# Patient Record
Sex: FEMALE | Race: WHITE | NOT HISPANIC OR LATINO | Employment: STUDENT | URBAN - METROPOLITAN AREA
[De-identification: names, ages, dates, MRNs, and addresses within clinical notes are randomized per-mention and may not be internally consistent; named-entity substitution may affect disease eponyms.]

---

## 2017-01-07 ENCOUNTER — TELEPHONE (OUTPATIENT)
Dept: OTHER | Facility: CLINIC | Age: 22
End: 2017-01-07

## 2017-01-07 NOTE — TELEPHONE ENCOUNTER
Telephone Encounter  Date: 1/7/2017    Mom, Anny, called regarding Marleni who is a patient of Dr. Leger with CF. Mom reports that Marleni has had cough, productive of sputum as well as low grade fevers. She is still able to maintain oral intake.     I advised them to present to the Urgent Care for evaluation. Mom is very anxious about going to the  as they have never been to one outside of [facility], though they agreed that Marleni should be evaluated. We recommend checking respiratory viral panel, CXR, and sending sputum for CF cultures.     Discussed with Dr. Lorenzana.     Sadia Norwood MD PGY-5  Pulmonary & Critical Care Fellow

## 2017-02-09 ENCOUNTER — TELEPHONE (OUTPATIENT)
Dept: PULMONOLOGY | Facility: CLINIC | Age: 22
End: 2017-02-09

## 2017-02-09 NOTE — TELEPHONE ENCOUNTER
Prior Authorization Specialty Medication Request    Medication/Dose: Cayston 75 mg  Diagnosis and ICD: E84.0  New/Renewal/Insurance Change PA:  [place], [phone]   ID: [id number]    Important Lab Values:     Previously Tried and Failed Therapies:     Rationale:     Would you like to include any research articles?  If yes please include the hyperlink(s) below or fax @ [phone].    (Include Name and MRN)    If you received a fax notification from an outside Pharmacy;  Pharmacy Name:  [facility]  Pharmacy #:  [phone]  Pharmacy Fax:  [phone]

## 2017-02-14 DIAGNOSIS — E84.9 CF (CYSTIC FIBROSIS) (H): ICD-10-CM

## 2017-02-14 DIAGNOSIS — K86.89 PANCREATIC INSUFFICIENCY: ICD-10-CM

## 2017-02-14 RX ORDER — ERGOCALCIFEROL 1.25 MG/1
50000 CAPSULE, LIQUID FILLED ORAL
Qty: 15 CAPSULE | Refills: 0 | Status: SHIPPED | OUTPATIENT
Start: 2017-02-15 | End: 2017-03-21

## 2017-02-14 NOTE — TELEPHONE ENCOUNTER
[facility] Prior Authorization Team   Phone: [phone]  Fax: [phone]      PA Initiation    Medication: Cayston 75 mg-PENDING  Insurance Company: [employer] - Phone [phone] Fax [phone]  Pharmacy Filling the Rx: [place], [place] - [address] [place] [address]  Filling Pharmacy Phone: [phone]  Filling Pharmacy Fax: [phone]  Start Date: 2/14/2017

## 2017-02-16 NOTE — TELEPHONE ENCOUNTER
Prior Authorization Approval    Authorization Effective Date: 2/14/2017  Authorization Expiration Date: 2/14/2018  Medication: Cayston 75 mg - Approved  Approved Dose/Quantity: Take 1 mL (75 mg) by nebulization 3 times daily 28 days on, 28 days off.  Reference #: [id number]   Insurance Company: [employer] - Phone [phone] Fax [phone]  Expected CoPay:       CoPay Card Available:      Foundation Assistance Needed:    Which Pharmacy is filling the prescription (Not needed for infusion/clinic administered): [place], [place] - [address] [place] [address]  Pharmacy Notified: Yes  Patient Notified: Yes

## 2017-03-21 DIAGNOSIS — E84.9 CF (CYSTIC FIBROSIS) (H): ICD-10-CM

## 2017-03-21 DIAGNOSIS — K86.89 PANCREATIC INSUFFICIENCY: ICD-10-CM

## 2017-03-21 RX ORDER — ERGOCALCIFEROL 1.25 MG/1
50000 CAPSULE, LIQUID FILLED ORAL
Qty: 30 CAPSULE | Refills: 3 | Status: SHIPPED | OUTPATIENT
Start: 2017-03-22 | End: 2017-12-17

## 2017-04-12 DIAGNOSIS — E84.9 CF (CYSTIC FIBROSIS) (H): ICD-10-CM

## 2017-04-25 DIAGNOSIS — E84.0 CYSTIC FIBROSIS WITH PULMONARY MANIFESTATIONS (H): Primary | ICD-10-CM

## 2017-05-03 DIAGNOSIS — E84.9 CF (CYSTIC FIBROSIS) (H): ICD-10-CM

## 2017-05-03 RX ORDER — MV-MIN 51/FOLIC ACID/VIT K/UBI 100-350MCG
2 TABLET,CHEWABLE ORAL DAILY
Qty: 60 TABLET | Refills: 3 | Status: SHIPPED | OUTPATIENT
Start: 2017-05-03 | End: 2017-09-09

## 2017-07-10 DIAGNOSIS — E84.9 CF (CYSTIC FIBROSIS) (H): Primary | ICD-10-CM

## 2017-07-10 NOTE — TELEPHONE ENCOUNTER
Drug Name: [pulmozyme 1mg/ml  Last Fill Date: 6/7/17  Quantity: 75    Allyson Zuletakanika   [facility]  [phone]

## 2017-07-27 DIAGNOSIS — E84.9 CF (CYSTIC FIBROSIS) (H): Primary | ICD-10-CM

## 2017-07-27 RX ORDER — FLUTICASONE PROPIONATE AND SALMETEROL XINAFOATE 115; 21 UG/1; UG/1
AEROSOL, METERED RESPIRATORY (INHALATION)
Qty: 12 INHALER | Refills: 9 | Status: SHIPPED | OUTPATIENT
Start: 2017-07-27 | End: 2018-10-02

## 2017-08-31 DIAGNOSIS — E84.9 CF (CYSTIC FIBROSIS) (H): ICD-10-CM

## 2017-09-05 RX ORDER — ALBUTEROL SULFATE 0.83 MG/ML
SOLUTION RESPIRATORY (INHALATION)
Qty: 360 ML | Refills: 6 | Status: SHIPPED | OUTPATIENT
Start: 2017-09-05 | End: 2018-09-15

## 2017-09-09 DIAGNOSIS — E84.9 CF (CYSTIC FIBROSIS) (H): ICD-10-CM

## 2017-09-11 RX ORDER — MV-MIN 51/FOLIC ACID/VIT K/UBI 100-350MCG
TABLET,CHEWABLE ORAL
Qty: 60 TABLET | Refills: 3 | Status: SHIPPED | OUTPATIENT
Start: 2017-09-11 | End: 2018-01-03

## 2017-09-26 DIAGNOSIS — E84.0 CYSTIC FIBROSIS WITH PULMONARY MANIFESTATIONS (H): ICD-10-CM

## 2017-09-26 RX ORDER — AZITHROMYCIN 500 MG/1
TABLET, FILM COATED ORAL
Qty: 30 TABLET | Refills: 3 | Status: CANCELLED | OUTPATIENT
Start: 2017-09-26

## 2017-09-26 RX ORDER — AZITHROMYCIN 500 MG/1
500 TABLET, FILM COATED ORAL
Qty: 30 TABLET | Refills: 3 | Status: SHIPPED | OUTPATIENT
Start: 2017-09-27 | End: 2018-06-15

## 2017-11-16 DIAGNOSIS — E84.0 CYSTIC FIBROSIS WITH PULMONARY MANIFESTATIONS (H): ICD-10-CM

## 2017-11-20 RX ORDER — PANCRELIPASE 24000; 76000; 120000 [USP'U]/1; [USP'U]/1; [USP'U]/1
CAPSULE, DELAYED RELEASE PELLETS ORAL
Qty: 180 CAPSULE | Refills: 11 | Status: SHIPPED | OUTPATIENT
Start: 2017-11-20 | End: 2018-02-06

## 2017-12-13 ENCOUNTER — TELEPHONE (OUTPATIENT)
Dept: PULMONOLOGY | Facility: CLINIC | Age: 22
End: 2017-12-13

## 2017-12-15 NOTE — TELEPHONE ENCOUNTER
[facility] Prior Authorization Team   Phone: [phone]  Fax: [phone]    Prior Authorization Approval    Authorization Effective Date: 11/13/2017  Authorization Expiration Date: 12/13/2018  Medication: PULMOZYME 1 MG/ML neb solution  Approved Dose/Quantity: Inhale 2.5 mg into the lungs daily / #75  Reference #: CMM KEY#: [id number]   Insurance Company: [employer] - Phone [phone] Fax [phone]  Expected CoPay: $0.00     CoPay Card Available:      Foundation Assistance Needed:    Which Pharmacy is filling the prescription (Not needed for infusion/clinic administered): [facility] - [place], [place] - [facility] [address]  Pharmacy Notified: Yes  Patient Notified: Yes

## 2017-12-17 DIAGNOSIS — E84.9 CF (CYSTIC FIBROSIS) (H): ICD-10-CM

## 2017-12-17 DIAGNOSIS — K86.89 PANCREATIC INSUFFICIENCY: ICD-10-CM

## 2017-12-19 RX ORDER — ERGOCALCIFEROL 1.25 MG/1
CAPSULE, LIQUID FILLED ORAL
Qty: 30 CAPSULE | Refills: 2 | Status: SHIPPED | OUTPATIENT
Start: 2017-12-19 | End: 2018-07-15

## 2017-12-20 ENCOUNTER — TELEPHONE (OUTPATIENT)
Dept: CARE COORDINATION | Facility: CLINIC | Age: 22
End: 2017-12-20

## 2017-12-20 NOTE — TELEPHONE ENCOUNTER
Adult Cystic Fibrosis Program  Social Work Phone Consult    Data:   SW informed that JESSENIA's mother, Anny, cancelled an appointment at the [facility] due to not having enough gas money to get to clinic. SW reached out to Anny to provide resources for getting to clinic. SW left a voicemail with a call back number, encouraging Anny to call back at any time. Will remain available for assistance as able/needed.    Intervention:   -Outreach re: missed clinic appointment; offered resources    Assessment: SW will remain available to provide resources re: getting to clinic as able/needed.    Plan:   -Continue to assist with above related concern(s) as able/needed.  -Continue to follow through regular clinic consult.  -Continue to follow for any psychosocial needs that may arise.  -Complete full psychosocial assessment annually.       AILYN Nj, [facility]  Adult Cystic Fibrosis   (Pager) [phone]  (Phone) [phone]

## 2017-12-21 ENCOUNTER — TELEPHONE (OUTPATIENT)
Dept: PHARMACY | Facility: CLINIC | Age: 22
End: 2017-12-21

## 2017-12-21 DIAGNOSIS — E84.9 CF (CYSTIC FIBROSIS) (H): ICD-10-CM

## 2017-12-21 RX ORDER — TOBRAMYCIN INHALATION SOLUTION 300 MG/5ML
300 INHALANT RESPIRATORY (INHALATION) 2 TIMES DAILY
Qty: 280 ML | Refills: 6 | Status: SHIPPED | OUTPATIENT
Start: 2017-12-21 | End: 2019-01-07

## 2017-12-21 NOTE — TELEPHONE ENCOUNTER
PA Initiation    Medication: tobramycin 300mg/5ml  Insurance Company: [employer] - Phone [phone] Fax [phone]  Pharmacy Filling the Rx: [facility] - [place], [place] - [facility] [address]  Filling Pharmacy Phone: [phone]  Filling Pharmacy Fax: [phone]  Start Date: 12/21/2017    [facility] faxed back requesting additional information,provide documentation that the patient has been seen within the last year and that the medication works.

## 2017-12-22 NOTE — TELEPHONE ENCOUNTER
Prior Authorization Approval    Authorization Effective Date: 11/21/2017  Authorization Expiration Date: 12/22/2018  Medication: tobramycin 300mg/5ml (approved)  Approved Dose/Quantity: 280 per 28 days  Reference #:     Insurance Company: [employer] - Phone [phone] Fax [phone]  Expected CoPay:       CoPay Card Available:      Foundation Assistance Needed:    Which Pharmacy is filling the prescription (Not needed for infusion/clinic administered): [facility] - [place], [place] - [facility] [address]  Pharmacy Notified:  yes  Patient Notified:

## 2017-12-22 NOTE — TELEPHONE ENCOUNTER
Per call to [facility], they never received the additional info that was sent. refaxed marked urgent.

## 2018-01-03 DIAGNOSIS — E84.9 CF (CYSTIC FIBROSIS) (H): ICD-10-CM

## 2018-01-03 NOTE — TELEPHONE ENCOUNTER
APPT INFO    Date /Time: 1/10/18 at 8:30AM   Reason for Appt: Sinusitis   Ref Provider/Clinic: [facility]   Are there internal records? Yes/No?  IF YES, list clinic names: No   Are there outside records? Yes/No? Yes   Patient Contact (Y/N) & Call Details: No, referred   Action: Faxed records request.     OUTSIDE RECORDS CHECKLIST     CLINIC NAME COMMENTS REC (x) IMG (x)   [facility] Waiting for imaging X X

## 2018-01-04 RX ORDER — MV-MIN 51/FOLIC ACID/VIT K/UBI 100-350MCG
TABLET,CHEWABLE ORAL
Qty: 60 TABLET | Refills: 3 | Status: SHIPPED | OUTPATIENT
Start: 2018-01-04 | End: 2018-06-11

## 2018-01-05 NOTE — TELEPHONE ENCOUNTER
Phone Call:    Who did you talk to? (or) Who did you call? Anny, pt's mother   Call Detail/Action: Anny called back, stated pt was seen in 2015.  Emailed BO to [email or web address]

## 2018-01-05 NOTE — TELEPHONE ENCOUNTER
Note:   [facility] denial from [facility], they need signed BO     Phone Call:    Who did you talk to? (or) [facility]   Call Detail/Action: [facility] and spoke to [facility], stated there is no referral on file and was last seen in 10/2015.  Need signed BO     Phone Call:    Who did you talk to? (or) Who did you call? Patient   Call Detail/Action: Called pt and left VM, need to send BO for [facility].  Also need to find out where she had surgery.  Waiting to hear back.

## 2018-01-08 NOTE — TELEPHONE ENCOUNTER
Phone Call:    Who did you talk to? (or) Who did you call? Anny, pt's mother   Call Detail/Action: Pt's call back, stated she didn't get the form back.  Emailed her again and had her stay on the phone.  Verified it was received. She will return it to me before the end of the day.

## 2018-01-09 NOTE — TELEPHONE ENCOUNTER
Note:   Pt's mother returned BO but it is legible.  Emailed pt's mother to send another copy.

## 2018-01-10 ENCOUNTER — PRE VISIT (OUTPATIENT)
Dept: OTOLARYNGOLOGY | Facility: CLINIC | Age: 23
End: 2018-01-10

## 2018-01-10 NOTE — TELEPHONE ENCOUNTER
Note:   APPT RE-SCHEDULED TO 2/28/18     ACTION    What did you do? Received BO, faxed with request.

## 2018-01-12 ENCOUNTER — TELEPHONE (OUTPATIENT)
Dept: PULMONOLOGY | Facility: CLINIC | Age: 23
End: 2018-01-12

## 2018-01-12 DIAGNOSIS — E84.9 CF (CYSTIC FIBROSIS) (H): Primary | ICD-10-CM

## 2018-01-12 RX ORDER — DOXYCYCLINE HYCLATE 100 MG
100 TABLET ORAL 2 TIMES DAILY
Qty: 42 TABLET | Refills: 0 | Status: SHIPPED | OUTPATIENT
Start: 2018-01-12 | End: 2018-02-02

## 2018-01-12 NOTE — TELEPHONE ENCOUNTER
"[facility] [place] [facility]  January 12, 2018    Mickey Peterson    CF Provider: Edi Leger MD    Caller: MotherAnny    Clinical information:  Marleni Alamo has started coughing the past 2 days. \"Bad crunchy\" sounding cough. No fevers. Mother reports that Marleni seems lethargic. Blood sugars are WNL. Last clinic visit was12/20/2016. Mother reports that Marleni was to be brought to clinic in August by her father, who told mother he had taken her, but cancelled the appointment. The next scheduled appointment was cancelled because of no funds for gas.   Currently vesting twice a day.    Plan:   Discussed with Dr Leger:  Doxycycline 100 mg BID x3 weeks.  Increase vest to three times/day.  Needs clinic appointment soon. Appointment scheduled for Wednesday, January 17th.  Call back with any new or worsening symptoms/concerns.    Caller verbalized understanding of plan and agrees with advice given.     "

## 2018-01-24 NOTE — TELEPHONE ENCOUNTER
[facility]    DATE/EXAM/LOCATION  (specify location if different)   Office Notes: 2/24/00, 8/29/00, 8/30/00, 2/7/15, 10/6/15, 3/18/15, 3/7/17,    ED/Hospital Notes: 2/3/15   Radiology Reports: CT Maxillofacial  10/6/15   Operative Notes: Endoscopic Sinus Surgery 10/29/15   Path/Culture Reports: Wound Culture Sinus 10/25/15  Throat Culture 3/18/15, 7/31/15   Missing: Imaging?     ACTION    What did you do? [facility]

## 2018-01-29 NOTE — TELEPHONE ENCOUNTER
Received Imaging From: [facility]     Image Type (x): Disc:_x__  Pacs:___      Exam Date/Name: CT Maxillofacial 10/6/15 Comments: sent to hosp. Film room.

## 2018-02-06 DIAGNOSIS — E84.0 CYSTIC FIBROSIS WITH PULMONARY MANIFESTATIONS (H): ICD-10-CM

## 2018-02-22 ENCOUNTER — ALLIED HEALTH/NURSE VISIT (OUTPATIENT)
Dept: PHARMACY | Facility: CLINIC | Age: 23
End: 2018-02-22
Payer: COMMERCIAL

## 2018-02-22 DIAGNOSIS — E84.9 CF (CYSTIC FIBROSIS) (H): Primary | ICD-10-CM

## 2018-02-22 PROCEDURE — 99207 ZZC NO CHARGE LOS: CPT | Performed by: PHARMACIST

## 2018-02-22 NOTE — MR AVS SNAPSHOT
After Visit Summary   2/22/2018    Marleni Alamo    MRN: [id number]           Patient Information     Date Of Birth          1995        Visit Information        Provider Department      2/22/2018 1:30 PM Fay Jay RPH [facility] Cystic Fib[facility]        Today's Diagnoses     CF (cystic fibrosis) (H)    -  1       Follow-ups after your visit        Your next 10 appointments already scheduled     Feb 26, 2018  9:00 AM CST   CF LOOP with  PFL CF   [facility] Pulmonary Function Testing ([facility])    [address] [facility]  3rd Floor  [facility] [id number] [phone]            Feb 26, 2018  9:40 AM CST   (Arrive by 9:25 AM)   RETURN CYSTIC FIBROSIS VISIT with Yahaira Trinidad PA-C,  Cf Nutritionist   [facility] ([facility])    [address] [facility]  [address] [place] [id number] [phone]            Feb 28, 2018  3:00 PM CST   (Arrive by 2:45 PM)   NEW RHINOLOGY with Anny Carbajal MD   [facility] Ear Nose and Throat ([facility])    [address]  4th Floor  [facility] [id number] [phone]              Who to contact     If you have questions or need follow up information about today's clinic visit or your schedule please contact [facility]AT[facility]IC CLINIC directly at [phone].  Normal or non-critical lab and imaging results will be communicated to you by MyChart, letter or phone within 4 business days after the clinic has received the results. If you do not hear from us within 7 days, please contact the clinic through MyChart or phone. If you have a critical or abnormal lab result, we will notify you by phone as soon as possible.  Submit refill requests through [employer] or call your pharmacy and they will forward the refill request to us. Please allow 3 business days for  "your refill to be completed.          Additional Information About Your Visit        [employer] Information     [employer] lets you send messages to your doctor, view your test results, renew your prescriptions, schedule appointments and more. To sign up, go to [email or web address] . Click on \"Log in\" on the left side of the screen, which will take you to the Welcome page. Then click on \"Sign up Now\" on the right side of the page.     You will be asked to enter the access code listed below, as well as some personal information. Please follow the directions to create your username and password.     Your access code is: [email or web address]  Expires: 3/11/2018  6:30 AM     Your access code will  in 90 days. If you need help or a new code, please call your [facility] clinic or [phone].        Care EveryWhere ID     This is your Care EveryWhere ID. This could be used by other organizations to access your [facility] medical records  [id number]         Blood Pressure from Last 3 Encounters:   16 109/68   16 113/74   13 121/76    Weight from Last 3 Encounters:   16 139 lb 8.8 oz (63.3 kg)   13 120 lb (54.4 kg) (43 %)*     * Growth percentiles are based on [facility] 2-20 Years data.              Today, you had the following     No orders found for display       Primary Care Provider Office Phone # Fax #    Mickey Peterson [phone]       [facility] [address] [place] [address] [place] [id number]        Equal Access to Services     [facility] Hospital[facility] : Hadii dipak red hadashandrew Soliz, waaxda luqadaha, qaybta kaalmada ana, vinny lynn. So [facility] [phone].    ATENCIÓN: Si habla español, tiene a philip disposición servicios gratuitos de asistencia lingüística. Llame al [phone].    We comply with applicable federal civil rights laws and [place] laws. We do not discriminate on the basis of race, color, national origin, age, disability, " sex, sexual orientation, or gender identity.            Thank you!     Thank you for choosing [facility]IAT[facility]IC CLINIC  for your care. Our goal is always to provide you with excellent care. Hearing back from our patients is one way we can continue to improve our services. Please take a few minutes to complete the written survey that you may receive in the mail after your visit with us. Thank you!             Your Updated Medication List - Protect others around you: Learn how to safely use, store and throw away your medicines at www.disposemymeds.org.          This list is accurate as of 2/22/18  1:57 PM.  Always use your most recent med list.                   Brand Name Dispense Instructions for use Diagnosis    albuterol (2.5 MG/3ML) 0.083% neb solution     360 mL    TAKE 1 VIAL (2.5 MG) BY NEBULIZATION 4 TIMES DAILY    CF (cystic fibrosis) (H)       amylase-lipase-protease 55051-51330 UNITS Cpep per EC capsule    CREON    180 capsule    TAKE 4 CAPSULES WITH MEALS AND 3 CAPSULES WITH SNACKS, FOR 3 MEALS AND 2 SNACKS PER DAY.    Cystic fibrosis with pulmonary manifestations (H)       azithromycin 500 MG tablet    ZITHROMAX    30 tablet    Take 1 tablet (500 mg) by mouth Every Mon, Wed, Fri Morning    Cystic fibrosis with pulmonary manifestations (H)       aztreonam lysine 75 MG Solr    CAYSTON    84 mL    Take 1 mL (75 mg) by nebulization 3 times daily 28 days on, 28 days off.    CF (cystic fibrosis) (H)       dornase alpha 1 MG/ML neb solution    PULMOZYME    75 mL    Inhale 2.5 mg into the lungs daily    CF (cystic fibrosis) (H)       fluticasone-salmeterol 115-21 MCG/ACT Inhaler    ADVAIR-HFA    12 Inhaler    INHALE TWO PUFFS BY MOUTH TWICE DAILY    CF (cystic fibrosis) (H)       insulin aspart 100 UNIT/ML injection    NovoLOG PEN     2 units per 15g carbohydrate with meals and correction scale        insulin glargine 100 UNIT/ML injection    LANTUS     Inject 15 Units  Subcutaneous every morning        multivitamin CF formula chewable tablet     60 tablet    TAKE 2 TABLETS BY MOUTH DAILY    CF (cystic fibrosis) (H)       omeprazole 20 MG CR capsule    priLOSEC    60 capsule    Take 1 capsule (20 mg) by mouth 2 times daily    CF (cystic fibrosis) (H)       sodium chloride inhalant 7 % Nebu neb solution     4 mL    Take 4 mLs by nebulization daily UNCLEAR IF THIS IS ON THE CURRENT LIST        tobramycin (PF) 300 MG/5ML neb solution    CHER    280 mL    Take 5 mLs (300 mg) by nebulization 2 times daily    CF (cystic fibrosis) (H)       vitamin D 41182 UNIT capsule    ERGOCALCIFEROL    30 capsule    TAKE 1 CAPSULE (50,000 UNITS) BY MOUTH EVERY MON, WED, FRI MORNING    CF (cystic fibrosis) (H), Pancreatic insufficiency

## 2018-02-22 NOTE — PROGRESS NOTES
At the request of patient's provider, a pre-visit chart review was completed.    CFTR:   Patient is eligible for treatment with Symdeko (tezacaftor/ivacaftor) based on their CF genotype and age.  Patient s genotype is [id number]  Patient is currently not on a CFTR modulator.  Side effects of Kalydeco/Orkambi include n/a  LFTs have not been check for a few years.  Drug-drug interactions with Symdeko (tezacaftor/ivacaftor) no significant drug-drug interactions identified  Dose adjustment needed for Symdeko none  Dose adjustment needed for concomitant medications none    Recommendation: If able to get LFTs drawn quarterly for a year then annually thereafter would be a candidate for Symdeko.        Fay Jay PharmD  CF Clinic Pharmacist  Phone: [phone]  E-mail: [email or web address]

## 2018-02-26 ENCOUNTER — RADIANT APPOINTMENT (OUTPATIENT)
Dept: GENERAL RADIOLOGY | Facility: CLINIC | Age: 23
End: 2018-02-26
Attending: PHYSICIAN ASSISTANT
Payer: COMMERCIAL

## 2018-02-26 ENCOUNTER — OFFICE VISIT (OUTPATIENT)
Dept: PULMONOLOGY | Facility: CLINIC | Age: 23
End: 2018-02-26
Attending: PHYSICIAN ASSISTANT
Payer: COMMERCIAL

## 2018-02-26 ENCOUNTER — ALLIED HEALTH/NURSE VISIT (OUTPATIENT)
Dept: CARE COORDINATION | Facility: CLINIC | Age: 23
End: 2018-02-26

## 2018-02-26 VITALS
DIASTOLIC BLOOD PRESSURE: 73 MMHG | SYSTOLIC BLOOD PRESSURE: 110 MMHG | BODY MASS INDEX: 26.74 KG/M2 | HEART RATE: 77 BPM | RESPIRATION RATE: 16 BRPM | WEIGHT: 136.91 LBS | OXYGEN SATURATION: 97 %

## 2018-02-26 DIAGNOSIS — K21.9 GASTROESOPHAGEAL REFLUX DISEASE WITHOUT ESOPHAGITIS: ICD-10-CM

## 2018-02-26 DIAGNOSIS — E08.9 DIABETES MELLITUS RELATED TO CYSTIC FIBROSIS (H): ICD-10-CM

## 2018-02-26 DIAGNOSIS — E84.9 CF (CYSTIC FIBROSIS) (H): Primary | ICD-10-CM

## 2018-02-26 DIAGNOSIS — E84.9 CF (CYSTIC FIBROSIS) (H): ICD-10-CM

## 2018-02-26 DIAGNOSIS — E08.9 DIABETES MELLITUS DUE TO CYSTIC FIBROSIS (H): ICD-10-CM

## 2018-02-26 DIAGNOSIS — E84.9 DIABETES MELLITUS DUE TO CYSTIC FIBROSIS (H): ICD-10-CM

## 2018-02-26 DIAGNOSIS — Q87.0 PIERRE ROBIN SYNDROME: ICD-10-CM

## 2018-02-26 DIAGNOSIS — E84.8 DIABETES MELLITUS RELATED TO CYSTIC FIBROSIS (H): ICD-10-CM

## 2018-02-26 DIAGNOSIS — Z71.9 ENCOUNTER FOR COUNSELING: Primary | ICD-10-CM

## 2018-02-26 DIAGNOSIS — K86.89 PANCREATIC INSUFFICIENCY: ICD-10-CM

## 2018-02-26 LAB
ALBUMIN SERPL-MCNC: 4 G/DL (ref 3.4–5)
ALBUMIN UR-MCNC: 30 MG/DL
ALP SERPL-CCNC: 209 U/L (ref 40–150)
ALT SERPL W P-5'-P-CCNC: 66 U/L (ref 0–50)
AMORPH CRY #/AREA URNS HPF: ABNORMAL /HPF
ANION GAP SERPL CALCULATED.3IONS-SCNC: 8 MMOL/L (ref 3–14)
APPEARANCE UR: ABNORMAL
AST SERPL W P-5'-P-CCNC: 25 U/L (ref 0–45)
BACTERIA #/AREA URNS HPF: ABNORMAL /HPF
BASOPHILS # BLD AUTO: 0 10E9/L (ref 0–0.2)
BASOPHILS NFR BLD AUTO: 0.4 %
BILIRUB UR QL STRIP: NEGATIVE
BUN SERPL-MCNC: 13 MG/DL (ref 7–30)
CALCIUM SERPL-MCNC: 9.7 MG/DL (ref 8.5–10.1)
CHLORIDE SERPL-SCNC: 103 MMOL/L (ref 94–109)
CHOLEST SERPL-MCNC: 139 MG/DL
CO2 SERPL-SCNC: 26 MMOL/L (ref 20–32)
COLOR UR AUTO: ABNORMAL
CREAT SERPL-MCNC: 0.53 MG/DL (ref 0.52–1.04)
CREAT UR-MCNC: 218 MG/DL
DEPRECATED CALCIDIOL+CALCIFEROL SERPL-MC: 39 UG/L (ref 20–75)
DIFFERENTIAL METHOD BLD: ABNORMAL
EOSINOPHIL # BLD AUTO: 0.1 10E9/L (ref 0–0.7)
EOSINOPHIL NFR BLD AUTO: 1.5 %
ERYTHROCYTE [DISTWIDTH] IN BLOOD BY AUTOMATED COUNT: 15.9 % (ref 10–15)
ERYTHROCYTE [SEDIMENTATION RATE] IN BLOOD BY WESTERGREN METHOD: 25 MM/H (ref 0–20)
GFR SERPL CREATININE-BSD FRML MDRD: >90 ML/MIN/1.7M2
GGT SERPL-CCNC: 198 U/L (ref 0–40)
GLUCOSE SERPL-MCNC: 168 MG/DL (ref 70–99)
GLUCOSE UR STRIP-MCNC: 50 MG/DL
HBA1C MFR BLD: 8.1 % (ref 4.3–6)
HCT VFR BLD AUTO: 41.3 % (ref 35–47)
HDLC SERPL-MCNC: 52 MG/DL
HGB BLD-MCNC: 12.9 G/DL (ref 11.7–15.7)
HGB UR QL STRIP: NEGATIVE
IMM GRANULOCYTES # BLD: 0 10E9/L (ref 0–0.4)
IMM GRANULOCYTES NFR BLD: 0.3 %
INR PPP: 0.97 (ref 0.86–1.14)
IRON SERPL-MCNC: 27 UG/DL (ref 35–180)
KETONES UR STRIP-MCNC: 5 MG/DL
LDLC SERPL CALC-MCNC: 58 MG/DL
LEUKOCYTE ESTERASE UR QL STRIP: ABNORMAL
LYMPHOCYTES # BLD AUTO: 2.6 10E9/L (ref 0.8–5.3)
LYMPHOCYTES NFR BLD AUTO: 26.8 %
MAGNESIUM SERPL-MCNC: 2.1 MG/DL (ref 1.6–2.3)
MCH RBC QN AUTO: 25.7 PG (ref 26.5–33)
MCHC RBC AUTO-ENTMCNC: 31.2 G/DL (ref 31.5–36.5)
MCV RBC AUTO: 82 FL (ref 78–100)
MICROALBUMIN UR-MCNC: 37 MG/L
MICROALBUMIN/CREAT UR: 16.79 MG/G CR (ref 0–25)
MONOCYTES # BLD AUTO: 0.8 10E9/L (ref 0–1.3)
MONOCYTES NFR BLD AUTO: 8 %
MUCOUS THREADS #/AREA URNS LPF: PRESENT /LPF
NEUTROPHILS # BLD AUTO: 6 10E9/L (ref 1.6–8.3)
NEUTROPHILS NFR BLD AUTO: 63 %
NITRATE UR QL: NEGATIVE
NONHDLC SERPL-MCNC: 88 MG/DL
NRBC # BLD AUTO: 0 10*3/UL
NRBC BLD AUTO-RTO: 0 /100
PH UR STRIP: 6 PH (ref 5–7)
PHOSPHATE SERPL-MCNC: 3.1 MG/DL (ref 2.5–4.5)
PLATELET # BLD AUTO: 249 10E9/L (ref 150–450)
POTASSIUM SERPL-SCNC: 4.5 MMOL/L (ref 3.4–5.3)
PROT SERPL-MCNC: 8.6 G/DL (ref 6.8–8.8)
RBC # BLD AUTO: 5.01 10E12/L (ref 3.8–5.2)
RBC #/AREA URNS AUTO: 6 /HPF (ref 0–2)
SODIUM SERPL-SCNC: 137 MMOL/L (ref 133–144)
SOURCE: ABNORMAL
SP GR UR STRIP: 1.03 (ref 1–1.03)
SQUAMOUS #/AREA URNS AUTO: 21 /HPF (ref 0–1)
TRANS CELLS #/AREA URNS HPF: <1 /HPF
TRIGL SERPL-MCNC: 150 MG/DL
TSH SERPL DL<=0.005 MIU/L-ACNC: 3.77 MU/L (ref 0.4–4)
UROBILINOGEN UR STRIP-MCNC: 2 MG/DL (ref 0–2)
WBC # BLD AUTO: 9.5 10E9/L (ref 4–11)
WBC #/AREA URNS AUTO: 32 /HPF (ref 0–2)
YEAST #/AREA URNS HPF: ABNORMAL /HPF

## 2018-02-26 PROCEDURE — 97803 MED NUTRITION INDIV SUBSEQ: CPT | Mod: ZF | Performed by: DIETITIAN, REGISTERED

## 2018-02-26 PROCEDURE — 82784 ASSAY IGA/IGD/IGG/IGM EACH: CPT | Performed by: PHYSICIAN ASSISTANT

## 2018-02-26 PROCEDURE — 85652 RBC SED RATE AUTOMATED: CPT | Performed by: PHYSICIAN ASSISTANT

## 2018-02-26 PROCEDURE — 80069 RENAL FUNCTION PANEL: CPT | Performed by: PHYSICIAN ASSISTANT

## 2018-02-26 PROCEDURE — 80061 LIPID PANEL: CPT | Performed by: PHYSICIAN ASSISTANT

## 2018-02-26 PROCEDURE — 87070 CULTURE OTHR SPECIMN AEROBIC: CPT | Performed by: PHYSICIAN ASSISTANT

## 2018-02-26 PROCEDURE — 81001 URINALYSIS AUTO W/SCOPE: CPT | Performed by: PHYSICIAN ASSISTANT

## 2018-02-26 PROCEDURE — 84446 ASSAY OF VITAMIN E: CPT | Performed by: PHYSICIAN ASSISTANT

## 2018-02-26 PROCEDURE — 87077 CULTURE AEROBIC IDENTIFY: CPT | Performed by: PHYSICIAN ASSISTANT

## 2018-02-26 PROCEDURE — 84450 TRANSFERASE (AST) (SGOT): CPT | Performed by: PHYSICIAN ASSISTANT

## 2018-02-26 PROCEDURE — 84460 ALANINE AMINO (ALT) (SGPT): CPT | Performed by: PHYSICIAN ASSISTANT

## 2018-02-26 PROCEDURE — 82306 VITAMIN D 25 HYDROXY: CPT | Performed by: PHYSICIAN ASSISTANT

## 2018-02-26 PROCEDURE — 87186 SC STD MICRODIL/AGAR DIL: CPT | Performed by: PHYSICIAN ASSISTANT

## 2018-02-26 PROCEDURE — 84590 ASSAY OF VITAMIN A: CPT | Performed by: PHYSICIAN ASSISTANT

## 2018-02-26 PROCEDURE — 83540 ASSAY OF IRON: CPT | Performed by: PHYSICIAN ASSISTANT

## 2018-02-26 PROCEDURE — 85025 COMPLETE CBC W/AUTO DIFF WBC: CPT | Performed by: PHYSICIAN ASSISTANT

## 2018-02-26 PROCEDURE — 82043 UR ALBUMIN QUANTITATIVE: CPT | Performed by: PHYSICIAN ASSISTANT

## 2018-02-26 PROCEDURE — 84075 ASSAY ALKALINE PHOSPHATASE: CPT | Performed by: PHYSICIAN ASSISTANT

## 2018-02-26 PROCEDURE — 82785 ASSAY OF IGE: CPT | Performed by: PHYSICIAN ASSISTANT

## 2018-02-26 PROCEDURE — 84155 ASSAY OF PROTEIN SERUM: CPT | Performed by: PHYSICIAN ASSISTANT

## 2018-02-26 PROCEDURE — 85610 PROTHROMBIN TIME: CPT | Performed by: PHYSICIAN ASSISTANT

## 2018-02-26 PROCEDURE — 83036 HEMOGLOBIN GLYCOSYLATED A1C: CPT | Performed by: PHYSICIAN ASSISTANT

## 2018-02-26 PROCEDURE — 83735 ASSAY OF MAGNESIUM: CPT | Performed by: PHYSICIAN ASSISTANT

## 2018-02-26 PROCEDURE — 82977 ASSAY OF GGT: CPT | Performed by: PHYSICIAN ASSISTANT

## 2018-02-26 PROCEDURE — 84443 ASSAY THYROID STIM HORMONE: CPT | Performed by: PHYSICIAN ASSISTANT

## 2018-02-26 RX ORDER — BUDESONIDE 1 MG/2ML
1 INHALANT ORAL PRN
COMMUNITY
Start: 2018-02-26 | End: 2019-05-23

## 2018-02-26 ASSESSMENT — PAIN SCALES - GENERAL: PAINLEVEL: NO PAIN (0)

## 2018-02-26 NOTE — MR AVS SNAPSHOT
After Visit Summary   2/26/2018    Marleni Alamo    MRN: [id number]           Patient Information     Date Of Birth          1995        Visit Information        Provider Department      2/26/2018 9:40 AM Nutritionist, Olya Cf; Yahaira Trinidad PA-C 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 Continue nebulizers and vest therapy.   2. As discussed with myself and RT:    Vest 1: albuterol, Pulmicort, Pulmozyme and jamarcus (if on month)   Vest 2: albuterol, Pulmicort, hypertonic saline and tiobi (if on month)    Annual Studies:   No results found for: IGG  No results found for: INS  There are no preventive care reminders to display for this patient.    Pulmonary Function Tests  FEV1: amount of air you can blow out in 1 second  FVC: total amount of air you can take in and blow out    Your Goals:         PFT Latest Ref Rng & Units 2/26/2018   FVC L 2.79   FEV1 L 2.51   FVC% % 84   FEV1% % 86          Airway Clearance: The Most Important Way to Keep Your Lungs Healthy  Vest Settings:    Hill-Rom Frequencies: 8, 9, 10 Pressure 10 Then, Frequencies 18, 19, 20 Pressure 6      RespirTech: Quick Start with Pressure of     Do each frequency for 5 minutes; Deflate vest after each frequency & cough 3 times before beginning the next setting.    Vest and Neb Therapy should be done 2 times/day.    Good Nutrition Can Improve Lung Function and Overall Health     Take ALL of your vitamins with food     Take 1/2 of your enzymes before EVERY meal/snack and the other 1/2 mid-meal/snack    Wt  Readings from Last 3 Encounters:   02/26/18 62.1 kg (136 lb 14.5 oz)   07/25/16 63.3 kg (139 lb 8.8 oz)   05/12/13 54.4 kg (120 lb) (43 %)*     * Growth percentiles are based on [facility] 2-20 Years data.       Body mass index is 26.74 kg/(m^2).         National CF Foundation Recommendations for BMI in CF Adults: Women: at least 22 Men: at least 23        Controlling Blood Sugars Helps Prevent Lung Infections & Improves Nutrition  Test blood sugar:     In the morning before eating (goal is )     2 hours after a meal (goal is less than 150)     When pre-meal glucose is greater than 150 add correction     At bedtime (if less than 100 eat a snack with 15 grams of carbohydrates  Last A1C Results: No results found for: A1C      If diabetic, measure A1C every 6 months. Goal is under 7%.    Staying Healthy    Research: If you are interested in learning about research opportunities or have questions, please contact Stella Gatica at [phone] or [email or web address].      CF Foundation: Compass is a personalized resource service to help you with the insurance, financial, legal and other issues you are facing.  It's free, confidential and available to anyone with CF.  Ask your  for more information or contact Compass directly at 281-[facility] ([phone]) or compass@cff.org, or learn more at [email or web address].       CF Nurse Line:  Devon Montes De Oca: [phone]   Chhaya Munoz, RT: [phone]     Megan Gilmore , Dieticians: [phone]     Paulette Danielle, Diabetes Nurse: [phone]    Kaykay Tavarez: [phone] or Mary Weber [phone], Social Workers   [email or web address]            Follow-ups after your visit        Follow-up notes from your care team     Return in about 3 months (around 5/26/2018), or Please send down for labs and CXR; please schedule DEXA at next f/u.      Your next 10 appointments already scheduled     Feb 28, 2018  3:00 PM CST   (Arrive by 2:45 PM)    NEW RHINOLOGY with Anny Carbajal MD   [facility] ([facility])    [address] [facility] [address] [phone]              Future tests that were ordered for you today     Open Future Orders        Priority Expected Expires Ordered    Dexa hip/pelvis/spine* Routine 5/14/2018 5/28/2018 2/26/2018    Cystic Fibrosis Culture Aerob Bacterial Routine 5/14/2018 5/28/2018 2/26/2018    Spirometry, Breathing Capacity Routine 5/14/2018 5/28/2018 2/26/2018    Erythrocyte sedimentation rate auto Routine 2/26/2018 2/26/2018 2/26/2018    Routine UA with microscopic Routine 2/26/2018 2/26/2018 2/26/2018    Albumin Random Urine Quantitative with Creat Ratio Routine 2/26/2018 2/26/2018 2/26/2018    Nocardia culture Routine 2/26/2018 2/26/2018 2/26/2018    Fungus Culture, non-blood Routine 2/26/2018 2/26/2018 2/26/2018    X-ray Chest 2 vws* Routine 2/26/2018 2/26/2018 2/26/2018    ALT Routine 2/26/2018 2/26/2018 2/26/2018    Basic metabolic panel Routine 2/26/2018 2/26/2018 2/26/2018    Lipid Profile Routine 2/26/2018 2/26/2018 2/26/2018    Albumin level Routine 2/26/2018 2/26/2018 2/26/2018    Alkaline phosphatase Routine 2/26/2018 2/26/2018 2/26/2018    AST Routine 2/26/2018 2/26/2018 2/26/2018    Iron Routine 2/26/2018 2/26/2018 2/26/2018    GGT Routine 2/26/2018 2/26/2018 2/26/2018    Hemoglobin A1c Routine 2/26/2018 2/26/2018 2/26/2018    IgA Routine 2/26/2018 2/26/2018 2/26/2018    IgG Routine 2/26/2018 2/26/2018 2/26/2018    IgM Routine 2/26/2018 2/26/2018 2/26/2018    IgE Routine 2/26/2018 2/26/2018 2/26/2018    INR Routine 2/26/2018 2/26/2018 2/26/2018    Magnesium Routine 2/26/2018 2/26/2018 2/26/2018    Phosphorus Routine 2/26/2018 2/26/2018 2/26/2018    Testosterone total  Routine 2/26/2018 2/26/2018 2/26/2018    Protein total Routine 2/26/2018 2/26/2018 2/26/2018    TSH with free T4 reflex Routine 2/26/2018 2/26/2018 2/26/2018    Vitamin A Routine  "2018    Vitamin E Routine 2018    Vitamin D Deficiency Routine 2018    CBC with platelets differential Routine 2018            Who to contact     If you have questions or need follow up information about today's clinic visit or your schedule please contact [facility] directly at [phone].  Normal or non-critical lab and imaging results will be communicated to you by Mobimhart, letter or phone within 4 business days after the clinic has received the results. If you do not hear from us within 7 days, please contact the clinic through [employer] or phone. If you have a critical or abnormal lab result, we will notify you by phone as soon as possible.  Submit refill requests through [email or web address] or call your pharmacy and they will forward the refill request to us. Please allow 3 business days for your refill to be completed.          Additional Information About Your Visit        [email or web address] Information     [email or web address] lets you send messages to your doctor, view your test results, renew your prescriptions, schedule appointments and more. To sign up, go to [email or web address] . Click on \"Log in\" on the left side of the screen, which will take you to the Welcome page. Then click on \"Sign up Now\" on the right side of the page.     You will be asked to enter the access code listed below, as well as some personal information. Please follow the directions to create your username and password.     Your access code is: [email or web address]  Expires: 3/11/2018  6:30 AM     Your access code will  in 90 days. If you need help or a new code, please call your [place] clinic or [phone].        Care EveryWhere ID     This is your Care EveryWhere ID. This could be used by other organizations to access your [place] medical records  [id number]        Your Vitals Were     Pulse Respirations Pulse Oximetry BMI " (Body Mass Index)          77 16 97% 26.74 kg/m2         Blood Pressure from Last 3 Encounters:   02/26/18 110/73   12/30/16 109/68   07/25/16 113/74    Weight from Last 3 Encounters:   02/26/18 62.1 kg (136 lb 14.5 oz)   07/25/16 63.3 kg (139 lb 8.8 oz)   05/12/13 54.4 kg (120 lb) (43 %)*     * Growth percentiles are based on [facility] 2-20 Years data.              We Performed the Following     Cystic Fibrosis Culture Aerob Bacterial          Today's Medication Changes          These changes are accurate as of 2/26/18 12:13 PM.  If you have any questions, ask your nurse or doctor.               Stop taking these medicines if you haven't already. Please contact your care team if you have questions.     aztreonam lysine 75 MG Solr   Commonly known as:  CAYSTON   Stopped by:  Yahaira Trinidad PA-C                    Primary Care Provider Office Phone # Fax #    Mickey Peterson [phone]       [facility] [address] [place] [address] [place] [address]        Equal Access to Services     [facility] AH: Hadii aad ku hadasho Sosarahali, waaxda luqadaha, qaybta kaalmada adeegyada, vinny morales . So [facility] [phone].    ATENCIÓN: Si habla español, tiene a philip disposición servicios gratuitos de asistencia lingüística. Llame al [phone].    We comply with applicable federal civil rights laws and [place] laws. We do not discriminate on the basis of race, color, national origin, age, disability, sex, sexual orientation, or gender identity.            Thank you!     Thank you for choosing [facility]  for your care. Our goal is always to provide you with excellent care. Hearing back from our patients is one way we can continue to improve our services. Please take a few minutes to complete the written survey that you may receive in the mail after your visit with us. Thank you!             Your Updated Medication List - Protect  others around you: Learn how to safely use, store and throw away your medicines at www.disposemymeds.org.          This list is accurate as of 2/26/18 12:13 PM.  Always use your most recent med list.                   Brand Name Dispense Instructions for use Diagnosis    albuterol (2.5 MG/3ML) 0.083% neb solution     360 mL    TAKE 1 VIAL (2.5 MG) BY NEBULIZATION 4 TIMES DAILY    CF (cystic fibrosis) (H)       amylase-lipase-protease 17549-72500 UNITS Cpep per EC capsule    CREON    180 capsule    TAKE 4 CAPSULES WITH MEALS AND 3 CAPSULES WITH SNACKS, FOR 3 MEALS AND 2 SNACKS PER DAY.    Cystic fibrosis with pulmonary manifestations (H)       azithromycin 500 MG tablet    ZITHROMAX    30 tablet    Take 1 tablet (500 mg) by mouth Every Mon, Wed, Fri Morning    Cystic fibrosis with pulmonary manifestations (H)       dornase alpha 1 MG/ML neb solution    PULMOZYME    75 mL    Inhale 2.5 mg into the lungs daily    CF (cystic fibrosis) (H)       fluticasone-salmeterol 115-21 MCG/ACT Inhaler    ADVAIR-HFA    12 Inhaler    INHALE TWO PUFFS BY MOUTH TWICE DAILY    CF (cystic fibrosis) (H)       insulin aspart 100 UNIT/ML injection    NovoLOG PEN     2 units per 15g carbohydrate with meals and correction scale        insulin glargine 100 UNIT/ML injection    LANTUS     Inject 15 Units Subcutaneous every morning        multivitamin CF formula chewable tablet     60 tablet    TAKE 2 TABLETS BY MOUTH DAILY    CF (cystic fibrosis) (H)       omeprazole 20 MG CR capsule    priLOSEC    60 capsule    Take 1 capsule (20 mg) by mouth 2 times daily    CF (cystic fibrosis) (H)       PULMICORT 1 MG/2ML Susp neb solution   Generic drug:  budesonide      Take 2 mLs (1 mg) by nebulization 2 times daily    CF (cystic fibrosis) (H), Diabetes mellitus due to cystic fibrosis (H), Diabetes mellitus related to cystic fibrosis (H), Gastroesophageal reflux disease without esophagitis, 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       sodium chloride inhalant 7 % Nebu  neb solution     4 mL    Take 4 mLs by nebulization daily UNCLEAR IF THIS IS ON THE CURRENT LIST        tobramycin (PF) 300 MG/5ML neb solution    CHER    280 mL    Take 5 mLs (300 mg) by nebulization 2 times daily    CF (cystic fibrosis) (H)       vitamin D 63624 UNIT capsule    ERGOCALCIFEROL    30 capsule    TAKE 1 CAPSULE (50,000 UNITS) BY MOUTH EVERY MON, WED, FRI MORNING    CF (cystic fibrosis) (H), Pancreatic insufficiency

## 2018-02-26 NOTE — PATIENT INSTRUCTIONS
Cystic Fibrosis Self-Care Plan       Patient: Marleni Alamo   MRN: [id number]   Clinic Date: February 26, 2018     RECOMMENDATIONS:  1. Continue nebulizers and vest therapy.   2. As discussed with myself and RT:    Vest 1: albuterol, Pulmicort, Pulmozyme and jamarcus (if on month)   Vest 2: albuterol, Pulmicort, hypertonic saline and tiobi (if on month)    Annual Studies:   No results found for: IGG  No results found for: INS  There are no preventive care reminders to display for this patient.    Pulmonary Function Tests  FEV1: amount of air you can blow out in 1 second  FVC: total amount of air you can take in and blow out    Your Goals:         PFT Latest Ref Rng & Units 2/26/2018   FVC L 2.79   FEV1 L 2.51   FVC% % 84   FEV1% % 86          Airway Clearance: The Most Important Way to Keep Your Lungs Healthy  Vest Settings:    Hill-Rom Frequencies: 8, 9, 10 Pressure 10 Then, Frequencies 18, 19, 20 Pressure 6      RespirTech: Quick Start with Pressure of     Do each frequency for 5 minutes; Deflate vest after each frequency & cough 3 times before beginning the next setting.    Vest and Neb Therapy should be done 2 times/day.    Good Nutrition Can Improve Lung Function and Overall Health     Take ALL of your vitamins with food     Take 1/2 of your enzymes before EVERY meal/snack and the other 1/2 mid-meal/snack    Wt Readings from Last 3 Encounters:   02/26/18 62.1 kg (136 lb 14.5 oz)   07/25/16 63.3 kg (139 lb 8.8 oz)   05/12/13 54.4 kg (120 lb) (43 %)*     * Growth percentiles are based on CDC 2-20 Years data.       Body mass index is 26.74 kg/(m^2).         National CF Foundation Recommendations for BMI in CF Adults: Women: at least 22 Men: at least 23        Controlling Blood Sugars Helps Prevent Lung Infections & Improves Nutrition  Test blood sugar:     In the morning before eating (goal is )     2 hours after a meal (goal is less than 150)     When pre-meal glucose is greater than 150 add  correction     At bedtime (if less than 100 eat a snack with 15 grams of carbohydrates  Last A1C Results: No results found for: A1C      If diabetic, measure A1C every 6 months. Goal is under 7%.    Staying Healthy    Research: If you are interested in learning about research opportunities or have questions, please contact Stella Gatica at [phone] or [email or web address].       Foundation: Compass is a personalized resource service to help you with the insurance, financial, legal and other issues you are facing.  It's free, confidential and available to anyone with CF.  Ask your  for more information or contact Compass directly at [phone]-[facility] ([phone]) or compass@cff.org, or learn more at cff.org/compass.       CF Nurse Line:  Devon Montes De Oca: [phone]   Chhaya Munoz, RT: [phone]     Megan Goddard and Renetta Gilmore , Dieticians: [phone]     Pualette Danielle, Diabetes Nurse: [phone]    Kaykay Tavarez: [phone] or Mary Weber [phone], Social Workers   [email or web address]

## 2018-02-26 NOTE — PROGRESS NOTES
[facility]  [facility]  February 26, 2018         Assessment and Plan:   Marleni Alamo is a 22 year old female with cystic fibrosis, pancreatic insufficinecy, CFRD, cerebral palsy and Leoncio [place] Syndrome who is seen today in clinic for routine follow up. She was last seen December 2016.    1. CF lung disease: stable with minimal dry cough and baseline dyspnea with stairs. No recent illnesses. Vesting BID, took awhile to try and clarify neb regimen with grandmother. Not doing [place] and neither the patient nor her current caregiver remember being on the medication. PFTs stable today and her recent best. Previous cultures have grown out MRSA. No evidence of exacerbation at this time. Seen by RT Nilda, as well with discussion of airway clearance.   - Continue current nebulizers (albuterol, Pulmicort, Pulmozyme and HTS) and vest therapy  - On chronic azithromycin  - Ttobi month on/off, currently off    2. 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   - Revisit, including Symdeko, at next f/u    3. Exocrine pancreatic insufficiency: denies symptoms of malabsorption. Weight is stable.  - Continue pancreatic enzymes and vitamin supplementation    4. CFRD: no AIC in the computer. BS are controlled per grandmother, rarely in the 200s. Hasn't seen an Endocrinologist that grandmother is aware of.  - AIC today  - Continue Lantus and carb coverage  - Will discuss referral to Endocrine at next visit    5. CF related sinus disease: has some nasal polyps that need to be removed, likely at [facility]. Notes increased sinus congestion, no drainage. Occasional sinus headaches. Not currently on medication.     6. H/o seizures: no further issues. Does not follow up with a neurologist.    7. Cerebral palsy: [facility].     8. GERD: no issues.  - Continue omeprazole    RTC: in 3 months with routine cultures and spirometry  Annual studies due: ordered labs and CXR today (ordered DEXA for next f/u)  Influenza vaccine: completed at [facility]    Yahaira Trinidad PA-C  Pulmonary, Allergy, Critical Care and Sleep Medicine        Interval History:   Patient is feeling pretty well. Breathing is okay. Occasionally feels short of breath, when she goes up stairs. No shortness of breath. Minimal cough and dry. No congestion and tightness. No fever, had some chills the last few nights, but grandmother notes the room is really cold. No recent illness. No nausea or vomiting, no abdominal pain. Stools about twice/day and they sink. Energy is good. Vesting twice/day with albuterol with Pulmozyme pulmonary.          Review of Systems:   Please see HPI. Otherwise, complete 10 point ROS negative.           Past Medical and Surgical History:     Past Medical History:   Diagnosis Date     Atopy      Cerebral palsy (H)     Botox injextions, managed by Dr. 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     Seizure disorder (H)     Multiple daily in infancy now infrequent (every few years)     Thrush, oral      Past Surgical History:   Procedure Laterality Date     bilat antrostomies  4/2011     Bilater knee surgery with plates and pins Bilateral 2007     CHOLECYSTECTOMY, LAPOROSCOPIC  Feb 2015     Cleft palate repair and mandibular advancement   1996     gastrostomy in infancy       Multiple PET (ear tubes)       Multiple sinus surgeries  2015     RESECT TRACHEA WITH RECONSTRUCTION CHILD  1998    Rib for reconstruction     surgery for meconium ileus, partial bowel resection  8/1995    Details not available     tracheostomy in infancy             Family History:     Family History   Problem Relation Age of Onset     Type 1 Diabetes Mother 12     Type 1 Diabetes Maternal Uncle      Type 1 Diabetes Maternal Grandmother      Breast Cancer Other      Great Grandmother     Thyroid Disease Mother      Thyroid Disease Paternal Uncle      Thyroid Disease Maternal Grandmother      Thyroid Disease Maternal Aunt             Social History:     Social History     Social History     Marital status: Single     Spouse name: N/A     Number of children: N/A     Years of education: N/A     Occupational History     Not on file.     Social History Main Topics     Smoking status: Never Smoker     Smokeless tobacco: Never Used     Alcohol use No     Drug use: No     Sexual activity: Not on file     Other Topics Concern     Not on file     Social History Narrative    Marleni lives at home with mother in [place] with 2 dogs and a cat. 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 Marleni goes to [employer] school.             Medications:     Current Outpatient Prescriptions   Medication     amylase-lipase-protease (CREON) 89581-92526 UNITS CPEP per EC capsule     multivitamin CF formula (AQUADEKS) chewable tablet     tobramycin, PF, (CHER) 300 MG/5ML neb solution     vitamin D (ERGOCALCIFEROL) 81678 UNIT capsule     azithromycin (ZITHROMAX) 500 MG tablet     albuterol (2.5 MG/3ML) 0.083% neb solution     fluticasone-salmeterol (ADVAIR-HFA) 115-21 MCG/ACT Inhaler     dornase alpha (PULMOZYME) 1 MG/ML neb solution     omeprazole (PRILOSEC) 20 MG CR capsule     sodium chloride inhalant 7 % NEBU neb solution     aztreonam lysine (CAYSTON) 75 MG SOLR     insulin aspart (NOVOLOG PEN) 100 UNIT/ML soln      insulin glargine (LANTUS) 100 UNIT/ML PEN     No current facility-administered medications for this visit.             Physical Exam:   /73 (BP Location: Right arm, Patient Position: Chair, Cuff Size: Adult Regular)  Pulse 77  Resp 16  Wt 62.1 kg (136 lb 14.5 oz)  SpO2 97%  BMI 26.74 kg/m2    GENERAL: alert, NAD  HEENT: NCAT, EOMI, anicteric sclera, purulent drainage and polyps in right nare; canals and TMs clear; no oral mucosal edema or erythema  Neck: no cervical or supraclavicular adenopathy  Respiratory: good air flow, no crackles, rhonchi or wheezing  CV: RRR, S1S2, no murmurs noted  Abdomen: normoactive BS, soft and non tender  Lymph: no edema, + digital clubbing  Neuro: AAO X 3, CN 2-12 grossly intact  Psychiatric: normal affect, good eye contact  Skin: no rash, jaundice or lesions on limited exam         Data:   All laboratory and imaging data reviewed.      Cystic Fibrosis Culture  Specimen Description   Date Value Ref Range Status   07/25/2016 Throat  Final   05/12/2013 Throat  Final   05/12/2013 Throat  Final    Culture Micro   Date Value Ref Range Status   07/25/2016 (A)  Final    Light growth Normal junaid  Moderate growth Methicillin resistant Staphylococcus aureus (MRSA)     05/12/2013 No Beta Streptococcus isolated  Final        PFT interpretation:  Maneuver: valid and meets ATS guidelines  Normal spirometry with stable FEV1 of 2.51

## 2018-02-26 NOTE — LETTER
2018       RE: Marleni Alamo  [address]   [place] [address]     Dear Colleague,    Thank you for referring your patient, Marleni Alamo, to the [facility] at [facility]. Please see a copy of my visit note below.    CF Annual Nutrition Assessment    Reason for Assessment  Assessed during adult Clinic r/t increased nutrition risk with diagnosis of CF per protocol    Nutrition Significant PMH  Pancreatic Insufficient   CFRD   GERD  [facility]  Hx of seizures    Social Assessment  Living situation: Living at home with mother.   Work/School/Disability: Does not currently work.   Food Insecurity:  None    Anthropometric Assessment  Height: 60 inches (152.4 cm)   IBW based on BMI 22 for females and 23 for males per CF Foundation recs: 51 kg   Today's Weight: 62.1 kg   %IBW: 120%  BMI: Body mass index is 26.7 kg/m2  Current weight is considered: Overweight, BMI above CFF goals for age.     Physical Activity/Exercise: Not evaluated at this visit     MALNUTRITION  % Intake:  No decreased intake noted  % Weight Loss:  None noted  Subcutaneous Fat Loss:  None observed  Muscle Loss:  None observed  Handgrip Strength:  Not Applicable  Fluid Accumulation/Edema:  None noted  Malnutrition Diagnosis: Patient does not meet two of the above criteria necessary for diagnosing malnutrition in the context of CF      Pancreatic Enzymes  Brand:  Creon [id number]  Dosin with meals, 3 with snacks  Are you taking enzymes as recommended:Yes     High dose providing 1545 units lipase/kg/meal which is within the recommended range per CF Foundation to inhibit fibrosing colonopathy  Estimated Daily Amount (if meal dose is >2500 units lipase/kg/meal): 15 caps daily = 5797 units lipase/kg/day    Signs of Malabsorption:  No  Enzyme Program:  None    Diet History and Assessment  Diet  Preferences/Allergies.Intolerances: NKFA  Appetite Stimulant Rx:  No  Intake Recall/Comments:  Marleni reports that she maintains a good appetite. She is eating 2-3 meals daily as follows:   B - Waffles/cereal + 2% milk; Ensure. States sometimes skips breakfast if she sleeps late.   L - Typically a sandwich + Ensure  D - Mac and cheese + apple juice + Ensure  HS - bowl of cereal + milk     Calcium: Adequate. Drinking 2% milk and Ensure  Salt: Table salt, added to foods  Hydration:  Adequate per patient report    Supplements:  Ensure 1 can TID    Tube Feeding:  None    Estimated Energy and Protein Needs  Estimation based on weight maintenance with Mild lung disease and pancreatic insufficient.  BEE: ~1850  6956-1860 kcals/day =  150-175% BEE    g protein/day = 1.5-2 g/kg     Laboratory Assessment    No results found for: NAT, VITDT, JESSICA, IRON, CHOL, TRIG, HDL, LDL, VLDL, CHOLHDLRATIO    Date: None available. Needs updated labs.     Current Vitamin/Mineral Prescription R/T deficiency/malabsorption: AquADEK (chewable), 2 per day  Comments: Marleni states she is taking vitamins as prescribed.     FOLLOW-UP FROM RECENT VISITS  Nutrition intakes -- Reports adequate PO intakes   CFRD -- Reports checking BGs daily and administering insulin as prescribed. States BGs running ~120s mg/dL.   CF vitamin fund -- Taking AquADEK chewable vitamin, 2 daily.     NUTRITION DIAGNOSIS  Impaired nutrient utilization related CFRD; EPI; AEB requires insulin, PERT and high calorie/protein diet for hypermetabolism with CF.     INTERVENTIONS/RECOMMENDATIONS  Discussed current nutritional status and goals with Marleni and her grandmother.   Stated needs updated annual studies & DEXA.     Education/Training: Nutrition POC  Patient Understanding: Good  Expected Compliance: Good  Follow-Up Plans: Annually     GOALS:  1. Maintain BMI >/= 22 kg/m2.   2. Adherence to nutrition related medications (enzymes, vitamins, insulin.)      FOLLOW-UP/MONITORING:  Visit patient within 12 month(s)    Time Spent In Face-to-Face Patient Interactions: 15 minutes    Liliana Dias RD, PATEL, [facility]  Pediatric Cystic Fibrosis & Pulmonary Dietitian  [facility]  Pager #[phone]  Phone #[phone]      [facility]  Center for Lung Science and [facility]  February 26, 2018         Assessment and Plan:   Marleni Alamo is a 22 year old female with cystic fibrosis, pancreatic insufficinecy, CFRD, cerebral palsy and Leoncio Mineral Syndrome who is seen today in clinic for routine follow up. She was last seen December 2016.    1. CF lung disease: stable with minimal dry cough and baseline dyspnea with stairs. No recent illnesses. Vesting BID, took awhile to try and clarify neb regimen with grandmother. Not doing [place] and neither the patient nor her current caregiver remember being on the medication. PFTs stable today and her recent best. Previous cultures have grown out MRSA. No evidence of exacerbation at this time. Seen by RT Nilda, as well with discussion of airway clearance.   - Continue current nebulizers (albuterol, Pulmicort, Pulmozyme and HTS) and vest therapy  - On chronic azithromycin  - Ttobi month on/off, currently off    2. 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   - Revisit, including Symdeko, at next f/u    3. Exocrine pancreatic insufficiency: denies symptoms of malabsorption. Weight is stable.  - Continue pancreatic enzymes and vitamin supplementation    4. CFRD: no AIC in the computer. BS are controlled per grandmother, rarely in the 200s. Hasn't seen an Endocrinologist that grandmother is aware of.  - AIC today  - Continue Lantus and carb coverage  - Will discuss referral to Endocrine at next visit    5. CF related sinus disease: has some nasal polyps that need to be removed,  likely at [facility] Notes increased sinus congestion, no drainage. Occasional sinus headaches. Not currently on medication.     6. H/o seizures: no further issues. Does not follow up with a neurologist.    7. Cerebral palsy: managed by Dr. [facility] at [facility].     8. GERD: no issues.  - Continue omeprazole    RTC: in 3 months with routine cultures and spirometry  Annual studies due: ordered labs and CXR today (ordered DEXA for next f/u)  Influenza vaccine: completed at [facility]    Yahaira Trinidad PA-C  Pulmonary, Allergy, Critical Care and Sleep Medicine        Interval History:   Patient is feeling pretty well. Breathing is okay. Occasionally feels short of breath, when she goes up stairs. No shortness of breath. Minimal cough and dry. No congestion and tightness. No fever, had some chills the last few nights, but grandmother notes the room is really cold. No recent illness. No nausea or vomiting, no abdominal pain. Stools about twice/day and they sink. Energy is good. Vesting twice/day with albuterol with Pulmozyme pulmonary.          Review of Systems:   Please see HPI. Otherwise, complete 10 point ROS negative.           Past Medical and Surgical History:     Past Medical History:   Diagnosis Date     Atopy      Cerebral palsy (H)     Botox injextions, managed by Dr. John Marley     CF (cystic fibrosis) (H) 7/25/2016    Sweat Test: 8/15/95 Value: 85.0 Genotype: [id number], H/O Pseudomonas and MRSA, Baseline FEV1  2.48, FVC 2.76 (7/2015)     Cholelithiasis      Chronic pansinusitis 7/25/2016     Diabetes mellitus due to cystic fibrosis (H) 7/25/2016     Diabetes mellitus related to cystic fibrosis (H) 7/25/2016     Gastroesophageal reflux disease without esophagitis 7/25/2016     MRSA (methicillin resistant staph aureus) culture positive      Pancreatic insufficiency 7/25/2016     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 7/25/2016     Seizure disorder (H)     Multiple daily in infancy now infrequent  (every few years)     Thrush, oral      Past Surgical History:   Procedure Laterality Date     bilat antrostomies  4/2011     Bilater knee surgery with plates and pins Bilateral 2007     CHOLECYSTECTOMY, LAPOROSCOPIC  Feb 2015     Cleft palate repair and mandibular advancement  1996     gastrostomy in infancy       Multiple PET (ear tubes)       Multiple sinus surgeries  2015     RESECT TRACHEA WITH RECONSTRUCTION CHILD  1998    Rib for reconstruction     surgery for meconium ileus, partial bowel resection  8/1995    Details not available     tracheostomy in infancy             Family History:     Family History   Problem Relation Age of Onset     Type 1 Diabetes Mother 12     Type 1 Diabetes Maternal Uncle      Type 1 Diabetes Maternal Grandmother      Breast Cancer Other      Great Grandmother     Thyroid Disease Mother      Thyroid Disease Paternal Uncle      Thyroid Disease Maternal Grandmother      Thyroid Disease Maternal Aunt             Social History:     Social History     Social History     Marital status: Single     Spouse name: N/A     Number of children: N/A     Years of education: N/A     Occupational History     Not on file.     Social History Main Topics     Smoking status: Never Smoker     Smokeless tobacco: Never Used     Alcohol use No     Drug use: No     Sexual activity: Not on file     Other Topics Concern     Not on file     Social History Narrative    Marleni lives at home with mother in [place] with 2 dogs and a cat. 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 Marleni goes to transition school.             Medications:     Current Outpatient Prescriptions   Medication     amylase-lipase-protease (CREON) 59119-70287 UNITS CPEP per EC capsule     multivitamin CF formula (AQUADEKS) chewable tablet     tobramycin, PF, (CHER) 300 MG/5ML neb solution     vitamin D (ERGOCALCIFEROL) 60094 UNIT capsule     azithromycin (ZITHROMAX) 500 MG tablet     albuterol (2.5 MG/3ML) 0.083% neb solution      fluticasone-salmeterol (ADVAIR-HFA) 115-21 MCG/ACT Inhaler     dornase alpha (PULMOZYME) 1 MG/ML neb solution     omeprazole (PRILOSEC) 20 MG CR capsule     sodium chloride inhalant 7 % NEBU neb solution     aztreonam lysine (CAYSTON) 75 MG SOLR     insulin aspart (NOVOLOG PEN) 100 UNIT/ML soln     insulin glargine (LANTUS) 100 UNIT/ML PEN     No current facility-administered medications for this visit.             Physical Exam:   /73 (BP Location: Right arm, Patient Position: Chair, Cuff Size: Adult Regular)  Pulse 77  Resp 16  Wt 62.1 kg (136 lb 14.5 oz)  SpO2 97%  BMI 26.74 kg/m2    GENERAL: alert, NAD  HEENT: NCAT, EOMI, anicteric sclera, purulent drainage and polyps in right nare; canals and TMs clear; no oral mucosal edema or erythema  Neck: no cervical or supraclavicular adenopathy  Respiratory: good air flow, no crackles, rhonchi or wheezing  CV: RRR, S1S2, no murmurs noted  Abdomen: normoactive BS, soft and non tender  Lymph: no edema, + digital clubbing  Neuro: AAO X 3, CN 2-12 grossly intact  Psychiatric: normal affect, good eye contact  Skin: no rash, jaundice or lesions on limited exam         Data:   All laboratory and imaging data reviewed.      Cystic Fibrosis Culture  Specimen Description   Date Value Ref Range Status   07/25/2016 Throat  Final   05/12/2013 Throat  Final   05/12/2013 Throat  Final    Culture Micro   Date Value Ref Range Status   07/25/2016 (A)  Final    Light growth Normal junaid  Moderate growth Methicillin resistant Staphylococcus aureus (MRSA)     05/12/2013 No Beta Streptococcus isolated  Final        PFT interpretation:  Maneuver: valid and meets ATS guidelines  Normal spirometry with stable FEV1 of 2.51      Again, thank you for allowing me to participate in the care of your patient.      Sincerely,    Yahaira Trinidad PA-C

## 2018-02-26 NOTE — PROGRESS NOTES
CF Annual Nutrition Assessment    Reason for Assessment  Assessed during adult Clinic r/t increased nutrition risk with diagnosis of CF per protocol    Nutrition Significant PMH  Pancreatic Insufficient   CFRD   GERD  Cerebral palsy - managed at [facility]  Leoncio [place] syndrome  Hx of seizures    Social Assessment  Living situation: Living at home with mother.   Work/School/Disability: Does not currently work.   Food Insecurity:  None    Anthropometric Assessment  Height: 60 inches (152.4 cm)   IBW based on BMI 22 for females and 23 for males per CF Foundation recs: 51 kg   Today's Weight: 62.1 kg   %IBW: 120%  BMI: Body mass index is 26.7 kg/m2  Current weight is considered: Overweight, BMI above CFF goals for age.     Physical Activity/Exercise: Not evaluated at this visit     MALNUTRITION  % Intake:  No decreased intake noted  % Weight Loss:  None noted  Subcutaneous Fat Loss:  None observed  Muscle Loss:  None observed  Handgrip Strength:  Not Applicable  Fluid Accumulation/Edema:  None noted  Malnutrition Diagnosis: Patient does not meet two of the above criteria necessary for diagnosing malnutrition in the context of CF      Pancreatic Enzymes  Brand:  Creon [id number]  Dosin with meals, 3 with snacks  Are you taking enzymes as recommended:Yes     High dose providing 1545 units lipase/kg/meal which is within the recommended range per CF Foundation to inhibit fibrosing colonopathy  Estimated Daily Amount (if meal dose is >2500 units lipase/kg/meal): 15 caps daily = 5797 units lipase/kg/day    Signs of Malabsorption:  No  Enzyme Program:  None    Diet History and Assessment  Diet Preferences/Allergies.Intolerances: NKFA  Appetite Stimulant Rx:  No  Intake Recall/Comments:  Marleni reports that she maintains a good appetite. She is eating 2-3 meals daily as follows:   B - Waffles/cereal + 2% milk; Ensure. States sometimes skips breakfast if she sleeps late.   L - Typically a sandwich + Ensure  D - Mac and  cheese + apple juice + Ensure  HS - bowl of cereal + milk     Calcium: Adequate. Drinking 2% milk and Ensure  Salt: Table salt, added to foods  Hydration:  Adequate per patient report    Supplements:  Ensure 1 can TID    Tube Feeding:  None    Estimated Energy and Protein Needs  Estimation based on weight maintenance with Mild lung disease and pancreatic insufficient.  BEE: ~1850  4427-3205 kcals/day =  150-175% BEE    g protein/day = 1.5-2 g/kg     Laboratory Assessment    No results found for: NAT, VITDT, JESSICA, IRON, CHOL, TRIG, HDL, LDL, VLDL, CHOLHDLRATIO    Date: None available. Needs updated labs.     Current Vitamin/Mineral Prescription R/T deficiency/malabsorption: AquADEK (chewable), 2 per day  Comments: Marleni states she is taking vitamins as prescribed.     FOLLOW-UP FROM RECENT VISITS  Nutrition intakes -- Reports adequate PO intakes   CFRD -- Reports checking BGs daily and administering insulin as prescribed. States BGs running ~120s mg/dL.   CF vitamin fund -- Taking AquADEK chewable vitamin, 2 daily.     NUTRITION DIAGNOSIS  Impaired nutrient utilization related CFRD; EPI; AEB requires insulin, PERT and high calorie/protein diet for hypermetabolism with CF.     INTERVENTIONS/RECOMMENDATIONS  Discussed current nutritional status and goals with Marleni and her grandmother.   Stated needs updated annual studies & DEXA.     Education/Training: Nutrition POC  Patient Understanding: Good  Expected Compliance: Good  Follow-Up Plans: Annually     GOALS:  1. Maintain BMI >/= 22 kg/m2.   2. Adherence to nutrition related medications (enzymes, vitamins, insulin.)     FOLLOW-UP/MONITORING:  Visit patient within 12 month(s)    Time Spent In Face-to-Face Patient Interactions: 15 minutes    Liliana Dias RD, PATEL, [facility]  Pediatric Cystic Fibrosis & Pulmonary Dietitian  [facility]  Pager #[phone]  Phone #[phone]

## 2018-02-27 LAB
IGA SERPL-MCNC: 180 MG/DL (ref 70–380)
IGG SERPL-MCNC: 1320 MG/DL (ref 695–1620)
IGM SERPL-MCNC: 120 MG/DL (ref 60–265)

## 2018-02-27 NOTE — PROGRESS NOTES
Respiratory Therapist Note:    Vest    Brand: Hill-Rom - Model 105   Settings: Hill Rom: Frequencies 8, 9, 10 at pressure 10 then frequencies 18, 19, 20 at pressure 6.   Cough Pause: No   Vest Garment Size: Adult Medium   Last Fitting Date: Due next visit   Frequency of therapy: 2 times per day (averages 14 per week, will increase to 3x day when ill)   Concerns: none      Nebulized Medications   Bronchodilators: Albuterol   Mucolytic: Pulmozyme and 7% Hypertonic Saline   Antibiotics: CHER   Additional Inhaled Medications: ICS- pulmicort BID when in stock    Review Cleaning: Yes. Top rack of .    Education and Transition Information   Correct order of inhaled medications: Yes   Mechanism of Action of inhaled medications: Yes   Frequency of inhaled medications: Yes   Dosage of inhaled medications: Yes   Other: none    Home Care   Nebulizer Compressor    Year Purchased:  2017 white machine, works well. Zhanna cups.   Home Care Company:     Unknown by grandma.      Other Comments: some confusion with nebulizer therapy, provider listed plan, and [facility] will get more pulmicort ordered.     Goals   Next Visit: Vest garment fitting.    Next Year: Great job working hard at airway clearance Marleni!

## 2018-02-28 ENCOUNTER — CARE COORDINATION (OUTPATIENT)
Dept: PULMONOLOGY | Facility: CLINIC | Age: 23
End: 2018-02-28

## 2018-02-28 LAB
A-TOCOPHEROL VIT E SERPL-MCNC: 13.1 MG/L (ref 5.5–18)
ANNOTATION COMMENT IMP: NORMAL
BETA+GAMMA TOCOPHEROL SERPL-MCNC: 0.8 MG/L (ref 0–6)
IGE SERPL-ACNC: 59 KIU/L (ref 0–114)
RETINYL PALMITATE SERPL-MCNC: 0.06 MG/L (ref 0–0.1)
VIT A SERPL-MCNC: 0.46 MG/L (ref 0.3–1.2)

## 2018-02-28 NOTE — PROGRESS NOTES
VM left with pt to call the CF office per Yahaira Trinidad PA-C request. Yahaira would like to know if pt is having any sx of an UTI as her recent UA potentially suggests it.

## 2018-03-01 LAB
EXPTIME-PRE: 4.79 SEC
FEF2575-%PRED-PRE: 98 %
FEF2575-PRE: 3.48 L/SEC
FEF2575-PRED: 3.54 L/SEC
FEFMAX-%PRED-PRE: 98 %
FEFMAX-PRE: 6.21 L/SEC
FEFMAX-PRED: 6.29 L/SEC
FEV1-%PRED-PRE: 86 %
FEV1-PRE: 2.51 L
FEV1FEV6-PRE: 90 %
FEV1FEV6-PRED: 87 %
FEV1FVC-PRE: 90 %
FEV1FVC-PRED: 88 %
FIFMAX-PRE: 4.09 L/SEC
FVC-%PRED-PRE: 84 %
FVC-PRE: 2.79 L
FVC-PRED: 3.3 L

## 2018-03-01 NOTE — PROGRESS NOTES
Social Work  Adult Cystic Fibrosis Program    Data:  COLBY was notified that Marleni's grandmother was brining her to clinic today but phone on the way here stating that she was concerned about parking costs because she forgot her wallet and only had $2 with her.  SW explained that grandma should  at [facility] and a  coupon would be provided at clinic.    Intervention:  [facility]  coupon provided.    AILYN Dasilva, LICSW (Covering for Maxine's primary COLBY CarreraMary today)  Adult Cystic Fibrosis

## 2018-03-04 LAB
BACTERIA SPEC CULT: ABNORMAL
Lab: ABNORMAL
SPECIMEN SOURCE: ABNORMAL

## 2018-03-14 ENCOUNTER — TELEPHONE (OUTPATIENT)
Dept: PULMONOLOGY | Facility: CLINIC | Age: 23
End: 2018-03-14

## 2018-03-14 DIAGNOSIS — E84.9 CF (CYSTIC FIBROSIS) (H): Primary | ICD-10-CM

## 2018-03-14 RX ORDER — DOXYCYCLINE HYCLATE 100 MG
100 TABLET ORAL 2 TIMES DAILY
Qty: 42 TABLET | Refills: 0 | Status: SHIPPED | OUTPATIENT
Start: 2018-03-14 | End: 2018-04-04

## 2018-03-14 NOTE — TELEPHONE ENCOUNTER
"[facility]  March 14, 2018    Mickey Peterson    CF Provider: Yahaira Trinidad PA-C    Caller: Mother, Anny Mckeon    Clinical information:  Marleni Alamo has had a low grade fever and \"crunchy\" cough for since Sunday. Coughing at night. Increased mucus; white appearing. Complaints of sore throat and complaints of chest pain from coughing so much. Chest pain localized to \"left side above her heart\". Vesting/nebs 2-3 times/day. Blood sugars within normal limits.    Plan:   Discussed with Yahaira Trinidad:  Add Doxycycline 100 mg BID x3 weeks.  Vest at minimum 3xday while ill.  Call back with any new or worsening symptoms/concerns.    Caller verbalized understanding of plan and agrees with advice given.     "

## 2018-04-19 NOTE — TELEPHONE ENCOUNTER
FUTURE VISIT INFORMATION      FUTURE VISIT INFORMATION:    Date: 04/25/2018    Time: 3:00    Location: [facility]  REFERRAL INFORMATION:    Referring provider:  DR GRAHAM    Referring providers clinic:  [facility]    Reason for visit/diagnosis  sinusitis    RECORDS REQUESTED FROM:       Clinic name Comments Records Status Imaging Status   [facility] OFFICE VISIT:08/02/2006-10/31/2015   EXTERNAL NONE                                   RECORDS STATUS

## 2018-04-20 NOTE — TELEPHONE ENCOUNTER
Phone Call:     Contact Name [facility]   Outcome CALLED AGAIN TO GET RECORDS FAXED OVER ( 2ND ATTEMPT )

## 2018-04-23 NOTE — TELEPHONE ENCOUNTER
Action    Action Taken CALLED [facility] BACK SPOKE TO ANOTHER LADY WHO SAID SHE WILL SEND US RECORDS PER THE OTHER LADY DR. GRAHAM REFERRED PT TO SEE ROBBIE IN 2015

## 2018-04-25 ENCOUNTER — PRE VISIT (OUTPATIENT)
Dept: OTOLARYNGOLOGY | Facility: CLINIC | Age: 23
End: 2018-04-25

## 2018-05-22 DIAGNOSIS — E84.9 CF (CYSTIC FIBROSIS) (H): ICD-10-CM

## 2018-05-25 NOTE — TELEPHONE ENCOUNTER
FUTURE VISIT INFORMATION        FUTURE VISIT INFORMATION:    Date: 06/06/2018    Time: 3:00    Location: [facility]  REFERRAL INFORMATION:    Referring provider:  DR GRAHAM    Referring providers clinic:  [facility]    Reason for visit/diagnosis  sinusitis     RECORDS REQUESTED FROM:         Clinic name Comments Records Status Imaging Status   [facility] OFFICE VISIT:08/02/2006-10/31/2015 EXTERNAL NONE

## 2018-06-06 ENCOUNTER — PRE VISIT (OUTPATIENT)
Dept: OTOLARYNGOLOGY | Facility: CLINIC | Age: 23
End: 2018-06-06

## 2018-06-06 ENCOUNTER — OFFICE VISIT (OUTPATIENT)
Dept: OTOLARYNGOLOGY | Facility: CLINIC | Age: 23
End: 2018-06-06
Payer: COMMERCIAL

## 2018-06-06 VITALS
HEIGHT: 61 IN | DIASTOLIC BLOOD PRESSURE: 74 MMHG | BODY MASS INDEX: 26.24 KG/M2 | WEIGHT: 139 LBS | SYSTOLIC BLOOD PRESSURE: 122 MMHG

## 2018-06-06 DIAGNOSIS — Q87.0 PIERRE ROBIN SYNDROME: ICD-10-CM

## 2018-06-06 DIAGNOSIS — E84.9 CF (CYSTIC FIBROSIS) (H): ICD-10-CM

## 2018-06-06 DIAGNOSIS — J32.4 CHRONIC PANSINUSITIS: Primary | ICD-10-CM

## 2018-06-06 ASSESSMENT — PAIN SCALES - GENERAL: PAINLEVEL: NO PAIN (0)

## 2018-06-06 NOTE — PATIENT INSTRUCTIONS
Plan of care:  CT today  Dental Clinic:[phone]  Sinus rinse in head hanging position  Clinic contact information:  1. To schedule an appointment call [phone], option 1  2. To talk to the Triage RN call [phone], option 3  3. If you need to speak to Elaine or get a message to your doctor on a Friday, call the triage RN  4. ElaineRN: [phone]  5. Surgery scheduling:      Grace Adam: [phone]      Radha Rivas: [phone]  6. Fax: [phone]  7. [phone]

## 2018-06-06 NOTE — LETTER
"2018       RE: Marleni Alamo  [address]   [place] [address]     Dear Colleague,    Thank you for referring your patient, Marleni Alamo, to the [facility] EAR NOSE AND THROAT at [facility]. Please see a copy of my visit note below.    Otolaryngology Adult Consultation    Patient: Marleni Alamo   : 1995     Patient presents with:  Consult:   Chief Complaint   Patient presents with     Consult     sinusitis         Referring Provider: Maycol Andres   Consulting Physician:  Anny Carbajal MD       Assessment/Plan: Marleni has ongoing congestion and drainage due to CRS related to CF.  We discussed reinstituting nasal saline irrigations.  I would like a new baseline CT scan.  Will see her back to discuss ongoing management.     HPI: Marleni Alamo is a 22 year old female seen today in the Otolaryngology Clinic in consultation from Dr. Andres for a history of sinusitis related to CF.  Currently she feels \"socked in\".  Has a hard time sleeping due to mouth breathing.  She has undergone multiple surgeries for polyps and sinus disease which contributed to migraine headaches.  She describes a runny nose and uses saline spray for this.  Has not been using flonase.  Last surgery was about 2 years ago.  No recent imaging.      CT in our system was from before last surgery.    Current Outpatient Prescriptions on File Prior to Visit:  albuterol (2.5 MG/3ML) 0.083% neb solution TAKE 1 VIAL (2.5 MG) BY NEBULIZATION 4 TIMES DAILY   amylase-lipase-protease (CREON) 85663-47827 UNITS CPEP per EC capsule TAKE 4 CAPSULES WITH MEALS AND 3 CAPSULES WITH SNACKS, FOR 3 MEALS AND 2 SNACKS PER DAY.   azithromycin (ZITHROMAX) 500 MG tablet Take 1 tablet (500 mg) by mouth Every Mon, Wed, Fri Morning   budesonide (PULMICORT) 1 MG/2ML SUSP neb solution Take 2 mLs (1 mg) by nebulization 2 times daily   dornase alpha (PULMOZYME) 1 MG/ML neb solution Inhale 2.5 mg into the " lungs daily   fluticasone-salmeterol (ADVAIR-HFA) 115-21 MCG/ACT Inhaler INHALE TWO PUFFS BY MOUTH TWICE DAILY   insulin aspart (NOVOLOG PEN) 100 UNIT/ML soln 2 units per 15g carbohydrate with meals and correction scale   insulin glargine (LANTUS) 100 UNIT/ML PEN Inject 15 Units Subcutaneous every morning   multivitamin CF formula (AQUADEKS) chewable tablet TAKE 2 TABLETS BY MOUTH DAILY   omeprazole (PRILOSEC) 20 MG CR capsule Take 1 capsule (20 mg) by mouth 2 times daily   sodium chloride inhalant 7 % NEBU neb solution Take 4 mLs by nebulization daily UNCLEAR IF THIS IS ON THE CURRENT LIST   tobramycin, PF, (CHER) 300 MG/5ML neb solution Take 5 mLs (300 mg) by nebulization 2 times daily   vitamin D (ERGOCALCIFEROL) 14862 UNIT capsule TAKE 1 CAPSULE (50,000 UNITS) BY MOUTH EVERY MON, WED, FRI MORNING     No current facility-administered medications on file prior to visit.        Allergies   Allergen Reactions     Augmentin Hives     Per mother     Ceftin Hives     Per mother     Vancomycin Hives     Per mother       Past Medical History:   Diagnosis Date     Atopy      Cerebral palsy (H)     Botox injextions, managed by Dr. 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    Physical Exam:    General Assessment   The patient is alert, oriented and in no acute distress.   Head/Face/Scalp  Grossly normal.   Ears  Normal canals, auricles and tympanic membranes.   Nose  External nose is straight, skin is normal. Intranasal exam (anterior rhinoscopy) reveals normal moist mucosa, turbinate tissue without edema, erythema or crusting.  Septum mainly in the midline.  Significant mucopurulence in left middle meatus, some suctioned out, some polyps present.  R middle meatus hard to see due to septal defection - crusting on septum.  Mouth  Oral cavity shows healthy mucosa with out ulceration, masses or other lesions involving the tongue, palate, buccal mucosa, floor of mouth or gingiva.  Dentition in good repair.  Oropharynx with normal tonsils, posterior wall and base of tongue.  Neck  No significant adenopathy, thyroid or salivary gland abnormality.           Again, thank you for allowing me to participate in the care of your patient.      Sincerely,    Anny Carbajal MD

## 2018-06-06 NOTE — LETTER
Plan of care:  CT today  Dental Clinic:[phone]  Sinus rinse in head hanging position  Clinic contact information:  1. To schedule an appointment call [phone], option 1  2. To talk to the Triage RN call [phone], option 3  3. If you need to speak to Elaine or get a message to your doctor on a Friday, call the triage RN  4. ElaineRN: [phone]  5. Surgery scheduling:      Grace Adam: [phone]      Radha Rivas: [phone]  6. Fax: [phone]  7. [phone]

## 2018-06-06 NOTE — MR AVS SNAPSHOT
After Visit Summary   2018    Marleni Alamo    MRN: [id number]           Patient Information     Date Of Birth          1995        Visit Information        Provider Department      2018 3:00 PM Anny Carbajal MD [facility] Ear Nose and Throat        Today's Diagnoses     Chronic pansinusitis    -  1      Care Instructions    Plan of care:  CT today  Dental Clinic:[phone]  Sinus rinse in head hanging position  Clinic contact information:  1. To schedule an appointment call [phone], option 1  2. To talk to the Triage RN call [phone], option 3  3. If you need to speak to Elaine or get a message to your doctor on a Friday, call the triage RN  4. ElaineRN: [phone]  5. Surgery scheduling:      Grace Adam: [phone]      Radha Rivas: [phone]  6. Fax: [phone]  7. [phone]            Follow-ups after your visit        Your next 10 appointments already scheduled     2018  9:00 AM CDT   DX HIP/PELVIS/SPINE with SHMADX1   [facility] Dexa ([facility])    [address] [place] [id number] [phone]           Please do not take any of the following 24 hours prior to the day of your exam: vitamins, calcium tablets, antacids.  If possible, please wear clothes without metal (snaps, zippers). A sweatsuit works well.              Who to contact     Please call your clinic at [phone] to:    Ask questions about your health    Make or cancel appointments    Discuss your medicines    Learn about your test results    Speak to your doctor            Additional Information About Your Visit        [employer] Information     [address] is an electronic gateway that provides easy, online access to your medical records. With [address], you can request a clinic appointment, read your test results, renew a prescription or communicate with your care team.     To sign up for [address] visit the website at  "[email or web address]   You will be asked to enter the access code listed below, as well as some personal information. Please follow the directions to create your username and password.     Your access code is: [email or web address]  Expires: 7/10/2018  6:31 AM     Your access code will  in 90 days. If you need help or a new code, please contact your [facility] or call [phone] for assistance.        Care EveryWhere ID     This is your Care EveryWhere ID. This could be used by other organizations to access your [place] medical records  [id number]        Your Vitals Were     Height BMI (Body Mass Index)                1.54 m (5' 0.63\") 26.59 kg/m2           Blood Pressure from Last 3 Encounters:   18 122/74   18 110/73   16 109/68    Weight from Last 3 Encounters:   18 63 kg (139 lb)   18 62.1 kg (136 lb 14.5 oz)   16 63.3 kg (139 lb 8.8 oz)              Today, you had the following     No orders found for display       Primary Care Provider Office Phone # Fax #    Mickey Peterson [phone]       [facility] [address] [place] [address] [place] [address]        Equal Access to Services     [facility]: Hadii aad ku hadasho Soomaali, waaxda luqadaha, qaybta kaalmada adeegyada, vinny lujan hayisela morales . So [facility] [phone].    ATENCIÓN: Si habla español, tiene a philip disposición servicios gratuitos de asistencia lingüística. Llame al [phone].    We comply with applicable federal civil rights laws and [place] laws. We do not discriminate on the basis of race, color, national origin, age, disability, sex, sexual orientation, or gender identity.            Thank you!     Thank you for choosing [facility] EAR NOSE AND THROAT  for your care. Our goal is always to provide you with excellent care. Hearing back from our patients is one way we can continue to improve our services. Please " take a few minutes to complete the written survey that you may receive in the mail after your visit with us. Thank you!             Your Updated Medication List - Protect others around you: Learn how to safely use, store and throw away your medicines at www.disposemymeds.org.          This list is accurate as of 6/6/18  4:17 PM.  Always use your most recent med list.                   Brand Name Dispense Instructions for use Diagnosis    albuterol (2.5 MG/3ML) 0.083% neb solution     360 mL    TAKE 1 VIAL (2.5 MG) BY NEBULIZATION 4 TIMES DAILY    CF (cystic fibrosis) (H)       amylase-lipase-protease 21128-11804 units Cpep per EC capsule    CREON    180 capsule    TAKE 4 CAPSULES WITH MEALS AND 3 CAPSULES WITH SNACKS, FOR 3 MEALS AND 2 SNACKS PER DAY.    Cystic fibrosis with pulmonary manifestations (H)       azithromycin 500 MG tablet    ZITHROMAX    30 tablet    Take 1 tablet (500 mg) by mouth Every Mon, Wed, Fri Morning    Cystic fibrosis with pulmonary manifestations (H)       dornase alpha 1 MG/ML neb solution    PULMOZYME    75 mL    Inhale 2.5 mg into the lungs daily    CF (cystic fibrosis) (H)       fluticasone-salmeterol 115-21 MCG/ACT Inhaler    ADVAIR-HFA    12 Inhaler    INHALE TWO PUFFS BY MOUTH TWICE DAILY    CF (cystic fibrosis) (H)       insulin aspart 100 UNIT/ML injection    NovoLOG PEN     2 units per 15g carbohydrate with meals and correction scale        insulin glargine 100 UNIT/ML injection    LANTUS     Inject 15 Units Subcutaneous every morning        multivitamin CF formula chewable tablet     60 tablet    TAKE 2 TABLETS BY MOUTH DAILY    CF (cystic fibrosis) (H)       omeprazole 20 MG CR capsule    priLOSEC    60 capsule    Take 1 capsule (20 mg) by mouth 2 times daily    CF (cystic fibrosis) (H)       PULMICORT 1 MG/2ML Susp neb solution   Generic drug:  budesonide      Take 2 mLs (1 mg) by nebulization 2 times daily    CF (cystic fibrosis) (H), Diabetes mellitus due to cystic fibrosis  (H), Diabetes mellitus related to cystic fibrosis (H), Gastroesophageal reflux disease without esophagitis, 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       sodium chloride inhalant 7 % Nebu neb solution     4 mL    Take 4 mLs by nebulization daily UNCLEAR IF THIS IS ON THE CURRENT LIST        tobramycin (PF) 300 MG/5ML neb solution    CHER    280 mL    Take 5 mLs (300 mg) by nebulization 2 times daily    CF (cystic fibrosis) (H)       vitamin D 43844 UNIT capsule    ERGOCALCIFEROL    30 capsule    TAKE 1 CAPSULE (50,000 UNITS) BY MOUTH EVERY MON, WED, FRI MORNING    CF (cystic fibrosis) (H), Pancreatic insufficiency

## 2018-06-06 NOTE — NURSING NOTE
"Chief Complaint   Patient presents with     Consult     sinusitis      Blood pressure 122/74, height 1.54 m (5' 0.63\"), weight 63 kg (139 lb), not currently breastfeeding.    Mj Ascencio LPN    "

## 2018-06-07 NOTE — PROGRESS NOTES
"Otolaryngology Adult Consultation    Patient: Marleni Alamo   : 1995     Patient presents with:  Consult:   Chief Complaint   Patient presents with     Consult     sinusitis         Referring Provider: Maycol Andres   Consulting Physician:  Anny Carbajal MD       Assessment/Plan: Marleni has ongoing congestion and drainage due to CRS related to CF.  We discussed reinstituting nasal saline irrigations.  I would like a new baseline CT scan.  Will see her back to discuss ongoing management.     HPI: Marleni Alamo is a 22 year old female seen today in the Otolaryngology Clinic in consultation from Dr. Andres for a history of sinusitis related to CF.  Currently she feels \"socked in\".  Has a hard time sleeping due to mouth breathing.  She has undergone multiple surgeries for polyps and sinus disease which contributed to migraine headaches.  She describes a runny nose and uses saline spray for this.  Has not been using flonase.  Last surgery was about 2 years ago.  No recent imaging.      CT in our system was from before last surgery.    Current Outpatient Prescriptions on File Prior to Visit:  albuterol (2.5 MG/3ML) 0.083% neb solution TAKE 1 VIAL (2.5 MG) BY NEBULIZATION 4 TIMES DAILY   amylase-lipase-protease (CREON) 32390-77039 UNITS CPEP per EC capsule TAKE 4 CAPSULES WITH MEALS AND 3 CAPSULES WITH SNACKS, FOR 3 MEALS AND 2 SNACKS PER DAY.   azithromycin (ZITHROMAX) 500 MG tablet Take 1 tablet (500 mg) by mouth Every Mon, Wed, Fri Morning   budesonide (PULMICORT) 1 MG/2ML SUSP neb solution Take 2 mLs (1 mg) by nebulization 2 times daily   dornase alpha (PULMOZYME) 1 MG/ML neb solution Inhale 2.5 mg into the lungs daily   fluticasone-salmeterol (ADVAIR-HFA) 115-21 MCG/ACT Inhaler INHALE TWO PUFFS BY MOUTH TWICE DAILY   insulin aspart (NOVOLOG PEN) 100 UNIT/ML soln 2 units per 15g carbohydrate with meals and correction scale   insulin glargine (LANTUS) 100 UNIT/ML PEN Inject 15 Units Subcutaneous every " morning   multivitamin CF formula (AQUADEKS) chewable tablet TAKE 2 TABLETS BY MOUTH DAILY   omeprazole (PRILOSEC) 20 MG CR capsule Take 1 capsule (20 mg) by mouth 2 times daily   sodium chloride inhalant 7 % NEBU neb solution Take 4 mLs by nebulization daily UNCLEAR IF THIS IS ON THE CURRENT LIST   tobramycin, PF, (CHER) 300 MG/5ML neb solution Take 5 mLs (300 mg) by nebulization 2 times daily   vitamin D (ERGOCALCIFEROL) 02230 UNIT capsule TAKE 1 CAPSULE (50,000 UNITS) BY MOUTH EVERY MON, WED, FRI MORNING     No current facility-administered medications on file prior to visit.        Allergies   Allergen Reactions     Augmentin Hives     Per mother     Ceftin Hives     Per mother     Vancomycin Hives     Per mother       Past Medical History:   Diagnosis Date     Atopy      Cerebral palsy (H)     Botox injextions, managed by Dr. 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    Physical Exam:    General Assessment   The patient is alert, oriented and in no acute distress.   Head/Face/Scalp  Grossly normal.   Ears  Normal canals, auricles and tympanic membranes.   Nose  External nose is straight, skin  is normal. Intranasal exam (anterior rhinoscopy) reveals normal moist mucosa, turbinate tissue without edema, erythema or crusting.  Septum mainly in the midline.  Significant mucopurulence in left middle meatus, some suctioned out, some polyps present.  R middle meatus hard to see due to septal defection - crusting on septum.  Mouth  Oral cavity shows healthy mucosa with out ulceration, masses or other lesions involving the tongue, palate, buccal mucosa, floor of mouth or gingiva.  Dentition in good repair.  Oropharynx with normal tonsils, posterior wall and base of tongue.  Neck  No significant adenopathy, thyroid or salivary gland abnormality.

## 2018-06-08 ENCOUNTER — HOSPITAL ENCOUNTER (OUTPATIENT)
Dept: BONE DENSITY | Facility: CLINIC | Age: 23
Discharge: HOME | End: 2018-06-08
Attending: PHYSICIAN ASSISTANT | Admitting: PHYSICIAN ASSISTANT
Payer: COMMERCIAL

## 2018-06-08 DIAGNOSIS — E08.9 DIABETES MELLITUS RELATED TO CYSTIC FIBROSIS (H): ICD-10-CM

## 2018-06-08 DIAGNOSIS — E84.9 DIABETES MELLITUS DUE TO CYSTIC FIBROSIS (H): ICD-10-CM

## 2018-06-08 DIAGNOSIS — E08.9 DIABETES MELLITUS DUE TO CYSTIC FIBROSIS (H): ICD-10-CM

## 2018-06-08 DIAGNOSIS — E84.8 DIABETES MELLITUS RELATED TO CYSTIC FIBROSIS (H): ICD-10-CM

## 2018-06-08 DIAGNOSIS — E84.9 CF (CYSTIC FIBROSIS) (H): ICD-10-CM

## 2018-06-08 DIAGNOSIS — Q87.0 PIERRE ROBIN SYNDROME: ICD-10-CM

## 2018-06-08 DIAGNOSIS — K21.9 GASTROESOPHAGEAL REFLUX DISEASE WITHOUT ESOPHAGITIS: ICD-10-CM

## 2018-06-08 PROCEDURE — 77080 DXA BONE DENSITY AXIAL: CPT

## 2018-06-11 DIAGNOSIS — E84.9 CF (CYSTIC FIBROSIS) (H): ICD-10-CM

## 2018-06-14 RX ORDER — MV-MIN 51/FOLIC ACID/VIT K/UBI 100-350MCG
TABLET,CHEWABLE ORAL
Qty: 60 TABLET | Refills: 3 | Status: SHIPPED | OUTPATIENT
Start: 2018-06-14 | End: 2018-10-19

## 2018-06-15 DIAGNOSIS — E84.0 CYSTIC FIBROSIS WITH PULMONARY MANIFESTATIONS (H): ICD-10-CM

## 2018-06-19 RX ORDER — AZITHROMYCIN 500 MG/1
TABLET, FILM COATED ORAL
Qty: 15 TABLET | Refills: 2 | Status: SHIPPED | OUTPATIENT
Start: 2018-06-19 | End: 2018-10-09

## 2018-07-05 ENCOUNTER — CARE COORDINATION (OUTPATIENT)
Dept: OTOLARYNGOLOGY | Facility: CLINIC | Age: 23
End: 2018-07-05

## 2018-07-05 NOTE — PROGRESS NOTES
Mom called wondering why she hasn't heard from us regarding Sinus CT  Patient had Dexa scan but no sinus Ct  Plan is once Dr Carbajal has reviewed CT we will call mom and make a plan

## 2018-07-10 DIAGNOSIS — E84.9 CF (CYSTIC FIBROSIS) (H): ICD-10-CM

## 2018-07-15 DIAGNOSIS — E84.9 CF (CYSTIC FIBROSIS) (H): ICD-10-CM

## 2018-07-15 DIAGNOSIS — K86.89 PANCREATIC INSUFFICIENCY: ICD-10-CM

## 2018-07-15 DIAGNOSIS — E84.0 CYSTIC FIBROSIS WITH PULMONARY MANIFESTATIONS (H): ICD-10-CM

## 2018-07-16 RX ORDER — ERGOCALCIFEROL 1.25 MG/1
CAPSULE, LIQUID FILLED ORAL
Qty: 30 CAPSULE | Refills: 0 | Status: SHIPPED | OUTPATIENT
Start: 2018-07-16 | End: 2018-09-13

## 2018-07-16 RX ORDER — PANCRELIPASE 24000; 76000; 120000 [USP'U]/1; [USP'U]/1; [USP'U]/1
CAPSULE, DELAYED RELEASE PELLETS ORAL
Qty: 180 CAPSULE | Refills: 0 | Status: SHIPPED | OUTPATIENT
Start: 2018-07-16 | End: 2018-07-30

## 2018-07-16 NOTE — PROGRESS NOTES
[facility]  July 18, 2018         Assessment and Plan:   Marleni Alamo is a 22 year old female with cystic fibrosis, pancreatic insufficinecy, CFRD, cerebral palsy and Leoncio [place] Syndrome who is seen today in clinic for routine follow up.     1. CF lung disease: continues to be stable, does note an increase in dry cough, but wonders if it's related to her sinuses. No congestion or  Dyspnea. Sating 98% on room air. Vesting BID. PFTs stable at her recent best. Previous cultures have grown out MRSA. No evidence of exacerbation at this time.   - Continue current nebulizers (albuterol, Pulmicort, Pulmozyme and HTS) and vest therapy  - On chronic azithromycin and jamarcus month on/off     2. 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   - Will revisit initiation of Symdeko at next f/u     3. Exocrine pancreatic insufficiency: denies symptoms of malabsorption. Weight is stable.  - Continue pancreatic enzymes and vitamin supplementation     4. CFRD: last AIC of 8.1 on 2/26/18. BS in the am are in the mid 100-200s, notes snacking at night. [facility]  - Referral to Dr. Galindo  - Continue Lantus and carb coverage     5. CF related sinus disease: notes some increased sinus congestion and frontal headaches. Feels she has some nasal polyps that need to be removed, has seen Dr. Carbajal and has a CT today.      6. GERD: notes some PND and clearly of her throat. Possibly related in acid reflux.  - Increase omeprazole from 20 mg BID to 40 mg BID    Chronic issues:  1. H/o seizures  2. Cerebral palsy     RTC: in 3 months with routine cultures and spirometry  Annual studies due: February 2019      Yahaira Trinidad PA-C  Pulmonary, Allergy, Critical Care and Sleep Medicine         Interval History:   Feeling fine.  Coughing a little bit more than normal, but it's drier and more clearing of the throat. Vesting BID.          Review of Systems:   Please see HPI. Otherwise, complete 10 point ROS negative.           Past Medical and Surgical History:     Past Medical History:   Diagnosis Date     Atopy      Cerebral palsy (H)     Botox injextions, managed by Dr. 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     Seizure disorder (H)     Multiple daily in infancy now infrequent (every few years)     Thrush, oral      Past Surgical History:   Procedure Laterality Date     bilat antrostomies  4/2011     Bilater knee surgery with plates and pins Bilateral 2007     CHOLECYSTECTOMY, LAPOROSCOPIC  Feb 2015     Cleft palate repair and mandibular advancement  1996     gastrostomy in infancy       Multiple PET (ear tubes)       Multiple sinus surgeries  2015     RESECT TRACHEA WITH RECONSTRUCTION CHILD  1998    Rib for reconstruction     surgery for meconium ileus, partial bowel resection  8/1995    Details not available     tracheostomy in infancy             Family History:     Family History   Problem Relation Age of Onset     Type 1 Diabetes Mother 12     Thyroid Disease Mother      Type 1 Diabetes Maternal Uncle      Type 1 Diabetes Maternal Grandmother      Thyroid Disease Maternal Grandmother      Breast Cancer Other      Great Grandmother     Thyroid Disease Paternal Uncle      Thyroid Disease Maternal Aunt             Social  History:     Social History     Social History     Marital status: Single     Spouse name: N/A     Number of children: N/A     Years of education: N/A     Occupational History     Not on file.     Social History Main Topics     Smoking status: Never Smoker     Smokeless tobacco: Never Used     Alcohol use No     Drug use: No     Sexual activity: No     Other Topics Concern     Not on file     Social History Narrative    Marleni lives at home with mother in [place] with 2 dogs and a cat. 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 Marleni goes to [employer] school.             Medications:     Current Outpatient Prescriptions   Medication     albuterol (2.5 MG/3ML) 0.083% neb solution     amylase-lipase-protease (CREON) 11875-39683 UNITS CPEP per EC capsule     azithromycin (ZITHROMAX) 500 MG tablet     budesonide (PULMICORT) 1 MG/2ML SUSP neb solution     CREON 31958-05742 units CPEP per EC capsule     dornase alpha (PULMOZYME) 1 MG/ML neb solution     fluticasone-salmeterol (ADVAIR-HFA) 115-21 MCG/ACT Inhaler     insulin aspart (NOVOLOG PEN) 100 UNIT/ML soln     insulin glargine (LANTUS SOLOSTAR) 100 UNIT/ML pen     multivitamin CF formula (AQUADEKS) chewable tablet     omeprazole (PRILOSEC) 40 MG capsule     sodium chloride inhalant 7 % NEBU neb solution     tobramycin, PF, (CHER) 300 MG/5ML neb solution     vitamin D (ERGOCALCIFEROL) 04979 UNIT capsule     [DISCONTINUED] insulin glargine (LANTUS) 100 UNIT/ML PEN     No current facility-administered medications for this visit.             Physical Exam:   /67 (BP Location: Right arm, Patient Position: Chair, Cuff Size: Adult Regular)  Pulse 63  Resp 16  Wt 62.1 kg (137 lb)  SpO2 98%  BMI 26.2 kg/m2    GENERAL: alert, NAD  HEENT: NCAT, EOMI, anicteric sclera, canals and TMs clear; no oral mucosal edema or erythema  Neck: no cervical or supraclavicular adenopathy  Respiratory: good air flow, no crackles, rhonchi or wheezing  CV: RRR, S1S2, no murmurs noted  Abdomen:  normoactive BS, soft and non tender   Lymph: no edema, + digital clubbing  Neuro: AAO X 3, CN 2-12 grossly intact  Psychiatric: normal affect, good eye contact  Skin: no rash, jaundice or lesions on limited exam         Data:   All laboratory and imaging data reviewed.      Cystic Fibrosis Culture  Specimen Description   Date Value Ref Range Status   02/26/2018 Throat  Final   07/25/2016 Throat  Final   05/12/2013 Throat  Final   05/12/2013 Throat  Final    Culture Micro   Date Value Ref Range Status   02/26/2018 Heavy growth  Normal junaid    Final   02/26/2018 (A)  Final    Moderate growth  Staphylococcus aureus  This isolate is presumed to be clindamycin resistant based on detection of inducible   clindamycin resistance. Erythromycin and clindamycin are resistant, therefore, they are   not recommended for use.     02/26/2018 (A)  Final    Light growth  Achromobacter xylosoxidans/denitrificans          PFT interpretation:  Maneuver: valid and meets ATS guidelines  Normal spirometry   Compared to prior: FEV1 of 2.49 is 20 cc below her prior

## 2018-07-18 ENCOUNTER — OFFICE VISIT (OUTPATIENT)
Dept: PULMONOLOGY | Facility: CLINIC | Age: 23
End: 2018-07-18
Attending: PHYSICIAN ASSISTANT
Payer: COMMERCIAL

## 2018-07-18 ENCOUNTER — RADIANT APPOINTMENT (OUTPATIENT)
Dept: CT IMAGING | Facility: CLINIC | Age: 23
End: 2018-07-18
Attending: OTOLARYNGOLOGY
Payer: COMMERCIAL

## 2018-07-18 ENCOUNTER — OFFICE VISIT (OUTPATIENT)
Dept: PHARMACY | Facility: CLINIC | Age: 23
End: 2018-07-18
Payer: MEDICAID

## 2018-07-18 VITALS
HEART RATE: 63 BPM | WEIGHT: 137 LBS | RESPIRATION RATE: 16 BRPM | OXYGEN SATURATION: 98 % | SYSTOLIC BLOOD PRESSURE: 107 MMHG | BODY MASS INDEX: 26.2 KG/M2 | DIASTOLIC BLOOD PRESSURE: 67 MMHG

## 2018-07-18 DIAGNOSIS — E84.9 CF (CYSTIC FIBROSIS) (H): Primary | ICD-10-CM

## 2018-07-18 DIAGNOSIS — J32.4 CHRONIC PANSINUSITIS: ICD-10-CM

## 2018-07-18 PROCEDURE — G0463 HOSPITAL OUTPT CLINIC VISIT: HCPCS | Mod: ZF

## 2018-07-18 PROCEDURE — 87070 CULTURE OTHR SPECIMN AEROBIC: CPT | Performed by: PHYSICIAN ASSISTANT

## 2018-07-18 PROCEDURE — 87186 SC STD MICRODIL/AGAR DIL: CPT | Performed by: PHYSICIAN ASSISTANT

## 2018-07-18 PROCEDURE — 99207 ZZC NO CHARGE LOS: CPT | Performed by: PHARMACIST

## 2018-07-18 PROCEDURE — 87077 CULTURE AEROBIC IDENTIFY: CPT | Performed by: PHYSICIAN ASSISTANT

## 2018-07-18 RX ORDER — OMEPRAZOLE 40 MG/1
40 CAPSULE, DELAYED RELEASE ORAL 2 TIMES DAILY
Qty: 60 CAPSULE | Refills: 3 | Status: SHIPPED | OUTPATIENT
Start: 2018-07-18 | End: 2018-12-11

## 2018-07-18 ASSESSMENT — PAIN SCALES - GENERAL: PAINLEVEL: NO PAIN (0)

## 2018-07-18 NOTE — MR AVS SNAPSHOT
After Visit Summary   7/18/2018    Marleni Alamo    MRN: [id number]           Patient Information     Date Of Birth          1995        Visit Information        Provider Department      7/18/2018 12:00 PM Yahaira Trinidad PA-C [facility]        Today's Diagnoses     CF (cystic fibrosis) (H)    -  1      Care Instructions    Cystic Fibrosis Self-Care Plan       Patient: Marleni Alamo   MRN: [id number]   Clinic Date: July 18, 2018     RECOMMENDATIONS:  1. Continue nebulizers and vest therapy.   2. Increase omeprazole to 40 mg twice/day.  3. Think about doing blood draws and we'll discuss the medication at your next visit.   4. Keep up the good work!    Annual Studies:   IGG   Date Value Ref Range Status   02/26/2018 1320 695 - 1620 mg/dL Final     No results found for: INS  There are no preventive care reminders to display for this patient.    Pulmonary Function Tests  FEV1: amount of air you can blow out in 1 second  FVC: total amount of air you can take in and blow out    Your Goals:         PFT Latest Ref Rng & Units 7/18/2018   FVC L 2.82   FEV1 L 2.49   FVC% % 85   FEV1% % 86          Airway Clearance: The Most Important Way to Keep Your Lungs Healthy  Vest Settings:    Hill-Rom Frequencies: 8, 9, 10 Pressure 10 Then, Frequencies 18, 19, 20 Pressure 6      RespirTech: Quick Start with Pressure of     Do each frequency for 5 minutes; Deflate vest after each frequency & cough 3 times before beginning the next setting.    Vest and Neb Therapy should be done 2 times/day.    Good Nutrition Can Improve Lung Function and Overall Health     Take ALL of your vitamins with food     Take 1/2 of your enzymes before EVERY meal/snack and the other 1/2 mid-meal/snack    Wt Readings from Last 3 Encounters:   07/18/18 62.1 kg (137 lb)   06/06/18 63 kg (139 lb)   02/26/18 62.1 kg (136 lb 14.5 oz)       Body mass index is 26.2 kg/(m^2).         National CF  Foundation Recommendations for BMI in CF Adults: Women: at least 22 Men: at least 23        Controlling Blood Sugars Helps Prevent Lung Infections & Improves Nutrition  Test blood sugar:     In the morning before eating (goal is )     2 hours after a meal (goal is less than 150)     When pre-meal glucose is greater than 150 add correction     At bedtime (if less than 100 eat a snack with 15 grams of carbohydrates  Last A1C Results:   Hemoglobin A1C   Date Value Ref Range Status   02/26/2018 8.1 (H) 4.3 - 6.0 % Final         If diabetic, measure A1C every 6 months. Goal is under 7%.    Staying Healthy    Research: If you are interested in learning about research opportunities or have questions, please contact Stella Gatica at [phone] or [email or web address].      CF Foundation: Compass is a personalized resource service to help you with the insurance, financial, legal and other issues you are facing.  It's free, confidential and available to anyone with CF.  Ask your  for more information or contact Compass directly at 871-COMPASS ([phone]) or compass@cff.org, or learn more at cff.org/compass.       CF Nurse Line:  Xiomy Montes De Oca and Era: [phone]   Chhaya Munoz, RT: [phone]     Megan Gilmore , Dieticians: [phone]     Paulette Danielle, Diabetes Nurse: [phone]    Kaykay Tavarez: [phone] or Mary Weber [phone], Social Workers   [email or web address]            Follow-ups after your visit        Additional Services     ENDOCRINOLOGY ADULT REFERRAL       Your provider has referred you to: Dr. Galindo h/o CF      Please be aware that coverage of these services is subject to the terms and limitations of your health insurance plan.  Call member services at your health plan with any benefit or coverage questions.      Please bring the following to your appointment:    >>   Any x-rays, CTs or MRIs which have been performed.  Contact the facility where  they were done to arrange for  prior to your scheduled appointment.    >>   List of current medications   >>   This referral request   >>   Any documents/labs given to you for this referral                  Your next 10 appointments already scheduled     Jul 18, 2018  1:40 PM CDT   CT FACIAL BONES WITHOUT CONTRAST with UCCT2   [facility] CT ([facility])    [address]  1st Floor  [place] [id number] [phone]           Please bring any scans or X-rays taken at other hospitals, if similar tests were done. Also bring a list of your medicines, including vitamins, minerals and over-the-counter drugs. It is safest to leave personal items at home.  Be sure to tell your doctor:   If you have any allergies.   If there s any chance you are pregnant.   If you are breastfeeding.  For a sinus scan: Use your nose spray (nasal decongestant spray) as directed.  You do not need to do anything special to prepare for this exam.  Please wear loose clothing, such as a sweat suit or jogging clothes. Avoid snaps, zippers and other metal. We may ask you to undress and put on a hospital gown.              Future tests that were ordered for you today     Open Future Orders        Priority Expected Expires Ordered    Cystic Fibrosis Culture Aerob Bacterial Routine 10/3/2018 10/17/2018 7/18/2018    Spirometry, Breathing Capacity Routine 10/3/2018 10/17/2018 7/18/2018            Who to contact     If you have questions or need follow up information about today's clinic visit or your schedule please contact [facility] directly at [phone].  Normal or non-critical lab and imaging results will be communicated to you by MyChart, letter or phone within 4 business days after the clinic has received the results. If you do not hear from us within 7 days, please contact the clinic through MyChart or phone. If you have a critical or abnormal lab result,  "we will notify you by phone as soon as possible.  Submit refill requests through [employer] or call your pharmacy and they will forward the refill request to us. Please allow 3 business days for your refill to be completed.          Additional Information About Your Visit        Fablichart Information     [employer] lets you send messages to your doctor, view your test results, renew your prescriptions, schedule appointments and more. To sign up, go to [email or web address] . Click on \"Log in\" on the left side of the screen, which will take you to the Welcome page. Then click on \"Sign up Now\" on the right side of the page.     You will be asked to enter the access code listed below, as well as some personal information. Please follow the directions to create your username and password.     Your access code is: [email or web address]  Expires: 2018  9:06 AM     Your access code will  in 90 days. If you need help or a new code, please call your [place] clinic or [phone].        Care EveryWhere ID     This is your Care EveryWhere ID. This could be used by other organizations to access your [place] medical records  [id number]        Your Vitals Were     Pulse Respirations Pulse Oximetry BMI (Body Mass Index)          63 16 98% 26.2 kg/m2         Blood Pressure from Last 3 Encounters:   18 107/67   18 122/74   18 110/73    Weight from Last 3 Encounters:   18 62.1 kg (137 lb)   18 63 kg (139 lb)   18 62.1 kg (136 lb 14.5 oz)              We Performed the Following     Cystic Fibrosis Culture Aerob Bacterial     ENDOCRINOLOGY ADULT REFERRAL          Today's Medication Changes          These changes are accurate as of 18 12:51 PM.  If you have any questions, ask your nurse or doctor.               These medicines have changed or have updated prescriptions.        Dose/Directions    insulin glargine 100 UNIT/ML injection   Commonly known as:  LANTUS   This may have changed:  how " much to take   Used for:  CF (cystic fibrosis) (H)   Changed by:  Yahaira Trinidad PA-C        Dose:  17 Units   Inject 17 Units Subcutaneous every morning   Refills:  0       omeprazole 40 MG capsule   Commonly known as:  priLOSEC   This may have changed:    - medication strength  - how much to take   Used for:  CF (cystic fibrosis) (H)   Changed by:  Yahaira Trinidad PA-C        Dose:  40 mg   Take 1 capsule (40 mg) by mouth 2 times daily   Quantity:  60 capsule   Refills:  3            Where to get your medicines      These medications were sent to [facility] [address] [facility] [place] - [address]  [address], [place] [address]     Phone:  [phone]     omeprazole 40 MG capsule                Primary Care Provider Office Phone # Fax #    Mickey Maurice Peterson [phone]       [facility] [address]  [place] [address]        Equal Access to Services     [facility]: Hadii aad ku hadasho Soomaali, waaxda luqadaha, qaybta kaalmada adeegyada, waxay idiin hayisela morales . So [facility] [phone].    ATENCIÓN: Si habla español, tiene a philip disposición servicios gratuitos de asistencia lingüística. Ll[facility] [phone].    We comply with applicable federal civil rights laws and [place] laws. We do not discriminate on the basis of race, color, national origin, age, disability, sex, sexual orientation, or gender identity.            Thank you!     Thank you for choosing [facility]  for your care. Our goal is always to provide you with excellent care. Hearing back from our patients is one way we can continue to improve our services. Please take a few minutes to complete the written survey that you may receive in the mail after your visit with us. Thank you!             Your Updated Medication List - Protect others around you: Learn how to safely use, store and throw away your  medicines at www.disposemymeds.org.          This list is accurate as of 7/18/18 12:51 PM.  Always use your most recent med list.                   Brand Name Dispense Instructions for use Diagnosis    albuterol (2.5 MG/3ML) 0.083% neb solution     360 mL    TAKE 1 VIAL (2.5 MG) BY NEBULIZATION 4 TIMES DAILY    CF (cystic fibrosis) (H)       * amylase-lipase-protease 50860-74194 units Cpep per EC capsule    CREON    180 capsule    TAKE 4 CAPSULES WITH MEALS AND 3 CAPSULES WITH SNACKS, FOR 3 MEALS AND 2 SNACKS PER DAY.    Cystic fibrosis with pulmonary manifestations (H)       * CREON 35082-28181 units Cpep per EC capsule   Generic drug:  amylase-lipase-protease     180 capsule    TAKE 4 CAPSULES WITH MEALS AND 3 CAPSULES WITH SNACKS, FOR 3 MEALS AND 2 SNACKS PER DAY.    Cystic fibrosis with pulmonary manifestations (H)       azithromycin 500 MG tablet    ZITHROMAX    15 tablet    TAKE 1 TABLET (500 MG) BY MOUTH EVERY MON, WED, FRI MORNING    Cystic fibrosis with pulmonary manifestations (H)       dornase alpha 1 MG/ML neb solution    PULMOZYME    75 mL    Inhale 2.5 mg into the lungs daily    CF (cystic fibrosis) (H)       fluticasone-salmeterol 115-21 MCG/ACT Inhaler    ADVAIR-HFA    12 Inhaler    INHALE TWO PUFFS BY MOUTH TWICE DAILY    CF (cystic fibrosis) (H)       insulin aspart 100 UNIT/ML injection    NovoLOG PEN     2 units per 15g carbohydrate with meals and correction scale        insulin glargine 100 UNIT/ML injection    LANTUS     Inject 17 Units Subcutaneous every morning    CF (cystic fibrosis) (H)       multivitamin CF formula chewable tablet     60 tablet    TAKE 2 TABLETS BY MOUTH DAILY    CF (cystic fibrosis) (H)       omeprazole 40 MG capsule    priLOSEC    60 capsule    Take 1 capsule (40 mg) by mouth 2 times daily    CF (cystic fibrosis) (H)       PULMICORT 1 MG/2ML Susp neb solution   Generic drug:  budesonide      Take 2 mLs (1 mg) by nebulization 2 times daily    CF (cystic fibrosis) (H),  Diabetes mellitus due to cystic fibrosis (H), Diabetes mellitus related to cystic fibrosis (H), Gastroesophageal reflux disease without esophagitis, 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       sodium chloride inhalant 7 % Nebu neb solution     4 mL    Take 4 mLs by nebulization daily UNCLEAR IF THIS IS ON THE CURRENT LIST        tobramycin (PF) 300 MG/5ML neb solution    CHER    280 mL    Take 5 mLs (300 mg) by nebulization 2 times daily    CF (cystic fibrosis) (H)       vitamin D 00145 UNIT capsule    ERGOCALCIFEROL    30 capsule    TAKE 1 CAPSULE BY MOUTH EVERY MON, WED, FRI MORNING    CF (cystic fibrosis) (H), Pancreatic insufficiency       * Notice:  This list has 2 medication(s) that are the same as other medications prescribed for you. Read the directions carefully, and ask your doctor or other care provider to review them with you.

## 2018-07-18 NOTE — LETTER
7/18/2018       RE: Marleni Alamo  [address] [place]   [place] [address]     Dear Colleague,    Thank you for referring your patient, Marleni Alamo, to the [facility] at [facility]. Please see a copy of my visit note below.    [facility]  July 18, 2018         Assessment and Plan:   Marleni Alamo is a 22 year old female with cystic fibrosis, pancreatic insufficinecy, CFRD, cerebral palsy and Leoncio Wheaton Syndrome who is seen today in clinic for routine follow up.     1. CF lung disease: continues to be stable, does note an increase in dry cough, but wonders if it's related to her sinuses. No congestion or  Dyspnea. Sating 98% on room air. Vesting BID. PFTs stable at her recent best. Previous cultures have grown out MRSA. No evidence of exacerbation at this time.   - Continue current nebulizers (albuterol, Pulmicort, Pulmozyme and HTS) and vest therapy  - On chronic azithromycin and jamarcus month on/off     2. 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   - Will revisit initiation of Symdeko at next f/u     3. Exocrine pancreatic insufficiency: denies symptoms of malabsorption. Weight is stable.  - Continue pancreatic enzymes and vitamin supplementation     4. CFRD: last AIC of 8.1 on 2/26/18. BS in the am are in the mid 100-200s, notes snacking at night. [facility]  - Referral to Dr. Galindo  - Continue Lantus and carb coverage     5. CF related sinus disease: notes some increased sinus congestion and frontal headaches. Feels she has some nasal polyps that need to be removed, has seen Dr. Carbajal and has a CT today.      6. GERD: notes some PND and clearly of her throat. Possibly related in acid reflux.  -  Increase omeprazole from 20 mg BID to 40 mg BID    Chronic issues:  1. H/o seizures  2. Cerebral palsy     RTC: in 3 months with routine cultures and spirometry  Annual studies due: February 2019      Yahaira Trinidad PA-C  Pulmonary, Allergy, Critical Care and Sleep Medicine         Interval History:   Feeling fine. Coughing a little bit more than normal, but it's drier and more clearing of the throat. Vesting BID.          Review of Systems:   Please see HPI. Otherwise, complete 10 point ROS negative.           Past Medical and Surgical History:     Past Medical History:   Diagnosis Date     Atopy      Cerebral palsy (H)     Botox injextions, managed by Dr. 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     Seizure disorder (H)     Multiple daily in infancy now infrequent (every few years)     Thrush, oral      Past Surgical History:   Procedure Laterality Date     bilat antrostomies  4/2011     Bilater knee surgery with plates and pins Bilateral 2007     CHOLECYSTECTOMY, LAPOROSCOPIC  Feb 2015     Cleft palate repair and mandibular advancement  1996     gastrostomy in infancy       Multiple PET (ear tubes)       Multiple sinus surgeries  2015     RESECT TRACHEA WITH RECONSTRUCTION CHILD  1998    Rib for reconstruction     surgery for meconium ileus, partial bowel resection  8/1995    Details not available     tracheostomy in infancy             Family History:     Family History   Problem Relation Age of  Onset     Type 1 Diabetes Mother 12     Thyroid Disease Mother      Type 1 Diabetes Maternal Uncle      Type 1 Diabetes Maternal Grandmother      Thyroid Disease Maternal Grandmother      Breast Cancer Other      Great Grandmother     Thyroid Disease Paternal Uncle      Thyroid Disease Maternal Aunt             Social History:     Social History     Social History     Marital status: Single     Spouse name: N/A     Number of children: N/A     Years of education: N/A     Occupational History     Not on file.     Social History Main Topics     Smoking status: Never Smoker     Smokeless tobacco: Never Used     Alcohol use No     Drug use: No     Sexual activity: No     Other Topics Concern     Not on file     Social History Narrative    Marleni lives at home with mother in [place] with 2 dogs and a cat. 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 Marleni goes to [employer] school.             Medications:     Current Outpatient Prescriptions   Medication     albuterol (2.5 MG/3ML) 0.083% neb solution     amylase-lipase-protease (CREON) 12947-16130 UNITS CPEP per EC capsule     azithromycin (ZITHROMAX) 500 MG tablet     budesonide (PULMICORT) 1 MG/2ML SUSP neb solution     CREON 61045-69679 units CPEP per EC capsule     dornase alpha (PULMOZYME) 1 MG/ML neb solution     fluticasone-salmeterol (ADVAIR-HFA) 115-21 MCG/ACT Inhaler     insulin aspart (NOVOLOG PEN) 100 UNIT/ML soln     insulin glargine (LANTUS SOLOSTAR) 100 UNIT/ML pen     multivitamin CF formula (AQUADEKS) chewable tablet     omeprazole (PRILOSEC) 40 MG capsule     sodium chloride inhalant 7 % NEBU neb solution     tobramycin, PF, (CHER) 300 MG/5ML neb solution     vitamin D (ERGOCALCIFEROL) 09030 UNIT capsule     [DISCONTINUED] insulin glargine (LANTUS) 100 UNIT/ML PEN     No current facility-administered medications for this visit.             Physical Exam:   /67 (BP Location: Right arm, Patient Position: Chair, Cuff Size: Adult Regular)  Pulse 63  Resp 16   Wt 62.1 kg (137 lb)  SpO2 98%  BMI 26.2 kg/m2    GENERAL: alert, NAD  HEENT: NCAT, EOMI, anicteric sclera, canals and TMs clear; no oral mucosal edema or erythema  Neck: no cervical or supraclavicular adenopathy  Respiratory: good air flow, no crackles, rhonchi or wheezing  CV: RRR, S1S2, no murmurs noted  Abdomen: normoactive BS, soft and non tender   Lymph: no edema, + digital clubbing  Neuro: AAO X 3, CN 2-12 grossly intact  Psychiatric: normal affect, good eye contact  Skin: no rash, jaundice or lesions on limited exam         Data:   All laboratory and imaging data reviewed.      Cystic Fibrosis Culture  Specimen Description   Date Value Ref Range Status   02/26/2018 Throat  Final   07/25/2016 Throat  Final   05/12/2013 Throat  Final   05/12/2013 Throat  Final    Culture Micro   Date Value Ref Range Status   02/26/2018 Heavy growth  Normal junaid    Final   02/26/2018 (A)  Final    Moderate growth  Staphylococcus aureus  This isolate is presumed to be clindamycin resistant based on detection of inducible   clindamycin resistance. Erythromycin and clindamycin are resistant, therefore, they are   not recommended for use.     02/26/2018 (A)  Final    Light growth  Achromobacter xylosoxidans/denitrificans          PFT interpretation:  Maneuver: valid and meets ATS guidelines  Normal spirometry   Compared to prior: FEV1 of 2.49 is 20 cc below her prior          Again, thank you for allowing me to participate in the care of your patient.      Sincerely,    Yahaira Trinidad PA-C

## 2018-07-18 NOTE — NURSING NOTE
Chief Complaint   Patient presents with     Cystic Fibrosis     Patient is being seen for CF follow up      Wendy Thompson CMA at 12:05 PM on 7/18/2018

## 2018-07-18 NOTE — PATIENT INSTRUCTIONS
Cystic Fibrosis Self-Care Plan       Patient: Marleni Alamo   MRN: [id number]   Clinic Date: July 18, 2018     RECOMMENDATIONS:  1. Continue nebulizers and vest therapy.   2. Increase omeprazole to 40 mg twice/day.  3. Think about doing blood draws and we'll discuss the medication at your next visit.   4. Keep up the good work!    Annual Studies:   IGG   Date Value Ref Range Status   02/26/2018 1320 695 - 1620 mg/dL Final     No results found for: INS  There are no preventive care reminders to display for this patient.    Pulmonary Function Tests  FEV1: amount of air you can blow out in 1 second  FVC: total amount of air you can take in and blow out    Your Goals:         PFT Latest Ref Rng & Units 7/18/2018   FVC L 2.82   FEV1 L 2.49   FVC% % 85   FEV1% % 86          Airway Clearance: The Most Important Way to Keep Your Lungs Healthy  Vest Settings:    Hill-Rom Frequencies: 8, 9, 10 Pressure 10 Then, Frequencies 18, 19, 20 Pressure 6      RespirTech: Quick Start with Pressure of     Do each frequency for 5 minutes; Deflate vest after each frequency & cough 3 times before beginning the next setting.    Vest and Neb Therapy should be done 2 times/day.    Good Nutrition Can Improve Lung Function and Overall Health     Take ALL of your vitamins with food     Take 1/2 of your enzymes before EVERY meal/snack and the other 1/2 mid-meal/snack    Wt Readings from Last 3 Encounters:   07/18/18 62.1 kg (137 lb)   06/06/18 63 kg (139 lb)   02/26/18 62.1 kg (136 lb 14.5 oz)       Body mass index is 26.2 kg/(m^2).         National CF Foundation Recommendations for BMI in CF Adults: Women: at least 22 Men: at least 23        Controlling Blood Sugars Helps Prevent Lung Infections & Improves Nutrition  Test blood sugar:     In the morning before eating (goal is )     2 hours after a meal (goal is less than 150)     When pre-meal glucose is greater than 150 add correction     At bedtime (if less than 100 eat a snack  with 15 grams of carbohydrates  Last A1C Results:   Hemoglobin A1C   Date Value Ref Range Status   02/26/2018 8.1 (H) 4.3 - 6.0 % Final         If diabetic, measure A1C every 6 months. Goal is under 7%.    Staying Healthy    Research: If you are interested in learning about research opportunities or have questions, please contact Stella Gatica at [phone] or [email or web address].       Foundation: Compass is a personalized resource service to help you with the insurance, financial, legal and other issues you are facing.  It's free, confidential and available to anyone with CF.  Ask your  for more information or contact Compass directly at 411-COMPASS ([phone]) or compass@cff.org, or learn more at cff.org/compass.       CF Nurse Line:  Devon Montes De Oca: [phone]   Chhaya Munoz, RT: [phone]     Megan Gilmore , Dieticians: [phone]     Paulette Danielle, Diabetes Nurse: [phone]    Kaykay Tavarez: [phone] or Mary Weber [phone], Social Workers   [email or web address]

## 2018-07-18 NOTE — PROGRESS NOTES
Clinical Consult:                                                    Marleni Alamo is a 22 year old female coming in for a clinical pharmacist consult.  She was referred to me from Yahaira Trinidad PA-C.     Reason for Consult: possible Symdeko initiation    Discussion: Met with patient and her mother today to discuss Symdeko.  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  Patient's mother states she is not afraid of needle sticks, but historically blood draws have been difficult and painful.  However, they are interested in the potential benefits of starting a CFTR modulator and are considering starting therapy.      Plan:  1. Verbal and written information about Symdeko were provided today.  2. Will discuss again at next clinic appointment in 3 months.    Kristy Miguel PharmD  CF Medication Therapy Management Pharmacist  [facility]  [phone]

## 2018-07-18 NOTE — MR AVS SNAPSHOT
"              After Visit Summary   7/18/2018    Marleni Alamo    MRN: [id number]           Patient Information     Date Of Birth          1995        Visit Information        Provider Department      7/18/2018 1:00 PM Nidia Miguel RPH [facility]        Today's Diagnoses     CF (cystic fibrosis) (H)    -  1       Follow-ups after your visit        Future tests that were ordered for you today     Open Future Orders        Priority Expected Expires Ordered    Cystic Fibrosis Culture Aerob Bacterial Routine 10/3/2018 10/17/2018 7/18/2018    Spirometry, Breathing Capacity Routine 10/3/2018 10/17/2018 7/18/2018            Who to contact     If you have questions or need follow up information about today's clinic visit or your schedule please contact [facility] CYSTIC FIBROSIS [facility] directly at [phone].  Normal or non-critical lab and imaging results will be communicated to you by MyChart, letter or phone within 4 business days after the clinic has received the results. If you do not hear from us within 7 days, please contact the clinic through [employer] or phone. If you have a critical or abnormal lab result, we will notify you by phone as soon as possible.  Submit refill requests through [employer] or call your pharmacy and they will forward the refill request to us. Please allow 3 business days for your refill to be completed.          Additional Information About Your Visit        MyChart Information     [employer] lets you send messages to your doctor, view your test results, renew your prescriptions, schedule appointments and more. To sign up, go to [email or web address] . Click on \"Log in\" on the left side of the screen, which will take you to the Welcome page. Then click on \"Sign up Now\" on the right side of the page.     You will be asked to enter the access code listed below, as well as some personal information. Please follow " the directions to create your username and password.     Your access code is: [email or web address]  Expires: 2018  9:06 AM     Your access code will  in 90 days. If you need help or a new code, please call your [place] clinic or [phone].        Care EveryWhere ID     This is your Care EveryWhere ID. This could be used by other organizations to access your [place] medical records  [id number]         Blood Pressure from Last 3 Encounters:   18 107/67   18 122/74   18 110/73    Weight from Last 3 Encounters:   18 137 lb (62.1 kg)   18 139 lb (63 kg)   18 136 lb 14.5 oz (62.1 kg)              Today, you had the following     No orders found for display         Today's Medication Changes          These changes are accurate as of 18  4:33 PM.  If you have any questions, ask your nurse or doctor.               These medicines have changed or have updated prescriptions.        Dose/Directions    insulin glargine 100 UNIT/ML injection   Commonly known as:  LANTUS   This may have changed:  how much to take   Used for:  CF (cystic fibrosis) (H)   Changed by:  Yahaira Trinidad PA-C        Dose:  17 Units   Inject 17 Units Subcutaneous every morning   Refills:  0       omeprazole 40 MG capsule   Commonly known as:  priLOSEC   This may have changed:    - medication strength  - how much to take   Used for:  CF (cystic fibrosis) (H)   Changed by:  Yahaira Trinidad PA-C        Dose:  40 mg   Take 1 capsule (40 mg) by mouth 2 times daily   Quantity:  60 capsule   Refills:  3            Where to get your medicines      These medications were sent to [facility] [place] - [address]  [address] [address]     Phone:  [phone]     omeprazole 40 MG capsule                Primary Care Provider Office Phone # Fax #    Mickey Maurice Peterson [phone]       [facility] [address] [place]  [address]  [facility] [address]        Equal Access to Services     FIGUEROAKAMRON DAVIS : Hadii dipak ku marni Jones, wajonelleda luqadaha, qaybta akrenyazminsimone douglasbayronsimone, waxkashif tai pamelaeda conradmanirosangela morales . So [facility] [phone].    ATENCIÓN: Si habla español, tiene a philip disposición servicios gratuitos de asistencia lingüística. Llame al [phone].    We comply with applicable federal civil rights laws and [place] laws. We do not discriminate on the basis of race, color, national origin, age, disability, sex, sexual orientation, or gender identity.            Thank you!     Thank you for choosing [facility]  for your care. Our goal is always to provide you with excellent care. Hearing back from our patients is one way we can continue to improve our services. Please take a few minutes to complete the written survey that you may receive in the mail after your visit with us. Thank you!             Your Updated Medication List - Protect others around you: Learn how to safely use, store and throw away your medicines at www.disposemymeds.org.          This list is accurate as of 7/18/18  4:33 PM.  Always use your most recent med list.                   Brand Name Dispense Instructions for use Diagnosis    albuterol (2.5 MG/3ML) 0.083% neb solution     360 mL    TAKE 1 VIAL (2.5 MG) BY NEBULIZATION 4 TIMES DAILY    CF (cystic fibrosis) (H)       * amylase-lipase-protease 62592-42022 units Cpep per EC capsule    CREON    180 capsule    TAKE 4 CAPSULES WITH MEALS AND 3 CAPSULES WITH SNACKS, FOR 3 MEALS AND 2 SNACKS PER DAY.    Cystic fibrosis with pulmonary manifestations (H)       * CREON 97432-46125 units Cpep per EC capsule   Generic drug:  amylase-lipase-protease     180 capsule    TAKE 4 CAPSULES WITH MEALS AND 3 CAPSULES WITH SNACKS, FOR 3 MEALS AND 2 SNACKS PER DAY.    Cystic fibrosis with pulmonary manifestations (H)       azithromycin 500 MG tablet    ZITHROMAX    15 tablet     TAKE 1 TABLET (500 MG) BY MOUTH EVERY MON, WED, FRI MORNING    Cystic fibrosis with pulmonary manifestations (H)       dornase alpha 1 MG/ML neb solution    PULMOZYME    75 mL    Inhale 2.5 mg into the lungs daily    CF (cystic fibrosis) (H)       fluticasone-salmeterol 115-21 MCG/ACT Inhaler    ADVAIR-HFA    12 Inhaler    INHALE TWO PUFFS BY MOUTH TWICE DAILY    CF (cystic fibrosis) (H)       insulin aspart 100 UNIT/ML injection    NovoLOG PEN     2 units per 15g carbohydrate with meals and correction scale        insulin glargine 100 UNIT/ML injection    LANTUS     Inject 17 Units Subcutaneous every morning    CF (cystic fibrosis) (H)       multivitamin CF formula chewable tablet     60 tablet    TAKE 2 TABLETS BY MOUTH DAILY    CF (cystic fibrosis) (H)       omeprazole 40 MG capsule    priLOSEC    60 capsule    Take 1 capsule (40 mg) by mouth 2 times daily    CF (cystic fibrosis) (H)       PULMICORT 1 MG/2ML Susp neb solution   Generic drug:  budesonide      Take 2 mLs (1 mg) by nebulization 2 times daily    CF (cystic fibrosis) (H), Diabetes mellitus due to cystic fibrosis (H), Diabetes mellitus related to cystic fibrosis (H), Gastroesophageal reflux disease without esophagitis, 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       sodium chloride inhalant 7 % Nebu neb solution     4 mL    Take 4 mLs by nebulization daily UNCLEAR IF THIS IS ON THE CURRENT LIST        tobramycin (PF) 300 MG/5ML neb solution    CHER    280 mL    Take 5 mLs (300 mg) by nebulization 2 times daily    CF (cystic fibrosis) (H)       vitamin D 76298 UNIT capsule    ERGOCALCIFEROL    30 capsule    TAKE 1 CAPSULE BY MOUTH EVERY MON, WED, FRI MORNING    CF (cystic fibrosis) (H), Pancreatic insufficiency       * Notice:  This list has 2 medication(s) that are the same as other medications prescribed for you. Read the directions carefully, and ask your doctor or other care provider to review them with you.

## 2018-07-19 ENCOUNTER — TELEPHONE (OUTPATIENT)
Dept: CARE COORDINATION | Facility: CLINIC | Age: 23
End: 2018-07-19

## 2018-07-19 ENCOUNTER — CARE COORDINATION (OUTPATIENT)
Dept: OTOLARYNGOLOGY | Facility: CLINIC | Age: 23
End: 2018-07-19

## 2018-07-19 NOTE — PROGRESS NOTES
CT reviewed by Dr Carbajal:  Patient should return to discuss, moderate sinus disease  Message left on patient's vm, asked for return call to schedule

## 2018-07-19 NOTE — TELEPHONE ENCOUNTER
Adult Cystic Fibrosis Program  Social Work Phone Consult    Data:   Per request of Yahaira Lambert, COLBY reached out to MARLENI's mother, Anny, re: questions raised by Anny re: Storm's father's involvement in her CF care. Anny reported that she was unable to speak at this time but would call back when able. SW encouraged her to call back at any time.    Intervention:   -Outreach following question raised by patient's parent in clinic    Assessment: Unable to assess situation at this time. Will await call back.    Plan:   -SW will await call back from Anny (Marleni's mother).      AILYN Nj, [facility]  Adult Cystic Fibrosis   (Pager) [phone]  (Phone) [phone]

## 2018-07-19 NOTE — PROGRESS NOTES
Elaine, Please contact patient by letter/phone with following results: Moderate sinus disease, we can discuss at return visit.

## 2018-07-23 LAB
BACTERIA SPEC CULT: ABNORMAL
Lab: ABNORMAL
SPECIMEN SOURCE: ABNORMAL

## 2018-07-30 DIAGNOSIS — E84.0 CYSTIC FIBROSIS WITH PULMONARY MANIFESTATIONS (H): ICD-10-CM

## 2018-07-31 RX ORDER — PANCRELIPASE 24000; 76000; 120000 [USP'U]/1; [USP'U]/1; [USP'U]/1
CAPSULE, DELAYED RELEASE PELLETS ORAL
Qty: 180 CAPSULE | Refills: 0 | Status: SHIPPED | OUTPATIENT
Start: 2018-07-31 | End: 2018-08-10

## 2018-08-01 ENCOUNTER — TELEPHONE (OUTPATIENT)
Dept: OTOLARYNGOLOGY | Facility: CLINIC | Age: 23
End: 2018-08-01

## 2018-08-10 DIAGNOSIS — E84.0 CYSTIC FIBROSIS WITH PULMONARY MANIFESTATIONS (H): ICD-10-CM

## 2018-08-10 RX ORDER — PANCRELIPASE 24000; 76000; 120000 [USP'U]/1; [USP'U]/1; [USP'U]/1
CAPSULE, DELAYED RELEASE PELLETS ORAL
Qty: 180 CAPSULE | Refills: 0 | Status: SHIPPED | OUTPATIENT
Start: 2018-08-10 | End: 2018-08-22

## 2018-08-22 DIAGNOSIS — E84.0 CYSTIC FIBROSIS WITH PULMONARY MANIFESTATIONS (H): ICD-10-CM

## 2018-08-23 DIAGNOSIS — E84.0 CYSTIC FIBROSIS WITH PULMONARY MANIFESTATIONS (H): ICD-10-CM

## 2018-08-27 RX ORDER — PANCRELIPASE 24000; 76000; 120000 [USP'U]/1; [USP'U]/1; [USP'U]/1
CAPSULE, DELAYED RELEASE PELLETS ORAL
Qty: 180 CAPSULE | Refills: 0 | Status: SHIPPED | OUTPATIENT
Start: 2018-08-27 | End: 2019-02-27

## 2018-08-29 LAB
EXPTIME-PRE: 6.18 SEC
FEF2575-%PRED-PRE: 91 %
FEF2575-PRE: 3.23 L/SEC
FEF2575-PRED: 3.52 L/SEC
FEFMAX-%PRED-PRE: 96 %
FEFMAX-PRE: 6.09 L/SEC
FEFMAX-PRED: 6.3 L/SEC
FEV1-%PRED-PRE: 86 %
FEV1-PRE: 2.49 L
FEV1FEV6-PRE: 88 %
FEV1FEV6-PRED: 87 %
FEV1FVC-PRE: 88 %
FEV1FVC-PRED: 86 %
FIFMAX-PRE: 4.6 L/SEC
FVC-%PRED-PRE: 85 %
FVC-PRE: 2.82 L
FVC-PRED: 3.3 L

## 2018-09-05 ENCOUNTER — TELEPHONE (OUTPATIENT)
Dept: PULMONOLOGY | Facility: CLINIC | Age: 23
End: 2018-09-05

## 2018-09-05 ENCOUNTER — HOSPITAL ENCOUNTER (EMERGENCY)
Facility: CLINIC | Age: 23
Discharge: HOME | End: 2018-09-06
Attending: EMERGENCY MEDICINE | Admitting: EMERGENCY MEDICINE
Payer: COMMERCIAL

## 2018-09-05 DIAGNOSIS — E84.9 CF (CYSTIC FIBROSIS) (H): ICD-10-CM

## 2018-09-05 DIAGNOSIS — R05.9 COUGH: ICD-10-CM

## 2018-09-05 PROCEDURE — 99284 EMERGENCY DEPT VISIT MOD MDM: CPT | Mod: 25

## 2018-09-05 PROCEDURE — 94640 AIRWAY INHALATION TREATMENT: CPT

## 2018-09-05 PROCEDURE — 99284 EMERGENCY DEPT VISIT MOD MDM: CPT | Mod: Z6 | Performed by: EMERGENCY MEDICINE

## 2018-09-05 NOTE — TELEPHONE ENCOUNTER
PA Initiation    Medication: dornase alpha (PULMOZYME) 1 MG/ML neb solution (PENDING)  Insurance Company: [email or web address] - Phone [phone] Fax [phone]  Pharmacy Filling the Rx:    Filling Pharmacy Phone:    Filling Pharmacy Fax:    Start Date: 9/5/2018

## 2018-09-05 NOTE — TELEPHONE ENCOUNTER
Received call from [employer], they stated the test claim must be run for 150 per 30 days, not 28. PA on file until 12/13/2018.

## 2018-09-05 NOTE — ED AVS SNAPSHOT
[facility] [place], Emergency Department    [address] [place] [id number]    Phone:  [phone]                                       Marleni Alamo   MRN: [id number]    Department:  [facility], Emergency Department   Date of Visit:  9/5/2018           After Visit Summary Signature Page     I have received my discharge instructions, and my questions have been answered. I have discussed any challenges I see with this plan with the nurse or doctor.    ..........................................................................................................................................  Patient/Patient Representative Signature      ..........................................................................................................................................  Patient Representative Print Name and Relationship to Patient    ..................................................               ................................................  Date                                            Time    ..........................................................................................................................................  Reviewed by Signature/Title    ...................................................              ..............................................  Date                                                            Time          22EPIC Rev 08/18

## 2018-09-06 ENCOUNTER — APPOINTMENT (OUTPATIENT)
Dept: GENERAL RADIOLOGY | Facility: CLINIC | Age: 23
End: 2018-09-06
Attending: EMERGENCY MEDICINE
Payer: COMMERCIAL

## 2018-09-06 VITALS
TEMPERATURE: 98.2 F | BODY MASS INDEX: 27.09 KG/M2 | OXYGEN SATURATION: 98 % | HEIGHT: 60 IN | DIASTOLIC BLOOD PRESSURE: 69 MMHG | WEIGHT: 138 LBS | SYSTOLIC BLOOD PRESSURE: 118 MMHG | RESPIRATION RATE: 24 BRPM | HEART RATE: 92 BPM

## 2018-09-06 LAB
ALBUMIN SERPL-MCNC: 3.6 G/DL (ref 3.4–5)
ALP SERPL-CCNC: 217 U/L (ref 40–150)
ALT SERPL W P-5'-P-CCNC: 88 U/L (ref 0–50)
ANION GAP SERPL CALCULATED.3IONS-SCNC: 8 MMOL/L (ref 3–14)
AST SERPL W P-5'-P-CCNC: 82 U/L (ref 0–45)
BASOPHILS # BLD AUTO: 0 10E9/L (ref 0–0.2)
BASOPHILS NFR BLD AUTO: 0.5 %
BILIRUB SERPL-MCNC: 0.6 MG/DL (ref 0.2–1.3)
BUN SERPL-MCNC: 11 MG/DL (ref 7–30)
CALCIUM SERPL-MCNC: 9.2 MG/DL (ref 8.5–10.1)
CHLORIDE SERPL-SCNC: 104 MMOL/L (ref 94–109)
CO2 SERPL-SCNC: 28 MMOL/L (ref 20–32)
CREAT SERPL-MCNC: 0.54 MG/DL (ref 0.52–1.04)
DIFFERENTIAL METHOD BLD: ABNORMAL
EOSINOPHIL # BLD AUTO: 0.2 10E9/L (ref 0–0.7)
EOSINOPHIL NFR BLD AUTO: 1.8 %
ERYTHROCYTE [DISTWIDTH] IN BLOOD BY AUTOMATED COUNT: 16.3 % (ref 10–15)
GFR SERPL CREATININE-BSD FRML MDRD: >90 ML/MIN/1.7M2
GLUCOSE SERPL-MCNC: 152 MG/DL (ref 70–99)
HCT VFR BLD AUTO: 35.3 % (ref 35–47)
HGB BLD-MCNC: 10.9 G/DL (ref 11.7–15.7)
IMM GRANULOCYTES # BLD: 0 10E9/L (ref 0–0.4)
IMM GRANULOCYTES NFR BLD: 0.1 %
LYMPHOCYTES # BLD AUTO: 2 10E9/L (ref 0.8–5.3)
LYMPHOCYTES NFR BLD AUTO: 22.2 %
MCH RBC QN AUTO: 24.4 PG (ref 26.5–33)
MCHC RBC AUTO-ENTMCNC: 30.9 G/DL (ref 31.5–36.5)
MCV RBC AUTO: 79 FL (ref 78–100)
MONOCYTES # BLD AUTO: 0.6 10E9/L (ref 0–1.3)
MONOCYTES NFR BLD AUTO: 6.8 %
NEUTROPHILS # BLD AUTO: 6 10E9/L (ref 1.6–8.3)
NEUTROPHILS NFR BLD AUTO: 68.6 %
NRBC # BLD AUTO: 0 10*3/UL
NRBC BLD AUTO-RTO: 0 /100
PLATELET # BLD AUTO: 309 10E9/L (ref 150–450)
POTASSIUM SERPL-SCNC: 4.2 MMOL/L (ref 3.4–5.3)
PROT SERPL-MCNC: 7.8 G/DL (ref 6.8–8.8)
RBC # BLD AUTO: 4.46 10E12/L (ref 3.8–5.2)
SODIUM SERPL-SCNC: 141 MMOL/L (ref 133–144)
WBC # BLD AUTO: 8.8 10E9/L (ref 4–11)

## 2018-09-06 PROCEDURE — 71046 X-RAY EXAM CHEST 2 VIEWS: CPT

## 2018-09-06 PROCEDURE — 80053 COMPREHEN METABOLIC PANEL: CPT | Performed by: EMERGENCY MEDICINE

## 2018-09-06 PROCEDURE — 25000125 ZZHC RX 250: Performed by: EMERGENCY MEDICINE

## 2018-09-06 PROCEDURE — 85025 COMPLETE CBC W/AUTO DIFF WBC: CPT | Performed by: EMERGENCY MEDICINE

## 2018-09-06 RX ORDER — SULFAMETHOXAZOLE AND TRIMETHOPRIM 400; 80 MG/1; MG/1
1 TABLET ORAL 2 TIMES DAILY
Qty: 14 TABLET | Refills: 0 | Status: SHIPPED | OUTPATIENT
Start: 2018-09-06 | End: 2019-02-27

## 2018-09-06 RX ORDER — IPRATROPIUM BROMIDE AND ALBUTEROL SULFATE 2.5; .5 MG/3ML; MG/3ML
3 SOLUTION RESPIRATORY (INHALATION) ONCE
Status: COMPLETED | OUTPATIENT
Start: 2018-09-06 | End: 2018-09-06

## 2018-09-06 RX ADMIN — IPRATROPIUM BROMIDE AND ALBUTEROL SULFATE 3 ML: .5; 3 SOLUTION RESPIRATORY (INHALATION) at 02:09

## 2018-09-06 ASSESSMENT — ENCOUNTER SYMPTOMS
WEAKNESS: 0
BACK PAIN: 0
BRUISES/BLEEDS EASILY: 0
CONFUSION: 0
DYSURIA: 0
ABDOMINAL PAIN: 0
SHORTNESS OF BREATH: 0
COUGH: 1
FEVER: 1

## 2018-09-06 NOTE — ED PROVIDER NOTES
History     Chief Complaint   Patient presents with     Cough     HPI  Marleni Alamo is a 23 year old female who has a past medical history of cystic fibrosis, pancreatic insufficiency, CFRD, cerebral palsy, 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 who presents emergency department from home with her mother and grandmother with complaint of fever and cough.  Mother reports that she developed a cough and cold-like symptoms since Sunday and since this time it has worsened.  Mother reports some sneezing, nasal congestion, and cough.  Today patient with low-grade fever in the low 100s along with productive cough with yellow-green sputum.  Mother reports patient has never had a productive cough in the past.  Denies any headache, ear pain, chest pain, shortness of breath, abdominal pain, nausea, vomiting, diarrhea.  No rash.  No sick contacts.  Patient normally does vest therapy twice daily however has increased the past few days with the cough.  Mother reports patient is doing well for the past few years.  Patient currently doing nebulizer treatments at home with albuterol, Pulmicort, HTS and recently added saline over the past few days. Last saline treatment was around 2130 this evening.  Patient last had ibuprofen around 1800 this evening.  Patient with history of nasal polyps and congestion and is scheduled to have surgery in October by ENT Dr. Carbajal.  She is on chronic azithromycin and on Tobramycin 1 month on 1 month off.  Mother reports that she is currently on her tobramycin month.    I have reviewed the Medications, Allergies, Past Medical and Surgical History, and Social History in the Epic system.    Review of Systems   Constitutional: Positive for fever.   HENT: Positive for sneezing.    Eyes: Negative for visual disturbance.   Respiratory: Positive for cough. Negative for shortness of breath.    Cardiovascular: Negative for chest pain.   Gastrointestinal: Negative for abdominal pain.   Endocrine: Negative for polyuria.    Genitourinary: Negative for dysuria.   Musculoskeletal: Negative for back pain.   Neurological: Negative for weakness.   Hematological: Does not bruise/bleed easily.   Psychiatric/Behavioral: Negative for confusion.       Physical Exam   BP: 143/72  Pulse: 85  Temp: 98.2  F (36.8  C)  Resp: 24  Height: 152.4 cm (5')  Weight: 62.6 kg (138 lb)  SpO2: 98 %      Physical Exam   Constitutional: She is oriented to person, place, and time. She appears well-developed and well-nourished. No distress.   HENT:   Head: Normocephalic and atraumatic.   Mouth/Throat: Oropharynx is clear and moist.   Eyes: Conjunctivae and EOM are normal. Pupils are equal, round, and reactive to light.   Neck: Normal range of motion. Neck supple.   Cardiovascular: Normal rate, regular rhythm and normal heart sounds.    Pulmonary/Chest: Effort normal and breath sounds normal. No respiratory distress. She has no wheezes.   Abdominal: Soft. There is no tenderness. There is no rebound and no guarding.   Musculoskeletal: Normal range of motion.   Neurological: She is alert and oriented to person, place, and time. No cranial nerve deficit.   Skin: Skin is warm and dry. No rash noted.   +digital clubbing   Psychiatric: She has a normal mood and affect. Her behavior is normal.       ED Course     ED Course     Procedures               Labs Ordered and Resulted from Time of ED Arrival Up to the Time of Departure from the ED   CBC WITH PLATELETS DIFFERENTIAL - Abnormal; Notable for the following:        Result Value    Hemoglobin 10.9 (*)     MCH 24.4 (*)     MCHC 30.9 (*)     RDW 16.3 (*)     All other components within normal limits   COMPREHENSIVE METABOLIC PANEL - Abnormal; Notable for the following:     Glucose 152 (*)     Alkaline Phosphatase 217 (*)     ALT 88 (*)     AST 82 (*)     All other components within normal limits   CYSTIC FIBROSIS CULTURE AEROB BACTERIAL       Consults  Pulmonary: Responded (09/06/18 0224).  Results for orders placed or  performed during the hospital encounter of 09/05/18   XR Chest 2 Views    Narrative    PRELIMINARY REPORT - The following report is a preliminary  interpretation.      Impression    Impression: No acute airspace disease.   CBC with platelets differential   Result Value Ref Range    WBC 8.8 4.0 - 11.0 10e9/L    RBC Count 4.46 3.8 - 5.2 10e12/L    Hemoglobin 10.9 (L) 11.7 - 15.7 g/dL    Hematocrit 35.3 35.0 - 47.0 %    MCV 79 78 - 100 fl    MCH 24.4 (L) 26.5 - 33.0 pg    MCHC 30.9 (L) 31.5 - 36.5 g/dL    RDW 16.3 (H) 10.0 - 15.0 %    Platelet Count 309 150 - 450 10e9/L    Diff Method Automated Method     % Neutrophils 68.6 %    % Lymphocytes 22.2 %    % Monocytes 6.8 %    % Eosinophils 1.8 %    % Basophils 0.5 %    % Immature Granulocytes 0.1 %    Nucleated RBCs 0 0 /100    Absolute Neutrophil 6.0 1.6 - 8.3 10e9/L    Absolute Lymphocytes 2.0 0.8 - 5.3 10e9/L    Absolute Monocytes 0.6 0.0 - 1.3 10e9/L    Absolute Eosinophils 0.2 0.0 - 0.7 10e9/L    Absolute Basophils 0.0 0.0 - 0.2 10e9/L    Abs Immature Granulocytes 0.0 0 - 0.4 10e9/L    Absolute Nucleated RBC 0.0    Comprehensive metabolic panel   Result Value Ref Range    Sodium 141 133 - 144 mmol/L    Potassium 4.2 3.4 - 5.3 mmol/L    Chloride 104 94 - 109 mmol/L    Carbon Dioxide 28 20 - 32 mmol/L    Anion Gap 8 3 - 14 mmol/L    Glucose 152 (H) 70 - 99 mg/dL    Urea Nitrogen 11 7 - 30 mg/dL    Creatinine 0.54 0.52 - 1.04 mg/dL    GFR Estimate >90 >60 mL/min/1.7m2    GFR Estimate If Black >90 >60 mL/min/1.7m2    Calcium 9.2 8.5 - 10.1 mg/dL    Bilirubin Total 0.6 0.2 - 1.3 mg/dL    Albumin 3.6 3.4 - 5.0 g/dL    Protein Total 7.8 6.8 - 8.8 g/dL    Alkaline Phosphatase 217 (H) 40 - 150 U/L    ALT 88 (H) 0 - 50 U/L    AST 82 (H) 0 - 45 U/L         Assessments & Plan (with Medical Decision Making)   Marleni Alamo is a 23 year old female who has a past medical history of cystic fibrosis, pancreatic insufficiency, CFRD, cerebral palsy, 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 who presents  emergency department from home with her mother and grandmother with complaint of fever and cough.  Patient with cough for the past 4 days, productive with yellow-green mucus.  Upon arrival patient is well-appearing, afebrile 98.2, no respiratory distress.  Hemodynamically stable pulse 85, blood pressure 143/72, O2 sats 98% on room air.  Lungs clear to auscultation bilaterally wheezing, rhonchi, rales.  Differential includes viral illness versus upper respiratory infection versus bronchitis versus pneumonia versus COPD exacerbation.  At this time plan on labs, chest x-ray, sputum culture, and reevaluate.  I reviewed competence of labs which are unremarkable with no leukocytosis, white blood cell count 8.8, no acute metabolic or electrolyte abnormality, mild elevation in liver function with alk phosphatase 217, ALT 88, AST 82.  Chest x-ray unremarkable no acute infiltrate.  Patient given DuoNeb in the emergency department.  Will attempt to get a sputum culture.  I discussed with pulmonology Dr. Michelle and at this time patient is nontoxic and will likely discharge home however will place her on Bactrim BID based on her prior cultures that were positive for staph and achromobacter and will follow up outpatient.  Patient and family understand agree with the plan.  Return precautions discussed if persistent high fever, difficulty breathing, or worsening symptoms. .The patient is discharged home with instructions to return if their symptoms persist or worsen.  Plan for close follow-up with their primary physician.  I discussed workup, results, treatment, and plan with the patient.  Patient understands and agrees with the plan.       I have reviewed the nursing notes.    I have reviewed the findings, diagnosis, plan and need for follow up with the patient.    New Prescriptions    SULFAMETHOXAZOLE-TRIMETHOPRIM (BACTRIM) 400-80 MG PER TABLET    Take 1 tablet by mouth 2 times daily for 7 days       Final diagnoses:   Cough    CF (cystic fibrosis) (H)       9/5/2018   [facility], [place], EMERGENCY DEPARTMENT     Crystal Trinidad MD  09/06/18 [id number]

## 2018-09-06 NOTE — DISCHARGE INSTRUCTIONS
Thank you for your patience today.  Please follow-up with your regular doctor in the next 2-3 days for further evaluation and follow-up care.  Please call in the morning to schedule an appointment.  Please continue your own medications.  Please take antibiotics as directed.  Please continue your vest therapy and breathing treatments as directed.  Please return to the ER if you develop high fever, difficulty breathing, or any worsening of your current symptoms.  It was a pleasure taking care of you today.  We hope you feel better soon.      Discharge Instructions for Cystic Fibrosis    Preventing infection    Help keep your child s lungs clear of extra mucus. Learn how to do chest physical therapy, including postural drainage and percussion on your child to help with this. Ask your child's healthcare provider for instructions.    Remind your child to wash his or her hands often, and correctly.  ? He or she should use soap and water and a lot of rubbing.  ? Make sure you have alcohol-based hand  when soap and water aren't available.  ? Teach your child to keep his or her hands away from the face. Germs often get into the nose and mouth and then into the lungs this way.    Ask your child's healthcare provider about a yearly flu shot and other vaccinations.    Avoid crowds, especially in the winter, when more people have colds and the flu.  Aiding digestion    Learn about the special dietary needs of your child. Your child may need pancreatic enzymes to help with digestion.    If prescribed, make sure your child takes pancreatic enzymes exactly as instructed.    A nutritionist or dietitian can help you and your child. Ask your child's healthcare provider for a referral.    Some children have problems growing and gaining weight. Talk with your child's healthcare provider or nutritionist about which types and amounts of foods or supplements to include in your child's diet.  Other home care    Encourage your child  to exercise regularly and drink lots of fluids.    Your child should see his or her healthcare provider at least every 3 months, or as directed.    Make sure you talk with your child about the dangers of smoking. He or she should not smoke and should stay away from others who do.  Follow-up  Make all follow-up appointments as soon as possible after leaving the hospital. Contact your child's healthcare provider sooner, if you have any questions or concerns.  Ask about the [facility]. These centers specialize in the care of children and adults with cystic fibrosis. You can check the Cystic Fibrosis Foundation website: http://www.cff.org. Or call [facility] ([phone]).  When to call your child's healthcare provider  Call the healthcare provider right away if your child has any of the following:    Severe constipation    Severe diarrhea    Abdominal pain    Vomiting    Decreased appetite    More mucus than usual, or mucus that is bloody or dark in color    Difficulty taking part in daily activities    More tired than usual    Fever    Worsening shortness of breath  Since each child is different, make sure you understand when to call the healthcare provider about your child's specific symptoms.   Date Last Reviewed: 2/1/2017 2000-2017 [employer]. [address] [place] [address], [place] [address]. All rights reserved. This information is not intended as a substitute for professional medical care. Always follow your healthcare professional's instructions.

## 2018-09-13 DIAGNOSIS — K86.89 PANCREATIC INSUFFICIENCY: ICD-10-CM

## 2018-09-13 DIAGNOSIS — E84.9 CF (CYSTIC FIBROSIS) (H): ICD-10-CM

## 2018-09-13 RX ORDER — ERGOCALCIFEROL 1.25 MG/1
CAPSULE, LIQUID FILLED ORAL
Qty: 30 CAPSULE | Refills: 0 | Status: SHIPPED | OUTPATIENT
Start: 2018-09-13 | End: 2018-11-27

## 2018-09-15 DIAGNOSIS — E84.9 CF (CYSTIC FIBROSIS) (H): ICD-10-CM

## 2018-09-18 RX ORDER — ALBUTEROL SULFATE 0.83 MG/ML
SOLUTION RESPIRATORY (INHALATION)
Qty: 360 ML | Refills: 6 | Status: SHIPPED | OUTPATIENT
Start: 2018-09-18 | End: 2019-05-23

## 2018-10-02 DIAGNOSIS — E84.9 CF (CYSTIC FIBROSIS) (H): ICD-10-CM

## 2018-10-03 RX ORDER — FLUTICASONE PROPIONATE AND SALMETEROL XINAFOATE 115; 21 UG/1; UG/1
AEROSOL, METERED RESPIRATORY (INHALATION)
Qty: 1 INHALER | Refills: 3 | Status: SHIPPED | OUTPATIENT
Start: 2018-10-03 | End: 2019-02-02

## 2018-10-09 DIAGNOSIS — E84.0 CYSTIC FIBROSIS WITH PULMONARY MANIFESTATIONS (H): ICD-10-CM

## 2018-10-10 RX ORDER — AZITHROMYCIN 500 MG/1
TABLET, FILM COATED ORAL
Qty: 15 TABLET | Refills: 2 | Status: SHIPPED | OUTPATIENT
Start: 2018-10-10 | End: 2019-01-29

## 2018-10-19 DIAGNOSIS — E84.9 CF (CYSTIC FIBROSIS) (H): ICD-10-CM

## 2018-10-22 RX ORDER — MV-MIN 51/FOLIC ACID/VIT K/UBI 100-350MCG
TABLET,CHEWABLE ORAL
Qty: 60 TABLET | Refills: 3 | Status: SHIPPED | OUTPATIENT
Start: 2018-10-22 | End: 2019-03-11

## 2018-10-31 ENCOUNTER — PATIENT OUTREACH (OUTPATIENT)
Dept: CARE COORDINATION | Facility: CLINIC | Age: 23
End: 2018-10-31

## 2018-11-02 DIAGNOSIS — E84.9 CF (CYSTIC FIBROSIS) (H): ICD-10-CM

## 2018-11-07 ENCOUNTER — OFFICE VISIT (OUTPATIENT)
Dept: OTOLARYNGOLOGY | Facility: CLINIC | Age: 23
End: 2018-11-07
Payer: COMMERCIAL

## 2018-11-07 VITALS — WEIGHT: 147 LBS | HEIGHT: 61 IN | BODY MASS INDEX: 27.75 KG/M2

## 2018-11-07 DIAGNOSIS — J32.4 CHRONIC PANSINUSITIS: Primary | ICD-10-CM

## 2018-11-07 DIAGNOSIS — E84.9 CF (CYSTIC FIBROSIS) (H): ICD-10-CM

## 2018-11-07 ASSESSMENT — PAIN SCALES - GENERAL: PAINLEVEL: NO PAIN (0)

## 2018-11-07 NOTE — NURSING NOTE
"Chief Complaint   Patient presents with     RECHECK     surgical consult, ct results      Height 1.55 m (5' 1.02\"), weight 66.7 kg (147 lb), not currently breastfeeding.    Mj Ascencio LPN    "

## 2018-11-07 NOTE — LETTER
11/7/2018       RE: Marleni Alamo  [address]   [place] [address]     Dear Colleague,    Thank you for referring your patient, Marleni Alamo, to the [facility] EAR NOSE AND TH[facility] at [facility]. Please see a copy of my visit note below.    11/07/18 11/07/18    HISTORY OF PRESENT ILLNESS: Marleni returns for follow-up today.  She is a 23-year-old woman with a history of cystic fibrosis and chronic rhinosinusitis related to CF.  We last saw her over the summer when she started having sinus pressure and increase rhinorrhea and crusting.  At the time we recommended for her to restart nasal saline irrigations and we obtained a CT scan.  Marleni has been doing sinus irrigations anywhere from 2-3 times a day with only mild improvement.  She continues to have sinus pressure and buildup of crusty secretions.  Since we last saw her she had brief overnight admission for a upper respiratory infection once school started.    Marleni feels her symptoms have worsened over time and have not resolved with the use of frequent sinus irrigations. She is interested in having a revision sinus surgery. Per her mother, Anny, she is also due to have dental work done, however given her limited mouth opening and small mouth this cannot be done in clinic.    Patient Supplied Answers to Review of Systems  [facility] ROS 11/7/2018   Ears, Nose, Throat Nasal congestion or drainage   Gastrointestinal/Genitourinary -       PHYSICAL EXAM: Well-appearing woman in no acute distress.  Symmetric facial features.  Examination of the oral cavity reveals moist mucous membranes and a bifid uvula.  Examination of her ears reveal patent ear canals with minimal cerumen.  Anterior rhinoscopy exam reveals bilateral nasal crusting with inflamed mucosa.  She is breathing comfortably room air.    IMAGING: CT sinuses 7/2018 and 10/2015  Images were personally reviewed. Chronic pansinusitis, similar to 2015  before most recent FESS.   Jose [place]c Score    Paranasal sinuses Right Left   Maxillary (0, 1, 2) 1 1   Anterior Ethmoid (0, 1, 2) 1 1   Posterior Ethmoid (0, 1, 2) 1 2   Sphenoid (0, 1, 2) 1 1   Frontal (0, 1, 2) 2 2   OMC (0,2) 0 0     Total:13/24    Osteitis present.     ASSESSMENT: chronic rhino sinusitis related to cystic fibrosis    PLAN: Given she is symptomatically worse and maximum medical therapy has not achieved satisfactory control of her symptom burden, we would recommend revision endoscopic sinus surgery. We will coordinate a date with our Dental colleagues so that she can also have work done during the same anesthetic event. We explained risks, benefits, and alternatives to Marleni and her mother and answered all of their questions. They will meet with our nurse coordinator, Dallin Conner, today.     I was present with the resident during the history and examination. I agree with the findings and the plan of care as documented in the resident's note.   Anny Carbajal MD    Again, thank you for allowing me to participate in the care of your patient.      Sincerely,    Anny Carbajal MD

## 2018-11-07 NOTE — MR AVS SNAPSHOT
After Visit Summary   11/7/2018    Marleni Alamo    MRN: [id number]           Patient Information     Date Of Birth          1995        Visit Information        Provider Department      11/7/2018 2:00 PM Anny Carbajal MD [facility] Ear Nose and Throat        Today's Diagnoses     Chronic pansinusitis    -  1      Care Instructions    I provided patient the rodney-op worksheet  ENT Adult Default Surgery Request.   I encouraged patient to review the check list and highlighted the following points:  1) Complete History and Physical within 30 days of surgery, either with PAC clinic or family clinic.   If completed outside of  Physicians, please have provider send all of your results to the hospital before the surgery.  Please schedule history and physical with primary care provider.  2) Same-day surgery, must arrange for an adult to take you home and stay with you for the first 24 hours after surgery.  3) Discuss with primary care provider what medicines are safe before surgery.   Example, Coumadin, Plavix, Aspirin, Ibuprofen/Motrin, herbal products, Vitamin E and Fish oil may possibly be discontinued 7 days prior to surgery date.  4) Stop drinking alcohol at least 24 hours before surgery.  5) Quit or at least cut down smoking as it higher your risks for infection after surgery. No chewing tobacco for at least 8 hours before surgery.  6) Take a bath or shower the night before and morning of surgery.   Use antiseptic soap.  7) No food or drink after midnight. Follow your surgeon s orders for eating and drinking.    Please following surgeon s instructions as if you don t, your surgery could be canceled.            Follow-ups after your visit        Who to contact     Please call your clinic at [phone] to:    Ask questions about your health    Make or cancel appointments    Discuss your medicines    Learn about your test results    Speak to your doctor            Additional Information About Your  "Visit        Care EveryWhere ID     This is your Care EveryWhere ID. This could be used by other organizations to access your [place] medical records  [id number]        Your Vitals Were     Height BMI (Body Mass Index)                1.55 m (5' 1.02\") 27.75 kg/m2           Blood Pressure from Last 3 Encounters:   09/06/18 118/69   07/18/18 107/67   06/06/18 122/74    Weight from Last 3 Encounters:   11/07/18 66.7 kg (147 lb)   09/05/18 62.6 kg (138 lb)   07/18/18 62.1 kg (137 lb)              We Performed the Following     Inna-Operative Worksheet (ENT Adult Default Surgery Request)        Primary Care Provider Office Phone # Fax #    Mickey Peterson [phone]       [facility] [id number] [place]  [facility] [address]        Equal Access to Services     [facility]: Hadii aad ku hadasho Soomaali, waaxda luqadaha, qaybta kaalmada adeegyada, waxay freddyin hayjorgen misael narvaezn . So [facility] [phone].    ATENCIÓN: Si habla español, tiene a philip disposición servicios gratuitos de asistencia lingüística. [facility] [phone].    We comply with applicable federal civil rights laws and [place] laws. We do not discriminate on the basis of race, color, national origin, age, disability, sex, sexual orientation, or gender identity.            Thank you!     Thank you for choosing [facility] EAR NOSE AND THROAT  for your care. Our goal is always to provide you with excellent care. Hearing back from our patients is one way we can continue to improve our services. Please take a few minutes to complete the written survey that you may receive in the mail after your visit with us. Thank you!             Your Updated Medication List - Protect others around you: Learn how to safely use, store and throw away your medicines at www.disposemymeds.org.          This list is accurate as of 11/7/18  2:37 PM.  Always use your most recent med list.                   Brand Name Dispense " Instructions for use Diagnosis    ADVAIR -21 MCG/ACT Inhaler   Generic drug:  fluticasone-salmeterol     1 Inhaler    INHALE TWO PUFFS BY MOUTH TWICE DAILY    CF (cystic fibrosis) (H)       albuterol (2.5 MG/3ML) 0.083% neb solution     360 mL    TAKE 1 VIAL (2.5 MG) BY NEBULIZATION 4 TIMES DAILY    CF (cystic fibrosis) (H)       azithromycin 500 MG tablet    ZITHROMAX    15 tablet    TAKE 1 TABLET BY MOUTH EVERY MON, WED, FRI MORNING    Cystic fibrosis with pulmonary manifestations (H)       * amylase-lipase-protease 18696-69599 units Cpep per EC capsule    CREON    540 capsule    TAKE 4 CAPSULES WITH MEALS AND 3 CAPSULES WITH SNACKS, FOR 3 MEALS AND 2 SNACKS PER DAY.    Cystic fibrosis with pulmonary manifestations (H)       * CREON 90918-65418 units Cpep per EC capsule   Generic drug:  amylase-lipase-protease     180 capsule    TAKE 4 CAPSULES WITH MEALS AND 3 CAPSULES WITH SNACKS, FOR 3 MEALS AND 2 SNACKS PER DAY.    Cystic fibrosis with pulmonary manifestations (H)       dornase alpha 1 MG/ML neb solution    PULMOZYME    75 mL    Inhale 2.5 mg into the lungs daily    CF (cystic fibrosis) (H)       insulin aspart 100 UNIT/ML injection    NovoLOG PEN     2 units per 15g carbohydrate with meals and correction scale        insulin glargine 100 UNIT/ML injection    LANTUS     Inject 17 Units Subcutaneous every morning    CF (cystic fibrosis) (H)       multivitamin CF formula chewable tablet     60 tablet    TAKE 2 TABLETS BY MOUTH DAILY    CF (cystic fibrosis) (H)       omeprazole 40 MG capsule    priLOSEC    60 capsule    Take 1 capsule (40 mg) by mouth 2 times daily    CF (cystic fibrosis) (H)       PULMICORT 1 MG/2ML Susp neb solution   Generic drug:  budesonide      Take 2 mLs (1 mg) by nebulization 2 times daily    CF (cystic fibrosis) (H), Diabetes mellitus due to cystic fibrosis (H), Diabetes mellitus related to cystic fibrosis (H), Gastroesophageal reflux disease without esophagitis, Leoncio Gallardo  syndrome       sodium chloride inhalant 7 % Nebu neb solution     4 mL    Take 4 mLs by nebulization daily UNCLEAR IF THIS IS ON THE CURRENT LIST        tobramycin (PF) 300 MG/5ML neb solution    CHER    280 mL    Take 5 mLs (300 mg) by nebulization 2 times daily    CF (cystic fibrosis) (H)       vitamin D2 62057 UNIT capsule    ERGOCALCIFEROL    30 capsule    TAKE 1 CAPSULE BY MOUTH EVERY MON, WED, FRI MORNING    CF (cystic fibrosis) (H), Pancreatic insufficiency       * Notice:  This list has 2 medication(s) that are the same as other medications prescribed for you. Read the directions carefully, and ask your doctor or other care provider to review them with you.

## 2018-11-07 NOTE — NURSING NOTE
I provided patient the rodney-op worksheet  ENT Adult Default Surgery Request.   I encouraged patient to review the check list and highlighted the following points:  1) Complete History and Physical within 30 days of surgery, either with PAC clinic or family clinic.   If completed outside of  Physicians, please have provider send all of your results to the hospital before the surgery.  Patient will schedule an appointment with her primary care provider.  2) Same-day surgery, must arrange for an adult to take you home and stay with you for the first 24 hours after surgery.  3) Discuss with primary care provider what medicines are safe before surgery.   Example, Coumadin, Plavix, Aspirin, Ibuprofen/Motrin, herbal products, Vitamin E and Fish oil may possibly be discontinued 7 days prior to surgery date.  4) Stop drinking alcohol at least 24 hours before surgery.  5) Quit or at least cut down smoking as it higher your risks for infection after surgery. No chewing tobacco for at least 8 hours before surgery.  6) Take a bath or shower the night before and morning of surgery.   Use antiseptic soap.  7) No food or drink after midnight. Follow your surgeon s orders for eating and drinking.    Please following surgeon s instructions as if you don t, your surgery could be canceled.

## 2018-11-07 NOTE — PATIENT INSTRUCTIONS
I provided patient the rodney-op worksheet  ENT Adult Default Surgery Request.   I encouraged patient to review the check list and highlighted the following points:  1) Complete History and Physical within 30 days of surgery, either with PAC clinic or family clinic.   If completed outside of  Physicians, please have provider send all of your results to the hospital before the surgery.  Please schedule history and physical with primary care provider.  2) Same-day surgery, must arrange for an adult to take you home and stay with you for the first 24 hours after surgery.  3) Discuss with primary care provider what medicines are safe before surgery.   Example, Coumadin, Plavix, Aspirin, Ibuprofen/Motrin, herbal products, Vitamin E and Fish oil may possibly be discontinued 7 days prior to surgery date.  4) Stop drinking alcohol at least 24 hours before surgery.  5) Quit or at least cut down smoking as it higher your risks for infection after surgery. No chewing tobacco for at least 8 hours before surgery.  6) Take a bath or shower the night before and morning of surgery.   Use antiseptic soap.  7) No food or drink after midnight. Follow your surgeon s orders for eating and drinking.    Please following surgeon s instructions as if you don t, your surgery could be canceled.

## 2018-11-08 NOTE — PROGRESS NOTES
11/07/18 11/07/18    HISTORY OF PRESENT ILLNESS: Marleni returns for follow-up today.  She is a 23-year-old woman with a history of cystic fibrosis and chronic rhinosinusitis related to CF.  We last saw her over the summer when she started having sinus pressure and increase rhinorrhea and crusting.  At the time we recommended for her to restart nasal saline irrigations and we obtained a CT scan.  Marleni has been doing sinus irrigations anywhere from 2-3 times a day with only mild improvement.  She continues to have sinus pressure and buildup of crusty secretions.  Since we last saw her she had brief overnight admission for a upper respiratory infection once school started.    Marleni feels her symptoms have worsened over time and have not resolved with the use of frequent sinus irrigations. She is interested in having a revision sinus surgery. Per her mother, Anny, she is also due to have dental work done, however given her limited mouth opening and small mouth this cannot be done in clinic.    Patient Supplied Answers to Review of Systems   ENT ROS 11/7/2018   Ears, Nose, Throat Nasal congestion or drainage   Gastrointestinal/Genitourinary -       PHYSICAL EXAM: Well-appearing woman in no acute distress.  Symmetric facial features.  Examination of the oral cavity reveals moist mucous membranes and a bifid uvula.  Examination of her ears reveal patent ear canals with minimal cerumen.  Anterior rhinoscopy exam reveals bilateral nasal crusting with inflamed mucosa.  She is breathing comfortably room air.    IMAGING: CT sinuses 7/2018 and 10/2015  Images were personally reviewed. Chronic pansinusitis, similar to 2015 before most recent FESS.   Jose [place] Score    Paranasal sinuses Right Left   Maxillary (0, 1, 2) 1 1   Anterior Ethmoid (0, 1, 2) 1 1   Posterior Ethmoid (0, 1, 2) 1 2   Sphenoid (0, 1, 2) 1 1   Frontal (0, 1, 2) 2 2   OMC (0,2) 0 0     Total:13/24    Osteitis present.     ASSESSMENT: chronic rhino  sinusitis related to cystic fibrosis    PLAN: Given she is symptomatically worse and maximum medical therapy has not achieved satisfactory control of her symptom burden, we would recommend revision endoscopic sinus surgery. We will coordinate a date with our Dental colleagues so that she can also have work done during the same anesthetic event. We explained risks, benefits, and alternatives to Marleni and her mother and answered all of their questions. They will meet with our nurse coordinator, Dallin Conner, today.     I was present with the resident during the history and examination. I agree with the findings and the plan of care as documented in the resident's note.   Anny Carbajal MD

## 2018-11-27 DIAGNOSIS — K86.89 PANCREATIC INSUFFICIENCY: ICD-10-CM

## 2018-11-27 DIAGNOSIS — E84.9 CF (CYSTIC FIBROSIS) (H): ICD-10-CM

## 2018-11-28 RX ORDER — ERGOCALCIFEROL 1.25 MG/1
CAPSULE, LIQUID FILLED ORAL
Qty: 30 CAPSULE | Refills: 0 | Status: SHIPPED | OUTPATIENT
Start: 2018-11-28 | End: 2019-02-02

## 2018-12-11 DIAGNOSIS — E84.9 CF (CYSTIC FIBROSIS) (H): ICD-10-CM

## 2018-12-11 RX ORDER — OMEPRAZOLE 40 MG/1
40 CAPSULE, DELAYED RELEASE ORAL 2 TIMES DAILY
Qty: 60 CAPSULE | Refills: 3 | Status: SHIPPED | OUTPATIENT
Start: 2018-12-11 | End: 2019-04-07

## 2018-12-19 ENCOUNTER — TELEPHONE (OUTPATIENT)
Dept: OTOLARYNGOLOGY | Facility: CLINIC | Age: 23
End: 2018-12-19

## 2018-12-19 NOTE — TELEPHONE ENCOUNTER
Pt's mother wanted to inform Dr. Carbajal that pt is going to have dental work done separate from the sinus surgery, ordinally discussed to have combined surgery for sinusitis w/ both Dr. Carbajal and w/ Dental due to pt's mouth being too small. Would like a call to schedule surgery w/ Dr. Carbajal for sinusitis after January 1, 2019. Notified pt's mother that the message will be relayed to Dr. Carbajal and her team and that she will be receiving a phone call to schedule the appt.    Kaykay Boyd LPN

## 2019-01-03 DIAGNOSIS — E84.9 CF (CYSTIC FIBROSIS) (H): ICD-10-CM

## 2019-01-04 ENCOUNTER — TELEPHONE (OUTPATIENT)
Dept: PULMONOLOGY | Facility: CLINIC | Age: 24
End: 2019-01-04

## 2019-01-04 NOTE — TELEPHONE ENCOUNTER
Prior Authorization Approval    Authorization Effective Date: 12/5/2018  Authorization Expiration Date: 1/4/2020  Medication: PULMOZYME 1 MG/ML neb solution  (APPROVED  Approved Dose/Quantity: 75 PER 28 DAYS  Reference #:     Insurance Company: [employer] - Phone [phone] Fax [phone]  Expected CoPay:   $0    CoPay Card Available:      Foundation Assistance Needed:    Which Pharmacy is filling the prescription (Not needed for infusion/clinic administered): [facility] - [place] [address]  Pharmacy Notified: Yes  Patient Notified: No    B

## 2019-01-04 NOTE — TELEPHONE ENCOUNTER
PA Initiation    Medication: PULMOZYME 1 MG/ML neb solution  (PENDING)  Insurance Company: [employer] - Phone [phone] Fax [phone]  Pharmacy Filling the Rx: [facility] - [place], [place] - [address]  Filling Pharmacy Phone:    Filling Pharmacy Fax:    Start Date: 1/4/2019

## 2019-01-07 DIAGNOSIS — E84.9 CF (CYSTIC FIBROSIS) (H): ICD-10-CM

## 2019-01-08 ENCOUNTER — TELEPHONE (OUTPATIENT)
Dept: PULMONOLOGY | Facility: CLINIC | Age: 24
End: 2019-01-08

## 2019-01-08 RX ORDER — TOBRAMYCIN INHALATION SOLUTION 300 MG/5ML
INHALANT RESPIRATORY (INHALATION)
Qty: 280 ML | Refills: 3 | Status: SHIPPED | OUTPATIENT
Start: 2019-01-08 | End: 2020-02-04

## 2019-01-08 NOTE — TELEPHONE ENCOUNTER
PA Initiation    Medication: tobramycin, PF, (CHER) 300 MG/5ML neb solution (PENDING)  Insurance Company: [employer] - Phone [phone] Fax [phone]  Pharmacy Filling the Rx: [facility] - [place], [place] - [address]  Filling Pharmacy Phone:    Filling Pharmacy Fax:    Start Date: 1/8/2019

## 2019-01-24 ENCOUNTER — HOSPITAL ENCOUNTER (OUTPATIENT)
Facility: CLINIC | Age: 24
End: 2019-01-24
Attending: OTOLARYNGOLOGY | Admitting: OTOLARYNGOLOGY
Payer: COMMERCIAL

## 2019-01-24 ENCOUNTER — DOCUMENTATION ONLY (OUTPATIENT)
Dept: OTOLARYNGOLOGY | Facility: CLINIC | Age: 24
End: 2019-01-24

## 2019-01-24 NOTE — PROGRESS NOTES
Patient is scheduled for surgery with Dr. Carbajal    Procedure:  Image guided bilateral maxillary antrostomy, ethmoidectomy, sphenoidotomy, frontal sinusotomy, stealth    Spoke or left message with: Diallo Mckeon    Date of Surgery: 2/22/19    Location: [facility]    H&P: Scheduled with Pulmonary Clinic               Yahaira Trinidad    Additional imaging/appointments: Post op 3/4/19      Surgery packet: pt has          Grace Adam   ENT Inna-Op Coordinator  [phone]

## 2019-01-29 DIAGNOSIS — E84.0 CYSTIC FIBROSIS WITH PULMONARY MANIFESTATIONS (H): ICD-10-CM

## 2019-01-30 RX ORDER — AZITHROMYCIN 500 MG/1
TABLET, FILM COATED ORAL
Qty: 15 TABLET | Refills: 1 | Status: SHIPPED | OUTPATIENT
Start: 2019-01-30 | End: 2019-05-21

## 2019-02-02 DIAGNOSIS — E84.9 CF (CYSTIC FIBROSIS) (H): ICD-10-CM

## 2019-02-02 DIAGNOSIS — K86.89 PANCREATIC INSUFFICIENCY: ICD-10-CM

## 2019-02-04 RX ORDER — FLUTICASONE PROPIONATE AND SALMETEROL XINAFOATE 115; 21 UG/1; UG/1
AEROSOL, METERED RESPIRATORY (INHALATION)
Qty: 1 INHALER | Refills: 0 | Status: SHIPPED | OUTPATIENT
Start: 2019-02-04 | End: 2019-03-07

## 2019-02-04 RX ORDER — ERGOCALCIFEROL 1.25 MG/1
CAPSULE, LIQUID FILLED ORAL
Qty: 15 CAPSULE | Refills: 0 | Status: SHIPPED | OUTPATIENT
Start: 2019-02-04 | End: 2019-11-14

## 2019-02-05 DIAGNOSIS — K86.89 PANCREATIC INSUFFICIENCY: ICD-10-CM

## 2019-02-05 DIAGNOSIS — E84.9 CF (CYSTIC FIBROSIS) (H): ICD-10-CM

## 2019-02-07 RX ORDER — ERGOCALCIFEROL 1.25 MG/1
CAPSULE, LIQUID FILLED ORAL
Qty: 30 CAPSULE | Refills: 0 | Status: SHIPPED | OUTPATIENT
Start: 2019-02-07 | End: 2019-04-07

## 2019-02-21 ENCOUNTER — DOCUMENTATION ONLY (OUTPATIENT)
Dept: OTOLARYNGOLOGY | Facility: CLINIC | Age: 24
End: 2019-02-21

## 2019-02-21 DIAGNOSIS — E84.9 CF (CYSTIC FIBROSIS) (H): Primary | ICD-10-CM

## 2019-02-21 RX ORDER — OXYMETAZOLINE HYDROCHLORIDE 0.05 G/100ML
2 SPRAY NASAL
Status: CANCELLED | OUTPATIENT
Start: 2019-02-22

## 2019-02-21 NOTE — PROGRESS NOTES
Note received from call center that family needs to reschedule Marleni' surgery due to illness of Mom today.    Surgery date changed to 3/1/19 (from 2/22/19) at [facility]  Post op changed to 3/20/19 ([place] 3/[place]19[place] [facility] [place]   ENT Inna-Op Coordinator  [phone]

## 2019-02-27 ENCOUNTER — ANESTHESIA EVENT (OUTPATIENT)
Dept: SURGERY | Facility: CLINIC | Age: 24
End: 2019-02-27
Payer: COMMERCIAL

## 2019-02-27 ENCOUNTER — OFFICE VISIT (OUTPATIENT)
Dept: SURGERY | Facility: CLINIC | Age: 24
End: 2019-02-27
Attending: INTERNAL MEDICINE
Payer: COMMERCIAL

## 2019-02-27 VITALS
WEIGHT: 142 LBS | HEIGHT: 60 IN | TEMPERATURE: 98.2 F | HEART RATE: 95 BPM | SYSTOLIC BLOOD PRESSURE: 121 MMHG | DIASTOLIC BLOOD PRESSURE: 74 MMHG | OXYGEN SATURATION: 96 % | BODY MASS INDEX: 27.88 KG/M2

## 2019-02-27 DIAGNOSIS — E84.9 CYSTIC FIBROSIS (H): ICD-10-CM

## 2019-02-27 DIAGNOSIS — Z01.818 PRE-OP EXAMINATION: Primary | ICD-10-CM

## 2019-02-27 LAB
MRSA DNA SPEC QL NAA+PROBE: POSITIVE
SPECIMEN SOURCE: ABNORMAL

## 2019-02-27 ASSESSMENT — MIFFLIN-ST. JEOR: SCORE: 1320.61

## 2019-02-27 ASSESSMENT — ENCOUNTER SYMPTOMS: SEIZURES: 1

## 2019-02-27 NOTE — ANESTHESIA PREPROCEDURE EVALUATION
Anesthesia Pre-Procedure Evaluation    Patient: Marleni Alamo   MRN:     [id number] Gender:   female   Age:    23 year old :      1995        Preoperative Diagnosis: * No surgery found *        Past Medical History:   Diagnosis Date     Atopy      Cerebral palsy (H)     Botox injextions, managed by Dr. John Marley     CF (cystic fibrosis) (H) 2016    Sweat Test: 8/15/95 Value: 85.0 Genotype: [id number]/G208clc, H/O Pseudomonas and MRSA, Baseline FEV1  2.48, FVC 2.76 (2015)     Cholelithiasis      Chronic pansinusitis 2016     Diabetes mellitus due to cystic fibrosis (H) 2016     Diabetes mellitus related to cystic fibrosis (H) 2016     Gastroesophageal reflux disease without esophagitis 2016     MRSA (methicillin resistant staph aureus) culture positive      Pancreatic insufficiency 2016     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 2016     Seizure disorder (H)     Multiple daily in infancy now infrequent (every few years)     Thrush, oral       Past Surgical History:   Procedure Laterality Date     bilat antrostomies  2011     Bilater knee surgery with plates and pins Bilateral      CHOLECYSTECTOMY, LAPOROSCOPIC  2015     Cleft palate repair and mandibular advancement       gastrostomy in infancy       Multiple PET (ear tubes)       Multiple sinus surgeries       RESECT TRACHEA WITH RECONSTRUCTION CHILD      Rib for reconstruction     surgery for meconium ileus, partial bowel resection  1995    Details not available     tracheostomy in infancy            Anesthesia Evaluation     . Pt has had prior anesthetic. Type: General    No history of anesthetic complications          ROS/MED HX    ENT/Pulmonary: Comment: H/O Leoncio Sami Syndrome requiring multiple procedures for airway as an infant.  Tracheostomy closed at age [age].       (+), cystic fibrosis sinus issues related to cystic fibrosis. . .    Neurologic: Comment: Cerebral palsy - mild limitations.  Uses  braces that helps with mobility.     (+)migraines (associated with sinus issues. ), seizures last seizure: REmote history of seizure.       Cardiovascular:  - neg cardiovascular ROS   (+) ----. : . . . :. . No previous cardiac testing       METS/Exercise Tolerance: Comment: Walks dogs twice a week.    >4 METS   Hematologic:  - neg hematologic  ROS       Musculoskeletal:  - neg musculoskeletal ROS       GI/Hepatic: Comment: Pancreatic insufficiency r/t CF.      (+) GERD Asymptomatic on medication,       Renal/Genitourinary:  - ROS Renal section negative       Endo:     (+) type II DM Last HgA1c: 8.1 date: 2/26/18 Using insulin - not using insulin pump Normal glucose range: 's in morning not previously admitted for DM/DKA .      Psychiatric:  - neg psychiatric ROS       Infectious Disease:   (+) MRSA (history ),       Malignancy:      - no malignancy   Other:    (+) Possibly pregnant LMP: early february 2019, C-spine cleared: No, no H/O Chronic Pain,                       PHYSICAL EXAM:   Mental Status/Neuro: A/A/O   Airway: Facies: Feasible ([place] mandible- h/o 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 with surgical intervention as an infant. )  Mallampati: II  Mouth/Opening: Full  TM distance: < 6 cm  Neck ROM: Full   Respiratory: Auscultation: CTAB     Resp. Rate: Normal     Resp. Effort: Normal      CV: Rhythm: Regular  Rate: Age appropriate  Heart: Normal Sounds   Comments:      Dental: Normal                  Lab Results   Component Value Date    WBC 8.8 09/06/2018    HGB 10.9 (L) 09/06/2018    HCT 35.3 09/06/2018     09/06/2018    SED 25 (H) 02/26/2018     09/06/2018    POTASSIUM 4.2 09/06/2018    CHLORIDE 104 09/06/2018    CO2 28 09/06/2018    BUN 11 09/06/2018    CR 0.54 09/06/2018     (H) 09/06/2018    RUPERT 9.2 09/06/2018    PHOS 3.1 02/26/2018    MAG 2.1 02/26/2018    ALBUMIN 3.6 09/06/2018    PROTTOTAL 7.8 09/06/2018    ALT 88 (H) 09/06/2018    AST 82 (H) 09/06/2018     (H) 02/26/2018     ALKPHOS 217 (H) 09/06/2018    BILITOTAL 0.6 09/06/2018    INR 0.97 02/26/2018    TSH 3.77 02/26/2018       Preop Vitals  BP Readings from Last 3 Encounters:   02/27/19 121/74   09/06/18 118/69   07/18/18 107/67    Pulse Readings from Last 3 Encounters:   02/27/19 95   09/06/18 92   07/18/18 63      Resp Readings from Last 3 Encounters:   09/05/18 24   07/18/18 16   02/26/18 16    SpO2 Readings from Last 3 Encounters:   02/27/19 96%   09/06/18 98%   07/18/18 98%      Temp Readings from Last 1 Encounters:   02/27/19 98.2  F (36.8  C) (Oral)    Ht Readings from Last 1 Encounters:   02/27/19 1.524 m (5')      Wt Readings from Last 1 Encounters:   02/27/19 64.4 kg (142 lb)    Estimated body mass index is 27.73 kg/m  as calculated from the following:    Height as of this encounter: 1.524 m (5').    Weight as of this encounter: 64.4 kg (142 lb).     LDA:            Assessment:   ASA SCORE: 3    NPO Status: > 6 hours since completed Solid Foods   Documentation: H&P complete; Preop Testing complete; Consents complete   Proceeding: Proceed without further delay  Tobacco Use:  NO Active use of Tobacco/UNKNOWN Tobacco use status     Plan:   Anes. Type:  General   Pre-Induction: Midazolam IV; Acetaminophen PO   Induction:  IV (Standard); Propofol; Rocuronium   Airway: Oral ETT; CMAC/VL   Access/Monitoring: PIV   Maintenance: Balanced   Emergence: Procedure Site   Logistics: Same Day Surgery     Postop Pain/Sedation Strategy:  Standard-Options: Opioids PRN     PONV Management:  Adult Risk Factors: Female, Non-Smoker, Postop Opioids  Prevention: Ondansetron; Dexamethasone     CONSENT: Direct conversation   Plan and risks discussed with: Patient; Parents   Blood Products: Consent Deferred (Minimal Blood Loss)       Comments for Plan/Consent:  Will perform direct laryngoscopy for airway evaluation, given NE history.  CMAC will be available.                  PAC Discussion and Assessment    ASA Classification: 3  Case is suitable  for: [place]  Anesthetic techniques and relevant risks discussed: GA  Invasive monitoring and risk discussed:   Types:   Possibility and Risk of blood transfusion discussed:   NPO instructions given:   Additional anesthetic preparation and risks discussed:   Needs early admission to pre-op area:   Other:     PAC Resident/NP Anesthesia Assessment:  PROCEDRES    PFTs 7/18/18  FVC-Pred 3.30    L  07/18/2018 11:55   FVC-Pre 2.82    L  07/18/2018 11:55   FVC-%Pred-Pre 85    %  07/18/2018 11:55   FEV1-Pre 2.49    L  07/18/2018 11:55   FEV1-%Pred-Pre 86    %  07/18/2018 11:55   FEV1FVC-Pred 86    %  07/18/2018 11:55   FEV1FVC-Pre 88    %  07/18/2018 11:55   FEFMax-Pred 6.30    L/sec  07/18/2018 11:55   FEFMax-Pre 6.09    L/sec  07/18/2018 11:55   FEFMax-%Pred-Pre 96    %  07/18/2018 11:55   FEF2575-Pred 3.52    L/sec  07/18/2018 11:55   FEF2575-Pre 3.23    L/sec  07/18/2018 11:55   MRI4320-%Pred-Pre 91    %  07/18/2018 11:55   ExpTime-Pre 6.18    sec  07/18/2018 11:55   FIFMax-Pre 4.60    L/sec  07/18/2018 11:55   FEV1FEV6-Pred 87    %  07/18/2018 11:55   FEV1FEV6-Pre 88    %  07/18/2018 11:55   Narrative     The FEV1, FVC, FEV1/FVC ratio are normal.  IMPRESSION:  Normal Spirometry      ASSESSMENT/PLAN:  Marleni Alamo is a 23-year-old female scheduled for Stealth Assisted Bilateral Maxillary Antrostomy, Ethmoidectomy, Sphenoidotomy, Frontal Sinusotomy on 3/1/2019 with Dr. Carbajal at [facility] under general anesthesia.  Ms. Alamo has a history of cystic fibrosis; chronic rhinosinusitis; pancreatic insufficiency; CFRD, cerebral palsy; GERD; h/o seizures and Leoncio Eitzen Syndrome.  She is followed by Dr. Carbajal for her chronic rhinosinusitis related to her CF.  The patient was last seen by Dr. Carbajal on 11/7/2018.  At that visit, the patient was symptomatically worse and maximum medical therapy had not achieved satisfactory  control of her symptom burden.  Dr. Carbajal recommended revision endoscopic sinus surgery as above. Originally this surgery was going to be coordinated with patient's Dental colleagues so that the patient could also have work done during the same anesthetic event.  However, mom called on 12/19/19  to inform Dr. Carbajal that pt is going to have dental work done separate from the sinus surgery due to pt's mouth being too small.  Ms. Alamo is followed by Yahaira Trinidad PA-C, from pulmonary with last visit on 2/26/18.    Ms. Alamo presents to PAC with her Dad.  She denies any cardiac history or symptoms.  She continues her vest therapy and inhalers for her CF and reports stable pulmonary symptoms.   She denies any issues with anesthesia.  She would like to proceed with above surgical intervention.      She has the following specific operative considerations:   - METS:  >4. RCRI : Diabetes Mellitus (on Insulin).  0.9 % risk of major adverse cardiac event.  No further cardiac evaluation needed per 2014 ACC/AHA guidelines for non-cardiac surgery.   - RICARDO risk:  low  - VTE risk:  0.26%  - Risk of PONV score = 2.  If > 2, anti-emetic intervention recommended.      #  Cardiology  - denies cardiac history or symptoms.        #  Pulmonary - no smoking       - CF - reports stable symptoms.  Last PFTs are WNL.  Followed by pulmonary.  Continue vest therapy and inhalers as prescribed DOS.  #  GI - Pancreatic insufficiency 2/2 CF, on enzyme replacement.        - GERD: Patient instructed to take PPI as prescribed.  Consider use of RSI techniques with advanced airway maneuvers.  #  Endocrine - CFRD Diabetes:  hold morning oral hypoglycemic medications and short acting insulin DOS. Take 80% of last scheduled dose of long acting insulin prior to procedure.  Recommend close monitoring of the patient's blood glucose levels throughout the perioperative period and treat per [facility] guidelines.  #  Neuro - cerebral palsy with mild limitations  related mobility.  Use ankle braces bilaterally with significant improvement with mobility/balnce per patient's report.   #  HEENT - h/o 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 s/p surgical intervention for airway as an infant.  No ongoing issues.  Small mouth opening.  #  ID - h/o MRSA, will check MRSA swab today.       - Anesthesia considerations:  Refer to PAC assessment in anesthesia records  - difficult IV stick      Arrival time, NPO, shower and medication instructions provided by nursing staff today.  Preparing For Your Surgery handout given.  Patient was discussed with Dr Rivera.      Reviewed and Signed by PAC Mid-Level Provider/Resident  Mid-Level Provider/Resident: Brittney CHANEL CNP  Date: 2/27/19  Time: 15:06    Attending Anesthesiologist Anesthesia Assessment:  Pt with hx Leoncio Sami, CF and DM undergoing sinus surgery.  Airway examination demonstrates MP II but somewhat shallow airway and 2-3 finger thyromental distance.  Does not look like a difficult intubation.  Uneventful prior anesthetics.  Requested parents bring relevant surgical records for review the day of surgery as they are transitioning care from [facility] to the .    Reviewed and Signed by PAC Anesthesiologist  Anesthesiologist: Ronel  Date: Feb 27, 2019  Time:   Pass/Fail: Pass  Disposition:     PAC Pharmacist Assessment:        Pharmacist:   Date:   Time:        YANIQUE Holcomb CNP

## 2019-02-27 NOTE — ADDENDUM NOTE
Addendum  created 02/27/19 1605 by Alexus Ambrosio RN    Order Reconciliation Section accessed, Order list changed, Patient Education assessment filed, Patient Education documented on, Patient Education title added, Visit Navigator Flowsheet section accepted

## 2019-02-27 NOTE — PATIENT INSTRUCTIONS
Preparing for Your Surgery      Name:  Marleni Alamo   MRN:  [id number]   :  1995   Today's Date:  2019     Arriving for surgery:  Surgery date:  3/1/19  Arrival time:  5:45 am    Please come to:  [facility] [address]  [address] [place]  [address]    -   parking is available in front of the hospital from 5:15 am to 8:00 pm    -  Stop at the Information Desk in the lobby    -   Inform the information person that you are here for surgery. An escort to 3C will be provided. If you would not like an escort, please proceed to 3C on the 3rd floor. [phone]     -  Bring your ID and insurance card.    What can I eat or drink?  -  You may have solid food or milk products until 8 hours prior to your surgery. (Until 11:45 pm 19 )  -  You may have water, apple juice or 7up/Sprite until 2 hours prior to your surgery. (Until 5:45 am )    Which medicines can I take?       -  Do not bring your own medications to the hospital, except for inhalers.        -  Follow Otolaryngology Clinic instructions regarding Ibuprofen. If no instructions given, NO Ibuprofen the day prior to surgery.         -  Hold Multivitamin 7 days prior to surgery. Hold starting now if you haven't already.    -  Do NOT take these medications in the morning, the day of surgery:  Amylase-Lipase-Protease (Creon), Vitamin D2, Aspart (Novolog insulin)    -  Please take these medications the morning of surgery:  Advair inhaler, Albuterol neb, Zithromax, Omeprazole (Prilosec), Pulmozyme neb, Sodium Chloride neb      Pulmicort neb if needed        -  The morning of surgery, decrease Glargine (Lantus) insulin to 14 units.        How do I prepare myself?  -  Take two showers: one the night before surgery; and one the morning of surgery.         Use Scrubcare or Hibiclens to wash from neck down.  You may use your own shampoo and conditioner. No other hair products.   -  Do NOT use lotion, powder,  colognes, deodorant, or antiperspirant the day of your surgery.  -  Do NOT wear any makeup, fingernail polish or jewelry.    Questions or Concerns:  If you have questions or concerns prior to your surgery, call [phone]. (Mon - Fri   8 am- 5:30 pm)  Questions about surgery, contact your Surgeons office.      AFTER YOUR SURGERY  Breathing exercises   Breathing exercises help you recover faster. Take deep breaths and let the air out slowly. This will:     Help you wake up after surgery.    Help prevent complications like pneumonia.  Preventing complications will help you go home sooner.   We may give you a breathing device (incentive spirometer) to encourage you to breathe deeply.   Nausea and vomiting   You may feel sick to your stomach after surgery; if so, let your nurse know.    Pain control:  After surgery, you may have pain. Our goal is to help you manage your pain. Pain medicine will help you feel comfortable enough to do activities that will help you heal.  These activities may include breathing exercises, walking and physical therapy.   To help your health care team treat your pain we will ask: 1) If you have pain  2) where it is located 3) describe your pain in your words  Methods of pain control include medications given by mouth, vein or by nerve block for some surgeries.  Sequential Compression Device (SCD) or Pneumo Boots:  You may need to wear SCD S on your legs or feet. These are wraps connected to a machine that pumps in air and releases it. The repeated pumping helps prevent blood clots from forming.

## 2019-02-28 NOTE — ADDENDUM NOTE
Addendum  created 02/27/19 2332 by Brittney Pryor, APRN CNP    Follow-up modified, LOS modified, Sign clinical note, Visit diagnoses modified

## 2019-02-28 NOTE — H&P
Pre-Operative H & P     CC:  Preoperative exam to assess for increased cardiopulmonary risk while undergoing surgery and anesthesia.    Date of Encounter: 2/27/2019  Primary Care Physician:  Mickey Peterson  Marleni Alamo is a 23 year old female who presents for pre-operative H & P in preparation for Stealth Assisted Bilateral Maxillary Antrostomy, Ethmoidectomy, Sphenoidotomy, Frontal Sinusotomy on 3/1/2019 with Dr. Carbajal at [facility] under general anesthesia.  Ms. Alamo has a history of cystic fibrosis; chronic rhinosinusitis; pancreatic insufficiency; CFRD, cerebral palsy; GERD; h/o seizures and Leoncio Saint Anthony Syndrome.  She is followed by Dr. Carbajal for her chronic rhinosinusitis related to her CF.  The patient was last seen by Dr. Carbajal on 11/7/2018.  At that visit, the patient was symptomatically worse and maximum medical therapy had not achieved satisfactory control of her symptom burden.  Dr. Carbajal recommended revision endoscopic sinus surgery as above. Originally this surgery was going to be coordinated with patient's Dental colleagues so that the patient could also have work done during the same anesthetic event.  However, mom called on 12/19/19  to inform Dr. Carbajal that pt is going to have dental work done separate from the sinus surgery due to pt's mouth being too small.  Ms. Alamo is followed by Yahaira Trinidad PA-C, from pulmonary with last visit on 2/26/18.    Ms. Alamo presents to PAC with her Dad.  She denies any cardiac history or symptoms.  She continues her vest therapy and inhalers for her CF and reports stable pulmonary symptoms.   She denies any issues with anesthesia.  She would like to proceed with above surgical intervention.         History is obtained from the patient, electronic health record and patient's parents.     Past Medical History  Past Medical History:   Diagnosis Date     Atopy      Cerebral palsy (H)     [facility] (H) 7/25/2016    Sweat Test: 8/15[birth year]95 Value: 85.0 Genotype: [id number], H/O Pseudomonas and MRSA, Baseline FEV1  2.48, FVC 2.76 (7/2015)     Cholelithiasis      Chronic pansinusitis 7/25/2016     Diabetes mellitus due to cystic fibrosis (H) 7/25/2016     Diabetes mellitus related to cystic fibrosis (H) 7/25/2016     Gastroesophageal reflux disease without esophagitis 7/25/2016     MRSA (methicillin resistant staph aureus) culture positive      Pancreatic insufficiency 7/25/2016     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 7/25/2016     Seizure disorder (H)     Multiple daily in infancy now infrequent (every few years)     Thrush, oral        Past Surgical History  Past Surgical History:   Procedure Laterality Date     bilat antrostomies  4/2011     Bilater knee surgery with plates and pins Bilateral 2007     CHOLECYSTECTOMY, LAPOROSCOPIC  Feb 2015     Cleft palate repair and mandibular advancement  1996     gastrostomy in infancy       Multiple PET (ear tubes)       Multiple sinus surgeries  2015     RESECT TRACHEA WITH RECONSTRUCTION CHILD  1998    Rib for reconstruction     surgery for meconium ileus, partial bowel resection  8/1995    Details not available     tracheostomy in infancy         Hx of Blood transfusions/reactions: denies     Hx of abnormal bleeding or anti-platelet use: denies    Menstrual history: Patient's last menstrual period was 02/13/2019 (approximate).:    Steroid use in the last year: inhaled/nebulized    Personal or FH with difficulty with Anesthesia:  denies    Prior to Admission Medications  Current Outpatient Medications   Medication Sig Dispense Refill     ADVAIR -21 MCG/ACT inhaler INHALE 2 PUFFS BY MOUTH TWICE A DAY 1 Inhaler 0     albuterol (2.5 MG/3ML) 0.083% neb solution TAKE 1 VIAL (2.5 MG) BY NEBULIZATION 4 TIMES DAILY (Patient taking differently: TAKE 1 VIAL (2.5 MG) BY NEBULIZATION 2 TIMES DAILY) 360 mL 6     amylase-lipase-protease (CREON) 82011-28015 units CPEP per  EC capsule TAKE 4 CAPSULES WITH MEALS AND 3 CAPSULES WITH SNACKS, FOR 3 MEALS AND 2 SNACKS PER DAY. 540 capsule 3     azithromycin (ZITHROMAX) 500 MG tablet TAKE 1 TABLET BY MOUTH EVERY MON, WED, FRI MORNING 15 tablet 1     budesonide (PULMICORT) 1 MG/2ML SUSP neb solution Take 1 mg by nebulization as needed        insulin aspart (NOVOLOG PEN) 100 UNIT/ML soln as needed 2 units per 15g carbohydrate with meals and correction scale       insulin glargine (LANTUS SOLOSTAR) 100 UNIT/ML pen Inject 18 Units Subcutaneous every morning        multivitamin CF formula (AQUADEKS) chewable tablet TAKE 2 TABLETS BY MOUTH DAILY 60 tablet 3     PULMOZYME 1 MG/ML neb solution INHALE ONE VIAL VIA NEBULIZER ONCE DAILY 75 mL 1     sodium chloride inhalant 7 % NEBU neb solution Take 4 mLs by nebulization daily UNCLEAR IF THIS IS ON THE CURRENT LIST 4 mL      tobramycin, PF, (CHER) 300 MG/5ML neb solution NEBULIZE AND INHALE THE CONTENTS OF 1 VIAL (5ML) TWO TIMES A DAY FOR 28 DAYS ON AND 28 DAYS  mL 3     vitamin D2 (ERGOCALCIFEROL) 24563 units (1250 mcg) capsule TAKE 1 CAPSULE BY MOUTH EVERY MON, WED, FRI MORNING 30 capsule 0     vitamin D2 (ERGOCALCIFEROL) 48673 units (1250 mcg) capsule TAKE 1 CAPSULE BY MOUTH EVERY MON, WED, FRI MORNING 15 capsule 0     omeprazole (PRILOSEC) 40 MG DR capsule Take 1 capsule (40 mg) by mouth 2 times daily 60 capsule 3       Allergies  Allergies   Allergen Reactions     Augmentin Hives     Per mother     Ceftin Hives     Per mother     Vancomycin Hives     Per mother       Social History  Social History     Socioeconomic History     Marital status: Single     Spouse name: Not on file     Number of children: Not on file     Years of education: Not on file     Highest education level: Not on file   Occupational History     Not on file   Social Needs     Financial resource strain: Not on file     Food insecurity:     Worry: Not on file     Inability: Not on file     Transportation needs:      Medical: Not on file     Non-medical: Not on file   Tobacco Use     Smoking status: Never Smoker     Smokeless tobacco: Never Used   Substance and Sexual Activity     Alcohol use: No     Alcohol/week: 0.0 oz     Drug use: No     Sexual activity: No   Lifestyle     Physical activity:     Days per week: Not on file     Minutes per session: Not on file     Stress: Not on file   Relationships     Social connections:     Talks on phone: Not on file     Gets together: Not on file     Attends Quaker service: Not on file     Active member of club or organization: Not on file     Attends meetings of clubs or organizations: Not on file     Relationship status: Not on file     Intimate partner violence:     Fear of current or ex partner: Not on file     Emotionally abused: Not on file     Physically abused: Not on file     Forced sexual activity: Not on file   Other Topics Concern     Not on file   Social History Narrative    Marleni lives at home with mother in [place] with 2 dogs and a cat. 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 Marleni goes to transition school.        Family History  Family History   Problem Relation Age of Onset     Diabetes Type 1 Mother 12     Thyroid Disease Mother      Diabetes Type 1 Maternal Uncle      Diabetes Type 1 Maternal Grandmother      Thyroid Disease Maternal Grandmother      Breast Cancer Other         Great Grandmother     Thyroid Disease Paternal Uncle      Thyroid Disease Maternal Aunt              ROS/MED HX    ENT/Pulmonary: Comment: H/O Leoncio Sami Syndrome requiring multiple procedures for airway as an infant.  Tracheostomy closed at age [age].       (+), cystic fibrosis sinus issues related to cystic fibrosis. . .    Neurologic: Comment: Cerebral palsy - mild limitations.  Uses ankle braces that helps with mobility.     (+)migraines (associated with sinus issues. ), seizures last seizure: REmote history of seizure.       Cardiovascular:  - neg cardiovascular ROS   (+) ----. : . . . :. .  "No previous cardiac testing       METS/Exercise Tolerance: Comment: Walks dogs twice a week.    >4 METS   Hematologic:  - neg hematologic  ROS       Musculoskeletal:  - neg musculoskeletal ROS       GI/Hepatic: Comment: Pancreatic insufficiency r/t CF.      (+) GERD Asymptomatic on medication,       Renal/Genitourinary:  - ROS Renal section negative       Endo:     (+) type II DM Last HgA1c: 8.1 date: 2/26/18 Using insulin - not using insulin pump Normal glucose range: 's in morning not previously admitted for DM/DKA .      Psychiatric:  - neg psychiatric ROS       Infectious Disease:   (+) MRSA (history ),       Malignancy:      - no malignancy   Other:    (+) Possibly pregnant LMP: early february 2019, C-spine cleared: No, no H/O Chronic Pain,         Temp: 98.2  F (36.8  C) Temp src: Oral BP: 121/74 Pulse: 95     SpO2: 96 %         142 lbs 0 oz  5' 0\"   Body mass index is 27.73 kg/m .       Physical Exam  Constitutional: Awake, alert, cooperative, no apparent distress, and appears stated age.  Eyes: Pupils equal, round and reactive to light, extra ocular muscles intact, sclera clear, conjunctiva normal.  HENT: Normocephalic, oral pharynx with moist mucus membranes, dentit fair repair.  Small mouth opening.  TM 2-3 FB.  No goiter appreciated.   Respiratory: Clear to auscultation bilaterally, no crackles or wheezing.  Cardiovascular: Regular rate and rhythm, normal S1 and S2, and no murmur noted.  Carotids +2, no bruits. No edema. Palpable pulses to radial  DP and PT arteries.   GI: Normal bowel sounds, soft, non-distended, non-tender, no masses palpated, no hepatosplenomegaly.  Surgical scars: well healed.  Lymph/Hematologic: No cervical lymphadenopathy and no supraclavicular lymphadenopathy.  Genitourinary:  na  Skin: Warm and dry.    Musculoskeletal: Full ROM of neck. There is no redness, warmth, or swelling of the joints. Gross motor strength is normal.    Neurologic: Awake, alert, oriented to name, place " and time. Cranial nerves II-XII are grossly intact. Gait is steady.   Neuropsychiatric: Calm, cooperative. Normal affect.     Labs: (personally reviewed)  Lab Results   Component Value Date    WBC 8.8 09/06/2018     Lab Results   Component Value Date    RBC 4.46 09/06/2018     Lab Results   Component Value Date    HGB 10.9 09/06/2018     Lab Results   Component Value Date    HCT 35.3 09/06/2018     Lab Results   Component Value Date    MCV 79 09/06/2018     Lab Results   Component Value Date    MCH 24.4 09/06/2018     Lab Results   Component Value Date    MCHC 30.9 09/06/2018     Lab Results   Component Value Date    RDW 16.3 09/06/2018     Lab Results   Component Value Date     09/06/2018       Last Comprehensive Metabolic Panel:  Sodium   Date Value Ref Range Status   09/06/2018 141 133 - 144 mmol/L Final     Potassium   Date Value Ref Range Status   09/06/2018 4.2 3.4 - 5.3 mmol/L Final     Chloride   Date Value Ref Range Status   09/06/2018 104 94 - 109 mmol/L Final     Carbon Dioxide   Date Value Ref Range Status   09/06/2018 28 20 - 32 mmol/L Final     Anion Gap   Date Value Ref Range Status   09/06/2018 8 3 - 14 mmol/L Final     Glucose   Date Value Ref Range Status   09/06/2018 152 (H) 70 - 99 mg/dL Final     Urea Nitrogen   Date Value Ref Range Status   09/06/2018 11 7 - 30 mg/dL Final     Creatinine   Date Value Ref Range Status   09/06/2018 0.54 0.52 - 1.04 mg/dL Final     GFR Estimate   Date Value Ref Range Status   09/06/2018 >90 >60 mL/min/1.7m2 Final     Comment:     Non  GFR Calc     Calcium   Date Value Ref Range Status   09/06/2018 9.2 8.5 - 10.1 mg/dL Final     PROCEDRES    PFTs 7/18/18  FVC-Pred 3.30    L  07/18/2018 11:55   FVC-Pre 2.82    L  07/18/2018 11:55   FVC-%Pred-Pre 85    %  07/18/2018 11:55   FEV1-Pre 2.49    L  07/18/2018 11:55   FEV1-%Pred-Pre 86    %  07/18/2018 11:55   FEV1FVC-Pred 86    %  07/18/2018 11:55   FEV1FVC-Pre 88     %  07/18/2018 11:55   FEFMax-Pred 6.30    L/sec  07/18/2018 11:55   FEFMax-Pre 6.09    L/sec  07/18/2018 11:55   FEFMax-%Pred-Pre 96    %  07/18/2018 11:55   FEF2575-Pred 3.52    L/sec  07/18/2018 11:55   FEF2575-Pre 3.23    L/sec  07/18/2018 11:55   GAD6498-%Pred-Pre 91    %  07/18/2018 11:55   ExpTime-Pre 6.18    sec  07/18/2018 11:55   FIFMax-Pre 4.60    L/sec  07/18/2018 11:55   FEV1FEV6-Pred 87    %  07/18/2018 11:55   FEV1FEV6-Pre 88    %  07/18/2018 11:55   Narrative     The FEV1, FVC, FEV1/FVC ratio are normal.  IMPRESSION:  Normal Spirometry    ASSESSMENT and PLAN  Marleni Alamo is a 23 year old female scheduled to undergo  Stealth Assisted Bilateral Maxillary Antrostomy, Ethmoidectomy, Sphenoidotomy, Frontal Sinusotomy on 3/1/2019 with Dr. Carbajal at [facility] under general anesthesia.   She has the following specific operative considerations:   - METS:  >4. RCRI : Diabetes Mellitus (on Insulin).  0.9 % risk of major adverse cardiac event.  No further cardiac evaluation needed per 2014 ACC/AHA guidelines for non-cardiac surgery.   - RICARDO risk:  low  - VTE risk:  0.26%  - Risk of PONV score = 2.  If > 2, anti-emetic intervention recommended.      #  Cardiology  - denies cardiac history or symptoms.        #  Pulmonary - no smoking       - CF - reports stable symptoms.  Last PFTs are WNL.  Followed by pulmonary.  Continue vest therapy and inhalers as prescribed DOS.  #  GI - Pancreatic insufficiency 2/2 CF, on enzyme replacement.        - GERD: Patient instructed to take PPI as prescribed.  Consider use of RSI techniques with advanced airway maneuvers.  #  Endocrine - CFRD Diabetes:  hold morning oral hypoglycemic medications and short acting insulin DOS. Take 80% of last scheduled dose of long acting insulin prior to procedure.  Recommend close monitoring of the patient's blood glucose levels throughout the perioperative period and treat per  [place] guidelines.  #  Neuro - cerebral palsy with mild limitations related mobility.  Use ankle braces bilaterally with significant improvement with mobility/balnce per patient's report.   #  HEENT - h/o 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 s/p surgical intervention for mandible and needing a tracheostomy as an infant.  Tracheostomy closed ~2 years of age.  No ongoing issues.  Small mouth opening. TM 2-3 FB.  #  ID - h/o MRSA, will check MRSA swab today.       - Anesthesia considerations:  Refer to PAC assessment in anesthesia records  - difficult IV stick      Arrival time, NPO, shower and medication instructions provided by nursing staff today.  Preparing For Your Surgery handout given.  Patient was discussed with Dr Rivera.    ADDENDUM: MRSA swab positive.  Will need to be placed on MRSA precautions.      YANIQUE Holcomb CNP  Preoperative Assessment Center  [facility]  Phone: [phone]  Fax: [phone]

## 2019-03-01 ENCOUNTER — HOSPITAL ENCOUNTER (OUTPATIENT)
Facility: CLINIC | Age: 24
Discharge: HOME | End: 2019-03-01
Attending: OTOLARYNGOLOGY | Admitting: OTOLARYNGOLOGY
Payer: COMMERCIAL

## 2019-03-01 ENCOUNTER — ANESTHESIA (OUTPATIENT)
Dept: SURGERY | Facility: CLINIC | Age: 24
End: 2019-03-01
Payer: COMMERCIAL

## 2019-03-01 VITALS
TEMPERATURE: 98.9 F | HEIGHT: 60 IN | HEART RATE: 79 BPM | SYSTOLIC BLOOD PRESSURE: 120 MMHG | DIASTOLIC BLOOD PRESSURE: 69 MMHG | WEIGHT: 139.99 LBS | BODY MASS INDEX: 27.48 KG/M2 | RESPIRATION RATE: 16 BRPM | OXYGEN SATURATION: 99 %

## 2019-03-01 DIAGNOSIS — J32.4 CHRONIC PANSINUSITIS: Primary | ICD-10-CM

## 2019-03-01 LAB
GLUCOSE BLDC GLUCOMTR-MCNC: 126 MG/DL (ref 70–99)
GLUCOSE BLDC GLUCOMTR-MCNC: 173 MG/DL (ref 70–99)
HCG UR QL: NEGATIVE

## 2019-03-01 PROCEDURE — 37000009 ZZH ANESTHESIA TECHNICAL FEE, EACH ADDTL 15 MIN: Performed by: OTOLARYNGOLOGY

## 2019-03-01 PROCEDURE — 37000008 ZZH ANESTHESIA TECHNICAL FEE, 1ST 30 MIN: Performed by: OTOLARYNGOLOGY

## 2019-03-01 PROCEDURE — 81025 URINE PREGNANCY TEST: CPT | Performed by: ANESTHESIOLOGY

## 2019-03-01 PROCEDURE — 25000125 ZZHC RX 250

## 2019-03-01 PROCEDURE — 25000128 H RX IP 250 OP 636

## 2019-03-01 PROCEDURE — 82962 GLUCOSE BLOOD TEST: CPT | Mod: 91

## 2019-03-01 PROCEDURE — 25000566 ZZH SEVOFLURANE, EA 15 MIN: Performed by: OTOLARYNGOLOGY

## 2019-03-01 PROCEDURE — 88304 TISSUE EXAM BY PATHOLOGIST: CPT | Performed by: OTOLARYNGOLOGY

## 2019-03-01 PROCEDURE — 71000016 ZZH RECOVERY PHASE 1 LEVEL 3 FIRST HR: Performed by: OTOLARYNGOLOGY

## 2019-03-01 PROCEDURE — 71000027 ZZH RECOVERY PHASE 2 EACH 15 MINS: Performed by: OTOLARYNGOLOGY

## 2019-03-01 PROCEDURE — 25000125 ZZHC RX 250: Performed by: OTOLARYNGOLOGY

## 2019-03-01 PROCEDURE — 40000170 ZZH STATISTIC PRE-PROCEDURE ASSESSMENT II: Performed by: OTOLARYNGOLOGY

## 2019-03-01 PROCEDURE — 36000074 ZZH SURGERY LEVEL 6 1ST 30 MIN - UMMC: Performed by: OTOLARYNGOLOGY

## 2019-03-01 PROCEDURE — 27210794 ZZH OR GENERAL SUPPLY STERILE: Performed by: OTOLARYNGOLOGY

## 2019-03-01 PROCEDURE — 25800030 ZZH RX IP 258 OP 636: Performed by: ANESTHESIOLOGY

## 2019-03-01 PROCEDURE — 36000076 ZZH SURGERY LEVEL 6 EA 15 ADDTL MIN - UMMC: Performed by: OTOLARYNGOLOGY

## 2019-03-01 PROCEDURE — 88311 DECALCIFY TISSUE: CPT | Performed by: OTOLARYNGOLOGY

## 2019-03-01 RX ORDER — LIDOCAINE HYDROCHLORIDE AND EPINEPHRINE 10; 10 MG/ML; UG/ML
INJECTION, SOLUTION INFILTRATION; PERINEURAL PRN
Status: DISCONTINUED | OUTPATIENT
Start: 2019-03-01 | End: 2019-03-01 | Stop reason: HOSPADM

## 2019-03-01 RX ORDER — DEXAMETHASONE SODIUM PHOSPHATE 4 MG/ML
INJECTION, SOLUTION INTRA-ARTICULAR; INTRALESIONAL; INTRAMUSCULAR; INTRAVENOUS; SOFT TISSUE PRN
Status: DISCONTINUED | OUTPATIENT
Start: 2019-03-01 | End: 2019-03-01

## 2019-03-01 RX ORDER — HYDROCODONE BITARTRATE AND ACETAMINOPHEN 5; 325 MG/1; MG/1
1-2 TABLET ORAL EVERY 4 HOURS PRN
Qty: 12 TABLET | Refills: 0 | Status: SHIPPED | OUTPATIENT
Start: 2019-03-01 | End: 2019-05-23

## 2019-03-01 RX ORDER — MEPERIDINE HYDROCHLORIDE 50 MG/ML
12.5 INJECTION INTRAMUSCULAR; INTRAVENOUS; SUBCUTANEOUS
Status: DISCONTINUED | OUTPATIENT
Start: 2019-03-01 | End: 2019-03-01 | Stop reason: HOSPADM

## 2019-03-01 RX ORDER — FENTANYL CITRATE 50 UG/ML
INJECTION, SOLUTION INTRAMUSCULAR; INTRAVENOUS PRN
Status: DISCONTINUED | OUTPATIENT
Start: 2019-03-01 | End: 2019-03-01

## 2019-03-01 RX ORDER — NALOXONE HYDROCHLORIDE 0.4 MG/ML
.1-.4 INJECTION, SOLUTION INTRAMUSCULAR; INTRAVENOUS; SUBCUTANEOUS
Status: DISCONTINUED | OUTPATIENT
Start: 2019-03-01 | End: 2019-03-01 | Stop reason: HOSPADM

## 2019-03-01 RX ORDER — LIDOCAINE 40 MG/G
CREAM TOPICAL
Status: DISCONTINUED | OUTPATIENT
Start: 2019-03-01 | End: 2019-03-01 | Stop reason: HOSPADM

## 2019-03-01 RX ORDER — LIDOCAINE HYDROCHLORIDE 20 MG/ML
INJECTION, SOLUTION INFILTRATION; PERINEURAL PRN
Status: DISCONTINUED | OUTPATIENT
Start: 2019-03-01 | End: 2019-03-01

## 2019-03-01 RX ORDER — ONDANSETRON 4 MG/1
4 TABLET, ORALLY DISINTEGRATING ORAL EVERY 30 MIN PRN
Status: DISCONTINUED | OUTPATIENT
Start: 2019-03-01 | End: 2019-03-01 | Stop reason: HOSPADM

## 2019-03-01 RX ORDER — FENTANYL CITRATE 50 UG/ML
25-50 INJECTION, SOLUTION INTRAMUSCULAR; INTRAVENOUS
Status: DISCONTINUED | OUTPATIENT
Start: 2019-03-01 | End: 2019-03-01 | Stop reason: HOSPADM

## 2019-03-01 RX ORDER — ONDANSETRON 2 MG/ML
4 INJECTION INTRAMUSCULAR; INTRAVENOUS EVERY 30 MIN PRN
Status: DISCONTINUED | OUTPATIENT
Start: 2019-03-01 | End: 2019-03-01 | Stop reason: HOSPADM

## 2019-03-01 RX ORDER — ESMOLOL HYDROCHLORIDE 10 MG/ML
INJECTION INTRAVENOUS PRN
Status: DISCONTINUED | OUTPATIENT
Start: 2019-03-01 | End: 2019-03-01

## 2019-03-01 RX ORDER — LABETALOL HYDROCHLORIDE 5 MG/ML
10 INJECTION, SOLUTION INTRAVENOUS
Status: DISCONTINUED | OUTPATIENT
Start: 2019-03-01 | End: 2019-03-01 | Stop reason: HOSPADM

## 2019-03-01 RX ORDER — HYDROMORPHONE HYDROCHLORIDE 1 MG/ML
.3-.5 INJECTION, SOLUTION INTRAMUSCULAR; INTRAVENOUS; SUBCUTANEOUS EVERY 10 MIN PRN
Status: DISCONTINUED | OUTPATIENT
Start: 2019-03-01 | End: 2019-03-01 | Stop reason: HOSPADM

## 2019-03-01 RX ORDER — SODIUM CHLORIDE, SODIUM LACTATE, POTASSIUM CHLORIDE, CALCIUM CHLORIDE 600; 310; 30; 20 MG/100ML; MG/100ML; MG/100ML; MG/100ML
INJECTION, SOLUTION INTRAVENOUS CONTINUOUS
Status: DISCONTINUED | OUTPATIENT
Start: 2019-03-01 | End: 2019-03-01 | Stop reason: HOSPADM

## 2019-03-01 RX ORDER — ONDANSETRON 2 MG/ML
INJECTION INTRAMUSCULAR; INTRAVENOUS PRN
Status: DISCONTINUED | OUTPATIENT
Start: 2019-03-01 | End: 2019-03-01

## 2019-03-01 RX ORDER — OXYMETAZOLINE HYDROCHLORIDE 0.05 G/100ML
SPRAY NASAL PRN
Status: DISCONTINUED | OUTPATIENT
Start: 2019-03-01 | End: 2019-03-01 | Stop reason: HOSPADM

## 2019-03-01 RX ORDER — MUPIROCIN 20 MG/G
OINTMENT TOPICAL 2 TIMES DAILY
Status: CANCELLED | OUTPATIENT
Start: 2019-03-01 | End: 2019-03-06

## 2019-03-01 RX ORDER — HYDRALAZINE HYDROCHLORIDE 20 MG/ML
2.5-5 INJECTION INTRAMUSCULAR; INTRAVENOUS EVERY 10 MIN PRN
Status: DISCONTINUED | OUTPATIENT
Start: 2019-03-01 | End: 2019-03-01 | Stop reason: HOSPADM

## 2019-03-01 RX ADMIN — ROCURONIUM BROMIDE 5 MG: 10 INJECTION INTRAVENOUS at 08:34

## 2019-03-01 RX ADMIN — FENTANYL CITRATE 50 MCG: 50 INJECTION, SOLUTION INTRAMUSCULAR; INTRAVENOUS at 07:33

## 2019-03-01 RX ADMIN — ROCURONIUM BROMIDE 50 MG: 10 INJECTION INTRAVENOUS at 07:33

## 2019-03-01 RX ADMIN — ONDANSETRON 4 MG: 2 INJECTION INTRAMUSCULAR; INTRAVENOUS at 08:40

## 2019-03-01 RX ADMIN — FENTANYL CITRATE 50 MCG: 50 INJECTION, SOLUTION INTRAMUSCULAR; INTRAVENOUS at 07:59

## 2019-03-01 RX ADMIN — SUGAMMADEX 120 MG: 100 INJECTION, SOLUTION INTRAVENOUS at 08:54

## 2019-03-01 RX ADMIN — DEXAMETHASONE SODIUM PHOSPHATE 6 MG: 4 INJECTION, SOLUTION INTRA-ARTICULAR; INTRALESIONAL; INTRAMUSCULAR; INTRAVENOUS; SOFT TISSUE at 07:56

## 2019-03-01 RX ADMIN — MIDAZOLAM 1 MG: 1 INJECTION INTRAMUSCULAR; INTRAVENOUS at 07:33

## 2019-03-01 RX ADMIN — LIDOCAINE HYDROCHLORIDE 80 MG: 20 INJECTION, SOLUTION INFILTRATION; PERINEURAL at 07:33

## 2019-03-01 RX ADMIN — SODIUM CHLORIDE, POTASSIUM CHLORIDE, SODIUM LACTATE AND CALCIUM CHLORIDE: 600; 310; 30; 20 INJECTION, SOLUTION INTRAVENOUS at 07:26

## 2019-03-01 RX ADMIN — ESMOLOL HYDROCHLORIDE 10 MG: 10 INJECTION, SOLUTION INTRAVENOUS at 07:48

## 2019-03-01 RX ADMIN — ESMOLOL HYDROCHLORIDE 20 MG: 10 INJECTION, SOLUTION INTRAVENOUS at 08:50

## 2019-03-01 RX ADMIN — ESMOLOL HYDROCHLORIDE 20 MG: 10 INJECTION, SOLUTION INTRAVENOUS at 07:59

## 2019-03-01 ASSESSMENT — MIFFLIN-ST. JEOR: SCORE: 1311.5

## 2019-03-01 NOTE — PROVIDER NOTIFICATION
Pt up to dress with assistance of NST.  Refused family until dressed.  Family at bedside.  Pt gait steady but gait appears to be more of a march when ambulating.

## 2019-03-01 NOTE — DISCHARGE INSTRUCTIONS
[facility]   Adult Discharge Orders & Instructions     For 24 hours after surgery    1. Get plenty of rest.  A responsible adult must stay with you for at least 24 hours after you leave the hospital.   2. Do not drive or use heavy equipment.  If you have weakness or tingling, don't drive or use heavy equipment until this feeling goes away.  3. Do not drink alcohol.  4. Avoid strenuous or risky activities.  Ask for help when climbing stairs.   5. You may feel lightheaded.  IF so, sit for a few minutes before standing.  Have someone help you get up.   6. If you have nausea (feel sick to your stomach): Drink only clear liquids such as apple juice, ginger ale, broth or 7-Up.  Rest may also help.  Be sure to drink enough fluids.  Move to a regular diet as you feel able.  7. You may have a slight fever. Call the doctor if your fever is over 100 F (37.7 C) (taken under the tongue) or lasts longer than 24 hours.  8. You may have a dry mouth, a sore throat, muscle aches or trouble sleeping.  These should go away after 24 hours.  9. Do not make important or legal decisions.   Call your doctor for any of the followin.  Signs of infection (fever, growing tenderness at the surgery site, a large amount of drainage or bleeding, severe pain, foul-smelling drainage, redness, swelling).    2. It has been over 8 to 10 hours since surgery and you are still not able to urinate (pass water).    3.  Headache for over 24 hours.    4.  Numbness, tingling or weakness the day after surgery (if you had spinal anesthesia).  To contact a doctor, call Dr Carbajal at [phone] [CL[facility]IC during business hours or for after hours call the hospital and ask the  for the ENT resident on call

## 2019-03-01 NOTE — BRIEF OP NOTE
[facility] [place]    Brief Operative Note    Pre-operative diagnosis: Chronic Pansinusitis  Post-operative diagnosis * No post-op diagnosis entered *  Procedure: Procedure(s):  Stealth Assisted Bilateral Maxillary Antrostomy, Ethmoidectomy, Sphenoidotomy, Frontal Sinusotomy  Surgeon: Surgeon(s) and Role:     * Anny Carbajal MD - Primary     * Juany Ramos MD - Resident - Assisting  Anesthesia: General .  Estimated blood loss: 50 ml.  Drains: None.  Specimens:   ID Type Source Tests Collected by Time Destination   A : bilateral sinus contents Tissue Other SURGICAL PATHOLOGY EXAM Anny Carbajal MD 3/1/2019  8:46 AM      Findings: Pansinusitis with diffuse inflammatory change, osteitic bone, polyp formation, and purulent drainage. Contents sent for pathology. See operative report for full details.  Complications: None.  Implants: None.

## 2019-03-01 NOTE — ANESTHESIA CARE TRANSFER NOTE
Patient: Marleni Alamo    Procedure(s):  Stealth Assisted Bilateral Maxillary Antrostomy, Ethmoidectomy, Sphenoidotomy, Frontal Sinusotomy    Diagnosis: Chronic Pansinusitis  Diagnosis Additional Information: No value filed.    Anesthesia Type:   No value filed.     Note:  Airway :Face Mask  Patient transferred to:PACU  Comments: VSS. Breathing spontaneously at a regular rate with adequate tidal volumes and maintaining O2 sats on 4L face mask. Denies nausea or pain. No apparent complications from anesthesia.     Misbah Plaza MD, MSc.  Anesthesia Resident, CA-1  Handoff Report: Identifed the Patient, Identified the Reponsible Provider, Reviewed the pertinent medical history, Discussed the surgical course, Reviewed Intra-OP anesthesia mangement and issues during anesthesia, Set expectations for post-procedure period and Allowed opportunity for questions and acknowledgement of understanding      Vitals: (Last set prior to Anesthesia Care Transfer)    CRNA VITALS  3/1/2019 0835 - 3/1/2019 0918      3/1/2019             Resp Rate (observed):  15                Electronically Signed By: Misbah Plaza MD  March 1, 2019  9:18 AM

## 2019-03-01 NOTE — ANESTHESIA POSTPROCEDURE EVALUATION
Anesthesia POST Procedure Evaluation    Patient: Marleni Alamo   MRN:     [id number] Gender:   female   Age:    23 year old :      1995        Preoperative Diagnosis: Chronic Pansinusitis   Procedure(s):  Stealth Assisted Bilateral Maxillary Antrostomy, Ethmoidectomy, Sphenoidotomy, Frontal Sinusotomy   Postop Comments: No value filed.       Anesthesia Type:  General    Reportable Event: NO     PAIN: Uncomplicated   Sign Out status: Comfortable, Well controlled pain     PONV: No PONV   Sign Out status:  No Nausea or Vomiting     Neuro/Psych: Uneventful perioperative course   Sign Out Status: Preoperative baseline; Age appropriate mentation     Airway/Resp.: Uneventful perioperative course   Sign Out Status: Non labored breathing, age appropriate RR; Resp. Status within EXPECTED Parameters     CV: Uneventful perioperative course   Sign Out status: Appropriate BP and perfusion indices; Appropriate HR/Rhythm     Disposition:   Sign Out in:  PACU  Disposition:  Phase II; Home  Recovery Course: Uneventful  Follow-Up: Not required           Last Anesthesia Record Vitals:  CRNA VITALS  3/1/2019 0835 - 3/1/2019 0935      3/1/2019             Resp Rate (observed):  1  (Abnormal)           Last PACU/Preop Vitals:  Vitals:    19 0547 19 0915 19 0930   BP: 128/77 133/75 119/75   Pulse:   77   Resp: 24 18 16   Temp: 36.7  C (98  F) 36.9  C (98.4  F)    SpO2: 98% 100% 96%         Electronically Signed By: Kash Myrick MD, 2019, 9:43 AM

## 2019-03-01 NOTE — PROVIDER NOTIFICATION
No change from initial assessment except as noted.  Pt tolerates oral intake well, some bloody discharge from nose but pt refused bib dsg under nose.  Pt awaiting transportation home.

## 2019-03-04 LAB — COPATH REPORT: NORMAL

## 2019-03-07 DIAGNOSIS — E84.9 CF (CYSTIC FIBROSIS) (H): ICD-10-CM

## 2019-03-07 RX ORDER — FLUTICASONE PROPIONATE AND SALMETEROL XINAFOATE 115; 21 UG/1; UG/1
AEROSOL, METERED RESPIRATORY (INHALATION)
Qty: 1 INHALER | Refills: 0 | Status: SHIPPED | OUTPATIENT
Start: 2019-03-07 | End: 2019-04-15

## 2019-03-08 DIAGNOSIS — E84.9 CF (CYSTIC FIBROSIS) (H): ICD-10-CM

## 2019-03-08 NOTE — OP NOTE
Procedure Date: 03/01/2019      PREOPERATIVE DIAGNOSIS:  Chronic pansinusitis and cystic fibrosis.      POSTOPERATIVE DIAGNOSIS:  Chronic pansinusitis and cystic fibrosis.      PROCEDURE:  Bilateral maxillary antrostomy, bilateral ethmoidectomy, bilateral sphenoidotomy, bilateral frontal sinusotomy, all using Stealth guidance.      SURGEON:  Anny Carbajal MD      ASSISTANT:  FOREIGN PUENTE MD      ANESTHESIA:  General endotracheal.      BLOOD LOSS:  50 mL.      REPLACEMENT:  Crystalloid.      COMPLICATIONS:  None.      FINDINGS:  Pansinus disease with significant bony osteitis.      INDICATIONS FOR PROCEDURE:  The patient has chronic rhinosinusitis related to cystic fibrosis.  She has undergone multiple previous procedures.  She continues to have sinus disease that is symptomatic and confirmed using imaging.  She presents for the above-stated stated PROCEDURE.       OPERATIVE PROCEDURE:  After risks and benefits were explained, the patient's signed consent was obtained, she was taken to the operating room, placed supine on the table.  General anesthesia administered and endotracheally intubated and sterilely draped.  The headset was placed.  Registration, calibration, verification was performed.  Image guidance was used throughout the procedure.  A 0-degree rigid endoscopes were used for visualization.  The procedure commences on the left side with injection of 1% lidocaine with epinephrine.  The image guidance was used due to the revision nature of surgery and landmarks were confirmed throughout the procedure.  The uncinate is re-resected and maxillary antrostomy is performed.  On the left side, there is moderate amount of debris removed from the maxillary antrum.  Next, a total ethmoidectomy was performed using a curette and Kerrison rongeurs.  Multiple bony partitions are removed and there was significant osteitis present.  The sphenoid anatomy site is identified and enlarged using a downbiting rongeur.   Next, the frontal sinus in the recess is dissected to the level of the ostium.  The ostium is enlarged using rongeurs.  Copious irrigation was performed.  An identical procedure was repeated on the right side with similar findings.  At the end of the procedure.  Copious irrigation was performed.  Orogastric suctioning is performed.  The patient was then turned over to Anesthesia for wakeup.         THOMAS AVALOS MD             D: 2019   T: 2019   MT: KELVIN      Name:     JESSENIA MEHTA   MRN:      [id number]        Account:        [id number]   :      1995           Procedure Date: 2019      Document: [id number]

## 2019-03-11 DIAGNOSIS — E84.9 CF (CYSTIC FIBROSIS) (H): ICD-10-CM

## 2019-03-12 RX ORDER — MV-MIN 51/FOLIC ACID/VIT K/UBI 100-350MCG
TABLET,CHEWABLE ORAL
Qty: 60 TABLET | Refills: 3 | Status: SHIPPED | OUTPATIENT
Start: 2019-03-12 | End: 2019-09-06

## 2019-04-07 DIAGNOSIS — E84.9 CF (CYSTIC FIBROSIS) (H): ICD-10-CM

## 2019-04-07 DIAGNOSIS — K86.89 PANCREATIC INSUFFICIENCY: ICD-10-CM

## 2019-04-08 ENCOUNTER — DOCUMENTATION ONLY (OUTPATIENT)
Dept: OTHER | Facility: CLINIC | Age: 24
End: 2019-04-08

## 2019-04-08 RX ORDER — OMEPRAZOLE 40 MG/1
CAPSULE, DELAYED RELEASE ORAL
Qty: 60 CAPSULE | Refills: 3 | Status: SHIPPED | OUTPATIENT
Start: 2019-04-08 | End: 2019-08-13

## 2019-04-08 RX ORDER — ERGOCALCIFEROL 1.25 MG/1
CAPSULE, LIQUID FILLED ORAL
Qty: 30 CAPSULE | Refills: 0 | Status: SHIPPED | OUTPATIENT
Start: 2019-04-08 | End: 2019-05-10

## 2019-04-15 DIAGNOSIS — E84.9 CF (CYSTIC FIBROSIS) (H): ICD-10-CM

## 2019-04-15 RX ORDER — FLUTICASONE PROPIONATE AND SALMETEROL XINAFOATE 115; 21 UG/1; UG/1
AEROSOL, METERED RESPIRATORY (INHALATION)
Qty: 1 INHALER | Refills: 0 | Status: SHIPPED | OUTPATIENT
Start: 2019-04-15 | End: 2019-06-10

## 2019-04-23 ENCOUNTER — TELEPHONE (OUTPATIENT)
Dept: CARE COORDINATION | Facility: CLINIC | Age: 24
End: 2019-04-23

## 2019-04-23 NOTE — TELEPHONE ENCOUNTER
Adult Cystic Fibrosis Program  Social Work Phone Consult    Data:     SW reach out to Anny (Marleni's mother) via phone to discuss lack of clinic follow up. Marleni has not been seen in CF clinic since 7/2018 and has had several cancelled appointments.     SW attempted to reach Marleni's mom on her cell phone but her vm was full. Left a message on the home phone listed in epic for Anny to call back.     Intervention:   -Re-assessment of recent/chronic concern(s)  -Outreach re: missed clinic appointment    Assessment: CF team is concerned Marleni has not been seen in almost a year in CF clinic and her mom has cancelled several appointments. Marleni relies on her mother to make appointments and get her to appointments due to developmental and physical disabilities. Marleni's mom did not answer the phone so SW left message.    Plan:   -Continue to assist with adherence/appointment concern(s).  -Continue to follow through regular clinic consult.  -Continue to follow for any psychosocial needs that may arise.  -Complete full psychosocial assessment annually.    COLBY will follow up with Yonas mom again later this week if no call back.    AILYN Potts, [facility]  Adult Cystic Fibrosis   Ph: [phone], Pager: [phone]

## 2019-04-23 NOTE — TELEPHONE ENCOUNTER
Adult Cystic Fibrosis Program  Social Work Phone Consult    Data:     COLBY received call back from Marleni's mom (Anny). COLBY introduced self and asked if Anny would like to make an appointment for Marleni as she hasn't been seen in clinic since 7/2018. Anny states she had a TSA stroke a few months ago and also a foot injury which got infected. She then had to go into a nursing home for rehab and just got out. During this time Marleni was with her grandmother and father. Anny is out of the nursing home now and is mostly recovered. Anny plans to make an appointment for Marleni in CF clinic this week and has CF office coordinators number to schedule. Asked if there are any barriers to Marleni coming such as paying for parking. Anny denied that this is an issue. She plans to explain to the team why Marleni hasn't been seen in a while. Reiterated the importance of routine follow up in clinic and Anny understood that the team would like to see Marleni every 3-6 months.    COLBY left message for Ruth- CF coordinator that Anny would be reaching out soon to make an appointment.    Intervention:   -Re-assessment of recent/chronic concern(s)  -Outreach re: missed clinic appointment    Assessment: Anny called COLBY back shortly after vm was left. She was apologetic about not getting Marleni in for an appointment since 7/2018. She seemed to understand the importance of routine follow up and plans to have Marleni come to clinic more frequently. She attributes Marleni's recent cancelled visits to her own (Anny's) recent health concerns. She doesn't like Marleni's grandmother or father to take her to appointments as Anny would like to hear the information first hand from the providers and not through someone else. She has plans to follow up with CF coordinator later this week to schedule appointments for Marleni. She was appreciative of COLBY reaching out.    Plan:   -Continue to assist with cancelled appointment/ahderence  concern(s).  -Continue to follow through regular clinic consult.  -Continue to follow for any psychosocial needs that may arise.  -Complete full psychosocial assessment annually.    AILYN Potts, [facility]  Adult Cystic Fibrosis   Ph: [phone], Pager: [phone]

## 2019-04-26 ENCOUNTER — TELEPHONE (OUTPATIENT)
Dept: EDUCATION SERVICES | Facility: CLINIC | Age: 24
End: 2019-04-26

## 2019-04-26 NOTE — TELEPHONE ENCOUNTER
Phone call to pharmacy.  Patient has never seen Dr. Galindo and there is no record on a visit with other team members.  Left message for Marleni to call back to schedule. Paulette Danielle RN,CDE

## 2019-05-10 DIAGNOSIS — E84.9 CF (CYSTIC FIBROSIS) (H): ICD-10-CM

## 2019-05-10 DIAGNOSIS — K86.89 PANCREATIC INSUFFICIENCY: ICD-10-CM

## 2019-05-10 RX ORDER — ERGOCALCIFEROL 1.25 MG/1
CAPSULE, LIQUID FILLED ORAL
Qty: 30 CAPSULE | Refills: 0 | Status: SHIPPED | OUTPATIENT
Start: 2019-05-10 | End: 2019-05-23

## 2019-05-21 DIAGNOSIS — E84.0 CYSTIC FIBROSIS WITH PULMONARY MANIFESTATIONS (H): ICD-10-CM

## 2019-05-22 DIAGNOSIS — E84.0 CYSTIC FIBROSIS WITH PULMONARY MANIFESTATIONS (H): ICD-10-CM

## 2019-05-22 RX ORDER — AZITHROMYCIN 500 MG/1
TABLET, FILM COATED ORAL
Qty: 15 TABLET | Refills: 1 | Status: SHIPPED | OUTPATIENT
Start: 2019-05-22 | End: 2019-09-26

## 2019-05-23 ENCOUNTER — OFFICE VISIT (OUTPATIENT)
Dept: PULMONOLOGY | Facility: CLINIC | Age: 24
End: 2019-05-23
Attending: INTERNAL MEDICINE
Payer: COMMERCIAL

## 2019-05-23 ENCOUNTER — ALLIED HEALTH/NURSE VISIT (OUTPATIENT)
Dept: CARE COORDINATION | Facility: CLINIC | Age: 24
End: 2019-05-23

## 2019-05-23 VITALS
BODY MASS INDEX: 26.71 KG/M2 | HEART RATE: 81 BPM | OXYGEN SATURATION: 98 % | SYSTOLIC BLOOD PRESSURE: 113 MMHG | TEMPERATURE: 98.4 F | WEIGHT: 136.02 LBS | DIASTOLIC BLOOD PRESSURE: 70 MMHG | HEIGHT: 60 IN | RESPIRATION RATE: 18 BRPM

## 2019-05-23 DIAGNOSIS — K86.89 PANCREATIC INSUFFICIENCY: ICD-10-CM

## 2019-05-23 DIAGNOSIS — Q87.0 PIERRE ROBIN SYNDROME: ICD-10-CM

## 2019-05-23 DIAGNOSIS — E84.8 DIABETES MELLITUS RELATED TO CYSTIC FIBROSIS (H): ICD-10-CM

## 2019-05-23 DIAGNOSIS — K21.9 GASTROESOPHAGEAL REFLUX DISEASE WITHOUT ESOPHAGITIS: ICD-10-CM

## 2019-05-23 DIAGNOSIS — E84.9 CF (CYSTIC FIBROSIS) (H): ICD-10-CM

## 2019-05-23 DIAGNOSIS — E08.9 DIABETES MELLITUS DUE TO CYSTIC FIBROSIS (H): ICD-10-CM

## 2019-05-23 DIAGNOSIS — E84.9 CF (CYSTIC FIBROSIS) (H): Primary | ICD-10-CM

## 2019-05-23 DIAGNOSIS — E84.9 DIABETES MELLITUS DUE TO CYSTIC FIBROSIS (H): ICD-10-CM

## 2019-05-23 DIAGNOSIS — Z71.9 ENCOUNTER FOR COUNSELING: Primary | ICD-10-CM

## 2019-05-23 DIAGNOSIS — E08.9 DIABETES MELLITUS RELATED TO CYSTIC FIBROSIS (H): ICD-10-CM

## 2019-05-23 DIAGNOSIS — E84.9 CYSTIC FIBROSIS EXACERBATION (H): ICD-10-CM

## 2019-05-23 LAB
EXPTIME-PRE: 7.1 SEC
FEF2575-%PRED-PRE: 86 %
FEF2575-PRE: 3.05 L/SEC
FEF2575-PRED: 3.52 L/SEC
FEFMAX-%PRED-PRE: 94 %
FEFMAX-PRE: 5.95 L/SEC
FEFMAX-PRED: 6.3 L/SEC
FEV1-%PRED-PRE: 87 %
FEV1-PRE: 2.51 L
FEV1FEV6-PRE: 88 %
FEV1FEV6-PRED: 87 %
FEV1FVC-PRE: 88 %
FEV1FVC-PRED: 88 %
FIFMAX-PRE: 4.05 L/SEC
FVC-%PRED-PRE: 86 %
FVC-PRE: 2.85 L
FVC-PRED: 3.3 L

## 2019-05-23 PROCEDURE — 87077 CULTURE AEROBIC IDENTIFY: CPT | Performed by: INTERNAL MEDICINE

## 2019-05-23 PROCEDURE — 87186 SC STD MICRODIL/AGAR DIL: CPT | Performed by: INTERNAL MEDICINE

## 2019-05-23 PROCEDURE — 87070 CULTURE OTHR SPECIMN AEROBIC: CPT | Performed by: INTERNAL MEDICINE

## 2019-05-23 PROCEDURE — G0463 HOSPITAL OUTPT CLINIC VISIT: HCPCS | Mod: ZF

## 2019-05-23 PROCEDURE — 97803 MED NUTRITION INDIV SUBSEQ: CPT | Mod: ZF | Performed by: DIETITIAN, REGISTERED

## 2019-05-23 RX ORDER — SULFAMETHOXAZOLE/TRIMETHOPRIM 800-160 MG
1 TABLET ORAL 3 TIMES DAILY
Qty: 63 TABLET | Refills: 0 | Status: SHIPPED | OUTPATIENT
Start: 2019-05-23 | End: 2019-11-14

## 2019-05-23 RX ORDER — SODIUM CHLORIDE FOR INHALATION 7 %
4 VIAL, NEBULIZER (ML) INHALATION DAILY
Qty: 4 ML | COMMUNITY
Start: 2019-05-23 | End: 2020-04-29

## 2019-05-23 RX ORDER — BUDESONIDE 1 MG/2ML
1 INHALANT ORAL AT BEDTIME
COMMUNITY
Start: 2019-05-23 | End: 2021-06-21

## 2019-05-23 RX ORDER — ALBUTEROL SULFATE 0.83 MG/ML
SOLUTION RESPIRATORY (INHALATION)
Qty: 360 ML | Refills: 6 | COMMUNITY
Start: 2019-05-23 | End: 2019-09-06

## 2019-05-23 ASSESSMENT — MIFFLIN-ST. JEOR: SCORE: 1293.5

## 2019-05-23 ASSESSMENT — PAIN SCALES - GENERAL: PAINLEVEL: SEVERE PAIN (6)

## 2019-05-23 NOTE — NURSING NOTE
Chief Complaint   Patient presents with     RECHECK     CF    Medications reviewed and vital signs taken.   Mecca Thornton, MILA

## 2019-05-23 NOTE — LETTER
5/23/2019     RE: Marleni Alamo  [address] [place]   [place] [address]     Dear Colleague,    Thank you for referring your patient, Marleni Alamo, to the [facility] FOR LUNG SCIENCE AND HEALTH at [facility]. Please see a copy of my visit note below.    Reason for Visit:    Marleni Alamo is a 23-year-old female who is being seen for RECHECK (CF)    Assessment and plan: Marleni Alamo is a 23-year-old female with cystic fibrosis, pancreatic insufficinecy, CFRD, cerebral palsy and Leoncio Macon Syndrome who is seen today in clinic for routine follow up. Patient was last seen in clinic July 2018.    # CF lung disease: Patient reports chest pain, increased productive cough and chest congestion. Today she is oxygenating well at 98% SpO2.  Her PFTs are stable, similar to her recent best. Previous cultures have grown out MRSA. Symptoms consistent with mild pulmonary exacerbation.  It is unclear why there is a discrepancy between symptoms and PFTs.  She will start bactrim 1 tablet TID x3 weeks. Instructed to increase vest therapy to three times dily when possible until returns to baseline. On chronic azithromycin and jamarcus month on/off. Otherwise, continue current medication, nebs and vest therapy.     # CFTR modulation: Patient is delta F508 homozygote, not currently on modulating therapy secondary to non compliance with f/u (oftentimes due to issues with her ride) and fear of blood draws. Discussed initiation of Symdeko and proper monitoring to be performed while on the modulator. Will consider starting Symdeko next visit.     # Exocrine pancreatic insufficiency: Patient denies symptoms of malabsorption. Weight is stable. Body mass index is 26.57 kg/m . Continue pancreatic enzymes and vitamin supplementation     # CFRD: Hemoglobin A1c elevated at 8.1 last checked on 2/26. Reports she is currently not follow with endocrinologist. Referral to Dr. Galindo. Continue  Lantus and carb coverage     # CF related sinus disease: Patient was seen by ENT in January for sinus surgery evaluation. Patient underwent sinus surgery on March 1, she has well healed and reports no complaints.      # GERD: No current complaints. Continue omeprazole 40 mg BID    # Healthcare maintenance: Annual studies are overdue, will be completed with her next visit.       The patient will be seen in follow up in 3 months with annual studies.     In addition to my visit, she will be seen by our dietician, , respiratory therapist and pharmacist.       CF HPI:    The patient was seen and examined by Edi Leger MD.     Marleni Alamo is a 23-year-old female with cystic fibrosis, pancreatic insufficinecy, CFRD, cerebral palsy and Leoncio Flushing Syndrome who is seen today in clinic for routine follow up. Patient was last seen in clinic July 2018. [facility] September 2018[facility]    Reports chest pain last night and increased productive cough though swallows sputum so unable to describe. Breathing is comfortable at rest and exercise is well tolerated. Denies SOB. No hemoptysis. No fever, chills, or night sweats.    She is doing vest therapy 30 minutes BID at [place] standard frequencies and pressures.       Review of systems:  CONST: Appetite is good.  ENT: No sinus/ear pain, sore throat, or rhinorrhea. Underwent sinus surgery March 1.  GI: No nausea, vomiting, or loose stools. No abdominal pain.    A complete ROS was otherwise negative except as noted in the HPI.      Past Medical History:   Diagnosis Date     Atopy      Cerebral palsy (H)     Botox injextions, managed by Dr. John Marley     CF (cystic fibrosis) (H) 7/25/2016    Sweat Test: 8/15/95 Value: 85.0 Genotype: [id number], H/O Pseudomonas and MRSA, Baseline FEV1  2.48, FVC 2.76 (7/2015)     Cholelithiasis      Chronic pansinusitis 7/25/2016     Diabetes mellitus due to cystic fibrosis (H)  7/25/2016     Diabetes mellitus related to cystic fibrosis (H) 7/25/2016     Gastroesophageal reflux disease without esophagitis 7/25/2016     MRSA (methicillin resistant staph aureus) culture positive      Pancreatic insufficiency 7/25/2016     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 7/25/2016     Seizure disorder (H)     Multiple daily in infancy now infrequent (every few years)     Thrush, oral      Past Surgical History:   Procedure Laterality Date     bilat antrostomies  4/2011     Bilater knee surgery with plates and pins Bilateral 2007     CHOLECYSTECTOMY, LAPOROSCOPIC  Feb 2015     Cleft palate repair and mandibular advancement  1996     gastrostomy in infancy       Multiple PET (ear tubes)       Multiple sinus surgeries  2015     OPTICAL TRACKING SYSTEM ENDOSCOPIC SINUS SURGERY Bilateral 3/1/2019    Procedure: Stealth Assisted Bilateral Maxillary Antrostomy, Ethmoidectomy, Sphenoidotomy, Frontal Sinusotomy;  Surgeon: Anny Carbajal MD;  Location: [facility] OR     RESECT TRACHEA WITH RECONSTRUCTION CHILD  1998    Rib for reconstruction     surgery for meconium ileus, partial bowel resection  8/1995    Details not available     tracheostomy in infancy       Family History   Problem Relation Age of Onset     Diabetes Type 1 Mother 12     Thyroid Disease Mother      Diabetes Type 1 Maternal Uncle      Diabetes Type 1 Maternal Grandmother      Thyroid Disease Maternal Grandmother      Breast Cancer Other         Great Grandmother     Thyroid Disease Paternal Uncle      Thyroid Disease Maternal Aunt      Social History     Socioeconomic History     Marital status: Single     Spouse name: Not on file     Number of children: Not on file     Years of education: Not on file     Highest education level: Not on file   Occupational History     Not on file   Social Needs     Financial resource strain: Not on file     Food insecurity:     Worry: Not on file     Inability: Not on file     Transportation needs:     Medical: Not on file      Non-medical: Not on file   Tobacco Use     Smoking status: Never Smoker     Smokeless tobacco: Never Used   Substance and Sexual Activity     Alcohol use: No     Alcohol/week: 0.0 oz     Drug use: No     Sexual activity: Never   Lifestyle     Physical activity:     Days per week: Not on file     Minutes per session: Not on file     Stress: Not on file   Relationships     Social connections:     Talks on phone: Not on file     Gets together: Not on file     Attends [employer] service: Not on file     Active member of club or organization: Not on file     Attends meetings of clubs or organizations: Not on file     Relationship status: Not on file     Intimate partner violence:     Fear of current or ex partner: Not on file     Emotionally abused: Not on file     Physically abused: Not on file     Forced sexual activity: Not on file   Other Topics Concern     Not on file   Social History Narrative    Marleni lives at home with mother in [place] with 2 dogs and a cat. 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 Marleni goes to transition school.      Current Outpatient Medications   Medication     albuterol (PROVENTIL) (2.5 MG/3ML) 0.083% neb solution     amylase-lipase-protease (CREON) 45347-50045 units CPEP per EC capsule     azithromycin (ZITHROMAX) 500 MG tablet     budesonide (PULMICORT) 1 MG/2ML neb solution     fluticasone-salmeterol (ADVAIR HFA) 115-21 MCG/ACT inhaler     insulin aspart (NOVOLOG PEN) 100 UNIT/ML soln     insulin glargine (LANTUS SOLOSTAR) 100 UNIT/ML pen     multivitamin CF formula (AQUADEKS) chewable tablet     omeprazole (PRILOSEC) 40 MG DR capsule     PULMOZYME 1 MG/ML neb solution     sodium chloride inhalant 7 % NEBU neb solution     sulfamethoxazole-trimethoprim (BACTRIM DS/SEPTRA DS) 800-160 MG tablet     tobramycin, PF, (CHER) 300 MG/5ML neb solution     vitamin D2 (ERGOCALCIFEROL) 10978 units (1250 mcg) capsule     No current facility-administered medications for this visit.      /70   Pulse  81   Temp 98.4  F (36.9  C) (Oral)   Resp 18   Ht 1.524 m (5')   Wt 61.7 kg (136 lb 0.4 oz)   SpO2 98%   BMI 26.57 kg/m       EXAM:  GENERAL APPEARANCE: Well developed, well nourished, alert, and in no apparent distress.    EYES: PERRL, EOMI    HENT: Nasal mucosa with edema and no hyperemia. No nasal polyps.    EARS: Canals clear, TMs normal    MOUTH: Oral mucosa is moist, without any lesions, no tonsillar enlargement, no oropharyngeal exudate.    NECK: supple, no masses, no thyromegaly.    LYMPHATICS: No significant axillary, cervical, or supraclavicular nodes.    RESP: normal percussion, good air flow throughout.  No crackles. No rhonchi. No wheezes.    CV: Normal S1, S2, regular rhythm, normal rate. No murmur.  No rub. No gallop. No LE edema.     ABDOMEN:  Bowel sounds normal, soft, nontender, no HSM or masses.     MS: extremities normal. No clubbing. No cyanosis.    SKIN: no rash on limited exam    NEURO: Mentation intact, speech normal, normal strength and tone, normal gait and stance    PSYCH: mentation appears normal. and affect normal/bright      All laboratory and imaging data reviewed.      Cystic Fibrosis Culture  Specimen Description   Date Value Ref Range Status   02/27/2019 Nares  Final   07/18/2018 Throat  Final   02/26/2018 Throat  Final    Culture Micro   Date Value Ref Range Status   07/18/2018 Moderate growth  Normal junaid    Final   07/18/2018 Moderate growth  Staphylococcus aureus   (A)  Final   07/18/2018 (A)  Final    Light growth  Achromobacter xylosoxidans/denitrificans                      Results as noted above.    PFT Interpretation:  Normal spirometry.  Unchanged from previous.   Similar to recent best.  Valid Maneuver        CF Exacerbation:   Mild Increased vest/bronchodilator/execise and Oral: non-quinolone        Scribe Disclosure:   Lashanda AWTKINS, am serving as a scribe; to document services personally performed by Edi Leger MD based on data collection and the  provider's statements to me.     Provider Disclosure:  I agree with above History, Review of Systems, Physical Exam and Plan.  I have reviewed the content of the documentation and have edited it as needed. I have personally performed the services documented here and the documentation accurately represents those services and the decisions I have made.      Electronically signed by:  Edi Leger       CF Annual Nutrition Assessment     Reason for Assessment  Assessed during  Dr. Leger CF c linic r/t increased nutrition risk with diagnosis of CF per protocol     Nutrition Significant PMH  Pancreatic Insufficient   CFRD   GERD  Cerebral palsy - managed at [facility] syndrome  Hx of seizures     Social Assessment  Living situation: Living at home with mother.   Work/School/Disability: Does not currently work.   Food Insecurity:  Yes - see below    Question 1.  In the last 12 months: We worried food would run out before we had money to buy more. Sometimes True    Question 2.  In the last 12 months: The food we bought just didn't last and we didn't have money to buy more. Often True    Did the patient answer Sometimes True or Often True to EITHER Question 1 or Question 2? YES      Question 3.  Would you be interested in receiving a call with additional resources? No, working with CF team on resources to assist with food insecurity.     Anthropometric Assessment  Height: 60 inches (152.4 cm)   IBW based on BMI 22 for females and 23 for males per CF Foundation recs: 51 kg   Today's Weight: 6 1.7 kg   %IBW: 120%  BMI: Body mass index is 26.57 kg/m2  Current weight is considered: Overweight, BMI above CFF goals for age.      Physical Activity/Exercise: Not evaluated at this visit      MALNUTRITION  % Intake:  No decreased intake noted  % Weight Loss:  None noted  Subcutaneous Fat Loss:  None observed  Muscle Loss:  None observed  Handgrip Strength:  Not Applicable  Fluid Accumulation/Edema:   None noted  Malnutrition Diagnosis: Patient does not meet two of the above criteria necessary for diagnosing malnutrition in the context of CF      Pancreatic Enzymes  Brand:  Creon [id number]  Dosin with meals, 3 with snacks  Are you taking enzymes as recommended:Yes      High dose providing 1 555 units lipase/kg/meal which is within the recommended range per CF Foundation to inhibit fibrosing colonopathy  Estimated Daily Amount (if meal dose is >2500 units lipase/kg/meal): 15 -20 caps daily = 8632-4713 units lipase/kg/day     Signs of Malabsorption:  No  Enzyme Program:  None , Gov based insurance , does not qualify      Diet History and Assessment  Diet Preferences/Allergies.Intolerances: NKFA  Appetite Stimulant Rx:  No  Intake Recall/Comments:  Marleni reports that she maintains a good appetite. She is eating 2-3 meals daily as follows:   B - Waffles/cereal + 2% milk; Ensure. States sometimes skips breakfast if she sleeps late.   L - Typically a sandwich + Ensure  D - Mac and cheese + apple juice + Ensure  HS - bowl of cereal + milk      Calcium: Adequate. Drinking 2% milk and 3 Ensure  Salt: Table salt, added to foods  Hydration:  Adequate per patient report     Supplements:  Ensure 1 can TID     Tube Feeding:  None    Comments: Marleni and her mother state that they are accessing local food shelves weekly and also receive SNAP (80$/mo) which helps with some costs. Despite this, they continue to be food insecure. Mother reports using SNAP dollars to purchase Marleni's Ensure shakes. Not currently using [employer] program.      Estimated Energy and Protein Needs  Estimation based on weight maintenance with Mild lung disease and pancreatic insufficient.  BEE: ~1850  9532-1801 kcals/day =  150-175% BEE    g protein/day = 1.5-2 g/kg     Laboratory Assessment  Last annuals: 18:   Iron: Low 27  LDL/HDL: WNL  TGs: Slightly elevated at 150   Vitamins A/D/E and INR WNL     CF Related Diabetes Evaluation  Hgb  A1C: 8.1   Date: 5/2018  FSBG range: Not assessed   Insulin: No   Insulin Regimen: Lantus and carb coverage  Carbohydrate Counting: No   Per MD, patient is not following with endo. Referral to Dr. Galindo.     Date:  Due for updated annuals at this time*      Current Vitamin/Mineral Prescription R/T deficiency/malabsorption: AquADEK (chewable), 2 per day, Vitamin D2, 32763 international unit(s) three times weekly (Mon/Wed/Fri)   Comments: Marleni states she is taking vitamins as prescribed.      FOLLOW-UP FROM RECENT VISITS     NUTRITION DIAGNOSIS  Impaired nutrient utilization related CFRD; EPI; AEB requires insulin, PERT and high calorie/protein diet for hypermetabolism with CF.      INTERVENTIONS/RECOMMENDATIONS  Discussed current nutritional status and goals with Marleni and her mother.  Given positive screen for food insecurity, discussed enrollment in [employer] program to help with costs of Ensure shakes. Mother verbalized understanding. Also discussed positive hunger screen with CF pharmacist and SW.     Marleni would be a candidate for supplemental iron given (+) food insecurity and limitations in diet as a result of this. However, unlikely insurance will cover supplemental Iron. Therefore will work with CF pharmacist on this to also get Iron suplementatiom from [employer].      Education/Training: Nutrition POC  Patient Understanding: Good  Expected Compliance: Good  Follow-Up Plans: Annually      GOALS:  1. Maintain BMI >/= 22 kg/m2.   2. Adherence to nutrition related medications (enzymes, vitamins, insulin.)      FOLLOW-UP/MONITORING:  Visit patient within 12 month(s)     Time Spent In Face-to-Face Patient Interactions: 15 minutes     Liliana Dias RD, PATEL, [facility]  Pediatric Cystic Fibrosis & Pulmonary Dietitian  [facility]  Pager #[phone]  Phone #[phone]    Again, thank you for allowing me to participate in the care of your patient.      Sincerely,    Edi Leger,  MD

## 2019-05-23 NOTE — PATIENT INSTRUCTIONS
Cystic Fibrosis Self-Care Plan    RECOMMENDATIONS:   Bactrim 1 tablet 3 times daily for 3 weeks.  Increase vest therapy to 3 times daily when you can until you are feeling better.  Otherwise continue current medication, nebs and vest therapy.         [facility] [place] Nurse line:  Maciej Montes De Oca Era    [phone]     [facility] [place] Fax Number:      [phone]         Cystic fibrosis Respiratory Therapist:   Chhaya Munoz              [phone]   Cystic fibrosis Dietitians:              Renetta Gilmore              [phone]                            Megan Goddard                        [phone]   Cystic fibrosis Diabetes Nurse:    Paulette Danielle   [phone]    Cystic fibrosis Social Workers:     Kaykay Rachel               [phone]                     Zehra Ware               [phone]  Cystic fibrosis Pharmacist:           Kristy Miguel                               [phone]         Fay Jay   [phone]  Cystic Fibrosis Genetic Counselor:   Cami Rutledge    [phone]    [facility] website:  [email or web address]         MRN: [id number]   Clinic Date: May 23, 2019   Patient: Marleni Alamo     Annual Studies:   IGG   Date Value Ref Range Status   02/26/2018 1,320 695 - 1,620 mg/dL Final     No results found for: INS  There are no preventive care reminders to display for this patient.    Pulmonary Function Tests  FEV1: amount of air you can blow out in 1 second  FVC: total amount of air you can take in and blow out    Your Goals:         PFT Latest Ref Rng & Units 5/23/2019   FVC L 2.85   FEV1 L 2.51   FVC% % 86   FEV1% % 87          Airway Clearance: The Most Important Way to Keep Your Lungs Healthy  Vest Settings:    Hill-Rom Frequencies: 8, 9, 10 Pressure 10 Then, Frequencies 18, 19, 20 Pressure 6      RespirTech: Quick Start with Pressure of     Do each frequency for 5 minutes; Deflate vest after each frequency &  cough 3 times before beginning the next setting.    Vest and Neb Therapy should be done 2-3 times/day.    Good Nutrition Can Improve Lung Function and Overall Health     Take ALL of your vitamins with food     Take 1/2 of your enzymes before EVERY meal/snack and the other 1/2 mid-meal/snack    Wt Readings from Last 3 Encounters:   05/23/19 61.7 kg (136 lb 0.4 oz)   03/01/19 63.5 kg (139 lb 15.9 oz)   02/27/19 64.4 kg (142 lb)       Body mass index is 26.57 kg/m .         National CF Foundation Recommendations for BMI in CF Adults: Women: at least 22 Men: at least 23        Controlling Blood Sugars Helps Prevent Lung Infections & Improves Nutrition  Test blood sugar:     In the morning before eating (goal is )     2 hours after a meal (goal is less than 150)     When pre-meal glucose is greater than 150 add correction     At bedtime (if less than 100 eat a snack with 15 grams of carbohydrates  Last A1C Results:   Hemoglobin A1C   Date Value Ref Range Status   02/26/2018 8.1 (H) 4.3 - 6.0 % Final         If diabetic, measure A1C every 6 months. Goal: Under 7%    Staying Healthy    Research:  If you are interested in learning about research opportunities or have questions, please contact the CF Research Team at [phone] or [email or web address].      CF Foundation:  Compass is a personalized resource service to help you with the insurance, financial, legal and other issues you are facing.  It's free, confidential and available to anyone with CF.  Ask your  for more information or contact Compass directly at 509-JIUCLFK ([phone]) or compass@cff.org, or learn more at cff.org/compass.

## 2019-05-23 NOTE — PROGRESS NOTES
CF Annual Nutrition Assessment     Reason for Assessment  Assessed during Dr. Leger CF clinic r/t increased nutrition risk with diagnosis of CF per protocol     Nutrition Significant PMH  Pancreatic Insufficient   CFRD   GERD  Cerebral palsy - managed at [facility] [place] syndrome  Hx of seizures     Social Assessment  Living situation: Living at home with mother.   Work/School/Disability: Does not currently work.   Food Insecurity: Yes - see below    Question 1.  In the last 12 months: We worried food would run out before we had money to buy more. Sometimes True    Question 2.  In the last 12 months: The food we bought just didn't last and we didn't have money to buy more. Often True    Did the patient answer Sometimes True or Often True to EITHER Question 1 or Question 2? YES      Question 3.  Would you be interested in receiving a call with additional resources? No, working with CF team on resources to assist with food insecurity.     Anthropometric Assessment  Height: 60 inches (152.4 cm)   IBW based on BMI 22 for females and 23 for males per CF Foundation recs: 51 kg   Today's Weight: 61.7 kg   %IBW: 120%  BMI: Body mass index is 26.57 kg/m2  Current weight is considered: Overweight, BMI above CFF goals for age.      Physical Activity/Exercise: Not evaluated at this visit      MALNUTRITION  % Intake:  No decreased intake noted  % Weight Loss:  None noted  Subcutaneous Fat Loss:  None observed  Muscle Loss:  None observed  Handgrip Strength:  Not Applicable  Fluid Accumulation/Edema:  None noted  Malnutrition Diagnosis: Patient does not meet two of the above criteria necessary for diagnosing malnutrition in the context of CF      Pancreatic Enzymes  Brand:  Creon [id number]  Dosin with meals, 3 with snacks  Are you taking enzymes as recommended:Yes      High dose providing 1555 units lipase/kg/meal which is within the recommended range per CF Foundation to inhibit fibrosing colonopathy  Estimated  Daily Amount (if meal dose is >2500 units lipase/kg/meal): 15-20 caps daily = 2555-0094 units lipase/kg/day     Signs of Malabsorption:  No  Enzyme Program:  None, Gov based insurance , does not qualify      Diet History and Assessment  Diet Preferences/Allergies.Intolerances: NKFA  Appetite Stimulant Rx:  No  Intake Recall/Comments:  Marleni reports that she maintains a good appetite. She is eating 2-3 meals daily as follows:   B - Waffles/cereal + 2% milk; Ensure. States sometimes skips breakfast if she sleeps late.   L - Typically a sandwich + Ensure  D - Mac and cheese + apple juice + Ensure  HS - bowl of cereal + milk      Calcium: Adequate. Drinking 2% milk and 3 Ensure  Salt: Table salt, added to foods  Hydration:  Adequate per patient report     Supplements:  Ensure 1 can TID     Tube Feeding:  None    Comments: Marleni and her mother state that they are accessing local food shelves weekly and also receive SNAP (80$/mo) which helps with some costs. Despite this, they continue to be food insecure. Mother reports using SNAP dollars to purchase Marleni's Ensure shakes. Not currently using [employer] program.      Estimated Energy and Protein Needs  Estimation based on weight maintenance with Mild lung disease and pancreatic insufficient.  BEE: ~1850  0423-4741 kcals/day =  150-175% BEE    g protein/day = 1.5-2 g/kg     Laboratory Assessment  Last annuals: 2/26/18:   Iron: Low 27  LDL/HDL: WNL  TGs: Slightly elevated at 150   Vitamins A/D/E and INR WNL     CF Related Diabetes Evaluation  Hgb A1C: 8.1   Date: 5/2018  FSBG range: Not assessed   Insulin: No   Insulin Regimen: Lantus and carb coverage  Carbohydrate Counting: No   Per MD, patient is not following with endo. Referral to Dr. Galindo.     Date: Due for updated annuals at this time*      Current Vitamin/Mineral Prescription R/T deficiency/malabsorption: AquADEK (chewable), 2 per day, Vitamin D2, 34340 international unit(s) three times weekly  (Mon/Wed/Fri)   Comments: Marleni states she is taking vitamins as prescribed.      FOLLOW-UP FROM RECENT VISITS     NUTRITION DIAGNOSIS  Impaired nutrient utilization related CFRD; EPI; AEB requires insulin, PERT and high calorie/protein diet for hypermetabolism with CF.      INTERVENTIONS/RECOMMENDATIONS  Discussed current nutritional status and goals with Marleni and her mother. Given positive screen for food insecurity, discussed enrollment in [employer] program to help with costs of Ensure shakes. Mother verbalized understanding. Also discussed positive hunger screen with CF pharmacist and SW.     Marleni would be a candidate for supplemental iron given (+) food insecurity and limitations in diet as a result of this. However, unlikely insurance will cover supplemental Iron. Therefore will work with CF pharmacist on this to also get Iron suplementatiom from [employer].      Education/Training: Nutrition POC  Patient Understanding: Good  Expected Compliance: Good  Follow-Up Plans: Annually      GOALS:  1. Maintain BMI >/= 22 kg/m2.   2. Adherence to nutrition related medications (enzymes, vitamins, insulin.)      FOLLOW-UP/MONITORING:  Visit patient within 12 month(s)     Time Spent In Face-to-Face Patient Interactions: 15 minutes     Liliana Dias RD, PATEL, [facility]  Pediatric Cystic Fibrosis & Pulmonary Dietitian  [facility]  Pager #[phone]  Phone #[phone]

## 2019-05-23 NOTE — PROGRESS NOTES
Adult Cystic Fibrosis Program  Social Work Clinic Consult    Data:   Met with Mraleni and her mom (Anny) to review psychosocial needs and provide counseling as needed.    Introduced self as new CF SW and provided contact info. Anny states that Marleni has a cough and will be started on bactrim 3 times a day. Otherwise she states Marleni is doing well and has no concerns to address with SW. Anny is recovering for a foot wound. She has been in and out of the hospital which has been hard for Marleni, as she has had to stay with her dad or grandma.     Discussed Marleni being seen every three months in CF clinic and Anny plans to make an appointment for August. She was agreeable to completing annual assessment at that time.    Intervention:  -Introduction to SW and SW role    Assessment: Anny and Marleni were open to SW visit. Marleni responded intermittently during conversation. No immediate concerns to address with SW at this time. Anny understands Marleni needs more frequent follow up at the clinic and was agreeable to making an appointment for August.    Plan:   -Continue to assist with psychosocial concern(s).  -Continue to follow through regular clinic consult.  -Continue to follow for any psychosocial needs that may arise.  -Complete full psychosocial assessment annually.     AILYN Potts, [facility]  Adult Cystic Fibrosis   Ph: [phone], Pager: [phone]

## 2019-05-23 NOTE — PROGRESS NOTES
Reason for Visit:    Marleni Alamo is a 23-year-old female who is being seen for RECHECK (CF)    Assessment and plan: Marleni Alamo is a 23-year-old female with cystic fibrosis, pancreatic insufficinecy, CFRD, cerebral palsy and Leoncio [place] Syndrome who is seen today in clinic for routine follow up. Patient was last seen in clinic July 2018.    # CF lung disease: Patient reports chest pain, increased productive cough and chest congestion. Today she is oxygenating well at 98% SpO2.  Her PFTs are stable, similar to her recent best. Previous cultures have grown out MRSA. Symptoms consistent with mild pulmonary exacerbation.  It is unclear why there is a discrepancy between symptoms and PFTs.  She will start bactrim 1 tablet TID x3 weeks. Instructed to increase vest therapy to three times dily when possible until returns to baseline. On chronic azithromycin and jamarcus month on/off. Otherwise, continue current medication, nebs and vest therapy.     # CFTR modulation: Patient is delta F508 homozygote, not currently on modulating therapy secondary to non compliance with f/u (oftentimes due to issues with her ride) and fear of blood draws. Discussed initiation of Symdeko and proper monitoring to be performed while on the modulator. Will consider starting Symdeko next visit.     # Exocrine pancreatic insufficiency: Patient denies symptoms of malabsorption. Weight is stable. Body mass index is 26.57 kg/m . Continue pancreatic enzymes and vitamin supplementation     # CFRD: Hemoglobin A1c elevated at 8.1 last checked on 2/26. Reports she is currently not follow with endocrinologist. Referral to Dr. Galindo. Continue Lantus and carb coverage     # CF related sinus disease: Patient was seen by ENT in January for sinus surgery evaluation. Patient underwent sinus surgery on March 1, she has well healed and reports no complaints.      # GERD: No current complaints. Continue omeprazole 40 mg BID    # Healthcare maintenance:  Annual studies are overdue, will be completed with her next visit.       The patient will be seen in follow up in 3 months with annual studies.     In addition to my visit, she will be seen by our dietician, , respiratory therapist and pharmacist.       CF HPI:    The patient was seen and examined by Edi Leger MD.     Marleni Alamo is a 23-year-old female with cystic fibrosis, pancreatic insufficinecy, CFRD, cerebral palsy and Leoncio [place] Syndrome who is seen today in clinic for routine follow up. Patient was last seen in clinic July 2018. Bactrim September 2018. ENT in [facility] then sinus surgery in March.    Reports chest pain last night and increased productive cough though swallows sputum so unable to describe. Breathing is comfortable at rest and exercise is well tolerated. Denies SOB. No hemoptysis. No fever, chills, or night sweats.    She is doing vest therapy 30 minutes BID at [place] standard frequencies and pressures.       Review of systems:  CONST: Appetite is good.  ENT: No sinus/ear pain, sore throat, or rhinorrhea. Underwent sinus surgery March 1.  GI: No nausea, vomiting, or loose stools. No abdominal pain.    A complete ROS was otherwise negative except as noted in the HPI.      Past Medical History:   Diagnosis Date     Atopy      Cerebral palsy (H)     Botox injextions, managed by Dr. 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     Seizure disorder (H)      Multiple daily in infancy now infrequent (every few years)     Thrush, oral      Past Surgical History:   Procedure Laterality Date     bilat antrostomies  4/2011     Bilater knee surgery with plates and pins Bilateral 2007     CHOLECYSTECTOMY, LAPOROSCOPIC  Feb 2015     Cleft palate repair and mandibular advancement  1996     gastrostomy in infancy       Multiple PET (ear tubes)       Multiple sinus surgeries  2015     OPTICAL TRACKING SYSTEM ENDOSCOPIC SINUS SURGERY Bilateral 3/1/2019    Procedure: Stealth Assisted Bilateral Maxillary Antrostomy, Ethmoidectomy, Sphenoidotomy, Frontal Sinusotomy;  Surgeon: Anny Carbajal MD;  Location: [facility] OR     RESECT TRACHEA WITH RECONSTRUCTION CHILD  1998    Rib for reconstruction     surgery for meconium ileus, partial bowel resection  8/1995    Details not available     tracheostomy in infancy       Family History   Problem Relation Age of Onset     Diabetes Type 1 Mother 12     Thyroid Disease Mother      Diabetes Type 1 Maternal Uncle      Diabetes Type 1 Maternal Grandmother      Thyroid Disease Maternal Grandmother      Breast Cancer Other         Great Grandmother     Thyroid Disease Paternal Uncle      Thyroid Disease Maternal Aunt      Social History     Socioeconomic History     Marital status: Single     Spouse name: Not on file     Number of children: Not on file     Years of education: Not on file     Highest education level: Not on file   Occupational History     Not on file   Social Needs     Financial resource strain: Not on file     Food insecurity:     Worry: Not on file     Inability: Not on file     Transportation needs:     Medical: Not on file     Non-medical: Not on file   Tobacco Use     Smoking status: Never Smoker     Smokeless tobacco: Never Used   Substance and Sexual Activity     Alcohol use: No     Alcohol/week: 0.0 oz     Drug use: No     Sexual activity: Never   Lifestyle     Physical activity:     Days per week: Not on file     Minutes per  session: Not on file     Stress: Not on file   Relationships     Social connections:     Talks on phone: Not on file     Gets together: Not on file     Attends Sikh service: Not on file     Active member of club or organization: Not on file     Attends meetings of clubs or organizations: Not on file     Relationship status: Not on file     Intimate partner violence:     Fear of current or ex partner: Not on file     Emotionally abused: Not on file     Physically abused: Not on file     Forced sexual activity: Not on file   Other Topics Concern     Not on file   Social History Narrative    Marleni lives at home with mother in [place] with 2 dogs and a cat. 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 Marleni goes to transition school.      Current Outpatient Medications   Medication     albuterol (PROVENTIL) (2.5 MG/3ML) 0.083% neb solution     amylase-lipase-protease (CREON) 28011-82309 units CPEP per EC capsule     azithromycin (ZITHROMAX) 500 MG tablet     budesonide (PULMICORT) 1 MG/2ML neb solution     fluticasone-salmeterol (ADVAIR HFA) 115-21 MCG/ACT inhaler     insulin aspart (NOVOLOG PEN) 100 UNIT/ML soln     insulin glargine (LANTUS SOLOSTAR) 100 UNIT/ML pen     multivitamin CF formula (AQUADEKS) chewable tablet     omeprazole (PRILOSEC) 40 MG DR capsule     PULMOZYME 1 MG/ML neb solution     sodium chloride inhalant 7 % NEBU neb solution     sulfamethoxazole-trimethoprim (BACTRIM DS/SEPTRA DS) 800-160 MG tablet     tobramycin, PF, (CHER) 300 MG/5ML neb solution     vitamin D2 (ERGOCALCIFEROL) 20382 units (1250 mcg) capsule     No current facility-administered medications for this visit.      /70   Pulse 81   Temp 98.4  F (36.9  C) (Oral)   Resp 18   Ht 1.524 m (5')   Wt 61.7 kg (136 lb 0.4 oz)   SpO2 98%   BMI 26.57 kg/m      EXAM:  GENERAL APPEARANCE: Well developed, well nourished, alert, and in no apparent distress.    EYES: PERRL, EOMI    HENT: Nasal mucosa with edema and no hyperemia. No nasal  polyps.    EARS: Canals clear, TMs normal    MOUTH: Oral mucosa is moist, without any lesions, no tonsillar enlargement, no oropharyngeal exudate.    NECK: supple, no masses, no thyromegaly.    LYMPHATICS: No significant axillary, cervical, or supraclavicular nodes.    RESP: normal percussion, good air flow throughout.  No crackles. No rhonchi. No wheezes.    CV: Normal S1, S2, regular rhythm, normal rate. No murmur.  No rub. No gallop. No LE edema.     ABDOMEN:  Bowel sounds normal, soft, nontender, no HSM or masses.     MS: extremities normal. No clubbing. No cyanosis.    SKIN: no rash on limited exam    NEURO: Mentation intact, speech normal, normal strength and tone, normal gait and stance    PSYCH: mentation appears normal. and affect normal/bright      All laboratory and imaging data reviewed.      Cystic Fibrosis Culture  Specimen Description   Date Value Ref Range Status   02/27/2019 Nares  Final   07/18/2018 Throat  Final   02/26/2018 Throat  Final    Culture Micro   Date Value Ref Range Status   07/18/2018 Moderate growth  Normal junaid    Final   07/18/2018 Moderate growth  Staphylococcus aureus   (A)  Final   07/18/2018 (A)  Final    Light growth  Achromobacter xylosoxidans/denitrificans                      Results as noted above.    PFT Interpretation:  Normal spirometry.  Unchanged from previous.   Similar to recent best.  Valid Maneuver        CF Exacerbation:   Mild Increased vest/bronchodilator/execise and Oral: non-quinolone        Scribe Disclosure:   I, Lashanda Garner, am serving as a scribe; to document services personally performed by Edi Leger MD based on data collection and the provider's statements to me.     Provider Disclosure:  I agree with above History, Review of Systems, Physical Exam and Plan.  I have reviewed the content of the documentation and have edited it as needed. I have personally performed the services documented here and the documentation accurately represents  those services and the decisions I have made.      Electronically signed by:  Edi Leger

## 2019-05-28 LAB
BACTERIA SPEC CULT: ABNORMAL
Lab: ABNORMAL
SPECIMEN SOURCE: ABNORMAL

## 2019-05-29 DIAGNOSIS — E84.9 CF (CYSTIC FIBROSIS) (H): Primary | ICD-10-CM

## 2019-05-29 DIAGNOSIS — K86.89 PANCREATIC INSUFFICIENCY: ICD-10-CM

## 2019-05-29 RX ORDER — LACTOSE-REDUCED FOOD
3 LIQUID (ML) ORAL DAILY
Qty: 84 BOTTLE | Refills: 11 | Status: SHIPPED | OUTPATIENT
Start: 2019-05-29 | End: 2020-08-31

## 2019-05-30 ENCOUNTER — CLINICAL UPDATE (OUTPATIENT)
Dept: PHARMACY | Facility: CLINIC | Age: 24
End: 2019-05-30
Payer: COMMERCIAL

## 2019-05-30 DIAGNOSIS — E84.9 CF (CYSTIC FIBROSIS) (H): Primary | ICD-10-CM

## 2019-05-30 PROCEDURE — 99207 ZZC NO CHARGE LOS: CPT | Performed by: PHARMACIST

## 2019-05-30 NOTE — PROGRESS NOTES
Clinical Update:                                                    At the request of Dr. Leger, a chart review was conducted for Marleni Alamo.    Reason for Chart Review: Assistance with food insecurity/financial concerns    Discussion: Marleni screened positive for food insecurity and the CF dietitian would like her to use Ensure as a dietary supplement.    Plan:  Marleni could apply for [employer], which would cover the cost of Ensure.  Marleni's mother Anny did not have time to stay to complete the [employer] application in clinic, so I will ask the CF pharmacy liaison to contact her via phone to complete the application.  An order for Ensure has been sent to [facility] and they will be able to fill it for her when the [employer] latoya is approved.    Kristy Miguel PharmD  CF Medication Therapy Management Pharmacist  [facility]  [phone]

## 2019-06-06 ENCOUNTER — TELEPHONE (OUTPATIENT)
Dept: PHARMACY | Facility: CLINIC | Age: 24
End: 2019-06-06
Payer: COMMERCIAL

## 2019-06-10 DIAGNOSIS — E84.9 CF (CYSTIC FIBROSIS) (H): ICD-10-CM

## 2019-06-11 RX ORDER — FLUTICASONE PROPIONATE AND SALMETEROL XINAFOATE 115; 21 UG/1; UG/1
AEROSOL, METERED RESPIRATORY (INHALATION)
Qty: 12 INHALER | Refills: 0 | Status: SHIPPED | OUTPATIENT
Start: 2019-06-11 | End: 2019-07-27

## 2019-07-27 DIAGNOSIS — E84.9 CF (CYSTIC FIBROSIS) (H): ICD-10-CM

## 2019-07-29 DIAGNOSIS — E84.9 CF (CYSTIC FIBROSIS) (H): ICD-10-CM

## 2019-07-29 RX ORDER — FLUTICASONE PROPIONATE AND SALMETEROL XINAFOATE 115; 21 UG/1; UG/1
AEROSOL, METERED RESPIRATORY (INHALATION)
Qty: 1 INHALER | Refills: 3 | Status: SHIPPED | OUTPATIENT
Start: 2019-07-29 | End: 2019-12-12

## 2019-08-13 DIAGNOSIS — E84.9 CF (CYSTIC FIBROSIS) (H): ICD-10-CM

## 2019-08-13 RX ORDER — OMEPRAZOLE 40 MG/1
CAPSULE, DELAYED RELEASE ORAL
Qty: 60 CAPSULE | Refills: 3 | Status: SHIPPED | OUTPATIENT
Start: 2019-08-13 | End: 2019-11-09

## 2019-09-06 DIAGNOSIS — E84.9 CF (CYSTIC FIBROSIS) (H): ICD-10-CM

## 2019-09-06 RX ORDER — MV-MIN 51/FOLIC ACID/VIT K/UBI 100-350MCG
2 TABLET,CHEWABLE ORAL DAILY
Qty: 60 TABLET | Refills: 3 | Status: SHIPPED | OUTPATIENT
Start: 2019-09-06 | End: 2020-01-22

## 2019-09-06 RX ORDER — ALBUTEROL SULFATE 0.83 MG/ML
SOLUTION RESPIRATORY (INHALATION)
Qty: 360 ML | Refills: 6 | Status: SHIPPED | OUTPATIENT
Start: 2019-09-06 | End: 2020-09-10

## 2019-09-20 RX ORDER — IBUPROFEN 600 MG/1
TABLET ORAL
Refills: 0 | OUTPATIENT
Start: 2019-09-20

## 2019-09-23 ENCOUNTER — TELEPHONE (OUTPATIENT)
Dept: PULMONOLOGY | Facility: CLINIC | Age: 24
End: 2019-09-23

## 2019-09-23 DIAGNOSIS — E84.9 DIABETES MELLITUS DUE TO CYSTIC FIBROSIS (H): Primary | ICD-10-CM

## 2019-09-23 DIAGNOSIS — E08.9 DIABETES MELLITUS DUE TO CYSTIC FIBROSIS (H): Primary | ICD-10-CM

## 2019-09-23 NOTE — TELEPHONE ENCOUNTER
Pharmacy requesting to refill: GLUCAGON 1MG EMERGENCY KIT  This medication is not on the patient's list.   Pharmacy: [facility]    Phone: [phone]  Fax: [phone]

## 2019-09-26 DIAGNOSIS — E84.0 CYSTIC FIBROSIS WITH PULMONARY MANIFESTATIONS (H): ICD-10-CM

## 2019-09-26 RX ORDER — AZITHROMYCIN 500 MG/1
TABLET, FILM COATED ORAL
Qty: 15 TABLET | Refills: 1 | Status: SHIPPED | OUTPATIENT
Start: 2019-09-26 | End: 2019-12-23

## 2019-10-06 ENCOUNTER — HOSPITAL ENCOUNTER (EMERGENCY)
Facility: CLINIC | Age: 24
Discharge: HOME | End: 2019-10-06
Attending: EMERGENCY MEDICINE | Admitting: EMERGENCY MEDICINE
Payer: COMMERCIAL

## 2019-10-06 ENCOUNTER — NURSE TRIAGE (OUTPATIENT)
Dept: NURSING | Facility: CLINIC | Age: 24
End: 2019-10-06

## 2019-10-06 ENCOUNTER — TELEPHONE (OUTPATIENT)
Dept: ENDOCRINOLOGY | Facility: CLINIC | Age: 24
End: 2019-10-06

## 2019-10-06 VITALS
HEART RATE: 94 BPM | SYSTOLIC BLOOD PRESSURE: 120 MMHG | OXYGEN SATURATION: 97 % | BODY MASS INDEX: 27.78 KG/M2 | RESPIRATION RATE: 20 BRPM | DIASTOLIC BLOOD PRESSURE: 57 MMHG | WEIGHT: 141.5 LBS | TEMPERATURE: 98.4 F | HEIGHT: 60 IN

## 2019-10-06 DIAGNOSIS — E08.9 DIABETES MELLITUS DUE TO CYSTIC FIBROSIS (H): ICD-10-CM

## 2019-10-06 DIAGNOSIS — E84.9 DIABETES MELLITUS DUE TO CYSTIC FIBROSIS (H): ICD-10-CM

## 2019-10-06 LAB — GLUCOSE BLDC GLUCOMTR-MCNC: 286 MG/DL (ref 70–99)

## 2019-10-06 PROCEDURE — 99282 EMERGENCY DEPT VISIT SF MDM: CPT | Performed by: EMERGENCY MEDICINE

## 2019-10-06 PROCEDURE — 99283 EMERGENCY DEPT VISIT LOW MDM: CPT | Mod: Z6 | Performed by: EMERGENCY MEDICINE

## 2019-10-06 PROCEDURE — 00000146 ZZHCL STATISTIC GLUCOSE BY METER IP

## 2019-10-06 ASSESSMENT — MIFFLIN-ST. JEOR: SCORE: 1313.34

## 2019-10-06 NOTE — DISCHARGE INSTRUCTIONS
The cystic fibrosis diabetes clinic will reach out to you to schedule an appointment.    Return to the emergency department for any problems.

## 2019-10-06 NOTE — ED TRIAGE NOTES
Pt presents to ED because she ran out of Humalog insulin. Reportedly, since pt was transferred from children's to adult care in 2016, pt hasn't been to endocrinologist. Dad reports because pt doesn't have an endocrinologist, she can't get refill. Ran out of insulin last night. Pt reports BG was 180 at 1130 today. Pt has hx cerebral palsy, CF, and diabetes. Pt took Lantus this am.

## 2019-10-06 NOTE — TELEPHONE ENCOUNTER
To schedulers: Please schedule  for lst available diabetes NDI -- ideally with Dr Galindo in his CFRD space.      Cami Knott MD  Endocrine triage      ----- Message from Atilio Fernandez MD sent at 10/6/2019  4:52 PM CDT -----  Regarding: CFRD patient that would like to make an appointment with us  Hello,    I got a call from ED for this patient that she came to ED to get insulin refill. She has CF and has been following with our adult CF clinic, . Casandras simone has now stopped seeing her, so she would like to make appointment with us. I think she can be seen in CFRD clinic, but I don't know how to get to the  people there. Please help me out!    Thank you!    Wow

## 2019-10-06 NOTE — TELEPHONE ENCOUNTER
"Call received from father, Jah.    He reports that Marleni is out of her insulin and was told that she needs to be seen by her provider.    He is not sure of the diabetes physician's name. He believes it is with the [facility].  He states that Marleni's mother would have more information on physician and insulin.    Chart review shows last order was 7/25/16 with a Dr. Leger.    Advised contacting mother for more information.  Suggested urgent care or ER.    Chelo Dye RN  [facility]      Reason for Disposition    [1] Request for URGENT new prescription or refill of \"essential\" medication (i.e., likelihood of harm to patient if not taken) AND [2] triager unable to fill per unit policy    Protocols used: MEDICATION QUESTION CALL-A-AH      "

## 2019-10-06 NOTE — ED AVS SNAPSHOT
[facility], [place], Emergency Department  [address] [place] [id number]  Phone:  [phone]                                    Marleni Alamo   MRN: [id number]    Department:  [facility], Emergency Department   Date of Visit:  10/6/2019           After Visit Summary Signature Page    I have received my discharge instructions, and my questions have been answered. I have discussed any challenges I see with this plan with the nurse or doctor.    ..........................................................................................................................................  Patient/Patient Representative Signature      ..........................................................................................................................................  Patient Representative Print Name and Relationship to Patient    ..................................................               ................................................  Date                                   Time    ..........................................................................................................................................  Reviewed by Signature/Title    ...................................................              ..............................................  Date                                               Time          22EPIC Rev 08/18

## 2019-10-07 ENCOUNTER — TELEPHONE (OUTPATIENT)
Dept: ENDOCRINOLOGY | Facility: CLINIC | Age: 24
End: 2019-10-07

## 2019-10-07 DIAGNOSIS — E84.0 CYSTIC FIBROSIS WITH PULMONARY MANIFESTATIONS (H): ICD-10-CM

## 2019-10-07 DIAGNOSIS — E84.9 CF (CYSTIC FIBROSIS) (H): ICD-10-CM

## 2019-10-07 NOTE — TELEPHONE ENCOUNTER
Message sent to clinic coordinators to  help her schedule with Dr Galindo  In pulmonary CF diabetes clinic. Haydee Hagen RN on 10/7/2019 at 10:26 AM

## 2019-10-07 NOTE — TELEPHONE ENCOUNTER
----- Message from Atilio Fernandez MD sent at 10/6/2019  4:52 PM CDT -----  Regarding: CFRD patient that would like to make an appointment with us  Hello,    I got a call from ED for this patient that she came to ED to get insulin refill. She has CF and has been following with our adult CF clinic, . Terrance nichols has now stopped seeing her, so she would like to make appointment with us. I think she can be seen in CFRD clinic, but I don't know how to get to the  people there. Please help me out!    Thank you!    Wow

## 2019-10-07 NOTE — TELEPHONE ENCOUNTER
KALEEM on listed mobile number for pt to [facility].

## 2019-10-10 DIAGNOSIS — E84.9 CF (CYSTIC FIBROSIS) (H): ICD-10-CM

## 2019-10-10 DIAGNOSIS — K86.89 PANCREATIC INSUFFICIENCY: ICD-10-CM

## 2019-10-10 RX ORDER — ERGOCALCIFEROL 1.25 MG/1
CAPSULE, LIQUID FILLED ORAL
Qty: 30 CAPSULE | Refills: 0 | Status: SHIPPED | OUTPATIENT
Start: 2019-10-10 | End: 2019-11-24

## 2019-10-18 ENCOUNTER — TELEPHONE (OUTPATIENT)
Dept: PULMONOLOGY | Facility: CLINIC | Age: 24
End: 2019-10-18

## 2019-10-18 DIAGNOSIS — E84.0 CYSTIC FIBROSIS WITH PULMONARY MANIFESTATIONS (H): Primary | ICD-10-CM

## 2019-10-18 RX ORDER — DOXYCYCLINE 100 MG/1
100 CAPSULE ORAL 2 TIMES DAILY
Qty: 42 CAPSULE | Refills: 0 | Status: SHIPPED | OUTPATIENT
Start: 2019-10-18 | End: 2019-11-14

## 2019-10-18 NOTE — TELEPHONE ENCOUNTER
[facility]  October 18, 2019    Clinics, [facility]    Cystic fibrosis Provider: Edi Leger MD    Caller: Patient     Clinical information:  Marleni Alamo has had a cough for about a week. Cough is day and night. O2 sats 98-99%. Increased vesting to TID. Last course of CHER completed about 2 1/2 weeks ago. No runny nose. No fevers. Mother has been ill with similar symptoms for a while.    Plan:   Discussed with Dr Leger:  Doxycycline 100 mg BID x3 weeks.  Needs clinic appointment.  Call back with any new or worsening symptoms/concerns.    Caller verbalized understanding of plan and agrees with advice given.

## 2019-11-05 NOTE — TELEPHONE ENCOUNTER
Marleni was worked into the CFRD clinic today at 9am.  She cancelled the appointment at 8:45am. Paulette Danielle RN,CDE

## 2019-11-09 DIAGNOSIS — E84.9 CF (CYSTIC FIBROSIS) (H): ICD-10-CM

## 2019-11-11 RX ORDER — OMEPRAZOLE 40 MG/1
CAPSULE, DELAYED RELEASE ORAL
Qty: 60 CAPSULE | Refills: 3 | Status: SHIPPED | OUTPATIENT
Start: 2019-11-11 | End: 2020-03-13

## 2019-11-11 NOTE — PROGRESS NOTES
[facility]  November 14, 2018         Assessment and Plan:   Marleni Alamo is a 24 year old female with cystic fibrosis, pancreatic insufficinecy, CFRD, cerebral palsy and Leoncio Sedalia Syndrome who is seen today in clinic for routine follow up.     1. CF lung disease: recently completed course of doxycycline and feels better. Still with mild mid chest heaviness with deep breaths. Sating 96% on room air; vesting BID. PFTs within baseline, down slightly from her best. Previous cultures have grown out MRSA. No evidence of exacerbation at this time. Will have patient increase airway clearance to TID for a few days and resume jamarcus nebs.  - Continue current nebulizers (albuterol, Pulmicort, Pulmozyme and HTS) and vest therapy, increase to TID for 3-4 days, then resume BID  - Resume jamarcus nebs month on/off  - On chronic azithromycin      2. 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 I discussed triple drug therapy today as did Kristy WALSH. Likely we will wait and see the response of our patients on this medication before making any decisions.     3. Exocrine pancreatic insufficiency: denies symptoms of malabsorption. Weight is down slightly, BMI of 26.48 is above baseline  - Continue pancreatic enzymes and vitamin supplementation     4. CFRD: last AIC of 8.1 on 2/26/18. Recently seen in the ED for insulin because she is not following with Endocrine at [facility]. Has not make an appointment with Dr. Galindo  - Continue Lantus and carb coverage  - Schedule appt with Josie today     5. CF related sinus disease: underwent sinus surgery March 2019 by Dr. Carbajal, but feels she is stable currently.      6. GERD: no new complaints.   - Omeprazole 40 mg BID    Chronic issues:  1. H/o seizures  2. Cerebral palsy     RTC: in 3 months  with routine cultures and spirometry  Annual studies due: February 2019 (cannot tolerate blood draws)  Vaccinations: states she has received her influenza vaccine at outside location     Yahaira Trinidad PA-C  Pulmonary, Allergy, Critical Care and Sleep Medicine         Interval History:   Feeling good after completing course of antibiotics. Cough is baseline, is swallowing her mucous. Feels a little heavy in her chest when she catches her breath. No shortness of breath. Vesting BID. No fever or chills, doesn't feel sick. No nausea unless she is drinking chocolate milk or a sugar drink. No vomiting. No abdominal pain unless she has sugary cereal.          Review of Systems:   Please see HPI. Otherwise, complete 10 point ROS negative.           Past Medical and Surgical History:     Past Medical History:   Diagnosis Date     Atopy      Cerebral palsy (H)     Botox injextions, managed by Dr. John Marley     CF (cystic fibrosis) (H) 7/25/2016    Sweat Test: 8/15/95 Value: 85.0 Genotype: [id number], H/O Pseudomonas and MRSA, Baseline FEV1  2.48, FVC 2.76 (7/2015)     Cholelithiasis      Chronic pansinusitis 7/25/2016     Diabetes mellitus due to cystic fibrosis (H) 7/25/2016     Diabetes mellitus related to cystic fibrosis (H) 7/25/2016     Gastroesophageal reflux disease without esophagitis 7/25/2016     MRSA (methicillin resistant staph aureus) culture positive      Pancreatic insufficiency 7/25/2016     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 7/25/2016     Seizure disorder (H)     Multiple daily in infancy now infrequent (every few years)     Thrush, oral      Past Surgical History:   Procedure Laterality Date     bilat antrostomies  4/2011     Bilater knee surgery with plates and pins Bilateral 2007     CHOLECYSTECTOMY, LAPOROSCOPIC  Feb 2015     Cleft palate repair and mandibular advancement  1996     gastrostomy in infancy       Multiple PET (ear tubes)       Multiple sinus surgeries  2015     OPTICAL TRACKING SYSTEM  ENDOSCOPIC SINUS SURGERY Bilateral 3/1/2019    Procedure: Stealth Assisted Bilateral Maxillary Antrostomy, Ethmoidectomy, Sphenoidotomy, Frontal Sinusotomy;  Surgeon: Anny Carbajal MD;  Location: [facility] OR     RESECT TRACHEA WITH RECONSTRUCTION CHILD  1998    Rib for reconstruction     surgery for meconium ileus, partial bowel resection  8/1995    Details not available     tracheostomy in infancy             Family History:     Family History   Problem Relation Age of Onset     Diabetes Type 1 Mother 12     Thyroid Disease Mother      Diabetes Type 1 Maternal Uncle      Diabetes Type 1 Maternal Grandmother      Thyroid Disease Maternal Grandmother      Breast Cancer Other         Great Grandmother     Thyroid Disease Paternal Uncle      Thyroid Disease Maternal Aunt             Social History:     Social History     Socioeconomic History     Marital status: Single     Spouse name: Not on file     Number of children: Not on file     Years of education: Not on file     Highest education level: Not on file   Occupational History     Not on file   Social Needs     Financial resource strain: Not on file     Food insecurity:     Worry: Not on file     Inability: Not on file     Transportation needs:     Medical: Not on file     Non-medical: Not on file   Tobacco Use     Smoking status: Never Smoker     Smokeless tobacco: Never Used   Substance and Sexual Activity     Alcohol use: No     Alcohol/week: 0.0 standard drinks     Drug use: No     Sexual activity: Never   Lifestyle     Physical activity:     Days per week: Not on file     Minutes per session: Not on file     Stress: Not on file   Relationships     Social connections:     Talks on phone: Not on file     Gets together: Not on file     Attends Hinduism service: Not on file     Active member of club or organization: Not on file     Attends meetings of clubs or organizations: Not on file     Relationship status: Not on file     Intimate partner violence:     Fear of  current or ex partner: Not on file     Emotionally abused: Not on file     Physically abused: Not on file     Forced sexual activity: Not on file   Other Topics Concern     Not on file   Social History Narrative    Marleni lives at home with mother in [place] with 2 dogs and a cat. 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 Marleni goes to transition school.             Medications:     Current Outpatient Medications   Medication     albuterol (PROVENTIL) (2.5 MG/3ML) 0.083% neb solution     amylase-lipase-protease (CREON) 41865-81546 units CPEP per EC capsule     azithromycin (ZITHROMAX) 500 MG tablet     budesonide (PULMICORT) 1 MG/2ML neb solution     fluticasone-salmeterol (ADVAIR HFA) 115-21 MCG/ACT inhaler     glucagon (GLUCAGON EMERGENCY) 1 MG kit     insulin aspart (NOVOLOG PEN) 100 UNIT/ML pen     insulin glargine (LANTUS SOLOSTAR) 100 UNIT/ML pen     multivitamin CF formula (AQUADEKS) chewable tablet     Nutritional Supplements (ENSURE ORIGINAL) LIQD     omeprazole (PRILOSEC) 40 MG DR capsule     PULMOZYME 1 MG/ML neb solution     sodium chloride inhalant 7 % NEBU neb solution     tobramycin, PF, (CHER) 300 MG/5ML neb solution     vitamin D2 (ERGOCALCIFEROL) 15209 units (1250 mcg) capsule     No current facility-administered medications for this visit.             Physical Exam:   /77 (BP Location: Right arm, Patient Position: Chair, Cuff Size: Adult Regular)   Pulse 74   Temp 98.3  F (36.8  C) (Oral)   Resp 18   Ht 1.524 m (5')   Wt 61.5 kg (135 lb 9.3 oz)   SpO2 96%   BMI 26.48 kg/m      GENERAL: alert, NAD  HEENT: NCAT, EOMI, anicteric sclera, clear mucous in bilateral nares; no oral mucosal edema or erythema  Neck: no cervical or supraclavicular adenopathy  Respiratory: good air flow  CV: RRR, S1S2, no murmurs noted  Abdomen: normoactive BS, soft and non tender   Lymph: no edema, + digital clubbing  Neuro: AAO X 3, CN 2-12 grossly intact  Psychiatric: normal affect, good eye contact  Skin: no rash,  jaundice or lesions on limited exam         Data:   All laboratory and imaging data reviewed.      Cystic Fibrosis Culture  Specimen Description   Date Value Ref Range Status   05/23/2019 Throat  Final   02/27/2019 Nares  Final   07/18/2018 Throat  Final    Culture Micro   Date Value Ref Range Status   05/23/2019 Heavy growth  Normal junaid    Final   05/23/2019 Heavy growth  Staphylococcus aureus   (A)  Final   05/23/2019 (A)  Final    Light growth  Achromobacter xylosoxidans/denitrificans          PFT interpretation:  Maneuver: valid and meets ATS guidelines  Normal spirometry   Compared to prior: FEV1 of 2.42 is 90 ml below prior

## 2019-11-14 ENCOUNTER — OFFICE VISIT (OUTPATIENT)
Dept: PULMONOLOGY | Facility: CLINIC | Age: 24
End: 2019-11-14
Attending: PHYSICIAN ASSISTANT
Payer: COMMERCIAL

## 2019-11-14 ENCOUNTER — ALLIED HEALTH/NURSE VISIT (OUTPATIENT)
Dept: CARE COORDINATION | Facility: CLINIC | Age: 24
End: 2019-11-14

## 2019-11-14 ENCOUNTER — OFFICE VISIT (OUTPATIENT)
Dept: PHARMACY | Facility: CLINIC | Age: 24
End: 2019-11-14
Payer: COMMERCIAL

## 2019-11-14 VITALS
HEIGHT: 60 IN | RESPIRATION RATE: 18 BRPM | TEMPERATURE: 98.3 F | OXYGEN SATURATION: 96 % | BODY MASS INDEX: 26.62 KG/M2 | HEART RATE: 74 BPM | DIASTOLIC BLOOD PRESSURE: 77 MMHG | WEIGHT: 135.58 LBS | SYSTOLIC BLOOD PRESSURE: 113 MMHG

## 2019-11-14 DIAGNOSIS — E84.9 CF (CYSTIC FIBROSIS) (H): Primary | ICD-10-CM

## 2019-11-14 DIAGNOSIS — Z13.9 RISK AND FUNCTIONAL ASSESSMENT: Primary | ICD-10-CM

## 2019-11-14 PROCEDURE — 99605 MTMS BY PHARM NP 15 MIN: CPT | Performed by: PHARMACIST

## 2019-11-14 PROCEDURE — G0463 HOSPITAL OUTPT CLINIC VISIT: HCPCS | Mod: ZF

## 2019-11-14 PROCEDURE — 87070 CULTURE OTHR SPECIMN AEROBIC: CPT | Performed by: PHYSICIAN ASSISTANT

## 2019-11-14 PROCEDURE — 99607 MTMS BY PHARM ADDL 15 MIN: CPT | Performed by: PHARMACIST

## 2019-11-14 ASSESSMENT — MIFFLIN-ST. JEOR: SCORE: 1286.5

## 2019-11-14 ASSESSMENT — PAIN SCALES - GENERAL: PAINLEVEL: NO PAIN (0)

## 2019-11-14 NOTE — LETTER
11/14/2019       RE: Marleni Alamo  [address]   [place] [address]     Dear Colleague,    Thank you for referring your patient, Marleni Alamo, to the [facility] at [facility]. Please see a copy of my visit note below.    [facility]  November 14, 2018         Assessment and Plan:   Marleni Alamo is a 24 year old female with cystic fibrosis, pancreatic insufficinecy, CFRD, cerebral palsy and Leoncio Mattoon Syndrome who is seen today in clinic for routine follow up.     1. CF lung disease: recently completed course of doxycycline and feels better. Still with mild mid chest heaviness with deep breaths. Sating 96% on room air; vesting BID. PFTs within baseline, down slightly from her best. Previous cultures have grown out MRSA. No evidence of exacerbation at this time. Will have patient increase airway clearance to TID for a few days and resume jamarcus nebs.  - Continue current nebulizers (albuterol, Pulmicort, Pulmozyme and HTS) and vest therapy, increase to TID for 3-4 days, then resume BID  - Resume jamarcus nebs month on/off  - On chronic azithromycin      2. 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 I discussed triple drug therapy today as did Kristy WALSH. Likely we will wait and see the response of our patients on this medication before making any decisions.     3. Exocrine pancreatic insufficiency: denies symptoms of malabsorption. Weight is down slightly, BMI of 26.48 is above baseline  - Continue pancreatic enzymes and vitamin supplementation     4. CFRD: last AIC of 8.1 on 2/26/18. Recently seen in the ED for insulin because she is not following with Endocrine at [facility]. Has not make an appointment with Dr. Galindo  -  Continue Lantus and carb coverage  - Schedule appt with Josie today     5. CF related sinus disease: underwent sinus surgery March 2019 by Dr. Carbajal, but feels she is stable currently.      6. GERD: no new complaints.   - Omeprazole 40 mg BID    Chronic issues:  1. H/o seizures  2. Cerebral palsy     RTC: in 3 months with routine cultures and spirometry  Annual studies due: February 2019 (cannot tolerate blood draws)  Vaccinations: states she has received her influenza vaccine at outside location     Yahaira Trinidad PA-C  Pulmonary, Allergy, Critical Care and Sleep Medicine         Interval History:   Feeling good after completing course of antibiotics. Cough is baseline, is swallowing her mucous. Feels a little heavy in her chest when she catches her breath. No shortness of breath. Vesting BID. No fever or chills, doesn't feel sick. No nausea unless she is drinking chocolate milk or a sugar drink. No vomiting. No abdominal pain unless she has sugary cereal.          Review of Systems:   Please see HPI. Otherwise, complete 10 point ROS negative.           Past Medical and Surgical History:     Past Medical History:   Diagnosis Date     Atopy      Cerebral palsy (H)     Botox injextions, managed by Dr. John Marley     CF (cystic fibrosis) (H) 7/25/2016    Sweat Test: 8/15/95 Value: 85.0 Genotype: [id number], H/O Pseudomonas and MRSA, Baseline FEV1  2.48, FVC 2.76 (7/2015)     Cholelithiasis      Chronic pansinusitis 7/25/2016     Diabetes mellitus due to cystic fibrosis (H) 7/25/2016     Diabetes mellitus related to cystic fibrosis (H) 7/25/2016     Gastroesophageal reflux disease without esophagitis 7/25/2016     MRSA (methicillin resistant staph aureus) culture positive      Pancreatic insufficiency 7/25/2016     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 7/25/2016     Seizure disorder (H)     Multiple daily in infancy now infrequent (every few years)     Thrush, oral      Past Surgical History:   Procedure Laterality Date      bilat antrostomies  4/2011     Bilater knee surgery with plates and pins Bilateral 2007     CHOLECYSTECTOMY, LAPOROSCOPIC  Feb 2015     Cleft palate repair and mandibular advancement  1996     gastrostomy in infancy       Multiple PET (ear tubes)       Multiple sinus surgeries  2015     OPTICAL TRACKING SYSTEM ENDOSCOPIC SINUS SURGERY Bilateral 3/1/2019    Procedure: Stealth Assisted Bilateral Maxillary Antrostomy, Ethmoidectomy, Sphenoidotomy, Frontal Sinusotomy;  Surgeon: Anny Carbajal MD;  Location: [facility] OR     RESECT TRACHEA WITH RECONSTRUCTION CHILD  1998    Rib for reconstruction     surgery for meconium ileus, partial bowel resection  8/1995    Details not available     tracheostomy in infancy             Family History:     Family History   Problem Relation Age of Onset     Diabetes Type 1 Mother 12     Thyroid Disease Mother      Diabetes Type 1 Maternal Uncle      Diabetes Type 1 Maternal Grandmother      Thyroid Disease Maternal Grandmother      Breast Cancer Other         Great Grandmother     Thyroid Disease Paternal Uncle      Thyroid Disease Maternal Aunt             Social History:     Social History     Socioeconomic History     Marital status: Single     Spouse name: Not on file     Number of children: Not on file     Years of education: Not on file     Highest education level: Not on file   Occupational History     Not on file   Social Needs     Financial resource strain: Not on file     Food insecurity:     Worry: Not on file     Inability: Not on file     Transportation needs:     Medical: Not on file     Non-medical: Not on file   Tobacco Use     Smoking status: Never Smoker     Smokeless tobacco: Never Used   Substance and Sexual Activity     Alcohol use: No     Alcohol/week: 0.0 standard drinks     Drug use: No     Sexual activity: Never   Lifestyle     Physical activity:     Days per week: Not on file     Minutes per session: Not on file     Stress: Not on file   Relationships     Social  connections:     Talks on phone: Not on file     Gets together: Not on file     Attends Baptism service: Not on file     Active member of club or organization: Not on file     Attends meetings of clubs or organizations: Not on file     Relationship status: Not on file     Intimate partner violence:     Fear of current or ex partner: Not on file     Emotionally abused: Not on file     Physically abused: Not on file     Forced sexual activity: Not on file   Other Topics Concern     Not on file   Social History Narrative    Marleni lives at home with mother in [place] with 2 dogs and a cat. 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 Marleni goes to transition school.             Medications:     Current Outpatient Medications   Medication     albuterol (PROVENTIL) (2.5 MG/3ML) 0.083% neb solution     amylase-lipase-protease (CREON) 03351-11061 units CPEP per EC capsule     azithromycin (ZITHROMAX) 500 MG tablet     budesonide (PULMICORT) 1 MG/2ML neb solution     fluticasone-salmeterol (ADVAIR HFA) 115-21 MCG/ACT inhaler     glucagon (GLUCAGON EMERGENCY) 1 MG kit     insulin aspart (NOVOLOG PEN) 100 UNIT/ML pen     insulin glargine (LANTUS SOLOSTAR) 100 UNIT/ML pen     multivitamin CF formula (AQUADEKS) chewable tablet     Nutritional Supplements (ENSURE ORIGINAL) LIQD     omeprazole (PRILOSEC) 40 MG DR capsule     PULMOZYME 1 MG/ML neb solution     sodium chloride inhalant 7 % NEBU neb solution     tobramycin, PF, (CHER) 300 MG/5ML neb solution     vitamin D2 (ERGOCALCIFEROL) 48715 units (1250 mcg) capsule     No current facility-administered medications for this visit.             Physical Exam:   /77 (BP Location: Right arm, Patient Position: Chair, Cuff Size: Adult Regular)   Pulse 74   Temp 98.3  F (36.8  C) (Oral)   Resp 18   Ht 1.524 m (5')   Wt 61.5 kg (135 lb 9.3 oz)   SpO2 96%   BMI 26.48 kg/m       GENERAL: alert, NAD  HEENT: NCAT, EOMI, anicteric sclera, clear mucous in bilateral nares; no oral mucosal  edema or erythema  Neck: no cervical or supraclavicular adenopathy  Respiratory: good air flow  CV: RRR, S1S2, no murmurs noted  Abdomen: normoactive BS, soft and non tender   Lymph: no edema, + digital clubbing  Neuro: AAO X 3, CN 2-12 grossly intact  Psychiatric: normal affect, good eye contact  Skin: no rash, jaundice or lesions on limited exam         Data:   All laboratory and imaging data reviewed.      Cystic Fibrosis Culture  Specimen Description   Date Value Ref Range Status   05/23/2019 Throat  Final   02/27/2019 Nares  Final   07/18/2018 Throat  Final    Culture Micro   Date Value Ref Range Status   05/23/2019 Heavy growth  Normal junaid    Final   05/23/2019 Heavy growth  Staphylococcus aureus   (A)  Final   05/23/2019 (A)  Final    Light growth  Achromobacter xylosoxidans/denitrificans          PFT interpretation:  Maneuver: valid and meets ATS guidelines  Normal spirometry   Compared to prior: FEV1 of 2.42 is 90 ml below prior          Again, thank you for allowing me to participate in the care of your patient.      Sincerely,    Yahaira Trinidad PA-C

## 2019-11-14 NOTE — NURSING NOTE
Chief Complaint   Patient presents with     RECHECK     Cystic Fibrosis/ end of medication/ 3 month follow up      Connie Ro, CMA

## 2019-11-14 NOTE — PROGRESS NOTES
SUBJECTIVE/OBJECTIVE:                           Marleni Alamo is a 24 year old female coming in for routine clinic visit.      He/she is accompanied by her mother    Allergies/ADRs: Reviewed in Epic  Tobacco: No tobacco use  Alcohol: not currently using  PMH: Reviewed in Epic  CF Genotype: [id number]    Medication Adherence  Medication adherence flowsheet 11/14/2019   Patient medication administration: Has assistance with medications   Patient estimated adherence level: %   Pharmacist assessment of adherence: Good   Patient reported doses missed per week: 0-1   Pharmacy MPR: MPR   Pharmacy MPR: 0.9 for Pulmozyme   Facilitators to medication adherence  Caregiver assistance;Schedule/routine   Patient reported barriers to medication adherence  No issues identified      Medication Access  Medication access flowsheet 11/14/2019   Number of pharmacies used: 2   Pharmacy: [facility]   Enrolled in [facility]? Yes   Patient reported barriers to accessing medications: No issues reported by patient      CF  Inhaled medications   Bronchodilator: albuterol   Mucolytic: Pulmozyme and hypertonic saline  Antibiotic: tobramycin  Other: Pulmicort nebs, Advair inhaler  Oral medications   Azithromycin: taking  CFTR modulator: none currently.  Marleni has been eligible for Orkambi and Symdeko but was not started on these medications due to concerns about inconsistent clinic follow-up and reluctance to have lab draws  Pulmonary symptoms are stable  PFTs are decreased  Current FEV1 84%  Cultures: throat cultures grow Staph and Achromobacter  Current exacerbation: no    Pancreatic Insufficiency/Nutrition: Pancreatic enzyme replacement with Creon 46931 units.  Patient is taking 4 capsules with meals and 3 capsules with snacks. Patient is not experiencing sign/symptoms of malabsorption.  Acid Reducer: Prilosec (omeprazole) 40 mg twice daily  Weight and BMI are decreased  Vitamins include: Aquadeks  chewable 2 daily and vitamin D 50,000 units M-W-F    Lab Results   Component Value Date    VITDT 39 02/26/2018     PFTs:    Weight/BMI:  Estimated body mass index is 26.48 kg/m  as calculated from the following:    Height as of an earlier encounter on 11/14/19: 5' (1.524 m).    Weight as of an earlier encounter on 11/14/19: 135 lb 9.3 oz (61.5 kg).      ASSESSMENT:                             Current medications were reviewed today.     CF: Needs Improvement. Patient would benefit from starting Trikafta.    Pancreatic Insufficiency/Nutrition: Stable.  Patient would benefit from no changes at this time    PLAN:                            Continue current medications and nebulizers.    We talked about Trikafta today, including how the medication works, how to take it, expected benefits, and potential side effects.  We discussed the lab monitoring and why quarterly labs are important.  Explained that Marleni could use any [facility] lab and that going to a smaller lab might be more comfortable for Marleni, rather than using the lab at the [facility].  They could also visit the lab and ask for an experienced phlebotomist.  Use of topical lidocaine prior to the lab draw could help reduce discomfort.      I spent 30 minutes with this patient today.      Post Discharge Medication Reconciliation Status: patient was not discharged from an inpatient facility.    Will follow up in 1-3 months to assess readiness to start Trikafta.    The patient was provided a summary of these recommendations in the AVS from CF care team visit.    Kristy Miguel PharmD  CF Medication Therapy Management Pharmacist  [facility]  [phone]

## 2019-11-14 NOTE — PATIENT INSTRUCTIONS
Cystic Fibrosis Self-Care Plan       Patient: Marleni Alamo   MRN: [id number]   Clinic Date: November 14, 2019     RECOMMENDATIONS:  1. Continue nebulizers and vest therapy.   2. Increase vesting to 3 times/day for the next 3-4 days. Do albuterol and pulmozyme with the 1st vest, and do albuterol and hypertonic saline (salt saline) with the 2nd and 3rd vest.   3. Start jamarcus nebs as soon as you get the medication.     Annual Studies:   IGG   Date Value Ref Range Status   02/26/2018 1,320 695 - 1,620 mg/dL Final     No results found for: INS  There are no preventive care reminders to display for this patient.    Pulmonary Function Tests  FEV1: amount of air you can blow out in 1 second  FVC: total amount of air you can take in and blow out    Your Goals:         PFT Latest Ref Rng & Units 11/14/2019   FVC L 2.76   FEV1 L 2.42   FVC% % 83   FEV1% % 84          Airway Clearance: The Most Important Way to Keep Your Lungs Healthy  Vest Settings:    Hill-Rom Frequencies: 8, 9, 10 Pressure 10 Then, Frequencies 18, 19, 20 Pressure 6      RespirTech: Quick Start with Pressure of     Do each frequency for 5 minutes; Deflate vest after each frequency & cough 3 times before beginning the next setting.    Vest and Neb Therapy should be done 2-3 times/day.    Good Nutrition Can Improve Lung Function and Overall Health     Take ALL of your vitamins with food     Take 1/2 of your enzymes before EVERY meal/snack and the other 1/2 mid-meal/snack    Wt Readings from Last 3 Encounters:   11/14/19 61.5 kg (135 lb 9.3 oz)   10/06/19 64.2 kg (141 lb 8 oz)   05/23/19 61.7 kg (136 lb 0.4 oz)       Body mass index is 26.48 kg/m .         National CF Foundation Recommendations for BMI in CF Adults: Women: at least 22 Men: at least 23        Controlling Blood Sugars Helps Prevent Lung Infections & Improves Nutrition  Test blood sugar:     In the morning before eating (goal is )     2 hours after a meal (goal is less than 150)      When pre-meal glucose is greater than 150 add correction     At bedtime (if less than 100 eat a snack with 15 grams of carbohydrates  Last A1C Results:   Hemoglobin A1C   Date Value Ref Range Status   02/26/2018 8.1 (H) 4.3 - 6.0 % Final         If diabetic, measure A1C every 6 months. Goal is under 7%.    Staying Healthy    Research: If you are interested in learning about research opportunities or have questions, please contact Stella Gatica at [phone] or [email or web address].       Foundation: Compass is a personalized resource service to help you with the insurance, financial, legal and other issues you are facing.  It's free, confidential and available to anyone with CF.  Ask your  for more information or contact Compass directly at [facility] (034-5663) or compass@cff.org, or learn more at cff.org/compass.       CF Nurse Line:  Trey Montes De Oca: [phone]   Chhaya Munoz, RT: [phone]     Megan Goddard and Renetta Gilmore , Dieticians: [phone]     Paulette Danielle, Diabetes Nurse: [phone]    Kaykay Tavarez: [phone] or Zehra Heaton at [phone], Social Workers   [email or web address]

## 2019-11-18 LAB
EXPTIME-PRE: 7.54 SEC
FEF2575-%PRED-PRE: 81 %
FEF2575-PRE: 2.86 L/SEC
FEF2575-PRED: 3.49 L/SEC
FEFMAX-%PRED-PRE: 101 %
FEFMAX-PRE: 6.43 L/SEC
FEFMAX-PRED: 6.32 L/SEC
FEV1-%PRED-PRE: 84 %
FEV1-PRE: 2.42 L
FEV1FEV6-PRE: 88 %
FEV1FEV6-PRED: 86 %
FEV1FVC-PRE: 88 %
FEV1FVC-PRED: 88 %
FIFMAX-PRE: 4.56 L/SEC
FVC-%PRED-PRE: 83 %
FVC-PRE: 2.76 L
FVC-PRED: 3.3 L

## 2019-11-18 NOTE — PROGRESS NOTES
Respiratory Therapist Note:    Vest    Brand: Hill-Rom - traditional Hill Rom: Frequencies 8, 9, 10 at pressure 10 then frequencies 18, 19, 20 at pressure 6.   Cough Pause: Cough Pause; Yes   Vest Garment Size: Adult Medium   Last Fitting Date: 2019   Frequency of therapy: 14-21 times per week   Concerns: none    Exercise (purposeful and aerobic for >20 minutes each session): NO.   Does this qualify as additional airway clearance: No    Alternative Airway Clearance:       Nebulized Medications   Bronchodilators: Albuterol   Mucolytic: Pulmozyme and 7% Hypertonic Saline   Antibiotics: CHER   Additional Inhaled Medications: ICS   Spacer Use: yes     Review Cleaning: Yes. Soap and boil.    Education and Transition Information   Correct order of inhaled medications: Yes   Mechanism of Action of inhaled medications: Yes   Frequency of inhaled medications: Yes   Dosage of inhaled medications: Yes   Other:     Home Care:   Nebulizer Cups (Brand/Type): Zhanna   Nebulizer Compressor    Year Purchased: 2018    [facility], Phone: [phone], Fax: [phone]   Nebulizer Supply Company:     [facility], Phone: [phone], Fax: [phone]    Oxygen:        Pulmonary Rehab   Site:    Date Completed:     Plan of Care and Goals for next visit: Keep up the good work

## 2019-11-19 LAB
BACTERIA SPEC CULT: ABNORMAL
BACTERIA SPEC CULT: ABNORMAL
Lab: ABNORMAL
SPECIMEN SOURCE: ABNORMAL

## 2019-11-24 DIAGNOSIS — E84.9 CF (CYSTIC FIBROSIS) (H): ICD-10-CM

## 2019-11-24 DIAGNOSIS — K86.89 PANCREATIC INSUFFICIENCY: ICD-10-CM

## 2019-11-25 RX ORDER — ERGOCALCIFEROL 1.25 MG/1
CAPSULE, LIQUID FILLED ORAL
Qty: 12 CAPSULE | Refills: 2 | Status: SHIPPED | OUTPATIENT
Start: 2019-11-25 | End: 2020-01-20

## 2019-11-25 NOTE — PROGRESS NOTES
Adult Cystic Fibrosis Program  Annual Psychosocial Assessment    Presenting Information:  Marleni is a 24-year-old female with cystic fibrosis, presenting in CF clinic for a regular follow up with primary CF provider, Yahaira Trinidad PA-C.  Met with Marleni for introduction to  and annual psychosocial assessment. Her mom, Anny, was also present. 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 She participated intermittently during the visit.    Of note, Anny states she is Marleni's legal guardian, however, she has not provided paperwork yet to confirm this.     Living situation:  Marleni lives with her mom, Anny, and her maternal grandpa, in [place], [place]. Marleni's grandfather owns the home, however, it will be foreclosed on in the next few months. Anny and Marleni are in the process of looking for a new place to live and plan to rent an apartment in the [facility]. They have 2 dogs and a cat. Marleni also spends every other weekend at her fathers home. Anny denies any concerns about their living situation and is confident they will find a property to rent soon.      Family Constellation:  Marleni was raised by her mother, Anny. She had intermittent contact with her father until age 12 when he became more involved. She has no siblings. Anny and Marleni currently live with her maternal grandfather, but he does not plan to move into a new rental property with them. Marleni's maternal grandmother lives in [place] with her stepfather. Her mom's siblings (sister and brother, and half siblings) live in [place].     Social Support:  Anny reports good social support for Marleni.  She gets along well with family members and draws additional support from friends, co-workers, and blu community. Marleni is also well supported by Anny's boyfriend. Anny has two good friends who have children with CF, but otherwise Marleni has no  "connections in the CF Community.    Adjustment to Illness:  Marleni was diagnosed with CF in infancy. 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 She was hospitalized for a few months after birth with various complications. She also had a cleft palat, a g-tube, and a trach for 2 years. Anny described this time as very stressful and upsetting for her. She described Marleni as \"purple\" when she was born because she couldn't breathe. After Marleni's first two years of life her health became more stable and she had minimal hospitalizations. She was hospitalized a couple times as a teenager and has also undergone a few sinus surgeries. She has not been hospitalized for the past few years.     Anny describes Marleni's current health status as \"good\".  Clinically, Marleni has mild lung disease, pancreatic insufficiency, sinus problems, GI problems, CFRD, nutritional problems, cerebral palsy, and Leoncio [place] Syndrome. SW neglected to assess frequency of vest treatments today. Marleni does not exercise. Marleni is eligible for trikafta, although is hesitant to go on it due to the frequency of lab draws and Marleni's fear of needles. The team is discussing a less frequent schedule for lab draws to accommodate Marleni.    Marleni and Anny are open about her CF diagnosis.         Advanced Care Planning:     Health Care Directive:  Marleni has not previously received Health Care Directive education.  This SW provided/reviewed education, including concept/purpose of health care directive, default health care agents and how to complete a directive. Per Anny, she is Marleni's legal guardian but there is no paperwork on file confirming this yet. Anny and Marleni's father are [employer]'s default decision makers in the absence of a directive. If Anny is Marleni's sole guardian she would be [employer]'s default decision maker. Anny is considering filling out a directive. She declined a copy today but will consider this. " "    Education:  Marleni graduated from high school in 2014. She has not had additional schooling since graduation and has no plans for education at this time.      Employment:  After highschool Marleni enrolled in a vocational rehab program through [employer]. She did not feel this was a good fit so quit the program. She is working on enrolling in another vocational rehab program currently and is hopeful to get a job at [employer].    Anny works as Marleni s PCA ~40 hours per week. 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 Anny is also considering going back to school to become a veternary tech.      Finances:  Marleni receives income from her mom's PCA wages and her ([employer]) SSI and denies any financial concerns.      Insurance:  Marleni is insured through her fathers employer ([employer]), and [place] medicaid. She denies any insurance concerns.      Mental Health/Coping:  Anny denies that Marleni has any current or past symptoms indicative of mood, anxiety, eating, learning or other mental health disorder. She also denies a family history of MH concerns.     Marleni reports she is \"fine\", and Anny reports she is usually in a good mood. She does not take medication or see a therapist for mental health. COLBY did not complete the PHQ-9 or ALEXANDR-7 due to Marleni's cognitive delays.    Marleni attends Jainism regularly and blu is an important part of her life.    Marleni has a [facility]  she is supported by through [employer].    Chemical Health:  Marleni denies use of alcohol, tobacco or other drugs.    Leisure Activities/Interests:   Marleni enjoys playing games, watching movies, going out to eat, and walking her dogs. She is very interested in Iam Yolie and is hopeful to go to one of his shows in the next year.    Intervention:  -Psychosocial Assessment  -Health Care Directive education  -Supportive " counseling/psycho-education  -Motivational interviewing    Assessment:  Marleni participated minimally throughout the interview and was playing on her ipad. Her mom, Anny, answered most of the questions and Marleni participated when able. Anny appeared to be open in her responses. She was appropriately tearful when discussing Marleni's medical complications and extended hospital stay when she was an infant. Marleni seems to be well supported by her mother, father, and grandparents. Marleni and Anny need to move out of her grandfathers home as it will be foreclosed on in the next few months. They are actively searching for apartments now and are confident they will find something. Marleni hopes to start her vocational rehab program soon and is looking forward to working at [employer].     Per Anny, she is Marleni's legal guardian but has yet to provide paperwork and SW neglected to address this today. SW will address in future visits and encourage Anny to bring in the necessary paperwork.    Plan:  Re-consult for any psychosocial needs that may arise.    Complete psychosocial assessment annually.  Continue to follow for regular clinic consult.      AILYN Potts, [facility]  Adult Cystic Fibrosis   Ph: [phone], Pager: [phone]

## 2019-12-03 DIAGNOSIS — E84.9 CF (CYSTIC FIBROSIS) (H): ICD-10-CM

## 2019-12-12 DIAGNOSIS — E84.9 CF (CYSTIC FIBROSIS) (H): ICD-10-CM

## 2019-12-13 RX ORDER — FLUTICASONE PROPIONATE AND SALMETEROL XINAFOATE 115; 21 UG/1; UG/1
AEROSOL, METERED RESPIRATORY (INHALATION)
Qty: 3 INHALER | Refills: 3 | Status: SHIPPED | OUTPATIENT
Start: 2019-12-13 | End: 2021-01-22

## 2019-12-19 DIAGNOSIS — K86.89 PANCREATIC INSUFFICIENCY: ICD-10-CM

## 2019-12-19 DIAGNOSIS — E84.9 CF (CYSTIC FIBROSIS) (H): ICD-10-CM

## 2019-12-19 RX ORDER — ERGOCALCIFEROL 1.25 MG/1
CAPSULE, LIQUID FILLED ORAL
Qty: 12 CAPSULE | Refills: 2 | OUTPATIENT
Start: 2019-12-19

## 2019-12-21 DIAGNOSIS — E84.0 CYSTIC FIBROSIS WITH PULMONARY MANIFESTATIONS (H): ICD-10-CM

## 2019-12-23 RX ORDER — AZITHROMYCIN 500 MG/1
TABLET, FILM COATED ORAL
Qty: 15 TABLET | Refills: 1 | Status: SHIPPED | OUTPATIENT
Start: 2019-12-23 | End: 2020-05-04

## 2020-01-06 DIAGNOSIS — E08.9 DIABETES MELLITUS DUE TO CYSTIC FIBROSIS (H): ICD-10-CM

## 2020-01-06 DIAGNOSIS — E84.9 DIABETES MELLITUS DUE TO CYSTIC FIBROSIS (H): ICD-10-CM

## 2020-01-08 ENCOUNTER — TELEPHONE (OUTPATIENT)
Dept: PULMONOLOGY | Facility: CLINIC | Age: 25
End: 2020-01-08

## 2020-01-08 NOTE — TELEPHONE ENCOUNTER
PA Initiation    Medication: pulmozyme  Insurance Company: [employer] - Phone [phone] Fax [phone]  Pharmacy Filling the Rx: [facility] - [place], [place] - [facility] [address]  Filling Pharmacy Phone: [phone]  Filling Pharmacy Fax: [phone]  Start Date: 1/8/2020    [facility] Authorization Team   Phone: [phone]  Fax: [phone]

## 2020-01-09 NOTE — TELEPHONE ENCOUNTER
Prior Authorization Approval    Authorization Effective Date: 12/10/2019  Authorization Expiration Date: 1/8/2021  Medication: pulmozyme  Approved Dose/Quantity: 75  Reference #:     Insurance Company: [employer] - Phone [phone] Fax [phone]  Expected CoPay: 0     CoPay Card Available:      Foundation Assistance Needed:    Which Pharmacy is filling the prescription (Not needed for infusion/clinic administered): [facility] - [place] [address] [address]  Pharmacy Notified: Yes  Patient Notified: Yes  [facility] Prior Authorization Team   Phone: [phone]  Fax: [phone]

## 2020-01-20 DIAGNOSIS — K86.89 PANCREATIC INSUFFICIENCY: ICD-10-CM

## 2020-01-20 DIAGNOSIS — E84.9 CF (CYSTIC FIBROSIS) (H): ICD-10-CM

## 2020-01-20 RX ORDER — ERGOCALCIFEROL 1.25 MG/1
CAPSULE, LIQUID FILLED ORAL
Qty: 12 CAPSULE | Refills: 2 | Status: SHIPPED | OUTPATIENT
Start: 2020-01-20 | End: 2020-06-02

## 2020-01-22 DIAGNOSIS — E84.9 CF (CYSTIC FIBROSIS) (H): ICD-10-CM

## 2020-01-22 RX ORDER — MV-MIN 51/FOLIC ACID/VIT K/UBI 100-350MCG
2 TABLET,CHEWABLE ORAL DAILY
Qty: 60 TABLET | Refills: 3 | Status: SHIPPED | OUTPATIENT
Start: 2020-01-22 | End: 2020-06-15

## 2020-01-31 ENCOUNTER — TELEPHONE (OUTPATIENT)
Dept: PULMONOLOGY | Facility: CLINIC | Age: 25
End: 2020-01-31

## 2020-01-31 NOTE — TELEPHONE ENCOUNTER
Pharmacy requesting to refill Pen needles .  This medication is not on the patient's list.    Pharmacy: [facility]  Phone: [phone]  Fax: [phone]

## 2020-02-04 ENCOUNTER — TELEPHONE (OUTPATIENT)
Dept: PULMONOLOGY | Facility: CLINIC | Age: 25
End: 2020-02-04

## 2020-02-04 DIAGNOSIS — E84.9 CF (CYSTIC FIBROSIS) (H): ICD-10-CM

## 2020-02-04 RX ORDER — TOBRAMYCIN INHALATION SOLUTION 300 MG/5ML
INHALANT RESPIRATORY (INHALATION)
Qty: 280 ML | Refills: 3 | Status: SHIPPED | OUTPATIENT
Start: 2020-02-04 | End: 2020-10-27

## 2020-02-04 NOTE — TELEPHONE ENCOUNTER
PA Initiation    Medication: TOBRAMYCIN-PENDING  Insurance Company: [employer] - Phone [phone] Fax [phone]  Pharmacy Filling the Rx: [facility] - [place], [place] - [facility] [address]  Filling Pharmacy Phone:    Filling Pharmacy Fax:    Start Date: 2/4/2020

## 2020-02-05 NOTE — TELEPHONE ENCOUNTER
Prior Authorization Approval    Authorization Effective Date: 1/6/2020  Authorization Expiration Date: 2/4/2021  Medication: TOBRAMYCIN-APPROVED  Approved Dose/Quantity: 280 PER 28 DAYS  Reference #:     Insurance Company: [employer] - Phone [phone] Fax [phone]  Expected CoPay:       CoPay Card Available: No    Foundation Assistance Needed:    Which Pharmacy is filling the prescription (Not needed for infusion/clinic administered): [facility] - [place] [address]  Pharmacy Notified: No  Patient Notified: No

## 2020-03-12 DIAGNOSIS — E84.9 CF (CYSTIC FIBROSIS) (H): ICD-10-CM

## 2020-03-13 RX ORDER — OMEPRAZOLE 40 MG/1
CAPSULE, DELAYED RELEASE ORAL
Qty: 180 CAPSULE | Refills: 1 | Status: SHIPPED | OUTPATIENT
Start: 2020-03-13 | End: 2020-10-01

## 2020-03-19 DIAGNOSIS — E08.9 DIABETES MELLITUS RELATED TO CYSTIC FIBROSIS (H): Primary | ICD-10-CM

## 2020-03-19 DIAGNOSIS — E84.8 DIABETES MELLITUS RELATED TO CYSTIC FIBROSIS (H): Primary | ICD-10-CM

## 2020-03-30 DIAGNOSIS — E84.9 CF (CYSTIC FIBROSIS) (H): ICD-10-CM

## 2020-03-31 ENCOUNTER — VIRTUAL VISIT (OUTPATIENT)
Dept: ENDOCRINOLOGY | Facility: CLINIC | Age: 25
End: 2020-03-31
Attending: INTERNAL MEDICINE
Payer: COMMERCIAL

## 2020-03-31 DIAGNOSIS — E84.9 DIABETES MELLITUS DUE TO CYSTIC FIBROSIS (H): Primary | ICD-10-CM

## 2020-03-31 DIAGNOSIS — E08.9 DIABETES MELLITUS DUE TO CYSTIC FIBROSIS (H): Primary | ICD-10-CM

## 2020-03-31 NOTE — PROGRESS NOTES
"Due to the COVID 19 pandemic this visit was converted to a telephone/virtual  visit in order to help prevent spread of infection in this high risk patient and the general population    Marleni Alamo is a 24 year old female who is being evaluated via a billable telephone visit.      The patient has been notified of following:     \"This telephone visit will be conducted via a call between you and your physician/provider. We have found that certain health care needs can be provided without the need for a physical exam.  This service lets us provide the care you need with a short phone conversation.  If a prescription is necessary we can send it directly to your pharmacy.  If lab work is needed we can place an order for that and you can then stop by our lab to have the test done at a later time.    If during the course of the call the physician/provider feels a telephone visit is not appropriate, you will not be charged for this service.\"     Patient has given verbal consent for Telephone visit?  Yes    History was taken over the phone from Marleni as well as her father was also on the speaker phone.  Most of the information was provided by patient's father.    Patient was previously been followed in the [facility] system.  She is transitioning her endocrine care to our clinic.  She reports that she was diagnosed with CF related diabetes around age 14.  She also has history of pancreatic insufficiency and cerebral palsy.    She is currently using Lantus 18 units daily in the morning.  Uses Humalog 10 units before breakfast and Humalog 1 unit per 12 g of carbs for snacks and remaining meals of the day.  Her average dose for dinner is around 5 units.  Humalog correction 1 unit for every 50 above 200  She is usually checks fingerstick blood glucose before meals.  Her father was able to read off glucose data from her meter.  Over the last 4 days majority of her readings were in range.  She had one reading of 55 and " 2 above 200.  Father reports that hypoglycemia happened after she returned from a long walk.  Patient reports that she feels dizzy and weak when her blood sugar is low.  Denies any history of severe hypoglycemia.  Reports that hypoglycemia is overall rare.  Patient is able to self administer insulin.  Parents help with her diabetes care.    Mother has history of type 1 diabetes    ALLERGIES  Augmentin; Ceftin; and Vancomycin    Objective   Reported vitals:  There were no vitals taken for this visit.     Laboratory data was reviewed in epic    Assessment/Plan:    Cystic fibrosis related diabetes: She does not have a recent A1c.  Based on recent blood glucose data overall glycemic control appears to be okay.  We discussed prevention of hypoglycemia with exercise by checking blood glucose before she goes on a walk and also taking a carb snack.  We also discussed use of new diabetes related technologies including insulin pump and continuous glucose monitor.  I think continuous glucose monitor would be a good option for her.  Both Marleni and her father were interested in learning more about CGM.  I provided them with some information to look up CGM options online.  We will have her meet with our diabetes educator in near future to go over CGM options.  We also counseled her to send blood glucose data between clinic visits for review.  We did not make any changes in insulin regimen today.    We will have her come to be seen in clinic in around 3 months.    Phone call duration:  23 minutes    DAVID Herrera    Note: Chart documentation done in part with Dragon Voice Recognition software. Although reviewed after completion, some word and grammatical errors may remain. Please consider this when interpreting information in this chart

## 2020-04-06 ENCOUNTER — TELEPHONE (OUTPATIENT)
Dept: EDUCATION SERVICES | Facility: CLINIC | Age: 25
End: 2020-04-06

## 2020-04-06 NOTE — TELEPHONE ENCOUNTER
LVM for pt to schedule 60 minute video or telephone visit with Paulette Danielle in diabetic education. If pt selects video visit (which is preferable) please confirm email address in chart.

## 2020-04-06 NOTE — TELEPHONE ENCOUNTER
----- Message from Paulette Danielle RN sent at 4/6/2020  6:52 AM CDT -----  Could you schedule this patient for a video visit on a Wed, Thursday or Friday?  Forgive me if I sent this to your earlier.   ----- Message -----  From: Anjelica Galindo MD  Sent: 3/31/2020  12:13 PM CDT  To: Paulette Danielle RN    New patient seen in clinic today.  Patient and father interested in considering CGM.  Please consider in person or tele visit in coming  months to teach about CGM options.

## 2020-04-10 ENCOUNTER — TELEPHONE (OUTPATIENT)
Dept: PULMONOLOGY | Facility: CLINIC | Age: 25
End: 2020-04-10

## 2020-04-10 NOTE — TELEPHONE ENCOUNTER
PA Initiation    Medication: Azithromycin-PENDING  Insurance Company: [employer] - Phone [phone] Fax [phone]  Pharmacy Filling the Rx:    Filling Pharmacy Phone:    Filling Pharmacy Fax:    Start Date: 4/10/2020

## 2020-04-13 NOTE — TELEPHONE ENCOUNTER
Prior Authorization Approval    Authorization Effective Date: 3/11/2020  Authorization Expiration Date: 4/11/2021  Medication: Azithromycin-APPROVED  Approved Dose/Quantity: 12 per 28 days  Reference #:     Insurance Company: [employer] - Phone [phone] Fax [phone]  Expected CoPay:       CoPay Card Available: No    Foundation Assistance Needed:    Which Pharmacy is filling the prescription (Not needed for infusion/clinic administered):    Pharmacy Notified:    Patient Notified:

## 2020-04-14 NOTE — TELEPHONE ENCOUNTER
LVMx2 for pt to schedule 60 minute video or telephone visit with Paulette Danielle in diabetic education. If pt selects video visit (which is preferable) please confirm email address in chart.

## 2020-04-27 DIAGNOSIS — E84.0 CYSTIC FIBROSIS WITH PULMONARY MANIFESTATIONS (H): ICD-10-CM

## 2020-04-29 DIAGNOSIS — E84.9 CF (CYSTIC FIBROSIS) (H): Primary | ICD-10-CM

## 2020-04-29 RX ORDER — SODIUM CHLORIDE FOR INHALATION 7 %
4 VIAL, NEBULIZER (ML) INHALATION DAILY
Qty: 360 ML | Refills: 11 | Status: SHIPPED | OUTPATIENT
Start: 2020-04-29 | End: 2023-05-25

## 2020-05-02 DIAGNOSIS — E84.0 CYSTIC FIBROSIS WITH PULMONARY MANIFESTATIONS (H): ICD-10-CM

## 2020-05-04 DIAGNOSIS — E08.9 DIABETES MELLITUS DUE TO CYSTIC FIBROSIS (H): Primary | ICD-10-CM

## 2020-05-04 DIAGNOSIS — E84.9 DIABETES MELLITUS DUE TO CYSTIC FIBROSIS (H): Primary | ICD-10-CM

## 2020-05-04 RX ORDER — AZITHROMYCIN 500 MG/1
TABLET, FILM COATED ORAL
Qty: 4 TABLET | Refills: 3 | Status: SHIPPED | OUTPATIENT
Start: 2020-05-04 | End: 2020-08-31

## 2020-05-04 RX ORDER — INSULIN LISPRO 100 [IU]/ML
INJECTION, SOLUTION INTRAVENOUS; SUBCUTANEOUS
Qty: 15 ML | Refills: 3 | Status: SHIPPED | OUTPATIENT
Start: 2020-05-04 | End: 2021-07-16

## 2020-05-19 DIAGNOSIS — E84.8 DIABETES MELLITUS RELATED TO CYSTIC FIBROSIS (H): Primary | ICD-10-CM

## 2020-05-19 DIAGNOSIS — E08.9 DIABETES MELLITUS RELATED TO CYSTIC FIBROSIS (H): Primary | ICD-10-CM

## 2020-05-19 RX ORDER — PEN NEEDLE, DIABETIC 32GX 5/32"
NEEDLE, DISPOSABLE MISCELLANEOUS
COMMUNITY
Start: 2020-03-24 | End: 2020-05-19

## 2020-05-19 RX ORDER — PEN NEEDLE, DIABETIC 32GX 5/32"
NEEDLE, DISPOSABLE MISCELLANEOUS
Qty: 200 EACH | Refills: 3 | Status: SHIPPED | OUTPATIENT
Start: 2020-05-19 | End: 2020-12-16

## 2020-06-02 DIAGNOSIS — K86.89 PANCREATIC INSUFFICIENCY: ICD-10-CM

## 2020-06-02 DIAGNOSIS — E84.9 CF (CYSTIC FIBROSIS) (H): ICD-10-CM

## 2020-06-02 RX ORDER — ERGOCALCIFEROL 1.25 MG/1
CAPSULE, LIQUID FILLED ORAL
Qty: 12 CAPSULE | Refills: 2 | Status: SHIPPED | OUTPATIENT
Start: 2020-06-02 | End: 2020-08-31

## 2020-06-02 NOTE — TELEPHONE ENCOUNTER
Mycophenolate is an a immunosuppressant which isn't something Dr. Leger prescribes for CF patients.. plus, there's nothing in her chart about her ever even being on this med so I'm guessing another provider orders it. Sorry!

## 2020-06-02 NOTE — TELEPHONE ENCOUNTER
Pharmacy requesting to refill Mycophenolate 250 Caps.  This medication is not on the patient's list.    Pharmacy: [facility]  Phone: [phone]  Fax: [phone]

## 2020-06-14 DIAGNOSIS — E84.9 CF (CYSTIC FIBROSIS) (H): ICD-10-CM

## 2020-06-15 RX ORDER — MV-MIN 51/FOLIC ACID/VIT K/UBI 100-350MCG
TABLET,CHEWABLE ORAL
Qty: 60 TABLET | Refills: 1 | Status: SHIPPED | OUTPATIENT
Start: 2020-06-15 | End: 2020-08-12

## 2020-06-29 ENCOUNTER — TELEPHONE (OUTPATIENT)
Dept: ENDOCRINOLOGY | Facility: CLINIC | Age: 25
End: 2020-06-29

## 2020-06-29 NOTE — TELEPHONE ENCOUNTER
[facility] Call Center    Phone Message    May a detailed message be left on voicemail: yes     Reason for Call: Other: Pts gaurdian called looking to inquire/request a glucose monitor patch.  Please review and follow up with any information     Action Taken: Message routed to:  [facility] Endo    Travel Screening: Not Applicable

## 2020-07-01 DIAGNOSIS — E84.9 CF (CYSTIC FIBROSIS) (H): ICD-10-CM

## 2020-07-01 DIAGNOSIS — E08.9 DIABETES MELLITUS RELATED TO CYSTIC FIBROSIS (H): Primary | ICD-10-CM

## 2020-07-01 DIAGNOSIS — E84.8 DIABETES MELLITUS RELATED TO CYSTIC FIBROSIS (H): Primary | ICD-10-CM

## 2020-07-01 NOTE — TELEPHONE ENCOUNTER
insulin glargine (LANTUS PEN) 100 UNIT/ML pen    Last Written Prescription Date:  7/18/2018  Last Fill Quantity: ?,   # refills: ?  Last Office Visit : 3/31/2020  Future Office visit:  None    Routing refill request to provider for review/approval because:    Historical Medication  Drug not on the [facility], P or [facility] refill protocol or controlled substance        Kristel Goncalves RN  Central Triage Red Flags/Med Refills

## 2020-07-01 NOTE — TELEPHONE ENCOUNTER
Long Acting Insulin Protocol [id number]/01/2020 01:37 PM   Serum creatinine on file in past 12 months Protocol Details    HgbA1C in past 3 or 6 months

## 2020-07-31 DIAGNOSIS — E84.9 CF (CYSTIC FIBROSIS) (H): ICD-10-CM

## 2020-08-03 ENCOUNTER — TELEPHONE (OUTPATIENT)
Dept: ENDOCRINOLOGY | Facility: CLINIC | Age: 25
End: 2020-08-03

## 2020-08-04 ENCOUNTER — VIRTUAL VISIT (OUTPATIENT)
Dept: EDUCATION SERVICES | Facility: CLINIC | Age: 25
End: 2020-08-04
Payer: COMMERCIAL

## 2020-08-04 DIAGNOSIS — E08.9 DIABETES MELLITUS DUE TO CYSTIC FIBROSIS (H): Primary | ICD-10-CM

## 2020-08-04 DIAGNOSIS — E84.9 DIABETES MELLITUS DUE TO CYSTIC FIBROSIS (H): Primary | ICD-10-CM

## 2020-08-04 NOTE — PROGRESS NOTES
"Marleni Alamo is a 24 year old female who is being evaluated via a billable telephone visit.      The patient has been notified of following:     \"This telephone visit will be conducted via a call between you and your physician/provider. We have found that certain health care needs can be provided without the need for a physical exam.  This service lets us provide the care you need with a short phone conversation.  If a prescription is necessary we can send it directly to your pharmacy.  If lab work is needed we can place an order for that and you can then stop by our lab to have the test done at a later time.    Telephone visits are billed at different rates depending on your insurance coverage. During this emergency period, for some insurers they may be billed the same as an in-person visit.  Please reach out to your insurance provider with any questions.    If during the course of the call the physician/provider feels a telephone visit is not appropriate, you will not be charged for this service.\"    Patient has given verbal consent for Telephone visit?  Yes    What phone number would you like to be contacted at? mobile    How would you like to obtain your AVS? Phone call    Phone call duration: 20 minutes    Paulette Danielle, RN    DIABETES EDUCATOR NOTE:    We discussed the three glucose sensors currently on the market:  Mamie Flash, DexCom, Guardian Connect.  We covered how they work, the difference between a sensor reading and a blood glucose reading, the usefulness in documenting blood sugar trends and adjusting insulin accordingly, alerts and alarms, how they are inserted and removed, calibration requirements, length of wear, the times you need to remove the sensor (for X-rays, CT and MRI scans)  and the process for ordering and starting one.     All of this was discussed with Marleni's dad, Jah.  He would like to check coverage on a DexCom.  Will speak with Givit first as she is a [facility] " patient.      Assessment/Plan:  1.) Check coverage on DexCom  2.) Order DexCom  3.) DexCom start Paulette Danielle RN,CDE      Any diabetes medication dose changes were made via the CDE Protocol and Collaborative Practice Agreement with [facility]. A copy of this encounter was provided to the patient's primary care provider.      Time spent in this visit:  20 minutes

## 2020-08-12 DIAGNOSIS — E84.9 CF (CYSTIC FIBROSIS) (H): ICD-10-CM

## 2020-08-12 RX ORDER — MV-MIN 51/FOLIC ACID/VIT K/UBI 100-350MCG
TABLET,CHEWABLE ORAL
Qty: 60 TABLET | Refills: 1 | Status: SHIPPED | OUTPATIENT
Start: 2020-08-12 | End: 2020-10-16

## 2020-08-20 DIAGNOSIS — E10.9 TYPE 1 DIABETES MELLITUS (H): Primary | ICD-10-CM

## 2020-08-20 RX ORDER — PROCHLORPERAZINE 25 MG/1
SUPPOSITORY RECTAL
Qty: 1 EACH | Refills: 3 | Status: SHIPPED | OUTPATIENT
Start: 2020-08-20 | End: 2021-10-26

## 2020-08-20 RX ORDER — PROCHLORPERAZINE 25 MG/1
SUPPOSITORY RECTAL
Qty: 1 EACH | Refills: 0 | Status: SHIPPED | OUTPATIENT
Start: 2020-08-20 | End: 2024-05-02

## 2020-08-20 RX ORDER — PROCHLORPERAZINE 25 MG/1
SUPPOSITORY RECTAL
Qty: 3 EACH | Refills: 11 | Status: SHIPPED | OUTPATIENT
Start: 2020-08-20 | End: 2021-10-09

## 2020-08-26 ENCOUNTER — VIRTUAL VISIT (OUTPATIENT)
Dept: EDUCATION SERVICES | Facility: CLINIC | Age: 25
End: 2020-08-26
Payer: COMMERCIAL

## 2020-08-26 DIAGNOSIS — E08.9 DIABETES MELLITUS DUE TO CYSTIC FIBROSIS (H): Primary | ICD-10-CM

## 2020-08-26 DIAGNOSIS — E84.9 DIABETES MELLITUS DUE TO CYSTIC FIBROSIS (H): Primary | ICD-10-CM

## 2020-08-26 NOTE — PROGRESS NOTES
"DIABETES EDUCATOR NOTE:    Subjective/Objective:  \"We can't seem to get the sensor from the pharmacy\"    Assessment/Plan:  Phone call placed to [facility] to get PA for sensor.  This was approved for 3 years.  Phone call to pharmacy to let them know to run it again.  Left message for dad.  Advised to contact us for training on sensor which can be done via video visit or in person.    Any diabetes medication dose changes were made via the CDE Protocol and Collaborative Practice Agreement with [facility]. A copy of this encounter was provided to the patient's primary care provider.      Time spent in this visit:  45 minutes    "

## 2020-08-27 ENCOUNTER — TELEPHONE (OUTPATIENT)
Dept: PULMONOLOGY | Facility: CLINIC | Age: 25
End: 2020-08-27

## 2020-08-27 NOTE — TELEPHONE ENCOUNTER
"[facility]  August 27, 2020    Clinics, [facility]    Cystic fibrosis Provider: Edi Leger MD    Caller: Father    Clinical information:  Marleni Alamo's father called with complaint of increased congestion for ~7 days. States he thinks it started out as \"hay fever\" with sneezing and runny nose but has seem to settle in her lungs.  Seems to be coughing more. Currently vesting BID. Albuterol/pulmozyme, Albuterol/HTS 7%. Hasn't been seen in clinic since November 2019    Plan:   Increase vest/nebs to TID  Scheduled appointment to be seen in clinic on Monday 8/31 with Dr. Ocampo.    Call back with any new or worsening symptoms/concerns.    Caller verbalized understanding of plan and agrees with advice given.     "

## 2020-08-29 NOTE — PROGRESS NOTES
"Reason for Visit  Marleni Alamo is a 25 year old year old female who is being seen for Follow Up (cf f/u)  CF HPI  The patient was seen and examined by Bruce Ocampo MD   Marleni Alamo is a 25 year old female with cystic fibrosis with CFRD, pancreatic insufficiency and and minimal lung disease , without a recent exacerbation.  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    We were called on 8/27 with seven days of chest congestion hence is being seen today in the clinic.    Interval history:  She comes to the clinic today with her father \"Jah\".  The major complaint is having more chest congestion of late.  There has been a little bit more cough.  Patient mostly swallows her phlegm and is difficult to find out what color the phlegm is.  She denies having any chest pains no hemoptysis no fevers chills or night sweats.  She denies any shortness of breath.  She is able to walk her dogs regularly.  She is doing vest therapy twice a day with nebs.  She has had some cough at nighttime.  Is difficult to find out how often this is occurring.  She states she is sleeping pretty well is eating pretty well. She is doing Supplements (only one can) at night.  Current Outpatient Medications   Medication     albuterol (PROVENTIL) (2.5 MG/3ML) 0.083% neb solution     amylase-lipase-protease (CREON) 08035-50687 units CPEP per EC capsule     azithromycin (ZITHROMAX) 500 MG tablet     BD TSEVIE U/F 32G X 4 MM insulin pen needle     budesonide (PULMICORT) 1 MG/2ML neb solution     Continuous Blood Gluc  (DEXCOM G6 ) JAZMINE     Continuous Blood Gluc Sensor (DEXCOM G6 SENSOR) MISC     Continuous Blood Gluc Transmit (DEXCOM G6 TRANSMITTER) MISC     fluticasone-salmeterol (ADVAIR HFA) 115-21 MCG/ACT inhaler     glucagon (GLUCAGON EMERGENCY) 1 MG kit     insulin aspart (NOVOLOG PEN) 100 UNIT/ML pen     insulin glargine (LANTUS PEN) 100 UNIT/ML pen     insulin lispro (HUMALOG KWIKPEN) 100 UNIT/ML (1 unit dial) " KWIKPEN     insulin pen needle (30G X 8 MM) 30G X 8 MM miscellaneous     multivitamin CF formula (AQUADEKS) chewable tablet     Nutritional Supplements (ENSURE ORIGINAL) LIQD     omeprazole (PRILOSEC) 40 MG DR capsule     PULMOZYME 1 MG/ML neb solution     sodium chloride inhalant 7 % NEBU neb solution     tobramycin, PF, (CHER) 300 MG/5ML neb solution     vitamin D2 (ERGOCALCIFEROL) 63098 units (1250 mcg) capsule     No current facility-administered medications for this visit.      Allergies   Allergen Reactions     Augmentin Hives     Per mother     Ceftin Hives     Per mother     Vancomycin Hives     Per mother     Past Medical History:   Diagnosis Date     Atopy      Cerebral palsy (H)     Botox injextions, managed by Dr. 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     Seizure disorder (H)     Multiple daily in infancy now infrequent (every few years)     Thrush, oral        Past Surgical History:   Procedure Laterality Date     bilat antrostomies  4/2011     Bilater knee surgery with plates and pins Bilateral 2007     CHOLECYSTECTOMY, LAPOROSCOPIC  Feb 2015     Cleft palate repair and mandibular advancement  1996     gastrostomy in infancy       Multiple PET (ear tubes)       Multiple sinus surgeries  2015     OPTICAL TRACKING SYSTEM ENDOSCOPIC SINUS SURGERY Bilateral 3/1/2019    Procedure: Stealth Assisted Bilateral Maxillary Antrostomy, Ethmoidectomy, Sphenoidotomy, Frontal Sinusotomy;  Surgeon:  Anny Carbajal MD;  Location: [facility] OR     RESECT TRACHEA WITH RECONSTRUCTION CHILD  1998    Rib for reconstruction     surgery for meconium ileus, partial bowel resection  8/1995    Details not available     tracheostomy in infancy         Social History     Socioeconomic History     Marital status: Single     Spouse name: Not on file     Number of children: Not on file     Years of education: Not on file     Highest education level: Not on file   Occupational History     Not on file   Social Needs     Financial resource strain: Not on file     Food insecurity     Worry: Not on file     Inability: Not on file     Transportation needs     Medical: Not on file     Non-medical: Not on file   Tobacco Use     Smoking status: Never Smoker     Smokeless tobacco: Never Used   Substance and Sexual Activity     Alcohol use: No     Alcohol/week: 0.0 standard drinks     Drug use: No     Sexual activity: Never   Lifestyle     Physical activity     Days per week: Not on file     Minutes per session: Not on file     Stress: Not on file   Relationships     Social connections     Talks on phone: Not on file     Gets together: Not on file     Attends Jainism service: Not on file     Active member of club or organization: Not on file     Attends meetings of clubs or organizations: Not on file     Relationship status: Not on file     Intimate partner violence     Fear of current or ex partner: Not on file     Emotionally abused: Not on file     Physically abused: Not on file     Forced sexual activity: Not on file   Other Topics Concern     Not on file   Social History Narrative    Marleni lives at home with mother in [place] with 2 dogs and a cat. 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 Marleni goes to transition school.        ROS Pulmonary  Constitutional- Negative  Eyes- Negative  Ear, nose and throat- Negative  Cardiac- Negative  Pulm- See HPI  GI- Negative.  Genitourinary- Negative  Musculoskeletal- Has both feet in orthotics and has  sore tendons too.  Neurology- Negative  Dermatology- Negative  Endocrine- Negative  Lymphatic- Negative  Psychiatry- Negative    A complete ROS was otherwise negative except as noted in the HPI.  /72   Pulse 69   Ht 1.524 m (5')   Wt 56.4 kg (124 lb 5.4 oz)   SpO2 97%   BMI 24.28 kg/m    Exam:   GENERAL APPEARANCE: Well developed, well nourished, alert, and in no apparent distress.  EYES: PERRL, EOMI  HENT: Nasal mucosa with no edema and no hyperemia. No nasal polyps.  EARS: Canals clear, TMs normal  MOUTH: Oral mucosa is moist, without any lesions, no tonsillar enlargement, no oropharyngeal exudate.  NECK: supple, no masses, no thyromegaly.  LYMPHATICS: No significant axillary, cervical, or supraclavicular nodes.  RESP: normal percussion, good air flow throughout.  No crackles. No rhonchi. No wheezes.  CV: Normal S1, S2, regular rhythm, normal rate. No murmur.  No rub. No gallop. No LE edema.   ABDOMEN:  Bowel sounds normal, soft, nontender, no HSM or masses.   MS: extremities normal. No clubbing. No cyanosis.  SKIN: no rash on limited exam  NEURO: Mentation intact, speech normal, normal strength and tone. Her legs are in Orthotics.  PSYCH: mentation appears normal. and affect normal/bright.    Results:  Recent Results (from the past 168 hour(s))   General PFT Lab (Please always keep checked)    Collection Time: 08/31/20 11:04 AM   Result Value Ref Range    FVC-Pred 3.30 L    FVC-Pre 2.77 L    FVC-%Pred-Pre 84 %    FEV1-Pre 2.49 L    FEV1-%Pred-Pre 86 %    FEV1FVC-Pred 87 %    FEV1FVC-Pre 90 %    FEFMax-Pred 6.34 L/sec    FEFMax-Pre 6.36 L/sec    FEFMax-%Pred-Pre 100 %    FEF2575-Pred 3.45 L/sec    FEF2575-Pre 3.13 L/sec    THJ2141-%Pred-Pre 90 %    ExpTime-Pre 4.58 sec    FIFMax-Pre 4.14 L/sec    FEV1FEV6-Pred 86 %    FEV1FEV6-Pre 90 %          Results as noted above.    PFT Interpretation:  Normal spirometry.  Unchanged from previous.   Similar to recent best.  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     1. CF lung disease:  Previous cultures have grown out MRSA. Has normal spirometry at baseline.   Current regimen:  - Vest BID.  - Nebs: Albuterol BID, Pulmozyme in AM  and HTS in PM.   - Advair BID.  - [place] nebs month on/off  - On chronic azithromycin     8/31/2020:  Having more sx might be related to allergies but unsure. We will empirically treat for CF exacerbation with anbx. Hold Azithromycin while on oral anbx. Accidentally was only getting four tabs of azithromycin per month.   She is not doing Pulmicort nebs daily.     2. 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 Yahaira MATA discussed Trikafta with pt last visit.  8/31/2020: Discussed pro and cons of Trikafta with Dad \"Jah\". We will start it.     3. Exocrine pancreatic insufficiency: denies symptoms of malabsorption. Weight is down slightly, BMI of 24.5. They have decreased supplements of Ensure to one can daily.  - Continue pancreatic enzymes and vitamin supplementation  8/31/2020: Advised to monitor weight regularly.     4. CFRD: last AIC of 8.1 on 2/26/18. Recently seen in the ED for insulin because she is not following with Endocrine at [facility].  - Continue Lantus and carb coverage  8/31/2020: BS well controlled per father.     5. CF related sinus disease: underwent sinus surgery March 2019 by Dr. Carbajal. Her father feels that this might be worse. No large polyps seen today in clinic.      6. GERD: no new complaints.   - Omeprazole 40 mg BID    7.  H/o seizures: Currently not on any treatment. No recent episodes.    Chronic issues:  Cerebral palsy     RTC: in 3 months with routine cultures and spirometry  Annual " studies due: February 2019 (cannot tolerate blood draws)  Vaccinations: states she has received her influenza vaccine at outside location

## 2020-08-31 ENCOUNTER — OFFICE VISIT (OUTPATIENT)
Dept: PULMONOLOGY | Facility: CLINIC | Age: 25
End: 2020-08-31
Payer: COMMERCIAL

## 2020-08-31 VITALS
WEIGHT: 124.34 LBS | HEART RATE: 69 BPM | SYSTOLIC BLOOD PRESSURE: 109 MMHG | OXYGEN SATURATION: 97 % | HEIGHT: 60 IN | BODY MASS INDEX: 24.41 KG/M2 | DIASTOLIC BLOOD PRESSURE: 72 MMHG

## 2020-08-31 DIAGNOSIS — E84.9 CF (CYSTIC FIBROSIS) (H): Primary | ICD-10-CM

## 2020-08-31 DIAGNOSIS — E84.0 CYSTIC FIBROSIS WITH PULMONARY MANIFESTATIONS (H): ICD-10-CM

## 2020-08-31 DIAGNOSIS — E08.9 DIABETES MELLITUS RELATED TO CYSTIC FIBROSIS (H): ICD-10-CM

## 2020-08-31 DIAGNOSIS — E84.9 CF (CYSTIC FIBROSIS) (H): ICD-10-CM

## 2020-08-31 DIAGNOSIS — E84.8 DIABETES MELLITUS RELATED TO CYSTIC FIBROSIS (H): ICD-10-CM

## 2020-08-31 DIAGNOSIS — K86.89 PANCREATIC INSUFFICIENCY: ICD-10-CM

## 2020-08-31 LAB
ALBUMIN SERPL-MCNC: 3.8 G/DL (ref 3.4–5)
ALP SERPL-CCNC: 217 U/L (ref 40–150)
ALT SERPL W P-5'-P-CCNC: 50 U/L (ref 0–50)
AST SERPL W P-5'-P-CCNC: 24 U/L (ref 0–45)
BILIRUB DIRECT SERPL-MCNC: 0.2 MG/DL (ref 0–0.2)
BILIRUB SERPL-MCNC: 0.8 MG/DL (ref 0.2–1.3)
CK SERPL-CCNC: 58 U/L (ref 30–225)
CREAT SERPL-MCNC: 0.51 MG/DL (ref 0.52–1.04)
EXPTIME-PRE: 4.58 SEC
FEF2575-%PRED-PRE: 90 %
FEF2575-PRE: 3.13 L/SEC
FEF2575-PRED: 3.45 L/SEC
FEFMAX-%PRED-PRE: 100 %
FEFMAX-PRE: 6.36 L/SEC
FEFMAX-PRED: 6.34 L/SEC
FEV1-%PRED-PRE: 86 %
FEV1-PRE: 2.49 L
FEV1FEV6-PRE: 90 %
FEV1FEV6-PRED: 86 %
FEV1FVC-PRE: 90 %
FEV1FVC-PRED: 87 %
FIFMAX-PRE: 4.14 L/SEC
FVC-%PRED-PRE: 84 %
FVC-PRE: 2.77 L
FVC-PRED: 3.3 L
GFR SERPL CREATININE-BSD FRML MDRD: >90 ML/MIN/{1.73_M2}
HBA1C MFR BLD: 6.9 % (ref 0–5.6)
PROT SERPL-MCNC: 8.2 G/DL (ref 6.8–8.8)

## 2020-08-31 PROCEDURE — 87077 CULTURE AEROBIC IDENTIFY: CPT | Performed by: INTERNAL MEDICINE

## 2020-08-31 PROCEDURE — 87070 CULTURE OTHR SPECIMN AEROBIC: CPT | Performed by: INTERNAL MEDICINE

## 2020-08-31 PROCEDURE — 80076 HEPATIC FUNCTION PANEL: CPT | Performed by: INTERNAL MEDICINE

## 2020-08-31 PROCEDURE — G0463 HOSPITAL OUTPT CLINIC VISIT: HCPCS | Mod: 25,ZF

## 2020-08-31 PROCEDURE — 87186 SC STD MICRODIL/AGAR DIL: CPT | Performed by: INTERNAL MEDICINE

## 2020-08-31 PROCEDURE — 82550 ASSAY OF CK (CPK): CPT | Performed by: INTERNAL MEDICINE

## 2020-08-31 PROCEDURE — 36415 COLL VENOUS BLD VENIPUNCTURE: CPT | Performed by: INTERNAL MEDICINE

## 2020-08-31 PROCEDURE — G0463 HOSPITAL OUTPT CLINIC VISIT: HCPCS

## 2020-08-31 RX ORDER — DOXYCYCLINE 100 MG/1
100 TABLET ORAL 2 TIMES DAILY
Qty: 42 TABLET | Refills: 0 | Status: SHIPPED | OUTPATIENT
Start: 2020-08-31 | End: 2020-09-21

## 2020-08-31 RX ORDER — LEVOFLOXACIN 750 MG/1
750 TABLET, FILM COATED ORAL DAILY
Qty: 21 TABLET | Refills: 0 | Status: SHIPPED | OUTPATIENT
Start: 2020-08-31 | End: 2020-09-21

## 2020-08-31 RX ORDER — LACTOSE-REDUCED FOOD
1 LIQUID (ML) ORAL DAILY PRN
Qty: 1 BOTTLE | Refills: 11 | COMMUNITY
Start: 2020-08-31

## 2020-08-31 RX ORDER — AZITHROMYCIN 500 MG/1
500 TABLET, FILM COATED ORAL
Qty: 12 TABLET | Refills: 11 | Status: SHIPPED | OUTPATIENT
Start: 2020-08-31 | End: 2021-11-15

## 2020-08-31 ASSESSMENT — MIFFLIN-ST. JEOR: SCORE: 1230.5

## 2020-08-31 NOTE — PATIENT INSTRUCTIONS
Cystic Fibrosis Self-Care Plan    RECOMMENDATIONS:   1. Please start taking levaquin 750mg daily for three weeks. Also take Doxycycline 100mg twice daily for three weeks. Please try to take Doxycycline atleast two to three hours separately from Ensure.  2. Doxycycline is associated with Sun-sensitivity, try to avoid spending time in sun. Levaquin can cause Achilles tendonitis, if these become more sore stop this antibiotic and call us.  3. We will check blood work today and start Trikafta (CF modulator). It will be associated with more cough/sputum in the first 2 - 3 days and then resolve. There may be more headaches for the first week. In addition it is associated with Abdominal pain (GAll bladder issues), Acne and Diarrhoea/Constipation. Please call us if any of these happen.            [facility] Nurse line:  Mayra Montes De Oca KJ and Allyson [phone]     [facility] Fax Number:      [phone]         Cystic Fibrosis Respiratory Therapists:   Chhaya Munoz              [phone]          Debbie Payne   [phone]  Cystic Fibrosis Dietitians:              Renetta Gilmore              [phone]                            Megan Goddard                        [phone]   Cystic Fibrosis Diabetes Nurse:    Paulette Danielle   [phone]    Cystic Fibrosis Social Workers:     Kaykay Rachel               [phone]                     Zehra Ware               [phone]  Cystic Fibrosis Pharmacists:           Kristy Miguel                               [phone]         Fay Jay   [phone]  Cystic Fibrosis Genetic Counselor:   Cami Rutledge    [phone]    [facility] website:  [email or web address]         MRN: [id number]   Clinic Date: August 31, 2020   Patient: Marleni Alamo     Annual Studies:   IGG   Date Value Ref Range Status   02/26/2018 1,320 695 - 1,620 mg/dL Final     No results found for: INS  There are no preventive  care reminders to display for this patient.    Pulmonary Function Tests  FEV1: amount of air you can blow out in 1 second  FVC: total amount of air you can take in and blow out    Your Goals:         PFT Latest Ref Rng & Units 8/31/2020   FVC L 2.77   FEV1 L 2.49   FVC% % 84   FEV1% % 86          Airway Clearance: The Most Important Way to Keep Your Lungs Healthy  Vest Settings:    Hill-Rom Frequencies: 8, 9, 10 Pressure 10 Then, Frequencies 18, 19, 20 Pressure 6      RespirTech: Quick Start with Pressure of     Do each frequency for 5 minutes; Deflate vest after each frequency & cough 3 times before beginning the next setting.    Vest and Neb Therapy should be done 2 times/day.    Good Nutrition Can Improve Lung Function and Overall Health     Take ALL of your vitamins with food     Take 1/2 of your enzymes before EVERY meal/snack and the other 1/2 mid-meal/snack    Wt Readings from Last 3 Encounters:   08/31/20 56.4 kg (124 lb 5.4 oz)   11/14/19 61.5 kg (135 lb 9.3 oz)   10/06/19 64.2 kg (141 lb 8 oz)       Body mass index is 24.28 kg/m .         National CF Foundation Recommendations for BMI in CF Adults: Women: at least 22 Men: at least 23        Controlling Blood Sugars Helps Prevent Lung Infections & Improves Nutrition  Test blood sugar:     In the morning before eating (goal is )     2 hours after a meal (goal is less than 150)     When pre-meal glucose is greater than 150 add correction     At bedtime (if less than 100 eat a snack with 15 grams of carbohydrates  Last A1C Results:   Hemoglobin A1C   Date Value Ref Range Status   02/26/2018 8.1 (H) 4.3 - 6.0 % Final         If diabetic, measure A1C every 6 months. Goal: Under 7%    Staying Healthy    Research:  If you are interested in learning about research opportunities or have questions, please contact the CF Research Team at [phone] or [email or web address].      CF Foundation:  Compass is a personalized resource service to help you with the  insurance, financial, legal and other issues you are facing.  It's free, confidential and available to anyone with CF.  Ask your  for more information or contact Compass directly at 700-BQDOOJC ([phone]) or compass@cff.org, or learn more at cff.[email or web address].

## 2020-08-31 NOTE — LETTER
"    8/31/2020         RE: Marleni Alamo  [address]   [place] [address]        Dear Colleague,    Thank you for referring your patient, Marleni Alamo, to the [facility] L[facility]G [facility]ENCE [facility]. Please see a copy of my visit note below.    Reason for Visit  Marleni Alaom is a 25 year old year old female who is being seen for Follow Up (cf f/u)  CF HPI  The patient was seen and examined by Bruce Ocampo MD   Marleni Alamo is a 25 year old female with cystic fibrosis with CFRD, pancreatic insufficiency and and minimal lung disease , without a recent exacerbation.  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    We were called on 8/27 with seven days of chest congestion hence is being seen today in the clinic.    Interval history:  She comes to the clinic today with her father \"Jah\".  The major complaint is having more chest congestion of late.  There has been a little bit more cough.  Patient mostly swallows her phlegm and is difficult to find out what color the phlegm is.  She denies having any chest pains no hemoptysis no fevers chills or night sweats.  She denies any shortness of breath.  She is able to walk her dogs regularly.  She is doing vest therapy twice a day with nebs.  She has had some cough at nighttime.  Is difficult to find out how often this is occurring.  She states she is sleeping pretty well is eating pretty well. She is doing Supplements (only one can) at night.  Current Outpatient Medications   Medication     albuterol (PROVENTIL) (2.5 MG/3ML) 0.083% neb solution     amylase-lipase-protease (CREON) 12050-11917 units CPEP per EC capsule     azithromycin (ZITHROMAX) 500 MG tablet     BD STEVIE U/F 32G X 4 MM insulin pen needle     budesonide (PULMICORT) 1 MG/2ML neb solution     Continuous Blood Gluc  (DEXCOM G6 ) JAZMINE     Continuous Blood Gluc Sensor (DEXCOM G6 SENSOR) MISC     Continuous Blood Gluc Transmit (DEXCOM G6 TRANSMITTER) MISC "     fluticasone-salmeterol (ADVAIR HFA) 115-21 MCG/ACT inhaler     glucagon (GLUCAGON EMERGENCY) 1 MG kit     insulin aspart (NOVOLOG PEN) 100 UNIT/ML pen     insulin glargine (LANTUS PEN) 100 UNIT/ML pen     insulin lispro (HUMALOG KWIKPEN) 100 UNIT/ML (1 unit dial) KWIKPEN     insulin pen needle (30G X 8 MM) 30G X 8 MM miscellaneous     multivitamin CF formula (AQUADEKS) chewable tablet     Nutritional Supplements (ENSURE ORIGINAL) LIQD     omeprazole (PRILOSEC) 40 MG DR capsule     PULMOZYME 1 MG/ML neb solution     sodium chloride inhalant 7 % NEBU neb solution     tobramycin, PF, (CHER) 300 MG/5ML neb solution     vitamin D2 (ERGOCALCIFEROL) 04085 units (1250 mcg) capsule     No current facility-administered medications for this visit.      Allergies   Allergen Reactions     Augmentin Hives     Per mother     Ceftin Hives     Per mother     Vancomycin Hives     Per mother     Past Medical History:   Diagnosis Date     Atopy      Cerebral palsy (H)     Botox injextions, managed by Dr. John Marley     CF (cystic fibrosis) (H) 7/25/2016    Sweat Test: 8/15/95 Value: 85.0 Genotype: [id number], H/O Pseudomonas and MRSA, Baseline FEV1  2.48, FVC 2.76 (7/2015)     Cholelithiasis      Chronic pansinusitis 7/25/2016     Diabetes mellitus due to cystic fibrosis (H) 7/25/2016     Diabetes mellitus related to cystic fibrosis (H) 7/25/2016     Gastroesophageal reflux disease without esophagitis 7/25/2016     MRSA (methicillin resistant staph aureus) culture positive      Pancreatic insufficiency 7/25/2016     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 7/25/2016     Seizure disorder (H)     Multiple daily in infancy now infrequent (every few years)     Thrush, oral        Past Surgical History:   Procedure Laterality Date     bilat antrostomies  4/2011     Bilater knee surgery with plates and pins Bilateral 2007     CHOLECYSTECTOMY, LAPOROSCOPIC  Feb 2015     Cleft palate repair and mandibular advancement  1996     gastrostomy in  infancy       Multiple PET (ear tubes)       Multiple sinus surgeries  2015     OPTICAL TRACKING SYSTEM ENDOSCOPIC SINUS SURGERY Bilateral 3/1/2019    Procedure: Stealth Assisted Bilateral Maxillary Antrostomy, Ethmoidectomy, Sphenoidotomy, Frontal Sinusotomy;  Surgeon: Anny Carbajal MD;  Location: [facility] OR     RESECT TRACHEA WITH RECONSTRUCTION CHILD  1998    Rib for reconstruction     surgery for meconium ileus, partial bowel resection  8/1995    Details not available     tracheostomy in infancy         Social History     Socioeconomic History     Marital status: Single     Spouse name: Not on file     Number of children: Not on file     Years of education: Not on file     Highest education level: Not on file   Occupational History     Not on file   Social Needs     Financial resource strain: Not on file     Food insecurity     Worry: Not on file     Inability: Not on file     Transportation needs     Medical: Not on file     Non-medical: Not on file   Tobacco Use     Smoking status: Never Smoker     Smokeless tobacco: Never Used   Substance and Sexual Activity     Alcohol use: No     Alcohol/week: 0.0 standard drinks     Drug use: No     Sexual activity: Never   Lifestyle     Physical activity     Days per week: Not on file     Minutes per session: Not on file     Stress: Not on file   Relationships     Social connections     Talks on phone: Not on file     Gets together: Not on file     Attends Confucianism service: Not on file     Active member of club or organization: Not on file     Attends meetings of clubs or organizations: Not on file     Relationship status: Not on file     Intimate partner violence     Fear of current or ex partner: Not on file     Emotionally abused: Not on file     Physically abused: Not on file     Forced sexual activity: Not on file   Other Topics Concern     Not on file   Social History Narrative    Marleni lives at home with mother in [place] with 2 dogs and a cat. Mom manages a  tanning salon. Marleni goes to transition school.        ROS Pulmonary  Constitutional- Negative  Eyes- Negative  Ear, nose and throat- Negative  Cardiac- Negative  Pulm- See HPI  GI- Negative.  Genitourinary- Negative  Musculoskeletal- Has both feet in orthotics and has sore tendons too.  Neurology- Negative  Dermatology- Negative  Endocrine- Negative  Lymphatic- Negative  Psychiatry- Negative    A complete ROS was otherwise negative except as noted in the HPI.  /72   Pulse 69   Ht 1.524 m (5')   Wt 56.4 kg (124 lb 5.4 oz)   SpO2 97%   BMI 24.28 kg/m    Exam:   GENERAL APPEARANCE: Well developed, well nourished, alert, and in no apparent distress.  EYES: PERRL, EOMI  HENT: Nasal mucosa with no edema and no hyperemia. No nasal polyps.  EARS: Canals clear, TMs normal  MOUTH: Oral mucosa is moist, without any lesions, no tonsillar enlargement, no oropharyngeal exudate.  NECK: supple, no masses, no thyromegaly.  LYMPHATICS: No significant axillary, cervical, or supraclavicular nodes.  RESP: normal percussion, good air flow throughout.  No crackles. No rhonchi. No wheezes.  CV: Normal S1, S2, regular rhythm, normal rate. No murmur.  No rub. No gallop. No LE edema.   ABDOMEN:  Bowel sounds normal, soft, nontender, no HSM or masses.   MS: extremities normal. No clubbing. No cyanosis.  SKIN: no rash on limited exam  NEURO: Mentation intact, speech normal, normal strength and tone. Her legs are in Orthotics.  PSYCH: mentation appears normal. and affect normal/bright.    Results:  Recent Results (from the past 168 hour(s))   General PFT Lab (Please always keep checked)    Collection Time: 08/31/20 11:04 AM   Result Value Ref Range    FVC-Pred 3.30 L    FVC-Pre 2.77 L    FVC-%Pred-Pre 84 %    FEV1-Pre 2.49 L    FEV1-%Pred-Pre 86 %    FEV1FVC-Pred 87 %    FEV1FVC-Pre 90 %    FEFMax-Pred 6.34 L/sec    FEFMax-Pre 6.36 L/sec    FEFMax-%Pred-Pre 100 %    FEF2575-Pred 3.45 L/sec    FEF2575-Pre 3.13 L/sec     "RTA6872-%Pred-Pre 90 %    ExpTime-Pre 4.58 sec    FIFMax-Pre 4.14 L/sec    FEV1FEV6-Pred 86 %    FEV1FEV6-Pre 90 %          Results as noted above.    PFT Interpretation:  Normal spirometry.  Unchanged from previous.   Similar to recent best.  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     1. CF lung disease:  Previous cultures have grown out MRSA. Has normal spirometry at baseline.   Current regimen:  - Vest BID.  - Nebs: Albuterol BID, Pulmozyme in AM  and HTS in PM.   - Advair BID.  - [place] nebs month on/off  - On chronic azithromycin     8/31/2020:  Having more sx might be related to allergies but unsure. We will empirically treat for CF exacerbation with anbx. Hold Azithromycin while on oral anbx. Accidentally was only getting four tabs of azithromycin per month.   She is not doing Pulmicort nebs daily.     2. 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 Yahaira MATA discussed Trikafta with pt last visit.  8/31/2020: Discussed pro and cons of Trikafta with Dad \"Jah\". We will start it.     3. Exocrine pancreatic insufficiency: denies symptoms of malabsorption. Weight is down slightly, BMI of 24.5. They have decreased supplements of Ensure to one can daily.  - Continue pancreatic enzymes and vitamin supplementation  8/31/2020: Advised to monitor weight regularly.     4. CFRD: last AIC of 8.1 on 2/26/18. Recently seen in the ED for insulin because she is not following with Endocrine at [facility].  - Continue Lantus and carb coverage  8/31/2020: BS well controlled per father.     5. CF related sinus disease: underwent sinus surgery March 2019 by Dr. Carbajal. Her father feels that this might " be worse. No large polyps seen today in clinic.      6. GERD: no new complaints.   - Omeprazole 40 mg BID    7.  H/o seizures: Currently not on any treatment. No recent episodes.    Chronic issues:  Cerebral palsy     RTC: in 3 months with routine cultures and spirometry  Annual studies due: February 2019 (cannot tolerate blood draws)  Vaccinations: states she has received her influenza vaccine at outside location      Again, thank you for allowing me to participate in the care of your patient.        Sincerely,        Bruce Ocampo MD

## 2020-08-31 NOTE — NURSING NOTE
Chief Complaint   Patient presents with     Follow Up     cf f/u     Vitals were taken and medications were reconciled.     DILIP Khan

## 2020-09-03 ENCOUNTER — VIRTUAL VISIT (OUTPATIENT)
Dept: EDUCATION SERVICES | Facility: CLINIC | Age: 25
End: 2020-09-03
Payer: COMMERCIAL

## 2020-09-03 DIAGNOSIS — E84.9 DIABETES MELLITUS DUE TO CYSTIC FIBROSIS (H): Primary | ICD-10-CM

## 2020-09-03 DIAGNOSIS — E08.9 DIABETES MELLITUS DUE TO CYSTIC FIBROSIS (H): Primary | ICD-10-CM

## 2020-09-04 NOTE — PROGRESS NOTES
"Marleni Alamo is a 25 year old female who is being evaluated via a billable telephone visit.      The patient has been notified of following:     \"This telephone visit will be conducted via a call between you and your physician/provider. We have found that certain health care needs can be provided without the need for a physical exam.  This service lets us provide the care you need with a short phone conversation.  If a prescription is necessary we can send it directly to your pharmacy.  If lab work is needed we can place an order for that and you can then stop by our lab to have the test done at a later time.    Telephone visits are billed at different rates depending on your insurance coverage. During this emergency period, for some insurers they may be billed the same as an in-person visit.  Please reach out to your insurance provider with any questions.    If during the course of the call the physician/provider feels a telephone visit is not appropriate, you will not be charged for this service.\"    Patient has given verbal consent for Telephone visit?  Yes    What phone number would you like to be contacted at? cell    How would you like to obtain your AVS? MyChart    Phone call duration: 30 minutes    Paulette Danielle RN    DIABETES EDUCATION NOTE:    Marleni Alamo presents today for education related to Cystic Fibrosis Related Diabetes (CFRD)    Patient is being treated with:  intensive insulin program and SMBG  She is accompanied by father, Jah    PATIENT CONCERNS RELATED TO DIABETES SELF MANAGEMENT:   Starting Dexcom    ASSESSMENT: CFRD, new to DexCom    Current Diabetes Management per Patient:  Taking diabetes medications?   yes:     Diabetes Medication(s)     Diabetic Other       glucagon (GLUCAGON EMERGENCY) 1 MG kit    Use as directed for low blood sugar    Insulin       insulin aspart (NOVOLOG PEN) 100 UNIT/ML pen    2 units per 15g carbohydrate with meals and correction scale     insulin glargine " (LANTUS PEN) 100 UNIT/ML pen    Inject 18 Units Subcutaneous every morning Labs needed. Call any [facility] lab to schedule.     insulin lispro (HUMALOG KWIKPEN) 100 UNIT/ML (1 unit dial) KWIKPEN    Inject subcutaneously 10 units before breakfast and 1 unit per 12 g of carbs for snacks and remaining meals of the day.  Her average dose for dinner is around 5 units.Max 20 units daily        Monitoring  Patient glucose self monitoring as follows: four times daily.   BG values are: in and out of range per Marleni  Patient's most recent   Lab Results   Component Value Date    A1C 6.9 08/31/2020    is meeting goal of <7.0    Hypoglycemia:   Patient is at risk of hypoglycemia? Yes                             EDUCATION and INSTRUCTION PROVIDED AT THIS VISIT:    Patient was instructed in the following skills:    Explanation of difference between blood glucose and sensor glucose.  Insertion of sensor, technique, angle, site rotation, skin prep use, frequency of change.  Programming settings:  Hi and low alerts, rate alerts, predictive alerts,  out-of-range alerts and fixed low alert of 55 mg/mL.  Patient was guided in putting settings into , i-Phone, or both.   Calibration:  Shown how to calibrate   Basic Care of transmitter and :      DexCom Settings:    High alert: 240 mg/dl  High repeat: off  Low alert: 70 mg/dl  Low repeat: 30 minutes  Rise rate alert: off  Fall rate alert: 3 mg/dl/min  Out of range: 20 minutes    Patient-stated goal written and given to Marleni Alamo.  Verbalized and demonstrated understanding of instructions.     PLAN:  See patient instructions  AVS printed and given to patient    FOLLOW-UP:    1 week  Time spent with patient at today's visit was 30 minutes.      Any diabetes medication dose changes were made via the CDE Protocol and Collaborative Practice Agreement with [facility].  A copy of this encounter was provided to patient's referring provider.

## 2020-09-05 LAB
BACTERIA SPEC CULT: ABNORMAL
Lab: ABNORMAL
SPECIMEN SOURCE: ABNORMAL

## 2020-09-10 ENCOUNTER — VIRTUAL VISIT (OUTPATIENT)
Dept: EDUCATION SERVICES | Facility: CLINIC | Age: 25
End: 2020-09-10
Payer: COMMERCIAL

## 2020-09-10 ENCOUNTER — TELEPHONE (OUTPATIENT)
Dept: EDUCATION SERVICES | Facility: CLINIC | Age: 25
End: 2020-09-10

## 2020-09-10 DIAGNOSIS — E84.9 DIABETES MELLITUS DUE TO CYSTIC FIBROSIS (H): Primary | ICD-10-CM

## 2020-09-10 DIAGNOSIS — E08.9 DIABETES MELLITUS DUE TO CYSTIC FIBROSIS (H): Primary | ICD-10-CM

## 2020-09-10 DIAGNOSIS — E84.9 CF (CYSTIC FIBROSIS) (H): Primary | ICD-10-CM

## 2020-09-10 DIAGNOSIS — E84.9 CF (CYSTIC FIBROSIS) (H): ICD-10-CM

## 2020-09-10 RX ORDER — ALBUTEROL SULFATE 0.83 MG/ML
SOLUTION RESPIRATORY (INHALATION)
Qty: 150 ML | Refills: 4 | Status: SHIPPED | OUTPATIENT
Start: 2020-09-10 | End: 2021-01-22

## 2020-09-10 NOTE — TELEPHONE ENCOUNTER
"Spoke with Jah and he confirms patient is taking her doxy BID and levaquin daily. He was concerned that the doxy was causing her night time BG to increase to the 300s and was worried Levaquin could cause a heart issue since patient is also on Advair. Discussed with Jah that inhale corticosteroids could not increase QT prolongation with Levaquin and it's safe for patient to be on both.     Regarding her elevated BG, patient had continuous glucose monitored placed a few days after starting her antibiotics. Jah states that her monitor alarms every night at around 1am with BG in the 300s and he believes this is  from her night dose of doxy. Prior to her CGM, patient did not check bedtime/overnight sugars nor has she ever had a CGM on before. Since her BG does not increase after her AM dose of doxy, it's unlikely the doxy is causing he elevated BG. Speculated that elevated BG during the middle of the night may be a chronic issue and now being detected because of the CGM. Jah still believes it's the doxy and stated, \"we've never had this problem before.\"     Plan: Dr. Galindo will weigh in and if he feels strongly that the antibiotics are causing increased BG, Dr. Ocampo is OK switching Doxy to Bactrim. Jah is comfortable with this plan.       "

## 2020-09-10 NOTE — PROGRESS NOTES
"Marleni Alamo is a 25 year old female who is being evaluated via a billable telephone visit.      The patient has been notified of following:     \"This telephone visit will be conducted via a call between you and your physician/provider. We have found that certain health care needs can be provided without the need for a physical exam.  This service lets us provide the care you need with a short phone conversation.  If a prescription is necessary we can send it directly to your pharmacy.  If lab work is needed we can place an order for that and you can then stop by our lab to have the test done at a later time.    Telephone visits are billed at different rates depending on your insurance coverage. During this emergency period, for some insurers they may be billed the same as an in-person visit.  Please reach out to your insurance provider with any questions.    If during the course of the call the physician/provider feels a telephone visit is not appropriate, you will not be charged for this service.\"    Patient has given verbal consent for Telephone visit?  yes    What phone number would you like to be contacted at? Jah's number    How would you like to obtain your AVS? MyChart    Phone call duration: 20 minutes    Paulette Danielle RN    DIABETES EDUCATION NOTE:    Marleni Alamo presents today for education related to Cystic Fibrosis Related Diabetes (CFRD)    Patient is being treated with: diet, insulin and SMBG  She is accompanied by self    PATIENT CONCERNS RELATED TO DIABETES SELF MANAGEMENT:   Her blood sugar is high overnight, dad thinks this is coming from the Doxycycline she is taking    ASSESSMENT: CFRD, new to DexCom    Current Diabetes Management per Patient:  Taking diabetes medications?   yes:     Diabetes Medication(s)     Diabetic Other       glucagon (GLUCAGON EMERGENCY) 1 MG kit    Use as directed for low blood sugar    Insulin       insulin aspart (NOVOLOG PEN) 100 UNIT/ML pen    2 units per " 15g carbohydrate with meals and correction scale     insulin glargine (LANTUS PEN) 100 UNIT/ML pen    Inject 18 Units Subcutaneous every morning Labs needed. Call any [facility] lab to schedule.     insulin lispro (HUMALOG KWIKPEN) 100 UNIT/ML (1 unit dial) KWIKPEN    Inject subcutaneously 10 units before breakfast and 1 unit per 12 g of carbs for snacks and remaining meals of the day.  Her average dose for dinner is around 5 units.Max 20 units daily        Monitoring  Patient glucose self monitoring as follows: two times daily.   BG meter: [employer] meter  BG results:         BG values are: See above  Patient's most recent   Lab Results   Component Value Date    A1C 6.9 08/31/2020    is meeting goal of <7.0    Hypoglycemia:   Patient is at risk of hypoglycemia? Yes                        EDUCATION and INSTRUCTION PROVIDED AT THIS VISIT:    Jah states that things are going pretty well on the DexCom.  He has noticed that Yonas blood sugar is spiking during the night.  He thinks this is related to the Doxycycline she is taking in the morning and evening.  Asked why it does not spike with the morning dose and apparently she takes a much larger mealtime bolus in the am so it holds it down.  Dinner tends to be lower in carb so she gets much less mealtime insulin.  They held the bedtime dose last night and it did not spike.    We got Marleni hooked tour our clinic portal.  She will need to upload into it because she is using the  and not the mobile aj.    Patient-stated goal written and given to Marleni E Cally.  Verbalized and demonstrated understanding of instructions.     PLAN:  See patient instructions  AVS printed and given to patient    FOLLOW-UP:    Time spent with patient at today's visit was 30 minutes.      Any diabetes medication dose changes were made via the CDE Protocol and Collaborative Practice Agreement with [facility].  A copy of this encounter was provided to patient's  referring provider.

## 2020-09-10 NOTE — Clinical Note
Jaskaran Galindo,  The pharmacist says she can switch the Doxy to Bactrim.  Should she do that to keep the blood sugar down?  Dad says they are going to skip the second dose of Doxy until they hear back from us.  Let me know and I can contact Jah.  Thanks!  Paulette

## 2020-09-10 NOTE — TELEPHONE ENCOUNTER
Jah left a message saying that Marleni is not taking the Doxycycline as directed.  She is taking the morning dose but the evening dose is spiking her blood sugar.  She is not taking the Levaquin either.  It is hard for her to tolerate.  Will send the CF pulmonary team. Paulette Danielle RN,CDE

## 2020-09-14 ENCOUNTER — TELEPHONE (OUTPATIENT)
Dept: PULMONOLOGY | Facility: CLINIC | Age: 25
End: 2020-09-14

## 2020-09-14 RX ORDER — SULFAMETHOXAZOLE/TRIMETHOPRIM 800-160 MG
1 TABLET ORAL 3 TIMES DAILY
Qty: 21 TABLET | Refills: 0 | Status: SHIPPED | OUTPATIENT
Start: 2020-09-14 | End: 2021-06-21

## 2020-09-14 NOTE — TELEPHONE ENCOUNTER
Jah left VM that patients BG was not elevated over the weekend while take BID Doxy. Attempted to call him back but he did not answer. Left him VM that OK to stay on Doxy.

## 2020-09-14 NOTE — TELEPHONE ENCOUNTER
Left VM for patient that Bactrim TID sent to local pharmacy, they should stop doxy. Per Ocampo, should only order 1 week of bactrim since she's been on the doxy for 2 weeks (total of 3 weeks). Asked Jah to abran back with any questions.

## 2020-09-30 DIAGNOSIS — E84.9 CF (CYSTIC FIBROSIS) (H): ICD-10-CM

## 2020-10-01 RX ORDER — OMEPRAZOLE 40 MG/1
CAPSULE, DELAYED RELEASE ORAL
Qty: 180 CAPSULE | Refills: 1 | Status: SHIPPED | OUTPATIENT
Start: 2020-10-01 | End: 2021-04-09

## 2020-10-15 DIAGNOSIS — E84.9 CF (CYSTIC FIBROSIS) (H): ICD-10-CM

## 2020-10-16 RX ORDER — MV-MIN 51/FOLIC ACID/VIT K/UBI 100-350MCG
TABLET,CHEWABLE ORAL
Qty: 60 TABLET | Refills: 1 | Status: SHIPPED | OUTPATIENT
Start: 2020-10-16 | End: 2020-12-21

## 2020-10-27 DIAGNOSIS — E84.9 CF (CYSTIC FIBROSIS) (H): ICD-10-CM

## 2020-10-27 RX ORDER — TOBRAMYCIN INHALATION SOLUTION 300 MG/5ML
INHALANT RESPIRATORY (INHALATION)
Qty: 280 ML | Refills: 3 | Status: SHIPPED | OUTPATIENT
Start: 2020-10-27 | End: 2021-05-19

## 2020-11-25 DIAGNOSIS — E84.9 CF (CYSTIC FIBROSIS) (H): ICD-10-CM

## 2020-12-02 DIAGNOSIS — E84.9 CF (CYSTIC FIBROSIS) (H): ICD-10-CM

## 2020-12-02 DIAGNOSIS — K86.89 PANCREATIC INSUFFICIENCY: ICD-10-CM

## 2020-12-02 RX ORDER — ERGOCALCIFEROL 1.25 MG/1
CAPSULE, LIQUID FILLED ORAL
Qty: 12 CAPSULE | Refills: 2 | Status: SHIPPED | OUTPATIENT
Start: 2020-12-02 | End: 2021-05-25

## 2020-12-13 DIAGNOSIS — E84.8 DIABETES MELLITUS RELATED TO CYSTIC FIBROSIS (H): ICD-10-CM

## 2020-12-13 DIAGNOSIS — E08.9 DIABETES MELLITUS RELATED TO CYSTIC FIBROSIS (H): ICD-10-CM

## 2020-12-16 RX ORDER — PEN NEEDLE, DIABETIC 32GX 5/32"
NEEDLE, DISPOSABLE MISCELLANEOUS
Qty: 200 EACH | Refills: 0 | Status: SHIPPED | OUTPATIENT
Start: 2020-12-16 | End: 2021-02-08

## 2020-12-16 NOTE — TELEPHONE ENCOUNTER
BD [facility] STEVIE PEN NEEDLE [id number]      Last Written Prescription Date:  3-19-20  Last Fill Quantity: 180,   # refills: 0  Last Office Visit : 3-31-20 ( RT 3 M)  Future Office visit:  none    Routing refill request to provider for review/approval because:  Last fill by Sarah   ? MD/service ordering/RF     Scheduling has been notified to contact the pt for appointment.

## 2020-12-19 DIAGNOSIS — E84.9 CF (CYSTIC FIBROSIS) (H): ICD-10-CM

## 2020-12-21 RX ORDER — MV-MIN 51/FOLIC ACID/VIT K/UBI 100-350MCG
TABLET,CHEWABLE ORAL
Qty: 60 TABLET | Refills: 1 | Status: SHIPPED | OUTPATIENT
Start: 2020-12-21 | End: 2021-03-02

## 2021-01-06 ENCOUNTER — TELEPHONE (OUTPATIENT)
Dept: PULMONOLOGY | Facility: CLINIC | Age: 26
End: 2021-01-06

## 2021-01-06 NOTE — TELEPHONE ENCOUNTER
PA Initiation    Medication: Pulmozyme-Pending  Insurance Company: [employer] - Phone [phone] Fax [phone]  Pharmacy Filling the Rx: [facility] - [place], [place] - [facility] [address]  Filling Pharmacy Phone:    Filling Pharmacy Fax:    Start Date: 1/6/2021

## 2021-01-08 NOTE — TELEPHONE ENCOUNTER
Prior Authorization Approval    Authorization Effective Date: 1/1/2021  Authorization Expiration Date: 1/8/2022  Medication: Pulmozyme-Approved  Approved Dose/Quantity: 60/30  Reference #: [id number]   Insurance Company: [employer] - Phone [phone] Fax [phone]  Expected CoPay:       CoPay Card Available:      Foundation Assistance Needed:    Which Pharmacy is filling the prescription (Not needed for infusion/clinic administered): [facility] - [place], [place] - [address]  Pharmacy Notified:    Patient Notified:

## 2021-01-22 DIAGNOSIS — E84.9 CF (CYSTIC FIBROSIS) (H): ICD-10-CM

## 2021-01-22 RX ORDER — ALBUTEROL SULFATE 0.83 MG/ML
SOLUTION RESPIRATORY (INHALATION)
Qty: 150 ML | Refills: 4 | Status: SHIPPED | OUTPATIENT
Start: 2021-01-22 | End: 2021-05-25

## 2021-01-22 RX ORDER — FLUTICASONE PROPIONATE AND SALMETEROL XINAFOATE 115; 21 UG/1; UG/1
AEROSOL, METERED RESPIRATORY (INHALATION)
Qty: 12 G | Refills: 2 | Status: SHIPPED | OUTPATIENT
Start: 2021-01-22 | End: 2021-07-26

## 2021-02-08 DIAGNOSIS — E08.9 DIABETES MELLITUS RELATED TO CYSTIC FIBROSIS (H): ICD-10-CM

## 2021-02-08 DIAGNOSIS — E84.8 DIABETES MELLITUS RELATED TO CYSTIC FIBROSIS (H): ICD-10-CM

## 2021-02-08 RX ORDER — PEN NEEDLE, DIABETIC 32GX 5/32"
NEEDLE, DISPOSABLE MISCELLANEOUS
Qty: 400 EACH | Refills: 0 | Status: SHIPPED | OUTPATIENT
Start: 2021-02-08 | End: 2021-07-01

## 2021-02-09 DIAGNOSIS — E84.0 CYSTIC FIBROSIS WITH PULMONARY MANIFESTATIONS (H): ICD-10-CM

## 2021-02-23 ENCOUNTER — TELEPHONE (OUTPATIENT)
Dept: PULMONOLOGY | Facility: CLINIC | Age: 26
End: 2021-02-23

## 2021-02-23 NOTE — TELEPHONE ENCOUNTER
Prior Authorization Approval    Authorization Effective Date: 1/21/2021  Authorization Expiration Date: 2/20/2022  Medication: Tobramycin  Approved Dose/Quantity: 280  Reference #: [id number]   Insurance Company: [employer] - Phone [phone] Fax [phone]  Expected CoPay:       CoPay Card Available:      Foundation Assistance Needed:    Which Pharmacy is filling the prescription (Not needed for infusion/clinic administered): [facility] - [place] [address] [address]  Pharmacy Notified: Yes  Patient Notified: Yes

## 2021-03-02 DIAGNOSIS — E84.9 CF (CYSTIC FIBROSIS) (H): ICD-10-CM

## 2021-03-02 RX ORDER — MV-MIN 51/FOLIC ACID/VIT K/UBI 100-350MCG
2 TABLET,CHEWABLE ORAL DAILY
Qty: 60 TABLET | Refills: 0 | Status: SHIPPED | OUTPATIENT
Start: 2021-03-02 | End: 2021-04-09

## 2021-03-11 ENCOUNTER — IMMUNIZATION (OUTPATIENT)
Dept: NURSING | Facility: CLINIC | Age: 26
End: 2021-03-11
Payer: COMMERCIAL

## 2021-03-11 PROCEDURE — 0001A PR COVID VAC PFIZER DIL RECON 30 MCG/0.3 ML IM: CPT

## 2021-03-11 PROCEDURE — 91300 PR COVID VAC PFIZER DIL RECON 30 MCG/0.3 ML IM: CPT

## 2021-03-14 ENCOUNTER — HEALTH MAINTENANCE LETTER (OUTPATIENT)
Age: 26
End: 2021-03-14

## 2021-03-23 DIAGNOSIS — E84.9 CF (CYSTIC FIBROSIS) (H): ICD-10-CM

## 2021-04-01 ENCOUNTER — IMMUNIZATION (OUTPATIENT)
Dept: NURSING | Facility: CLINIC | Age: 26
End: 2021-04-01
Attending: INTERNAL MEDICINE
Payer: COMMERCIAL

## 2021-04-01 PROCEDURE — 0002A PR COVID VAC PFIZER DIL RECON 30 MCG/0.3 ML IM: CPT

## 2021-04-01 PROCEDURE — 91300 PR COVID VAC PFIZER DIL RECON 30 MCG/0.3 ML IM: CPT

## 2021-04-08 DIAGNOSIS — E84.9 CF (CYSTIC FIBROSIS) (H): ICD-10-CM

## 2021-04-09 RX ORDER — OMEPRAZOLE 40 MG/1
CAPSULE, DELAYED RELEASE ORAL
Qty: 180 CAPSULE | Refills: 1 | Status: SHIPPED | OUTPATIENT
Start: 2021-04-09 | End: 2021-09-30

## 2021-04-09 RX ORDER — MV-MIN 51/FOLIC ACID/VIT K/UBI 100-350MCG
TABLET,CHEWABLE ORAL
Qty: 60 TABLET | Refills: 0 | Status: SHIPPED | OUTPATIENT
Start: 2021-04-09 | End: 2021-05-24

## 2021-05-19 DIAGNOSIS — E84.9 CF (CYSTIC FIBROSIS) (H): ICD-10-CM

## 2021-05-19 RX ORDER — TOBRAMYCIN INHALATION SOLUTION 300 MG/5ML
INHALANT RESPIRATORY (INHALATION)
Qty: 280 ML | Refills: 3 | Status: SHIPPED | OUTPATIENT
Start: 2021-05-19 | End: 2022-01-19

## 2021-05-23 DIAGNOSIS — E84.9 CF (CYSTIC FIBROSIS) (H): ICD-10-CM

## 2021-05-24 DIAGNOSIS — E84.9 CF (CYSTIC FIBROSIS) (H): ICD-10-CM

## 2021-05-24 RX ORDER — MV-MIN 51/FOLIC ACID/VIT K/UBI 100-350MCG
2 TABLET,CHEWABLE ORAL DAILY
Qty: 60 TABLET | Refills: 0 | Status: SHIPPED | OUTPATIENT
Start: 2021-05-24 | End: 2021-07-13

## 2021-05-25 DIAGNOSIS — K86.89 PANCREATIC INSUFFICIENCY: ICD-10-CM

## 2021-05-25 DIAGNOSIS — E84.9 CF (CYSTIC FIBROSIS) (H): ICD-10-CM

## 2021-05-25 RX ORDER — ALBUTEROL SULFATE 0.83 MG/ML
SOLUTION RESPIRATORY (INHALATION)
Qty: 150 ML | Refills: 4 | Status: SHIPPED | OUTPATIENT
Start: 2021-05-25 | End: 2021-09-30

## 2021-05-25 RX ORDER — ERGOCALCIFEROL 1.25 MG/1
CAPSULE, LIQUID FILLED ORAL
Qty: 12 CAPSULE | Refills: 2 | Status: SHIPPED | OUTPATIENT
Start: 2021-05-25 | End: 2021-09-15

## 2021-05-26 NOTE — ED AVS SNAPSHOT
[facility], Emergency Department    [address] [address]    Phone:  [phone]                                       Marleni Alamo   MRN: [id number]    Department:  [facility], Emergency Department   Date of Visit:  9/5/2018           Patient Information     Date Of Birth          1995        Your diagnoses for this visit were:     Cough     CF (cystic fibrosis) (H)        You were seen by Crystal Trinidad MD.        Discharge Instructions         Thank you for your patience today.  Please follow-up with your regular doctor in the next 2-3 days for further evaluation and follow-up care.  Please call in the morning to schedule an appointment.  Please continue your own medications.  Please take antibiotics as directed.  Please continue your vest therapy and breathing treatments as directed.  Please return to the ER if you develop high fever, difficulty breathing, or any worsening of your current symptoms.  It was a pleasure taking care of you today.  We hope you feel better soon.      Discharge Instructions for Cystic Fibrosis    Preventing infection    Help keep your child s lungs clear of extra mucus. Learn how to do chest physical therapy, including postural drainage and percussion on your child to help with this. Ask your child's healthcare provider for instructions.    Remind your child to wash his or her hands often, and correctly.  ? He or she should use soap and water and a lot of rubbing.  ? Make sure you have alcohol-based hand  when soap and water aren't available.  ? Teach your child to keep his or her hands away from the face. Germs often get into the nose and mouth and then into the lungs this way.    Ask your child's healthcare provider about a yearly flu shot and other vaccinations.    Avoid crowds, especially in the winter, when more people have colds and the flu.  Aiding digestion    Learn about the special dietary needs of your child. Your child may  Refill denied to the pharmacy.   If patient schedules an appointment we can provide a livier refill.     Needs BP recheck and labs   need pancreatic enzymes to help with digestion.    If prescribed, make sure your child takes pancreatic enzymes exactly as instructed.    A nutritionist or dietitian can help you and your child. Ask your child's healthcare provider for a referral.    Some children have problems growing and gaining weight. Talk with your child's healthcare provider or nutritionist about which types and amounts of foods or supplements to include in your child's diet.  Other home care    Encourage your child to exercise regularly and drink lots of fluids.    Your child should see his or her healthcare provider at least every 3 months, or as directed.    Make sure you talk with your child about the dangers of smoking. He or she should not smoke and should stay away from others who do.  Follow-up  Make all follow-up appointments as soon as possible after leaving the hospital. Contact your child's healthcare provider sooner, if you have any questions or concerns.  Ask about the [facility]. These centers specialize in the care of children and adults with cystic fibrosis. You can check the Cystic Fibrosis Foundation website: http://www.cff.org. Or call [facility] ([phone]).  When to call your child's healthcare provider  Call the healthcare provider right away if your child has any of the following:    Severe constipation    Severe diarrhea    Abdominal pain    Vomiting    Decreased appetite    More mucus than usual, or mucus that is bloody or dark in color    Difficulty taking part in daily activities    More tired than usual    Fever    Worsening shortness of breath  Since each child is different, make sure you understand when to call the healthcare provider about your child's specific symptoms.   Date Last Reviewed: 2/1/2017 2000-2017 [employer]. [address] [place] [address], [place] [address]. All rights reserved. This information is not intended as a substitute for professional medical care. Always  follow your healthcare professional's instructions.          Your next 10 appointments already scheduled     Oct 01, 2018  6:45 PM CDT   (Arrive by 6:30 PM)   Return Visit with Anny Carbajal MD   [facility] ([facility])    [address] [place] [id number] [phone]              24 Hour Appointment Hotline       To make an appointment at any [facility], call [phone] ([phone]). If you don't have a family doctor or clinic, we will help you find one. [place] clinics are conveniently located to serve the needs of you and your family.             Review of your medicines      START taking        Dose / Directions Last dose taken    sulfamethoxazole-trimethoprim 400-80 MG per tablet   Commonly known as:  BACTRIM   Dose:  1 tablet   Quantity:  14 tablet        Take 1 tablet by mouth 2 times daily for 7 days   Refills:  0          Our records show that you are taking the medicines listed below. If these are incorrect, please call your family doctor or clinic.        Dose / Directions Last dose taken    albuterol (2.5 MG/3ML) 0.083% neb solution   Quantity:  360 mL        TAKE 1 VIAL (2.5 MG) BY NEBULIZATION 4 TIMES DAILY   Refills:  6        azithromycin 500 MG tablet   Commonly known as:  ZITHROMAX   Quantity:  15 tablet        TAKE 1 TABLET (500 MG) BY MOUTH EVERY MON, WED, FRI MORNING   Refills:  2        * amylase-lipase-protease 92117-95851 units Cpep per EC capsule   Commonly known as:  CREON   Quantity:  540 capsule        TAKE 4 CAPSULES WITH MEALS AND 3 CAPSULES WITH SNACKS, FOR 3 MEALS AND 2 SNACKS PER DAY.   Refills:  3        * CREON 70634-35353 units Cpep per EC capsule   Quantity:  180 capsule   Generic drug:  amylase-lipase-protease        TAKE 4 CAPSULES WITH MEALS AND 3 CAPSULES WITH SNACKS, FOR 3 MEALS AND 2 SNACKS PER DAY.   Refills:  0        dornase alpha 1 MG/ML neb solution   Commonly known as:  PULMOZYME    Dose:  2.5 mg   Quantity:  75 mL        Inhale 2.5 mg into the lungs daily   Refills:  1        fluticasone-salmeterol 115-21 MCG/ACT Inhaler   Commonly known as:  ADVAIR-HFA   Quantity:  12 Inhaler        INHALE TWO PUFFS BY MOUTH TWICE DAILY   Refills:  9        insulin aspart 100 UNIT/ML injection   Commonly known as:  NovoLOG PEN        2 units per 15g carbohydrate with meals and correction scale   Refills:  0        insulin glargine 100 UNIT/ML injection   Commonly known as:  LANTUS   Dose:  17 Units        Inject 17 Units Subcutaneous every morning   Refills:  0        multivitamin CF formula chewable tablet   Quantity:  60 tablet        TAKE 2 TABLETS BY MOUTH DAILY   Refills:  3        omeprazole 40 MG capsule   Commonly known as:  priLOSEC   Dose:  40 mg   Quantity:  60 capsule        Take 1 capsule (40 mg) by mouth 2 times daily   Refills:  3        PULMICORT 1 MG/2ML Susp neb solution   Dose:  1 mg   Generic drug:  budesonide        Take 2 mLs (1 mg) by nebulization 2 times daily   Refills:  0        sodium chloride inhalant 7 % Nebu neb solution   Dose:  4 mL   Quantity:  4 mL        Take 4 mLs by nebulization daily UNCLEAR IF THIS IS ON THE CURRENT LIST   Refills:  0        tobramycin (PF) 300 MG/5ML neb solution   Commonly known as:  CHER   Dose:  300 mg   Quantity:  280 mL        Take 5 mLs (300 mg) by nebulization 2 times daily   Refills:  6        vitamin D 41367 UNIT capsule   Commonly known as:  ERGOCALCIFEROL   Quantity:  30 capsule        TAKE 1 CAPSULE BY MOUTH EVERY MON, WED, FRI MORNING   Refills:  0        * Notice:  This list has 2 medication(s) that are the same as other medications prescribed for you. Read the directions carefully, and ask your doctor or other care provider to review them with you.            Prescriptions were sent or printed at these locations (1 Prescription)                   Other Prescriptions                Printed at Department/Unit printer (1 of 1)          "sulfamethoxazole-trimethoprim (BACTRIM) 400-80 MG per tablet                Procedures and tests performed during your visit     CBC with platelets differential    Comprehensive metabolic panel    XR Chest 2 Views      Orders Needing Specimen Collection     Ordered          09/06/18 0018  Cystic Fibrosis Culture Aerob Bacterial - STAT, Prio: STAT, Needs to be Collected     Scheduled Task Status   09/06/18 0017 Collect Cystic Fibrosis Culture Aerob Bacterial Open   Order Class:  PCU Collect                  Pending Results     Date and Time Order Name Status Description    9/6/2018 0018 XR Chest 2 Views Preliminary             Pending Culture Results     No orders found for last 3 day(s).            Pending Results Instructions     If you had any lab results that were not finalized at the time of your Discharge, you can call the ED Lab Result RN at [phone]. You will be contacted by this team for any positive Lab results or changes in treatment. The nurses are available 7 days a week from 10A to 6:30P.  You can leave a message 24 hours per day and they will return your call.        Thank you for choosing [facility]       Thank you for choosing [facility] for your care. Our goal is always to provide you with excellent care. Hearing back from our patients is one way we can continue to improve our services. Please take a few minutes to complete the written survey that you may receive in the mail after you visit with us. Thank you!        [employer] Information     [employer] lets you send messages to your doctor, view your test results, renew your prescriptions, schedule appointments and more. To sign up, go to [email or web address] . Click on \"Log in\" on the left side of the screen, which will take you to the Welcome page. Then click on \"Sign up Now\" on the right side of the page.     You will be asked to enter the access code listed below, as well as some personal information. Please follow the directions to create your " username and password.     Your access code is: [email or web address]  Expires: 2018  9:06 AM     Your access code will  in 90 days. If you need help or a new code, please call your [facility] clinic or [phone].        Care EveryWhere ID     This is your Care EveryWhere ID. This could be used by other organizations to access your [facility] medical records  [id number]        Equal Access to Services     ZENON [facility] HospitalTHERESA : Hadii dipak red hadasho Soomaali, waaxda luqadaha, qaybta kaalmada adeegyada, vinny morales . So [facility] [phone].    ATENCIÓN: Si habla español, tiene a philip disposición servicios gratuitos de asistencia lingüística. Llame al [phone].    We comply with applicable federal civil rights laws and [place] laws. We do not discriminate on the basis of race, color, national origin, age, disability, sex, sexual orientation, or gender identity.            After Visit Summary       This is your record. Keep this with you and show to your community pharmacist(s) and doctor(s) at your next visit.

## 2021-06-16 NOTE — PROGRESS NOTES
"Reason for Visit  Marleni Alamo is a 25 year old female who is being seen for Cystic Fibrosis (follow up)  CF HPI  The patient was seen and examined by rBuce Ocampo MD   Marleni Alamo is a 25 year old female with cystic fibrosis with CFRD, pancreatic insufficiency and and minimal lung disease , without a recent exacerbation.  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    She was treated with Leva/Doxy for three weeks on 8/31/21 (Last clinic visit).    Interval history:   She comes to the clinic today with her father \"Jah\".  The major complaint is having more chest congestion and has been coughing more. Has required additional neb in the afternoon.  She mostly swallows her phlegm and is difficult to find out what color the phlegm is.  She denies having any chest pains, hemoptysis. No fevers, chills or night sweats.  She denies any shortness of breath.  She is able to walk her dogs regularly (walks 1 - 2blocks).  She is doing vest therapy twice a day with nebs.  She has minimal to no night time cough.  Is difficult to find out how often this is occurring.  She is sleeping and eating well. She is doing Supplements (only one can) at night.  Current Outpatient Medications   Medication     albuterol (PROVENTIL) (2.5 MG/3ML) 0.083% neb solution     amylase-lipase-protease (CREON) 89434-82113 units CPEP per EC capsule     azithromycin (ZITHROMAX) 250 MG tablet     BD STEVIE U/F 32G X 4 MM insulin pen needle     Continuous Blood Gluc  (DEXCOM G6 ) JAZMINE     Continuous Blood Gluc Sensor (DEXCOM G6 SENSOR) MISC     Continuous Blood Gluc Transmit (DEXCOM G6 TRANSMITTER) MISC     fluticasone-salmeterol (ADVAIR HFA) 115-21 MCG/ACT inhaler     glucagon (GLUCAGON EMERGENCY) 1 MG kit     insulin aspart (NOVOLOG PEN) 100 UNIT/ML pen     insulin glargine (LANTUS PEN) 100 UNIT/ML pen     insulin lispro (HUMALOG KWIKPEN) 100 UNIT/ML (1 unit dial) KWIKPEN     insulin pen needle (30G X 8 MM) 30G X 8 MM " miscellaneous     multivitamin CF formula (AQUADEKS) chewable tablet     Nutritional Supplements (ENSURE ORIGINAL) LIQD     omeprazole (PRILOSEC) 40 MG DR capsule     PULMOZYME 1 MG/ML neb solution     sodium chloride inhalant 7 % NEBU neb solution     tobramycin, PF, (CHER) 300 MG/5ML neb solution     vitamin D2 (ERGOCALCIFEROL) 56064 units (1250 mcg) capsule     No current facility-administered medications for this visit.      Allergies   Allergen Reactions     Augmentin Hives     Per mother     Ceftin Hives     Per mother     Vancomycin Hives     Per mother     Past Medical History:   Diagnosis Date     Atopy      Cerebral palsy (H)     Botox injextions, managed by Dr. 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     Seizure disorder (H)     Multiple daily in infancy now infrequent (every few years)     Thrush, oral        Past Surgical History:   Procedure Laterality Date     bilat antrostomies  4/2011     Bilater knee surgery with plates and pins Bilateral 2007     CHOLECYSTECTOMY, LAPOROSCOPIC  Feb 2015     Cleft palate repair and mandibular advancement  1996     gastrostomy in infancy       Multiple PET (ear tubes)       Multiple sinus surgeries  2015     OPTICAL TRACKING SYSTEM ENDOSCOPIC SINUS SURGERY Bilateral 3/1/2019    Procedure: Stealth Assisted Bilateral Maxillary Antrostomy, Ethmoidectomy, Sphenoidotomy, Frontal Sinusotomy;  Surgeon: Anny Carbajal MD;  Location: [facility] OR     RESECT TRACHEA  WITH RECONSTRUCTION CHILD  1998    Rib for reconstruction     surgery for meconium ileus, partial bowel resection  8/1995    Details not available     tracheostomy in infancy         Social History     Socioeconomic History     Marital status: Single     Spouse name: Not on file     Number of children: Not on file     Years of education: Not on file     Highest education level: Not on file   Occupational History     Not on file   Social Needs     Financial resource strain: Not on file     Food insecurity     Worry: Not on file     Inability: Not on file     Transportation needs     Medical: Not on file     Non-medical: Not on file   Tobacco Use     Smoking status: Never Smoker     Smokeless tobacco: Never Used   Substance and Sexual Activity     Alcohol use: No     Alcohol/week: 0.0 standard drinks     Drug use: No     Sexual activity: Never   Lifestyle     Physical activity     Days per week: Not on file     Minutes per session: Not on file     Stress: Not on file   Relationships     Social connections     Talks on phone: Not on file     Gets together: Not on file     Attends Tenriism service: Not on file     Active member of club or organization: Not on file     Attends meetings of clubs or organizations: Not on file     Relationship status: Not on file     Intimate partner violence     Fear of current or ex partner: Not on file     Emotionally abused: Not on file     Physically abused: Not on file     Forced sexual activity: Not on file   Other Topics Concern     Not on file   Social History Narrative    Marleni lives at home with mother in [place] with 2 dogs and a cat. 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 Marleni goes to transition school.        ROS Pulmonary  Constitutional- Negative  Eyes- Negative  Ear, nose and throat- Negative  Cardiac- Negative  Pulm- See HPI  GI- Negative.  Genitourinary- Negative  Musculoskeletal- Has both feet in orthotics and has sore tendons too.  Neurology- Negative  Dermatology-  Negative  Endocrine- Negative  Lymphatic- Negative  Psychiatry- Negative    A complete ROS was otherwise negative except as noted in the HPI.  /75   Pulse 83   Wt 56.4 kg (124 lb 5.4 oz)   SpO2 98%   BMI 24.28 kg/m    Exam:   GENERAL APPEARANCE: Well developed, well nourished, alert, and in no apparent distress.  EYES: PERRL, EOMI  HENT: Nasal mucosa with no edema and no hyperemia.  EARS: Canals clear, TMs normal  MOUTH: Oral mucosa is moist, without any lesions, no tonsillar enlargement, no oropharyngeal exudate.  NECK: supple, no masses, no thyromegaly.  LYMPHATICS: No significant axillary, cervical, or supraclavicular nodes.  RESP: normal percussion, good air flow throughout.  No crackles. No rhonchi. No wheezes.  CV: Normal S1, S2, regular rhythm, normal rate. No murmur.  No rub. No gallop. No LE edema.   ABDOMEN:  Bowel sounds normal, soft, nontender, no HSM or masses.   MS: extremities normal. No clubbing. No cyanosis.  SKIN: no rash on limited exam  NEURO: Mentation intact, speech normal, normal strength and tone. Her legs are in Orthotics.  PSYCH: mentation appears normal. and affect normal/bright.    Results:  Recent Results (from the past 168 hour(s))   General PFT Lab (Please always keep checked)    Collection Time: 06/21/21 10:59 AM   Result Value Ref Range    FVC-Pred 3.30 L    FVC-Pre 3.01 L    FVC-%Pred-Pre 91 %    FEV1-Pre 2.68 L    FEV1-%Pred-Pre 93 %    FEV1FVC-Pred 87 %    FEV1FVC-Pre 89 %    FEFMax-Pred 6.34 L/sec    FEFMax-Pre 6.84 L/sec    FEFMax-%Pred-Pre 107 %    FEF2575-Pred 3.45 L/sec    FEF2575-Pre 3.40 L/sec    LIX9921-%Pred-Pre 98 %    ExpTime-Pre 5.22 sec    FIFMax-Pre 4.82 L/sec    FEV1FEV6-Pred 86 %    FEV1FEV6-Pre 89 %          Results as noted above.    PFT Interpretation:  Normal spirometry.  Unchanged from previous.   Similar to recent best.  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     1. CF lung disease:  Previous cultures have grown out MRSA. Has normal spirometry at baseline.   Current regimen:  - Vest BID.  - Nebs: Albuterol BID, Pulmozyme in AM  and HTS in PM.   - Advair BID.  - Corbin nebs month on/off  - On chronic azithromycin   - She is not doing Pulmicort nebs daily since 2020 and it was stopped.    8/31/2020:   We empirically treated for CF exacerbation with anbx with leva/Doxy for three weeks.    6/21/21: Had not been on Azithromycin (accidentally), we will restart it. Pulm sx are slightly worse but FEV1 is good. Continue current airway clearance regimen.    CF exac: Absent       2. 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  8/31/2020: Discussed pro and cons of Trikafta with Dad \"Jah\" and started it.  6/21/2021: Has not started Trikafta. We discussed pro-cons about Trikafta. Pt is very concerned about blood draws. She has a hard time with it in part due to difficult access. She will think about it.     3. Exocrine pancreatic insufficiency: denies symptoms of malabsorption. Weight is down slightly, BMI of 24.5. They have decreased supplements of Ensure to one can daily.  - Continue pancreatic enzymes and vitamin supplementation  8/31/2020: Advised to monitor weight regularly.     4. CFRD: last AIC of 8.1 on 2/26/18. Recently seen in the ED for insulin because she is not [facility] with [facility].  - Continue Lantus and carb coverage  6/21/2021: BS well controlled per father.     5. CF related sinus disease: underwent sinus surgery March 2019 by Dr. Carbajal. Her father feels that this might be worse. No large polyps seen today in clinic.   6/21/2021: Will provide referral to ENT. She has some headaches and voice is more \"nasally\" " lately.     6. GERD: no new complaints.   - Omeprazole 40 mg BID    7.  H/o seizures: Currently not on any treatment. No recent episodes.    Chronic issues:  Cerebral palsy     RTC: in 3 months with lung tx annuals.  Annual studies due: February 2019 (cannot tolerate blood draws)  Vaccinations: states she has received her influenza vaccine at outside location. Has received COVID vaccination.

## 2021-06-21 ENCOUNTER — OFFICE VISIT (OUTPATIENT)
Dept: PULMONOLOGY | Facility: CLINIC | Age: 26
End: 2021-06-21
Attending: INTERNAL MEDICINE
Payer: COMMERCIAL

## 2021-06-21 ENCOUNTER — ALLIED HEALTH/NURSE VISIT (OUTPATIENT)
Dept: CARE COORDINATION | Facility: CLINIC | Age: 26
End: 2021-06-21

## 2021-06-21 VITALS
OXYGEN SATURATION: 98 % | BODY MASS INDEX: 24.28 KG/M2 | SYSTOLIC BLOOD PRESSURE: 115 MMHG | DIASTOLIC BLOOD PRESSURE: 75 MMHG | WEIGHT: 124.34 LBS | HEART RATE: 83 BPM

## 2021-06-21 DIAGNOSIS — Z71.9 ENCOUNTER FOR COUNSELING: Primary | ICD-10-CM

## 2021-06-21 DIAGNOSIS — E84.9 CF (CYSTIC FIBROSIS) (H): Primary | ICD-10-CM

## 2021-06-21 DIAGNOSIS — E08.9 DIABETES MELLITUS DUE TO CYSTIC FIBROSIS (H): ICD-10-CM

## 2021-06-21 DIAGNOSIS — K21.9 GASTROESOPHAGEAL REFLUX DISEASE WITHOUT ESOPHAGITIS: ICD-10-CM

## 2021-06-21 DIAGNOSIS — E84.9 DIABETES MELLITUS DUE TO CYSTIC FIBROSIS (H): ICD-10-CM

## 2021-06-21 DIAGNOSIS — K86.89 PANCREATIC INSUFFICIENCY: ICD-10-CM

## 2021-06-21 LAB
EXPTIME-PRE: 5.22 SEC
FEF2575-%PRED-PRE: 98 %
FEF2575-PRE: 3.4 L/SEC
FEF2575-PRED: 3.45 L/SEC
FEFMAX-%PRED-PRE: 107 %
FEFMAX-PRE: 6.84 L/SEC
FEFMAX-PRED: 6.34 L/SEC
FEV1-%PRED-PRE: 93 %
FEV1-PRE: 2.68 L
FEV1FEV6-PRE: 89 %
FEV1FEV6-PRED: 86 %
FEV1FVC-PRE: 89 %
FEV1FVC-PRED: 87 %
FIFMAX-PRE: 4.82 L/SEC
FVC-%PRED-PRE: 91 %
FVC-PRE: 3.01 L
FVC-PRED: 3.3 L

## 2021-06-21 PROCEDURE — 87077 CULTURE AEROBIC IDENTIFY: CPT | Performed by: INTERNAL MEDICINE

## 2021-06-21 PROCEDURE — 97802 MEDICAL NUTRITION INDIV IN: CPT | Performed by: DIETITIAN, REGISTERED

## 2021-06-21 PROCEDURE — G0463 HOSPITAL OUTPT CLINIC VISIT: HCPCS | Mod: 25

## 2021-06-21 PROCEDURE — 87186 SC STD MICRODIL/AGAR DIL: CPT | Performed by: INTERNAL MEDICINE

## 2021-06-21 PROCEDURE — 94375 RESPIRATORY FLOW VOLUME LOOP: CPT | Performed by: INTERNAL MEDICINE

## 2021-06-21 PROCEDURE — 87070 CULTURE OTHR SPECIMN AEROBIC: CPT | Performed by: INTERNAL MEDICINE

## 2021-06-21 RX ORDER — AZITHROMYCIN 250 MG/1
250 TABLET, FILM COATED ORAL DAILY
Qty: 30 TABLET | Refills: 11 | Status: SHIPPED | OUTPATIENT
Start: 2021-06-21 | End: 2022-06-29

## 2021-06-21 NOTE — NURSING NOTE
Chief Complaint   Patient presents with     Cystic Fibrosis     follow up     Vitals were taken and medications were reconciled.     DILIP Khan

## 2021-06-21 NOTE — PROGRESS NOTES
"Adult Cystic Fibrosis Program  Social Work Clinic Consult    Data:   Met with Marleni to review psychosocial needs and provide counseling as needed. Her father and guardian, Jah, was also present.    Marleni's mother, Anny, passed away last spring. Marleni lived with Anny for most of her life and Anny was also her guardian. Marleni now lives with her dad full time. Marleni states she and her dad are doing \"okay\" regarding her mom's death and are just trying to get through it. Due to COVID, Marleni has not had as much access to [facility] services but her father, Jah (who is also now her legal guardian), are working on getting her waiver back up and running. This will help with getting Marleni PCA hours as well as help her find employment which she is looking forward to.     SW requested Jah send guardianship paperwork which he was agreeable to. Also updated emergency contacts as they were outdated.     Provided contact info and brief overview of CF SW role. Jah denied needing anything from  at this time but was appreciative of the visit. Marleni does have a [facility] SW as well. Jah is aware of how to get a hold of  if needs arise.    Intervention:  -Introduction to SW and SW role  -Counseling: facilitating processing of feelings and thoughts;  grief and loss counseling    Assessment: Marleni and her dad were pleasant and receptive to SW visit. They seem to be coping well with the death of Marleni's mother. They plan to get her waiver approved again so Marleni can get services. They denied needing anything from  at this time.    Plan:   -Continue to follow through regular clinic consult.  -Continue to follow for any psychosocial needs that may arise.  -Complete full psychosocial assessment annually.     AILYN Potts, [facility]  Adult Cystic Fibrosis   Ph: [phone], Pager: [phone]        "

## 2021-06-21 NOTE — LETTER
"    6/21/2021         RE: Marleni Alamo  [address] [place] [address]        Dear Colleague,    Thank you for referring your patient, Marleni Alamo, to the [facility] [place]. Please see a copy of my visit note below.    Reason for Visit  Marleni Alamo is a 25 year old female who is being seen for Cystic Fibrosis (follow up)  CF HPI  The patient was seen and examined by Bruce Ocampo MD   Marleni Alamo is a 25 year old female with cystic fibrosis with CFRD, pancreatic insufficiency and and minimal lung disease , without a recent exacerbation.  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    She was treated with Leva/Doxy for three weeks on 8/31/21 (Last clinic visit).    Interval history:   She comes to the clinic today with her father \"Jah\".  The major complaint is having more chest congestion and has been coughing more. Has required additional neb in the afternoon.  She mostly swallows her phlegm and is difficult to find out what color the phlegm is.  She denies having any chest pains, hemoptysis. No fevers, chills or night sweats.  She denies any shortness of breath.  She is able to walk her dogs regularly (walks 1 - 2blocks).  She is doing vest therapy twice a day with nebs.  She has minimal to no night time cough.  Is difficult to find out how often this is occurring.  She is sleeping and eating well. She is doing Supplements (only one can) at night.  Current Outpatient Medications   Medication     albuterol (PROVENTIL) (2.5 MG/3ML) 0.083% neb solution     amylase-lipase-protease (CREON) 86239-17423 units CPEP per EC capsule     azithromycin (ZITHROMAX) 250 MG tablet     BD STEVIE U/F 32G X 4 MM insulin pen needle     Continuous Blood Gluc  (DEXCOM G6 ) JAZMINE     Continuous Blood Gluc Sensor (DEXCOM G6 SENSOR) MISC     Continuous Blood Gluc Transmit (DEXCOM G6 TRANSMITTER) MISC     fluticasone-salmeterol (ADVAIR HFA) " 115-21 MCG/ACT inhaler     glucagon (GLUCAGON EMERGENCY) 1 MG kit     insulin aspart (NOVOLOG PEN) 100 UNIT/ML pen     insulin glargine (LANTUS PEN) 100 UNIT/ML pen     insulin lispro (HUMALOG KWIKPEN) 100 UNIT/ML (1 unit dial) KWIKPEN     insulin pen needle (30G X 8 MM) 30G X 8 MM miscellaneous     multivitamin CF formula (AQUADEKS) chewable tablet     Nutritional Supplements (ENSURE ORIGINAL) LIQD     omeprazole (PRILOSEC) 40 MG DR capsule     PULMOZYME 1 MG/ML neb solution     sodium chloride inhalant 7 % NEBU neb solution     tobramycin, PF, (CHER) 300 MG/5ML neb solution     vitamin D2 (ERGOCALCIFEROL) 06389 units (1250 mcg) capsule     No current facility-administered medications for this visit.      Allergies   Allergen Reactions     Augmentin Hives     Per mother     Ceftin Hives     Per mother     Vancomycin Hives     Per mother     Past Medical History:   Diagnosis Date     Atopy      Cerebral palsy (H)     Botox injextions, managed by Dr. 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     Seizure disorder (H)     Multiple daily in infancy now infrequent (every few years)     Thrush, oral        Past Surgical History:   Procedure Laterality Date     bilat antrostomies  4/2011     Bilater knee surgery with plates and pins Bilateral 2007     CHOLECYSTECTOMY, LAPOROSCOPIC  Feb 2015     Cleft palate repair and mandibular advancement  1996     gastrostomy in infancy       Multiple PET (ear tubes)        Multiple sinus surgeries  2015     OPTICAL TRACKING SYSTEM ENDOSCOPIC SINUS SURGERY Bilateral 3/1/2019    Procedure: Stealth Assisted Bilateral Maxillary Antrostomy, Ethmoidectomy, Sphenoidotomy, Frontal Sinusotomy;  Surgeon: Anny Crabajal MD;  Location: [facility] OR     RESECT TRACHEA WITH RECONSTRUCTION CHILD  1998    Rib for reconstruction     surgery for meconium ileus, partial bowel resection  8/1995    Details not available     tracheostomy in infancy         Social History     Socioeconomic History     Marital status: Single     Spouse name: Not on file     Number of children: Not on file     Years of education: Not on file     Highest education level: Not on file   Occupational History     Not on file   Social Needs     Financial resource strain: Not on file     Food insecurity     Worry: Not on file     Inability: Not on file     Transportation needs     Medical: Not on file     Non-medical: Not on file   Tobacco Use     Smoking status: Never Smoker     Smokeless tobacco: Never Used   Substance and Sexual Activity     Alcohol use: No     Alcohol/week: 0.0 standard drinks     Drug use: No     Sexual activity: Never   Lifestyle     Physical activity     Days per week: Not on file     Minutes per session: Not on file     Stress: Not on file   Relationships     Social connections     Talks on phone: Not on file     Gets together: Not on file     Attends [employer] service: Not on file     Active member of club or organization: Not on file     Attends meetings of clubs or organizations: Not on file     Relationship status: Not on file     Intimate partner violence     Fear of current or ex partner: Not on file     Emotionally abused: Not on file     Physically abused: Not on file     Forced sexual activity: Not on file   Other Topics Concern     Not on file   Social History Narrative    Marleni lives at home with mother in [place] with 2 dogs and a cat. 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 Marleni goes to transition  school.        ROS Pulmonary  Constitutional- Negative  Eyes- Negative  Ear, nose and throat- Negative  Cardiac- Negative  Pulm- See HPI  GI- Negative.  Genitourinary- Negative  Musculoskeletal- Has both feet in orthotics and has sore tendons too.  Neurology- Negative  Dermatology- Negative  Endocrine- Negative  Lymphatic- Negative  Psychiatry- Negative    A complete ROS was otherwise negative except as noted in the HPI.  /75   Pulse 83   Wt 56.4 kg (124 lb 5.4 oz)   SpO2 98%   BMI 24.28 kg/m    Exam:   GENERAL APPEARANCE: Well developed, well nourished, alert, and in no apparent distress.  EYES: PERRL, EOMI  HENT: Nasal mucosa with no edema and no hyperemia.  EARS: Canals clear, TMs normal  MOUTH: Oral mucosa is moist, without any lesions, no tonsillar enlargement, no oropharyngeal exudate.  NECK: supple, no masses, no thyromegaly.  LYMPHATICS: No significant axillary, cervical, or supraclavicular nodes.  RESP: normal percussion, good air flow throughout.  No crackles. No rhonchi. No wheezes.  CV: Normal S1, S2, regular rhythm, normal rate. No murmur.  No rub. No gallop. No LE edema.   ABDOMEN:  Bowel sounds normal, soft, nontender, no HSM or masses.   MS: extremities normal. No clubbing. No cyanosis.  SKIN: no rash on limited exam  NEURO: Mentation intact, speech normal, normal strength and tone. Her legs are in Orthotics.  PSYCH: mentation appears normal. and affect normal/bright.    Results:  Recent Results (from the past 168 hour(s))   General PFT Lab (Please always keep checked)    Collection Time: 06/21/21 10:59 AM   Result Value Ref Range    FVC-Pred 3.30 L    FVC-Pre 3.01 L    FVC-%Pred-Pre 91 %    FEV1-Pre 2.68 L    FEV1-%Pred-Pre 93 %    FEV1FVC-Pred 87 %    FEV1FVC-Pre 89 %    FEFMax-Pred 6.34 L/sec    FEFMax-Pre 6.84 L/sec    FEFMax-%Pred-Pre 107 %    FEF2575-Pred 3.45 L/sec    FEF2575-Pre 3.40 L/sec    CRQ0398-%Pred-Pre 98 %    ExpTime-Pre 5.22 sec    FIFMax-Pre 4.82 L/sec    FEV1FEV6-Pred 86  "%    FEV1FEV6-Pre 89 %          Results as noted above.    PFT Interpretation:  Normal spirometry.  Unchanged from previous.   Similar to recent best.  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     1. CF lung disease:  Previous cultures have grown out MRSA. Has normal spirometry at baseline.   Current regimen:  - Vest BID.  - Nebs: Albuterol BID, Pulmozyme in AM  and HTS in PM.   - Advair BID.  - Corbin nebs month on/off  - On chronic azithromycin   - She is not doing Pulmicort nebs daily since 2020 and it was stopped.    8/31/2020:   We empirically treated for CF exacerbation with anbx with leva/Doxy for three weeks.    6/21/21: Had not been on Azithromycin (accidentally), we will restart it. Pulm sx are slightly worse but FEV1 is good. Continue current airway clearance regimen.    CF exac: Absent       2. 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  8/31/2020: Discussed pro and cons of Trikafta with Dad \"Jah\" and started it.  6/21/2021: Has not started Trikafta. We discussed pro-cons about Trikafta. Pt is very concerned about blood draws. She has a hard time with it in part due to difficult access. She will think about it.     3. Exocrine pancreatic insufficiency: denies symptoms of malabsorption. Weight is down slightly, BMI of 24.5. They have decreased supplements of Ensure to one can daily.  - Continue pancreatic enzymes and vitamin supplementation  8/31/2020: Advised to monitor weight regularly.     4. CFRD: last AIC of 8.1 on 2/26/18. Recently seen in the ED for insulin because she is not following with Endocrine at [facility].  - Continue Lantus and carb coverage  6/21/2021: BS well " "controlled per father.     5. CF related sinus disease: underwent sinus surgery 2019 by Dr. Carbajal. Her father feels that this might be worse. No large polyps seen today in clinic.   2021: Will provide referral to ENT. She has some headaches and voice is more \"nasally\" lately.     6. GERD: no new complaints.   - Omeprazole 40 mg BID    7.  H/o seizures: Currently not on any treatment. No recent episodes.    Chronic issues:  Cerebral palsy     RTC: in 3 months with lung tx annuals.  Annual studies due: 2019 (cannot tolerate blood draws)  Vaccinations: states she has received her influenza vaccine at outside location. Has received COVID vaccination.             CF Annual Nutrition Assessment     Reason for Assessment  Assessed during Dr. Ocampo's CF clinic r/t increased nutrition risk with diagnosis of CF per protocol     Nutrition Significant PMH  Pancreatic Insufficient   CFRD      -Dexcom sensor  GERD  Cerebral palsy - managed at [facility]  Leoncio [place] syndrome      Anthropometric Assessment  Height: 60 inches (152.4 cm)   IBW based on BMI 22 for females and 23 for males per CF Foundation recs: 51 kg   Today's Weight: 56.4 kg (actual weight)  %IBW: 111%    Body mass index is 24.28 kg/m .  Comments: Since coming to live with her dad she has been able to lose a little weight through healthy dietary changes.  She and dad are happy with her current weight/eating pattern.    Wt Readings from Last 5 Encounters:   21 56.4 kg (124 lb 5.4 oz)   20 56.4 kg (124 lb 5.4 oz)   19 61.5 kg (135 lb 9.3 oz)   10/06/19 64.2 kg (141 lb 8 oz)   19 61.7 kg (136 lb 0.4 oz)        Pancreatic Enzymes  Brand:  Creon [id number]  Dosin with meals, 3 with snacks (~18 per day)     High dose providing 1702 units lipase/kg/meal which is within the recommended range per CF Foundation to inhibit fibrosing colonopathy.     Enzyme Program:  None, does not qualify      Diet History and " Assessment  Diet Preferences/Allergies/Intolerances: Regular    Intake Recall/Comments:  Marleni reports that she maintains a good appetite. She is eating 2-3 meals daily as follows:   B - Waffles/cereal   L - Typically a sandwich, leftovers  D - Hot meal, pasta/meat and sides, cooked at home  HS - Ensure      Calcium: Adequate. Drinking 2% milk and 1 Ensure per day  Salt: Table salt, added to foods  Supplements:  Ensure     Laboratory Assessment  Last annuals >3 years old, will repeat annuals with next appt in 3 months. Of note, pt has a fear of blood draws in lab and has a lot of anxiety with talk of necessary blood draws (when this topic came up during this visit pt had to leave the room).     Current Vitamin/Mineral Supplements: AquADEK (chewable) 2 per day    Comments: Marleni states she is taking vitamins as prescribed.     CF Related Diabetes Evaluation  Hgb A1C: 6.9   Date: August 2020  On insulin, using a sensor. Has resumed care with Dr. Galindo.  Despite having needle fear/disliking lab draws Dad says that she does ok with finger sticks.       NUTRITION DIAGNOSIS  Impaired nutrient utilization related CFRD; EPI; AEB requires insulin, PERT and high calorie/protein diet for hypermetabolism with CF.      INTERVENTIONS/RECOMMENDATIONS    1) Oral Intake/Weight: No planned changes to diet, maintain 1 Ensure/day for now instead of 3 as this has helped with weight management.       2) Enzymes: Working well, no planned changes. Reviewed enzyme dosing and storage guidelines with Adalid due to his verbalization of not hearing this information in the past.     3) Vitamin/Mineral Supplementation: Discussed with Adalid and Marleni that her current CF multivitamin (AquADEK) is in the process of being discontinued.  Reviewed the options for transition to a new multivitamin and pt would like to do the MVW chewable.  Also discussed that the family may qualify for [employer] Supplements latoya, Adalid is very interested in  this program and plans to apply at home after our visit.  Asked him to contact me/pharmacy staff with Marleni's [employer] account information once he is approved and we can place orders for both MVW Complete and Ensure to be billed directly to [employer] via [employer].  Will await to hear from him.     4) Diabetes:  Maintain current plan as prescribed by Dr. Galindo, referred Dad to the endocrine team for any questions/concerns with current regimen.  Discussed types of carbs and impact on blood sugar, also how to treat low blood sugars.     Patient Understanding: Good  Expected Compliance: Good  Follow-Up Plans: Per Protocol       GOALS:  1. Maintain BMI >/= 22 kg/m2.   2. Adherence to nutrition related medications (enzymes, vitamins, insulin.)      FOLLOW-UP/MONITORING:  Visit patient within 12 month(s)     Time Spent In Face-to-Face Patient Interactions: 15 minutes     Megan Goddard MS, RD, LD (pager [phone])  Cystic Fibrosis/Lung Transplant Dietitian      -Available Mon-Thurs 8 AM-4:30 PM. On Fridays please contact pager [phone] (Renetta Gilmore RD) and on weekends/holidays contact coverage dietitian at pager [phone] (inpatient use only).           Respiratory Therapist Note:         Vest                Brand: Hill-Rom - traditional Hill Rom: Frequencies 8, 9, 10 at pressure 10 then frequencies 18, 19, 20 at pressure 6.                Cough Pause: Cough Pause; Yes                Vest Garment Size: Adult Small                Last Fitting Date: 2021                Frequency of therapy: 14 times per week                Concerns: none         Exercise (purposeful and aerobic for >20 minutes each session): NO.                Does this qualify as additional airway clearance: No         Alternative Airway Clearance:               Nebulized Medications                Bronchodilators: Albuterol                Mucolytic: Pulmozyme                Antibiotics: CHER                Additional Inhaled Medications:                  Spacer Use:          Review Cleaning: Yes. Top rack of .         Education and Transition Information                Correct order of inhaled medications: Yes                Mechanism of Action of inhaled medications: Yes                Frequency of inhaled medications: Yes                Dosage of inhaled medications: Yes                Other:          Home Care:                Nebulizer Cups (Brand/Type): Zhanna                Nebulizer Compressor                            Year Purchased:                             [facility], Phone: [phone], Fax: [phone]                Nebulizer Supply Company:                            [facility], Phone: [phone], Fax: [phone]         Oxygen:                                     Pulmonary Rehab                Site:                 Date Completed:          Plan of Care and Goals for next visit: Keep up the good work      Again, thank you for allowing me to participate in the care of your patient.        Sincerely,        Bruce Ocampo MD

## 2021-06-22 NOTE — PROGRESS NOTES
CF Annual Nutrition Assessment     Reason for Assessment  Assessed during Dr. Ocampo's CF clinic r/t increased nutrition risk with diagnosis of CF per protocol     Nutrition Significant PMH  Pancreatic Insufficient   CFRD      -Dexcom sensor  GERD  Cerebral palsy - managed at [facility]  Leoncio [place] syndrome      Anthropometric Assessment  Height: 60 inches (152.4 cm)   IBW based on BMI 22 for females and 23 for males per CF Foundation recs: 51 kg   Today's Weight: 56.4 kg (actual weight)  %IBW: 111%    Body mass index is 24.28 kg/m .  Comments: Since coming to live with her dad she has been able to lose a little weight through healthy dietary changes.  She and dad are happy with her current weight/eating pattern.    Wt Readings from Last 5 Encounters:   21 56.4 kg (124 lb 5.4 oz)   20 56.4 kg (124 lb 5.4 oz)   19 61.5 kg (135 lb 9.3 oz)   10/06/19 64.2 kg (141 lb 8 oz)   19 61.7 kg (136 lb 0.4 oz)        Pancreatic Enzymes  Brand:  Creon [id number]  Dosin with meals, 3 with snacks (~18 per day)     High dose providing 1702 units lipase/kg/meal which is within the recommended range per CF Foundation to inhibit fibrosing colonopathy.     Enzyme Program:  None, does not qualify      Diet History and Assessment  Diet Preferences/Allergies/Intolerances: Regular    Intake Recall/Comments:  Marleni reports that she maintains a good appetite. She is eating 2-3 meals daily as follows:   B - Waffles/cereal   L - Typically a sandwich, leftovers  D - Hot meal, pasta/meat and sides, cooked at home  HS - Ensure      Calcium: Adequate. Drinking 2% milk and 1 Ensure per day  Salt: Table salt, added to foods  Supplements:  Ensure     Laboratory Assessment  Last annuals >3 years old, will repeat annuals with next appt in 3 months. Of note, pt has a fear of blood draws in lab and has a lot of anxiety with talk of necessary blood draws (when this topic came up during this visit pt had to leave the room).      Current Vitamin/Mineral Supplements: AquADEK (chewable) 2 per day    Comments: Marleni states she is taking vitamins as prescribed.     CF Related Diabetes Evaluation  Hgb A1C: 6.9   Date: August 2020  On insulin, using a sensor. Has resumed care with Dr. Galindo.  Despite having needle fear/disliking lab draws Dad says that she does ok with finger sticks.       NUTRITION DIAGNOSIS  Impaired nutrient utilization related CFRD; EPI; AEB requires insulin, PERT and high calorie/protein diet for hypermetabolism with CF.      INTERVENTIONS/RECOMMENDATIONS    1) Oral Intake/Weight: No planned changes to diet, maintain 1 Ensure/day for now instead of 3 as this has helped with weight management.       2) Enzymes: Working well, no planned changes. Reviewed enzyme dosing and storage guidelines with Dad due to his verbalization of not hearing this information in the past.     3) Vitamin/Mineral Supplementation: Discussed with Dad and Malreni that her current CF multivitamin (AquADEK) is in the process of being discontinued.  Reviewed the options for transition to a new multivitamin and pt would like to do the MVW chewable.  Also discussed that the family may qualify for [employer]amins & Supplements latoya, Adalid is very interested in this program and plans to apply at home after our visit.  Asked him to contact me/pharmacy staff with Marleni's [employer] account information once he is approved and we can place orders for both MVW Complete and Ensure to be billed directly to [employer] via [employer].  Will await to hear from him.     4) Diabetes:  Maintain current plan as prescribed by Dr. Galindo, referred Dad to the endocrine team for any questions/concerns with current regimen.  Discussed types of carbs and impact on blood sugar, also how to treat low blood sugars.     Patient Understanding: Good  Expected Compliance: Good  Follow-Up Plans: Per Protocol       GOALS:  1. Maintain BMI >/= 22 kg/m2.   2. Adherence to nutrition  related medications (enzymes, vitamins, insulin.)      FOLLOW-UP/MONITORING:  Visit patient within 12 month(s)     Time Spent In Face-to-Face Patient Interactions: 15 minutes     Megan Goddard MS, RD, LD (pager [phone])  Cystic Fibrosis/Lung Transplant Dietitian      -Available Mon-Thurs 8 AM-4:30 PM. On Fridays please contact pager [phone] (Renetta Gilmore RD) and on weekends/holidays contact coverage dietitian at pager [phone] (inpatient use only).

## 2021-06-23 ENCOUNTER — DOCUMENTATION ONLY (OUTPATIENT)
Dept: OTHER | Facility: CLINIC | Age: 26
End: 2021-06-23

## 2021-06-24 DIAGNOSIS — E84.8 DIABETES MELLITUS RELATED TO CYSTIC FIBROSIS (H): ICD-10-CM

## 2021-06-24 DIAGNOSIS — E08.9 DIABETES MELLITUS RELATED TO CYSTIC FIBROSIS (H): ICD-10-CM

## 2021-06-24 DIAGNOSIS — E84.9 CF (CYSTIC FIBROSIS) (H): ICD-10-CM

## 2021-06-24 NOTE — PROGRESS NOTES
Respiratory Therapist Note:         Vest                Brand: Hill-Rom - traditional Hill Rom: Frequencies 8, 9, 10 at pressure 10 then frequencies 18, 19, 20 at pressure 6.                Cough Pause: Cough Pause; Yes                Vest Garment Size: Adult Small                Last Fitting Date: 2021                Frequency of therapy: 14 times per week                Concerns: none         Exercise (purposeful and aerobic for >20 minutes each session): NO.                Does this qualify as additional airway clearance: No         Alternative Airway Clearance:               Nebulized Medications                Bronchodilators: Albuterol                Mucolytic: Pulmozyme                Antibiotics: CHER                Additional Inhaled Medications:                 Spacer Use:          Review Cleaning: Yes. Top rack of .         Education and Transition Information                Correct order of inhaled medications: Yes                Mechanism of Action of inhaled medications: Yes                Frequency of inhaled medications: Yes                Dosage of inhaled medications: Yes                Other:          Home Care:                Nebulizer Cups (Brand/Type): Zhanna                Nebulizer Compressor                            Year Purchased:                             [facility], Phone: [phone], Fax: [phone]                Nebulizer Supply Company:                            [facility], Phone: [phone], Fax: [phone]         Oxygen:                                     Pulmonary Rehab                Site:                 Date Completed:          Plan of Care and Goals for next visit: Keep up the good work

## 2021-06-25 NOTE — TELEPHONE ENCOUNTER
"  insulin glargine (LANTUS PEN) 100 UNIT/ML pen  Last Written Prescription Date:  7/1/20  Last Fill Quantity: 18ml,   # refills: 3  Last Office Visit : 3/31/20  Josie  Future Office visit: none    \" to be seen in clinic in around 3 months.\"      Scheduling has been notified to contact the pt for appointment.      Routing refill request to provider for review/approval because: endo triage to fill. Past due for 3 mth appt  "

## 2021-06-25 NOTE — TELEPHONE ENCOUNTER
Long Acting Insulin Protocol [id number]/25/2021 06:28 AM   HgbA1C in past 3 or 6 months Protocol Details    Recent (6 mo) or future (30 days) visit within the authorizing provider's specialty      [facility]    PLEASE HELP SCHEDULE THE FOLLOWING APPT(S): Return Appointment    TIME FRAME NEEDED BY: First available.       SCHEDULING COMMENTS (optional): with Dr Galindo

## 2021-06-26 LAB
BACTERIA SPEC CULT: ABNORMAL
BACTERIA SPEC CULT: ABNORMAL
Lab: ABNORMAL
SPECIMEN SOURCE: ABNORMAL

## 2021-06-29 NOTE — TELEPHONE ENCOUNTER
[facility]    Phone Message    May a detailed message be left on voicemail: yes     Reason for Call: Medication Refill Request    Has the patient contacted the pharmacy for the refill? Yes   Name of medication being requested: insulin glargine (LANTUS PEN) 100 UNIT/ML pen  Provider who prescribed the medication: Josie  Pharmacy: [id number] - [place], [place] - [address]  Date medication is needed: ASAP- Pt is out of medication    Pt's father called and scheduled next available with Dr. Galindo  Action Taken: Message routed to:  [facility] endo    Travel Screening: Not Applicable

## 2021-06-30 DIAGNOSIS — E84.8 DIABETES MELLITUS RELATED TO CYSTIC FIBROSIS (H): ICD-10-CM

## 2021-06-30 DIAGNOSIS — E08.9 DIABETES MELLITUS RELATED TO CYSTIC FIBROSIS (H): ICD-10-CM

## 2021-07-01 RX ORDER — PEN NEEDLE, DIABETIC 32GX 5/32"
NEEDLE, DISPOSABLE MISCELLANEOUS
Qty: 400 EACH | Refills: 1 | Status: SHIPPED | OUTPATIENT
Start: 2021-07-01 | End: 2022-03-16

## 2021-07-01 NOTE — TELEPHONE ENCOUNTER
3/31/2020  [facility] [place]   Anjelica Galindo MD  Endocrinology, Diabetes, and Metabolism  [place]: 10/12/21    Insulin pen

## 2021-07-12 DIAGNOSIS — E84.9 CF (CYSTIC FIBROSIS) (H): ICD-10-CM

## 2021-07-13 RX ORDER — MV-MIN 51/FOLIC ACID/VIT K/UBI 100-350MCG
2 TABLET,CHEWABLE ORAL DAILY
Qty: 60 TABLET | Refills: 0 | Status: SHIPPED | OUTPATIENT
Start: 2021-07-13 | End: 2021-08-31

## 2021-07-15 DIAGNOSIS — E08.9 DIABETES MELLITUS DUE TO CYSTIC FIBROSIS (H): ICD-10-CM

## 2021-07-15 DIAGNOSIS — E84.9 DIABETES MELLITUS DUE TO CYSTIC FIBROSIS (H): ICD-10-CM

## 2021-07-16 RX ORDER — INSULIN LISPRO 100 [IU]/ML
INJECTION, SOLUTION INTRAVENOUS; SUBCUTANEOUS
Qty: 30 ML | Refills: 1 | Status: SHIPPED | OUTPATIENT
Start: 2021-07-16 | End: 2022-09-26

## 2021-07-16 NOTE — TELEPHONE ENCOUNTER
Short Acting Insulin Protocol [id number]/16/2021 08:28 AM   HgbA1C in past 3 or 6 months Protocol Details    Recent (6 mo) or future (30 days) visit within the authorizing provider's specialty      RTC 10/12/2021    Lab orders in place

## 2021-07-16 NOTE — TELEPHONE ENCOUNTER
insulin lispro (HUMALOG KWIKPEN) 100 UNIT/ML (1 unit dial) KWIKPEN   Inject subcutaneously 10 units before breakfast and 1 unit per 12 g of carbs for snacks and remaining meals of the day.  Her average dose for dinner is around 5 units.Max 20 units daily     Last Written Prescription Date:  5/4/20  Last Fill Quantity: 15 ml,   # refills: 3  Last Office Visit : 3/31/20  Future Office visit:  10/12/21    Routing refill request to provider for review/approval because:  Insulin refilled by Clinic RN.

## 2021-07-25 DIAGNOSIS — E84.9 CF (CYSTIC FIBROSIS) (H): ICD-10-CM

## 2021-07-26 RX ORDER — FLUTICASONE PROPIONATE AND SALMETEROL XINAFOATE 115; 21 UG/1; UG/1
AEROSOL, METERED RESPIRATORY (INHALATION)
Qty: 12 G | Refills: 2 | Status: SHIPPED | OUTPATIENT
Start: 2021-07-26 | End: 2021-11-15

## 2021-08-03 DIAGNOSIS — E84.9 CF (CYSTIC FIBROSIS) (H): ICD-10-CM

## 2021-08-16 DIAGNOSIS — E84.0 CYSTIC FIBROSIS WITH PULMONARY MANIFESTATIONS (H): ICD-10-CM

## 2021-08-30 DIAGNOSIS — E84.9 CF (CYSTIC FIBROSIS) (H): ICD-10-CM

## 2021-08-31 RX ORDER — MV-MIN 51/FOLIC ACID/VIT K/UBI 100-350MCG
2 TABLET,CHEWABLE ORAL DAILY
Qty: 60 TABLET | Refills: 3 | Status: SHIPPED | OUTPATIENT
Start: 2021-08-31 | End: 2021-11-22 | Stop reason: ALTCHOICE

## 2021-09-09 DIAGNOSIS — E08.9 DIABETES MELLITUS RELATED TO CYSTIC FIBROSIS (H): ICD-10-CM

## 2021-09-09 DIAGNOSIS — E84.8 DIABETES MELLITUS RELATED TO CYSTIC FIBROSIS (H): ICD-10-CM

## 2021-09-09 DIAGNOSIS — E84.9 CF (CYSTIC FIBROSIS) (H): ICD-10-CM

## 2021-09-09 NOTE — TELEPHONE ENCOUNTER
Long Acting Insulin Protocol [id number]/09/2021 09:52 AM   Serum creatinine on file in past 12 months Protocol Details    HgbA1C in past 3 or 6 months     Recent (6 mo) or future (30 days) visit within the authorizing provider's specialty      RTC 10/12/2021 with Dr Galindo  Lab orders in place per Dr Ocampo  Pt notified to schedule via Rx

## 2021-09-09 NOTE — TELEPHONE ENCOUNTER
LANTUS SOLOSTAR 100 UNIT/ML      Last Written Prescription Date:  6-29-21  Last Fill Quantity: 15 ml,   # refills: 1  Last Office Visit : 3-31-20  Future Office visit:  10-12-21    Routing refill request to provider for review/approval because:  Insulin - refilled per clinic

## 2021-09-15 DIAGNOSIS — K86.89 PANCREATIC INSUFFICIENCY: ICD-10-CM

## 2021-09-15 DIAGNOSIS — E84.9 CF (CYSTIC FIBROSIS) (H): ICD-10-CM

## 2021-09-15 RX ORDER — ERGOCALCIFEROL 1.25 MG/1
CAPSULE, LIQUID FILLED ORAL
Qty: 12 CAPSULE | Refills: 2 | Status: SHIPPED | OUTPATIENT
Start: 2021-09-15 | End: 2021-09-30

## 2021-09-30 DIAGNOSIS — E84.9 CF (CYSTIC FIBROSIS) (H): ICD-10-CM

## 2021-09-30 DIAGNOSIS — K86.89 PANCREATIC INSUFFICIENCY: ICD-10-CM

## 2021-09-30 RX ORDER — ALBUTEROL SULFATE 0.83 MG/ML
SOLUTION RESPIRATORY (INHALATION)
Qty: 150 ML | Refills: 4 | Status: SHIPPED | OUTPATIENT
Start: 2021-09-30 | End: 2022-02-07

## 2021-09-30 RX ORDER — OMEPRAZOLE 40 MG/1
CAPSULE, DELAYED RELEASE ORAL
Qty: 180 CAPSULE | Refills: 1 | Status: SHIPPED | OUTPATIENT
Start: 2021-09-30 | End: 2022-03-01

## 2021-09-30 RX ORDER — ERGOCALCIFEROL 1.25 MG/1
CAPSULE, LIQUID FILLED ORAL
Qty: 36 CAPSULE | Refills: 1 | Status: SHIPPED | OUTPATIENT
Start: 2021-09-30 | End: 2022-07-19

## 2021-10-08 DIAGNOSIS — E10.9 TYPE 1 DIABETES MELLITUS (H): ICD-10-CM

## 2021-10-09 RX ORDER — PROCHLORPERAZINE 25 MG/1
SUPPOSITORY RECTAL
Qty: 3 EACH | Refills: 1 | Status: SHIPPED | OUTPATIENT
Start: 2021-10-09 | End: 2022-09-09

## 2021-10-12 ENCOUNTER — VIRTUAL VISIT (OUTPATIENT)
Dept: ENDOCRINOLOGY | Facility: CLINIC | Age: 26
End: 2021-10-12
Payer: COMMERCIAL

## 2021-10-12 ENCOUNTER — TELEPHONE (OUTPATIENT)
Dept: ENDOCRINOLOGY | Facility: CLINIC | Age: 26
End: 2021-10-12

## 2021-10-12 DIAGNOSIS — E84.9 DIABETES MELLITUS DUE TO CYSTIC FIBROSIS (H): Primary | ICD-10-CM

## 2021-10-12 DIAGNOSIS — E08.9 DIABETES MELLITUS DUE TO CYSTIC FIBROSIS (H): Primary | ICD-10-CM

## 2021-10-12 PROBLEM — E11.9 DIABETES MELLITUS, TYPE 2 (H): Status: ACTIVE | Noted: 2021-10-12

## 2021-10-12 PROCEDURE — 99214 OFFICE O/P EST MOD 30 MIN: CPT | Mod: GT | Performed by: INTERNAL MEDICINE

## 2021-10-12 NOTE — PATIENT INSTRUCTIONS
We appreciate your assistance in coordinating your healthcare.     Please upload your insulin pump, blood sugar meter and/or continuous glucose monitor at home 1-2 days before your next diabetes-related appointment.   This will allow your provider to review your  data before your scheduled virtual visit.    To ask a question to your Endocrine care team, please send them a [employer] message, or reach them by phone at [phone]     To expedite your medication refill(s), please contact your pharmacy and have them   fax a refill request to: [phone].  *Please allow 3 business days for routine medication refills.  *Please allow 5 business days for controlled substance medication refills.    For after-hours urgent Endocrine issues, that do not require 301, please dial [phone], and ask to speak with the Endocrinologist On-Call

## 2021-10-12 NOTE — TELEPHONE ENCOUNTER
[facility]    Phone Message    May a detailed message be left on voicemail: yes     Reason for Call: Other: Pt father called and said they are waiting in the waiting room for the virtual visit and no  one is there. Jah pt dad is requesting a call back [phone]     Action Taken: Other: endo    Travel Screening: Not Applicable

## 2021-10-12 NOTE — PROGRESS NOTES
dm due to CF  Tomas Rowe CMA    Outcome for 10/12/21 1:19 PM :Glucose data attachment located on [employer] message     Marleni is a 26 year old who is being evaluated via a billable video visit.      How would you like to obtain your AVS? [employer]  If the video visit is dropped, the invitation should be resent by: Send to e-mail at: [email or web address]  Will anyone else be joining your video visit? Yes: dad. How would they like to receive their invitation? Send to e-mail at: [email or web address]        Marleni is a 26 year old female presents today for Video Visit (dm due to CF)    HPI    26-year-old female being seen in follow-up for CF related diabetes.  She reports that she was diagnosed with CF related diabetes around age 14.  She also has history of pancreatic insufficiency and cerebral palsy.  Patient was accompanied by her father during this video visit.    She is currently using Lantus 18 units daily in the morning.   Humalog 1 unit per 12 g of carbs for meal.  Takes around 8 units of Humalog for breakfast.  Humalog correction 1 unit for every 50 above 200    She is not currently using Dexcom CGM and checking blood glucose before meals.  She has missed checking blood glucose before breakfast for the last few days.  Father reports that fasting glucose is usually between 100-120 range.  She occasionally misses taking meal insulin which results in postmeal hyperglycemia.  Blood glucose data was shared by patient using my chart.  Hypoglycemia is rare and she can feel symptoms of low.  Patient is able to self administer insulin.   Patient and father are overall happy with the current insulin regimen and diabetes control.  She has not had a recent A1c but plans to get annual labs prior to the next pulmonary visit.    Mother has history of type 1 diabetes    Past Medical History:   Diagnosis Date     Atopy      Cerebral palsy (H)     Botox injextions, managed by Dr. 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     Seizure disorder (H)     Multiple daily in infancy now infrequent (every few years)     Thrush, oral      Allergies  Allergies   Allergen Reactions     Augmentin Hives     Per mother     Ceftin Hives     Per mother     Vancomycin Hives     Per mother     Medications  Current Outpatient Medications   Medication Sig Dispense Refill     ADVAIR -21 MCG/ACT inhaler TAKE 2 PUFFS BY MOUTH TWICE A DAY 12 g 2     albuterol (PROVENTIL) (2.5 MG/3ML) 0.083% neb solution TAKE 1 VIAL (2.5 MG) BY NEBULIZATION 2 TIMES DAILY 150 mL 4     amylase-lipase-protease (CREON) 26175-87649 units CPEP per EC capsule TAKE 4 CAPSULES WITH MEALS AND 3 CAPSULES WITH SNACKS, FOR 3 MEALS AND 2 SNACKS PER DAY. 540 capsule 3     BD STEVIE U/F 32G X 4 MM insulin pen needle USE 4 PEN NEEDLES DAILY OR AS DIRECTED. 400 each 1     Continuous Blood Gluc  (DEXCOM G6 ) JAZMINE Use to read blood sugars as per 's instructions. 1 each 0     Continuous Blood Gluc Sensor (DEXCOM G6 SENSOR) MISC CHANGE EVERY 10 DAYS. 3 each 1     Continuous Blood Gluc Transmit (DEXCOM G6 TRANSMITTER) MISC Change every 3 months. 1 each 3     glucagon (GLUCAGON EMERGENCY) 1 MG kit Use as directed for low blood sugar 1 mg 1     insulin aspart (NOVOLOG PEN) 100 UNIT/ML pen 2 units per 15g carbohydrate with meals and correction scale 3 mL 3     insulin glargine (LANTUS SOLOSTAR) 100 UNIT/ML pen INJECT 18 UNITS SUBCUTANEOUSLY DAILY. Lab draw  needed. Call [phone] to schedule 15 mL 0     insulin lispro (HUMALOG KWIKPEN) 100 UNIT/ML (1 unit dial) KWIKPEN INJECT 10 UNITS SUBQ BEFORE BREAKFAST &1 U PER 12G OF CARBS FOR SNACKS &REMAINING MEALS OF THE DAY, ~5 UNITS @DINNER. MAX 20U DAILY 30 mL 1     insulin pen needle (30G X 8 MM) 30G X 8 MM miscellaneous Use 4 pen needles daily or as directed. 180 each 0     multivitamin CF formula (AQUADEKS) chewable tablet Take 2 tablets by mouth daily 60 tablet 3     Nutritional Supplements (ENSURE ORIGINAL) LIQD Take 1 Can by mouth At Bedtime 1 Bottle 11     omeprazole (PRILOSEC) 40 MG DR capsule TAKE 1 CAPSULE BY MOUTH TWICE A  capsule 1     PULMOZYME 1 MG/ML neb solution NEBULIZE AND INHALE THE CONTENTS OF ONE VIAL INTO THE LUNGS ONCE DAILY 75 mL 3     sodium chloride inhalant 7 % NEBU neb solution Take 4 mLs by nebulization daily 360 mL 11     tobramycin, PF, (CHER) 300 MG/5ML neb solution NEBULIZE AND INHALE THE CONTENTS OF 1 VIAL (5ML) TWO TIMES A DAY FOR 28 DAYS ON AND 28 DAYS  mL 3     vitamin D2 (ERGOCALCIFEROL) 29810 units (1250 mcg) capsule TAKE 1 CAPSULE BY MOUTH EVERY MON, WED, FRI MORNING 36 capsule 1     Family History  family history includes Breast Cancer in an other family member; Diabetes Type 1 in her maternal grandmother and maternal uncle; Diabetes Type 1 (age of onset: 12) in her mother; Thyroid Disease in her maternal aunt, maternal grandmother, mother, and paternal uncle.  Social History     Socioeconomic History     Marital status: Single     Spouse name: Not on file     Number of children: Not on file     Years of education: Not on file     Highest education level: Not on file   Occupational History     Not on file   Tobacco Use     Smoking status: Never Smoker     Smokeless tobacco: Never Used   Substance and Sexual Activity     Alcohol use: No     Alcohol/week: 0.0 standard drinks     Drug use: No     Sexual activity: Never   Other Topics Concern     Not on file   Social  History Narrative    Marleni lives at home with mother in [place] with 2 dogs and a cat. 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 Marleni goes to transition school.      Social Determinants of Health     Financial Resource Strain:      Difficulty of Paying Living Expenses:    Food Insecurity:      Worried About Running Out of Food in the Last Year:      Ran Out of Food in the Last Year:    Transportation Needs:      Lack of Transportation (Medical):      Lack of Transportation (Non-Medical):    Physical Activity:      Days of Exercise per Week:      Minutes of Exercise per Session:    Stress:      Feeling of Stress :    Social Connections:      Frequency of Communication with Friends and Family:      Frequency of Social Gatherings with Friends and Family:      Attends [employer] Services:      Active Member of Clubs or Organizations:      Attends Club or Organization Meetings:      Marital Status:    Intimate Partner Violence:      Fear of Current or Ex-Partner:      Emotionally Abused:      Physically Abused:      Sexually Abused:          Physical Exam  There were no vitals taken for this visit.  There is no height or weight on file to calculate BMI.    Constitutional: no distress, comfortable, pleasant   Psychological: appropriate mood     RESULTS  Lab Results   Component Value Date    HGB 10.9 09/06/2018     Lab Results   Component Value Date    TSH 3.77 02/26/2018              Lab Results   Component Value Date    LDL 58 02/26/2018        No results found for: MICROALBUMIN  Lab Results   Component Value Date    ALBUMIN 3.8 08/31/2020           Lab Results   Component Value Date    ALT 50 08/31/2020     Hemoglobin A1C   Date Value Ref Range Status   08/31/2020 6.9 (H) 0 - 5.6 % Final     Comment:     Normal <5.7% Prediabetes 5.7-6.4%  Diabetes 6.5% or higher - adopted from ADA   consensus guidelines.         Assessment/Plan:    Cystic fibrosis related diabetes: She does not have a recent A1c.  Based on recent fingerstick  blood glucose data overall glycemic control appears to be okay.    Continue current insulin regimen.  She may consider going back to start using CGM in the near future but currently happy with doing the fingerstick monitoring.  She is due for labs and is planning to get annual labs done in the coming months.  Also due for eye exam   counseled her to send blood glucose data between clinic visits if she is having any issues    Return to clinic in about 6 months    DAVID Herrera    Note: Chart documentation done in part with Dragon Voice Recognition software. Although reviewed after completion, some word and grammatical errors may remain. Please consider this when interpreting information in this chart    Video visit done using [employer] video visit start time 3:47 AM, video visit end time 4:08 PM    Time: I spent 30 minutes on the date of the encounter preparing to see patient (including chart review and preparation), obtaining and or reviewing additional medical history, performing an evaluation, documenting clinical information in the electronic health record, independently interpreting results, communicating results to the patient, and/or coordinating care.

## 2021-10-12 NOTE — LETTER
10/12/2021       RE: Marleni Alamo  [address] [place] [address]     Dear Colleague,    Thank you for referring your patient, Marleni Alamo, to the [facility]. Please see a copy of my visit note below.    dm due to CF  Tomas Rowe CMA    Outcome for 10/12/21 1:19 PM :Glucose data attachment located on [employer] message     Marleni is a 26 year old who is being evaluated via a billable video visit.      How would you like to obtain your AVS? [employer]  If the video visit is dropped, the invitation should be resent by: Send to e-mail at: [email or web address]  Will anyone else be joining your video visit? Yes: dad. How would they like to receive their invitation? Send to e-mail at: [email or web address]        Marleni is a 26 year old female presents today for Video Visit (dm due to CF)    HPI    26-year-old female being seen in follow-up for CF related diabetes.  She reports that she was diagnosed with CF related diabetes around age 14.  She also has history of pancreatic insufficiency and cerebral palsy.  Patient was accompanied by her father during this video visit.    She is currently using Lantus 18 units daily in the morning.   Humalog 1 unit per 12 g of carbs for meal.  Takes around 8 units of Humalog for breakfast.  Humalog correction 1 unit for every 50 above 200    She is not currently using Dexcom CGM and checking blood glucose before meals.  She has missed checking blood glucose before breakfast for the last few days.  Father reports that fasting glucose is usually between 100-120 range.  She occasionally misses taking meal insulin which results in postmeal hyperglycemia.  Blood glucose data was shared by patient using my chart.  Hypoglycemia is rare and she can feel symptoms of low.  Patient is able to self administer insulin.   Patient and father are overall happy with the current insulin regimen  and diabetes control.  She has not had a recent A1c but plans to get annual labs prior to the next pulmonary visit.    Mother has history of type 1 diabetes    Past Medical History:   Diagnosis Date     Atopy      Cerebral palsy (H)     Botox injextions, managed by Dr. 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     Seizure disorder (H)     Multiple daily in infancy now infrequent (every few years)     Thrush, oral      Allergies  Allergies   Allergen Reactions     Augmentin Hives     Per mother     Ceftin Hives     Per mother     Vancomycin Hives     Per mother     Medications  Current Outpatient Medications   Medication Sig Dispense Refill     ADVAIR -21 MCG/ACT inhaler TAKE 2 PUFFS BY MOUTH TWICE A DAY 12 g 2     albuterol (PROVENTIL) (2.5 MG/3ML) 0.083% neb solution TAKE 1 VIAL (2.5 MG) BY NEBULIZATION 2 TIMES DAILY 150 mL 4     amylase-lipase-protease (CREON) 22346-40645 units CPEP per EC capsule TAKE 4 CAPSULES WITH MEALS AND 3 CAPSULES WITH SNACKS, FOR 3 MEALS AND 2 SNACKS PER DAY. 540 capsule 3     BD STEVIE U/F 32G X 4 MM insulin pen needle USE 4 PEN NEEDLES DAILY OR AS DIRECTED. 400 each 1     Continuous Blood Gluc  (DEXCOM G6 ) JAZMINE Use to read blood sugars as per 's instructions. 1 each 0     Continuous Blood Gluc Sensor (DEXCOM G6 SENSOR) MISC CHANGE EVERY 10 DAYS. 3 each 1     Continuous Blood Gluc Transmit (DEXCOM G6 TRANSMITTER) MISC Change every 3 months.  1 each 3     glucagon (GLUCAGON EMERGENCY) 1 MG kit Use as directed for low blood sugar 1 mg 1     insulin aspart (NOVOLOG PEN) 100 UNIT/ML pen 2 units per 15g carbohydrate with meals and correction scale 3 mL 3     insulin glargine (LANTUS SOLOSTAR) 100 UNIT/ML pen INJECT 18 UNITS SUBCUTANEOUSLY DAILY. Lab draw needed. Call [phone] to schedule 15 mL 0     insulin lispro (HUMALOG KWIKPEN) 100 UNIT/ML (1 unit dial) KWIKPEN INJECT 10 UNITS SUBQ BEFORE BREAKFAST &1 U PER 12G OF CARBS FOR SNACKS &REMAINING MEALS OF THE DAY, ~5 UNITS @DINNER. MAX 20U DAILY 30 mL 1     insulin pen needle (30G X 8 MM) 30G X 8 MM miscellaneous Use 4 pen needles daily or as directed. 180 each 0     multivitamin CF formula (AQUADEKS) chewable tablet Take 2 tablets by mouth daily 60 tablet 3     Nutritional Supplements (ENSURE ORIGINAL) LIQD Take 1 Can by mouth At Bedtime 1 Bottle 11     omeprazole (PRILOSEC) 40 MG DR capsule TAKE 1 CAPSULE BY MOUTH TWICE A  capsule 1     PULMOZYME 1 MG/ML neb solution NEBULIZE AND INHALE THE CONTENTS OF ONE VIAL INTO THE LUNGS ONCE DAILY 75 mL 3     sodium chloride inhalant 7 % NEBU neb solution Take 4 mLs by nebulization daily 360 mL 11     tobramycin, PF, (CHER) 300 MG/5ML neb solution NEBULIZE AND INHALE THE CONTENTS OF 1 VIAL (5ML) TWO TIMES A DAY FOR 28 DAYS ON AND 28 DAYS  mL 3     vitamin D2 (ERGOCALCIFEROL) 41949 units (1250 mcg) capsule TAKE 1 CAPSULE BY MOUTH EVERY MON, WED, FRI MORNING 36 capsule 1     Family History  family history includes Breast Cancer in an other family member; Diabetes Type 1 in her maternal grandmother and maternal uncle; Diabetes Type 1 (age of onset: 12) in her mother; Thyroid Disease in her maternal aunt, maternal grandmother, mother, and paternal uncle.  Social History     Socioeconomic History     Marital status: Single     Spouse name: Not on file     Number of children: Not on file     Years of education: Not on file     Highest education level: Not  on file   Occupational History     Not on file   Tobacco Use     Smoking status: Never Smoker     Smokeless tobacco: Never Used   Substance and Sexual Activity     Alcohol use: No     Alcohol/week: 0.0 standard drinks     Drug use: No     Sexual activity: Never   Other Topics Concern     Not on file   Social History Narrative    Marleni lives at home with mother in [place] with 2 dogs and a cat. 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 Marleni goes to transition school.      Social Determinants of Health     Financial Resource Strain:      Difficulty of Paying Living Expenses:    Food Insecurity:      Worried About Running Out of Food in the Last Year:      Ran Out of Food in the Last Year:    Transportation Needs:      Lack of Transportation (Medical):      Lack of Transportation (Non-Medical):    Physical Activity:      Days of Exercise per Week:      Minutes of Exercise per Session:    Stress:      Feeling of Stress :    Social Connections:      Frequency of Communication with Friends and Family:      Frequency of Social Gatherings with Friends and Family:      Attends Holiness Services:      Active Member of Clubs or Organizations:      Attends Club or Organization Meetings:      Marital Status:    Intimate Partner Violence:      Fear of Current or Ex-Partner:      Emotionally Abused:      Physically Abused:      Sexually Abused:          Physical Exam  There were no vitals taken for this visit.  There is no height or weight on file to calculate BMI.    Constitutional: no distress, comfortable, pleasant   Psychological: appropriate mood     RESULTS  Lab Results   Component Value Date    HGB 10.9 09/06/2018     Lab Results   Component Value Date    TSH 3.77 02/26/2018              Lab Results   Component Value Date    LDL 58 02/26/2018        No results found for: MICROALBUMIN  Lab Results   Component Value Date    ALBUMIN 3.8 08/31/2020           Lab Results   Component Value Date    ALT 50 08/31/2020     Hemoglobin A1C    Date Value Ref Range Status   08/31/2020 6.9 (H) 0 - 5.6 % Final     Comment:     Normal <5.7% Prediabetes 5.7-6.4%  Diabetes 6.5% or higher - adopted from ADA   consensus guidelines.         Assessment/Plan:    Cystic fibrosis related diabetes: She does not have a recent A1c.  Based on recent fingerstick blood glucose data overall glycemic control appears to be okay.    Continue current insulin regimen.  She may consider going back to start using CGM in the near future but currently happy with doing the fingerstick monitoring.  She is due for labs and is planning to get annual labs done in the coming months.  Also due for eye exam   counseled her to send blood glucose data between clinic visits if she is having any issues    Return to clinic in about 6 months    DAVID Herrera    Note: Chart documentation done in part with Dragon Voice Recognition software. Although reviewed after completion, some word and grammatical errors may remain. Please consider this when interpreting information in this chart    Video visit done using [employer] video visit start time 3:47 AM, video visit end time 4:08 PM    Time: I spent 30 minutes on the date of the encounter preparing to see patient (including chart review and preparation), obtaining and or reviewing additional medical history, performing an evaluation, documenting clinical information in the electronic health record, independently interpreting results, communicating results to the patient, and/or coordinating care.

## 2021-10-24 ENCOUNTER — HEALTH MAINTENANCE LETTER (OUTPATIENT)
Age: 26
End: 2021-10-24

## 2021-10-25 DIAGNOSIS — E10.9 TYPE 1 DIABETES MELLITUS (H): ICD-10-CM

## 2021-10-26 ENCOUNTER — LAB (OUTPATIENT)
Dept: LAB | Facility: CLINIC | Age: 26
End: 2021-10-26
Payer: COMMERCIAL

## 2021-10-26 DIAGNOSIS — E84.9 CF (CYSTIC FIBROSIS) (H): ICD-10-CM

## 2021-10-26 LAB
ALBUMIN SERPL-MCNC: 3.7 G/DL (ref 3.4–5)
ALBUMIN UR-MCNC: NEGATIVE MG/DL
ALP SERPL-CCNC: 181 U/L (ref 40–150)
ALT SERPL W P-5'-P-CCNC: 53 U/L (ref 0–50)
ANION GAP SERPL CALCULATED.3IONS-SCNC: 4 MMOL/L (ref 3–14)
APPEARANCE UR: ABNORMAL
AST SERPL W P-5'-P-CCNC: 24 U/L (ref 0–45)
BACTERIA #/AREA URNS HPF: ABNORMAL /HPF
BASOPHILS # BLD AUTO: 0.1 10E3/UL (ref 0–0.2)
BASOPHILS NFR BLD AUTO: 1 %
BILIRUB UR QL STRIP: NEGATIVE
BUN SERPL-MCNC: 12 MG/DL (ref 7–30)
CALCIUM SERPL-MCNC: 9.5 MG/DL (ref 8.5–10.1)
CHLORIDE BLD-SCNC: 108 MMOL/L (ref 94–109)
CHOLEST SERPL-MCNC: 132 MG/DL
CO2 SERPL-SCNC: 29 MMOL/L (ref 20–32)
COLOR UR AUTO: ABNORMAL
CREAT SERPL-MCNC: 0.52 MG/DL (ref 0.52–1.04)
CREAT UR-MCNC: 39 MG/DL
EOSINOPHIL # BLD AUTO: 0.1 10E3/UL (ref 0–0.7)
EOSINOPHIL NFR BLD AUTO: 2 %
ERYTHROCYTE [DISTWIDTH] IN BLOOD BY AUTOMATED COUNT: 18.6 % (ref 10–15)
ERYTHROCYTE [SEDIMENTATION RATE] IN BLOOD BY WESTERGREN METHOD: 16 MM/HR (ref 0–20)
FASTING STATUS PATIENT QL REPORTED: NO
GFR SERPL CREATININE-BSD FRML MDRD: >90 ML/MIN/1.73M2
GGT SERPL-CCNC: 103 U/L (ref 0–40)
GLUCOSE BLD-MCNC: 96 MG/DL (ref 70–99)
GLUCOSE UR STRIP-MCNC: NEGATIVE MG/DL
HBA1C MFR BLD: 6.8 % (ref 0–5.6)
HCT VFR BLD AUTO: 36.2 % (ref 35–47)
HDLC SERPL-MCNC: 55 MG/DL
HGB BLD-MCNC: 10.8 G/DL (ref 11.7–15.7)
HGB UR QL STRIP: NEGATIVE
IMM GRANULOCYTES # BLD: 0 10E3/UL
IMM GRANULOCYTES NFR BLD: 0 %
INR PPP: 1.03 (ref 0.85–1.15)
IRON SERPL-MCNC: 27 UG/DL (ref 35–180)
KETONES UR STRIP-MCNC: NEGATIVE MG/DL
LDLC SERPL CALC-MCNC: 61 MG/DL
LEUKOCYTE ESTERASE UR QL STRIP: NEGATIVE
LYMPHOCYTES # BLD AUTO: 2.3 10E3/UL (ref 0.8–5.3)
LYMPHOCYTES NFR BLD AUTO: 26 %
MAGNESIUM SERPL-MCNC: 2 MG/DL (ref 1.6–2.3)
MCH RBC QN AUTO: 23.6 PG (ref 26.5–33)
MCHC RBC AUTO-ENTMCNC: 29.8 G/DL (ref 31.5–36.5)
MCV RBC AUTO: 79 FL (ref 78–100)
MICROALBUMIN UR-MCNC: <5 MG/L
MICROALBUMIN/CREAT UR: NORMAL MG/G{CREAT}
MONOCYTES # BLD AUTO: 0.6 10E3/UL (ref 0–1.3)
MONOCYTES NFR BLD AUTO: 7 %
MUCOUS THREADS #/AREA URNS LPF: PRESENT /LPF
NEUTROPHILS # BLD AUTO: 5.5 10E3/UL (ref 1.6–8.3)
NEUTROPHILS NFR BLD AUTO: 64 %
NITRATE UR QL: NEGATIVE
NONHDLC SERPL-MCNC: 77 MG/DL
NRBC # BLD AUTO: 0 10E3/UL
NRBC BLD AUTO-RTO: 0 /100
PH UR STRIP: 5 [PH] (ref 5–7)
PHOSPHATE SERPL-MCNC: 3.3 MG/DL (ref 2.5–4.5)
PLATELET # BLD AUTO: 169 10E3/UL (ref 150–450)
POTASSIUM BLD-SCNC: 4.1 MMOL/L (ref 3.4–5.3)
PROT SERPL-MCNC: 8 G/DL (ref 6.8–8.8)
RBC # BLD AUTO: 4.57 10E6/UL (ref 3.8–5.2)
RBC URINE: <1 /HPF
SODIUM SERPL-SCNC: 141 MMOL/L (ref 133–144)
SP GR UR STRIP: 1.01 (ref 1–1.03)
SQUAMOUS EPITHELIAL: 8 /HPF
TRIGL SERPL-MCNC: 82 MG/DL
TSH SERPL DL<=0.005 MIU/L-ACNC: 3.05 MU/L (ref 0.4–4)
UROBILINOGEN UR STRIP-MCNC: NORMAL MG/DL
WBC # BLD AUTO: 8.6 10E3/UL (ref 4–11)
WBC URINE: 1 /HPF

## 2021-10-26 PROCEDURE — 82306 VITAMIN D 25 HYDROXY: CPT | Mod: 90 | Performed by: PATHOLOGY

## 2021-10-26 PROCEDURE — 87799 DETECT AGENT NOS DNA QUANT: CPT | Mod: 90 | Performed by: PATHOLOGY

## 2021-10-26 PROCEDURE — 82784 ASSAY IGA/IGD/IGG/IGM EACH: CPT | Mod: 90 | Performed by: PATHOLOGY

## 2021-10-26 PROCEDURE — 84590 ASSAY OF VITAMIN A: CPT | Mod: 90 | Performed by: PATHOLOGY

## 2021-10-26 PROCEDURE — 84450 TRANSFERASE (AST) (SGOT): CPT | Performed by: PATHOLOGY

## 2021-10-26 PROCEDURE — 80061 LIPID PANEL: CPT | Performed by: PATHOLOGY

## 2021-10-26 PROCEDURE — 80069 RENAL FUNCTION PANEL: CPT | Performed by: PATHOLOGY

## 2021-10-26 PROCEDURE — 36415 COLL VENOUS BLD VENIPUNCTURE: CPT | Performed by: PATHOLOGY

## 2021-10-26 PROCEDURE — 84075 ASSAY ALKALINE PHOSPHATASE: CPT | Performed by: PATHOLOGY

## 2021-10-26 PROCEDURE — 85652 RBC SED RATE AUTOMATED: CPT | Performed by: PATHOLOGY

## 2021-10-26 PROCEDURE — 81001 URINALYSIS AUTO W/SCOPE: CPT | Performed by: PATHOLOGY

## 2021-10-26 PROCEDURE — 85610 PROTHROMBIN TIME: CPT | Performed by: PATHOLOGY

## 2021-10-26 PROCEDURE — 82785 ASSAY OF IGE: CPT | Mod: 90 | Performed by: PATHOLOGY

## 2021-10-26 PROCEDURE — 82977 ASSAY OF GGT: CPT | Performed by: PATHOLOGY

## 2021-10-26 PROCEDURE — 99000 SPECIMEN HANDLING OFFICE-LAB: CPT | Performed by: PATHOLOGY

## 2021-10-26 PROCEDURE — 83036 HEMOGLOBIN GLYCOSYLATED A1C: CPT | Performed by: PATHOLOGY

## 2021-10-26 PROCEDURE — 84460 ALANINE AMINO (ALT) (SGPT): CPT | Performed by: PATHOLOGY

## 2021-10-26 PROCEDURE — 82043 UR ALBUMIN QUANTITATIVE: CPT | Performed by: PATHOLOGY

## 2021-10-26 PROCEDURE — 84155 ASSAY OF PROTEIN SERUM: CPT | Performed by: PATHOLOGY

## 2021-10-26 PROCEDURE — 84443 ASSAY THYROID STIM HORMONE: CPT | Performed by: PATHOLOGY

## 2021-10-26 PROCEDURE — 83735 ASSAY OF MAGNESIUM: CPT | Performed by: PATHOLOGY

## 2021-10-26 PROCEDURE — 83540 ASSAY OF IRON: CPT | Performed by: PATHOLOGY

## 2021-10-26 PROCEDURE — 85025 COMPLETE CBC W/AUTO DIFF WBC: CPT | Performed by: PATHOLOGY

## 2021-10-26 PROCEDURE — 84446 ASSAY OF VITAMIN E: CPT | Mod: 90 | Performed by: PATHOLOGY

## 2021-10-26 RX ORDER — PROCHLORPERAZINE 25 MG/1
SUPPOSITORY RECTAL
Qty: 1 EACH | Refills: 3 | Status: SHIPPED | OUTPATIENT
Start: 2021-10-26 | End: 2023-01-26

## 2021-10-26 NOTE — TELEPHONE ENCOUNTER
10/12/2021  [facility] [place]     Anjelica Galindo MD    Endocrinology, Diabetes, and Metabolism    Continuous Blood Gluc Transmit (DEXCOM G6 TRANSMITTER) MISC

## 2021-10-27 LAB
CMV DNA SPEC NAA+PROBE-ACNC: NOT DETECTED IU/ML
DEPRECATED CALCIDIOL+CALCIFEROL SERPL-MC: 39 UG/L (ref 20–75)
IGA SERPL-MCNC: 177 MG/DL (ref 84–499)
IGE SERPL-ACNC: 34 KU/L (ref 0–114)
IGG SERPL-MCNC: 1335 MG/DL (ref 610–1616)
IGM SERPL-MCNC: 129 MG/DL (ref 35–242)

## 2021-10-28 LAB
EBV DNA COPIES/ML, INSTRUMENT: 643 COPIES/ML
EBV DNA SPEC NAA+PROBE-LOG#: 2.8 {LOG_COPIES}/ML

## 2021-10-29 LAB
A-TOCOPHEROL VIT E SERPL-MCNC: 9.9 MG/L
ANNOTATION COMMENT IMP: NORMAL
BETA+GAMMA TOCOPHEROL SERPL-MCNC: 0.3 MG/L
RETINYL PALMITATE SERPL-MCNC: <0.02 MG/L
VIT A SERPL-MCNC: 0.39 MG/L

## 2021-11-01 ENCOUNTER — OFFICE VISIT (OUTPATIENT)
Dept: OTOLARYNGOLOGY | Facility: CLINIC | Age: 26
End: 2021-11-01
Attending: INTERNAL MEDICINE
Payer: COMMERCIAL

## 2021-11-01 VITALS — WEIGHT: 120 LBS | HEIGHT: 60 IN | BODY MASS INDEX: 23.56 KG/M2

## 2021-11-01 DIAGNOSIS — J32.4 CHRONIC PANSINUSITIS: Primary | ICD-10-CM

## 2021-11-01 DIAGNOSIS — E84.9 CF (CYSTIC FIBROSIS) (H): ICD-10-CM

## 2021-11-01 PROCEDURE — 99212 OFFICE O/P EST SF 10 MIN: CPT | Mod: 25 | Performed by: OTOLARYNGOLOGY

## 2021-11-01 PROCEDURE — 31231 NASAL ENDOSCOPY DX: CPT | Performed by: OTOLARYNGOLOGY

## 2021-11-01 ASSESSMENT — PAIN SCALES - GENERAL: PAINLEVEL: NO PAIN (0)

## 2021-11-01 ASSESSMENT — MIFFLIN-ST. JEOR: SCORE: 1205.82

## 2021-11-01 NOTE — PATIENT INSTRUCTIONS
1.  You were seen in the ENT Clinic today by . If you have any questions or concerns after your appointment, please call [phone]. Press option #1 for scheduling related needs. Press option #3 for Nurse advice.    2  Plan is to return to clinic as needed or sooner with any new or worsening symptoms      Trang Ludwig LPN  [phone]  [facility] - Otolaryngology

## 2021-11-01 NOTE — PROGRESS NOTES
HISTORY OF PRESENT ILLNESS: Marleni returns for follow-up today.  She is a 23-year-old woman with a history of cystic fibrosis and chronic rhinosinusitis related to CF. She is doing ok. No acute or progressive changes in nasal congestion and drainage. Overall doing ok with regards to CF. Accompanied by her father today.    Last surgery 3/19/2019:  Optical tracking system endoscopic sinus surgery (Bilateral) Procedure: Stealth Assisted Bilateral Maxillary Antrostomy, Ethmoidectomy, Sphenoidotomy, Frontal Sinusotomy;  Surgeon: Anny Carbajal MD;  Location: [facility] OR    PHYSICAL EXAM: alert, no distress.     Procedure:  Endoscopy indicated for exam of sinus cavities  Topical anesthetic/decongestant spray applied.  Rigid scope used for visualization.  Findings: post surgical changes, scant drainage, mucous mostly clear, some mucopurulence.    ASSESSMENT: chronic rhino sinusitis related to cystic fibrosis. No significant worsening. Would consider imaging and surgery if symptoms worsen. Overall stable disease.       15 minutes spent on the date of the encounter doing chart review, history and exam, documentation and further activities per the note. This time is in addition to separately billable procedure.

## 2021-11-01 NOTE — LETTER
11/1/2021        RE: Marleni Alamo  [address] [place] [address]     Dear Colleague,    Thank you for referring your patient, Marleni Alamo, to the [facility] [place] at [facility]. Please see a copy of my visit note below.    HISTORY OF PRESENT ILLNESS: Marleni returns for follow-up today.  She is a 23-year-old woman with a history of cystic fibrosis and chronic rhinosinusitis related to CF. She is doing ok. No acute or progressive changes in nasal congestion and drainage. Overall doing ok with regards to CF. Accompanied by her father today.    Last surgery 3/19/2019:  Optical tracking system endoscopic sinus surgery (Bilateral) Procedure: Stealth Assisted Bilateral Maxillary Antrostomy, Ethmoidectomy, Sphenoidotomy, Frontal Sinusotomy;  Surgeon: Anny Carbajal MD;  Location: [facility] OR    PHYSICAL EXAM: alert, no distress.     Procedure:  Endoscopy indicated for exam of sinus cavities  Topical anesthetic/decongestant spray applied.  Rigid scope used for visualization.  Findings: post surgical changes, scant drainage, mucous mostly clear, some mucopurulence.    ASSESSMENT: chronic rhino sinusitis related to cystic fibrosis. No significant worsening. Would consider imaging and surgery if symptoms worsen. Overall stable disease.       15 minutes spent on the date of the encounter doing chart review, history and exam, documentation and further activities per the note. This time is in addition to separately billable procedure.    Sincerely,    Anny Carbajal MD

## 2021-11-15 DIAGNOSIS — E84.9 CF (CYSTIC FIBROSIS) (H): ICD-10-CM

## 2021-11-15 DIAGNOSIS — E84.0 CYSTIC FIBROSIS WITH PULMONARY MANIFESTATIONS (H): ICD-10-CM

## 2021-11-15 RX ORDER — FLUTICASONE PROPIONATE AND SALMETEROL XINAFOATE 115; 21 UG/1; UG/1
AEROSOL, METERED RESPIRATORY (INHALATION)
Qty: 12 G | Refills: 2 | Status: SHIPPED | OUTPATIENT
Start: 2021-11-15 | End: 2022-02-22

## 2021-11-15 RX ORDER — AZITHROMYCIN 500 MG/1
500 TABLET, FILM COATED ORAL
Qty: 12 TABLET | Refills: 6 | Status: SHIPPED | OUTPATIENT
Start: 2021-11-15 | End: 2021-12-15

## 2021-11-22 DIAGNOSIS — K86.89 PANCREATIC INSUFFICIENCY: ICD-10-CM

## 2021-11-22 DIAGNOSIS — E84.9 CF (CYSTIC FIBROSIS) (H): Primary | ICD-10-CM

## 2021-11-22 RX ORDER — PEDIATRIC MULTIVIT 61/D3/VIT K 1500-800
1 CAPSULE ORAL 2 TIMES DAILY WITH MEALS
Qty: 180 TABLET | Refills: 4 | Status: SHIPPED | OUTPATIENT
Start: 2021-11-22 | End: 2022-01-07

## 2021-11-23 ENCOUNTER — TELEPHONE (OUTPATIENT)
Dept: PULMONOLOGY | Facility: CLINIC | Age: 26
End: 2021-11-23

## 2021-11-23 DIAGNOSIS — E84.9 CF (CYSTIC FIBROSIS) (H): ICD-10-CM

## 2021-11-23 RX ORDER — DORNASE ALFA 1 MG/ML
SOLUTION RESPIRATORY (INHALATION)
Qty: 75 ML | Refills: 3 | Status: SHIPPED | OUTPATIENT
Start: 2021-11-23 | End: 2022-03-16

## 2021-11-23 NOTE — TELEPHONE ENCOUNTER
Prior Authorization Retail Medication Request    Medication/Dose: mvw complete formulation (CHEWABLES) tablet  ICD code (if different than what is on RX):  CF (cystic fibrosis) (H) [E84.9] - Pancreatic insufficiency [K86.89]   Previously Tried and Failed:    Rationale:      Insurance Name:  [employer]  Insurance ID:  [id number]    Pharmacy Information (if different than what is on RX)  Name:  [facility]/PHARMACY #[id number]  [address]  Phone:  [phone]

## 2021-11-24 NOTE — TELEPHONE ENCOUNTER
PA Initiation    Medication: mvw complete formulation (CHEWABLES) tablet- Pending  Insurance Company: Other (see comments)Comment:  [facility]  Pharmacy Filling the Rx: [facility]/PHARMACY #[id number] - [place], [place] - [address] [facility]  Filling Pharmacy Phone:    Filling Pharmacy Fax:    Start Date: 11/24/2021    Through [employer]  TRX code [id number]

## 2021-11-30 NOTE — TELEPHONE ENCOUNTER
PA Initiation    Medication: mvw complete formulation (CHEWABLES) tablet- Pending  Insurance Company: Other (see comments)Comment:  [facility]  Pharmacy Filling the Rx: [facility]/PHARMACY #[id number] - [place], [place] - [address]  Filling Pharmacy Phone:    Filling Pharmacy Fax:    Start Date: 11/24/2021      Submitted PA VIA [employer] [id number]

## 2021-12-01 NOTE — TELEPHONE ENCOUNTER
PRIOR AUTHORIZATION DENIED    Medication: mvw complete formulation (CHEWABLES) tablet- Denied    Denial Date: 11/30/2021    Denial Rational:       Appeal Information:

## 2021-12-22 ENCOUNTER — MYC MEDICAL ADVICE (OUTPATIENT)
Dept: ENDOCRINOLOGY | Facility: CLINIC | Age: 26
End: 2021-12-22
Payer: COMMERCIAL

## 2021-12-22 DIAGNOSIS — E84.8 DIABETES MELLITUS RELATED TO CYSTIC FIBROSIS (H): ICD-10-CM

## 2021-12-22 DIAGNOSIS — E84.9 CF (CYSTIC FIBROSIS) (H): ICD-10-CM

## 2021-12-22 DIAGNOSIS — E84.8 DIABETES MELLITUS RELATED TO CYSTIC FIBROSIS (H): Primary | ICD-10-CM

## 2021-12-22 DIAGNOSIS — E08.9 DIABETES MELLITUS RELATED TO CYSTIC FIBROSIS (H): ICD-10-CM

## 2021-12-22 DIAGNOSIS — E08.9 DIABETES MELLITUS RELATED TO CYSTIC FIBROSIS (H): Primary | ICD-10-CM

## 2021-12-22 RX ORDER — INSULIN GLARGINE 100 [IU]/ML
INJECTION, SOLUTION SUBCUTANEOUS
Qty: 15 ML | Refills: 1 | Status: SHIPPED | OUTPATIENT
Start: 2021-12-22 | End: 2022-07-15

## 2021-12-23 NOTE — TELEPHONE ENCOUNTER
blood glucose (NO BRAND SPECIFIED) test strip  Last Written Prescription Date:  ?  Last Fill Quantity: ?,   # refills: ?  Last Office Visit : 10/12/2021  Future Office visit:  None    Routing refill request to provider for review/approval because:  Not on med list   Refer to clinic for review       Kristel Goncalves RN  Central Triage Red Flags/Med Refills

## 2022-01-03 DIAGNOSIS — E84.9 DIABETES MELLITUS DUE TO CYSTIC FIBROSIS (H): Primary | ICD-10-CM

## 2022-01-03 DIAGNOSIS — E08.9 DIABETES MELLITUS DUE TO CYSTIC FIBROSIS (H): Primary | ICD-10-CM

## 2022-01-03 RX ORDER — BLOOD SUGAR DIAGNOSTIC
STRIP MISCELLANEOUS
Qty: 200 STRIP | Refills: 3 | Status: SHIPPED | OUTPATIENT
Start: 2022-01-03 | End: 2022-08-13

## 2022-01-07 ENCOUNTER — DOCUMENTATION ONLY (OUTPATIENT)
Dept: PULMONOLOGY | Facility: CLINIC | Age: 27
End: 2022-01-07
Payer: COMMERCIAL

## 2022-01-07 DIAGNOSIS — E84.9 CF (CYSTIC FIBROSIS) (H): Primary | ICD-10-CM

## 2022-01-07 DIAGNOSIS — K86.89 PANCREATIC INSUFFICIENCY: ICD-10-CM

## 2022-01-07 RX ORDER — PEDIATRIC MULTIVIT 61/D3/VIT K 1500-800
2 CAPSULE ORAL DAILY
Qty: 180 TABLET | Refills: 4 | Status: SHIPPED | OUTPATIENT
Start: 2022-01-07 | End: 2022-11-09

## 2022-01-07 NOTE — PROGRESS NOTES
Marleni's dad reports Marleni was not able to qualify for [employer].  Is asking for other options for her CF multivitamin to be covered.     New prescription to be sent for MVW chewable 2 tablets/day, to be billed to the CF Vitamin Fund at [facility]. Cost will be 100% covered.     Megan Goddard MS, RD, LD (pager [phone])  Cystic Fibrosis/Lung Transplant Dietitian      -Available Mon-Thurs 8 AM-4:30 PM. On Fridays please contact pager [phone] (Renetta Gilmore RD) and on weekends/holidays contact coverage dietitian at pager [phone] (inpatient use only).

## 2022-01-19 DIAGNOSIS — E84.9 CF (CYSTIC FIBROSIS) (H): ICD-10-CM

## 2022-01-19 RX ORDER — TOBRAMYCIN INHALATION SOLUTION 300 MG/5ML
INHALANT RESPIRATORY (INHALATION)
Qty: 280 ML | Refills: 3 | Status: SHIPPED | OUTPATIENT
Start: 2022-01-19 | End: 2022-08-09

## 2022-01-21 ENCOUNTER — TELEPHONE (OUTPATIENT)
Dept: PULMONOLOGY | Facility: CLINIC | Age: 27
End: 2022-01-21
Payer: COMMERCIAL

## 2022-01-21 NOTE — TELEPHONE ENCOUNTER
Prior Authorization Approval    Authorization Effective Date: 12/20/2021  Authorization Expiration Date: 1/19/2023  Medication: Pulmozyme - Approved  Approved Dose/Quantity: 75  Reference #:     Insurance Company: [employer] - Phone [phone] Fax [phone]  Expected CoPay:       CoPay Card Available:      Foundation Assistance Needed:    Which Pharmacy is filling the prescription (Not needed for infusion/clinic administered): [facility] - [place], [place]  [facility] [address]  Pharmacy Notified: Yes  Patient Notified: Yes

## 2022-02-06 DIAGNOSIS — E84.9 CF (CYSTIC FIBROSIS) (H): ICD-10-CM

## 2022-02-07 RX ORDER — ALBUTEROL SULFATE 0.83 MG/ML
SOLUTION RESPIRATORY (INHALATION)
Qty: 150 ML | Refills: 4 | Status: SHIPPED | OUTPATIENT
Start: 2022-02-07 | End: 2022-06-07

## 2022-02-22 DIAGNOSIS — E84.9 CF (CYSTIC FIBROSIS) (H): ICD-10-CM

## 2022-02-22 RX ORDER — FLUTICASONE PROPIONATE AND SALMETEROL XINAFOATE 115; 21 UG/1; UG/1
AEROSOL, METERED RESPIRATORY (INHALATION)
Qty: 12 G | Refills: 2 | Status: SHIPPED | OUTPATIENT
Start: 2022-02-22 | End: 2022-06-29

## 2022-02-23 DIAGNOSIS — E84.0 CYSTIC FIBROSIS WITH PULMONARY MANIFESTATIONS (H): ICD-10-CM

## 2022-03-01 DIAGNOSIS — E84.9 CF (CYSTIC FIBROSIS) (H): ICD-10-CM

## 2022-03-01 RX ORDER — OMEPRAZOLE 40 MG/1
CAPSULE, DELAYED RELEASE ORAL
Qty: 180 CAPSULE | Refills: 1 | Status: SHIPPED | OUTPATIENT
Start: 2022-03-01 | End: 2022-06-14

## 2022-03-15 DIAGNOSIS — E84.8 DIABETES MELLITUS RELATED TO CYSTIC FIBROSIS (H): ICD-10-CM

## 2022-03-15 DIAGNOSIS — E08.9 DIABETES MELLITUS RELATED TO CYSTIC FIBROSIS (H): ICD-10-CM

## 2022-03-16 DIAGNOSIS — E84.9 CF (CYSTIC FIBROSIS) (H): ICD-10-CM

## 2022-03-16 RX ORDER — PEN NEEDLE, DIABETIC 32GX 5/32"
NEEDLE, DISPOSABLE MISCELLANEOUS
Qty: 400 EACH | Refills: 2 | Status: SHIPPED | OUTPATIENT
Start: 2022-03-16 | End: 2023-02-01

## 2022-03-16 RX ORDER — DORNASE ALFA 1 MG/ML
SOLUTION RESPIRATORY (INHALATION)
Qty: 75 ML | Refills: 3 | Status: SHIPPED | OUTPATIENT
Start: 2022-03-16 | End: 2022-07-14

## 2022-03-16 NOTE — TELEPHONE ENCOUNTER
Pen needle    10/12/2021  [facility] [place]     Anjelica Galindo MD    Endocrinology, Diabetes, and Metabolism

## 2022-04-10 ENCOUNTER — HEALTH MAINTENANCE LETTER (OUTPATIENT)
Age: 27
End: 2022-04-10

## 2022-05-05 ENCOUNTER — TELEPHONE (OUTPATIENT)
Dept: PULMONOLOGY | Facility: CLINIC | Age: 27
End: 2022-05-05
Payer: MEDICARE

## 2022-05-05 NOTE — TELEPHONE ENCOUNTER
Was patient tested for COVID: Yes  Reason for testing: Known exposure  Testing date: 5/5/2022  Type of test: Nasopharyngeal swab (antigen)  Testing result: Positive   Symptomatic: Yes  Date of first symptom: 5/4/2022  S/sx: Cough, different from CF  Disposition: Self-Quarantine at home  If at Self-quarantine at home - any treatment needed: scheduled for COVID treatment appt on 5/7/2022 (soonest available appt)  If admitted to the hospital: na  COVID associated complications/comorbidities (stroke, secondary infection) : na    Recovery date: na

## 2022-05-07 ENCOUNTER — VIRTUAL VISIT (OUTPATIENT)
Dept: URGENT CARE | Facility: CLINIC | Age: 27
End: 2022-05-07
Payer: MEDICARE

## 2022-05-07 DIAGNOSIS — Z91.199 NO-SHOW FOR APPOINTMENT: Primary | ICD-10-CM

## 2022-06-07 DIAGNOSIS — E84.9 CF (CYSTIC FIBROSIS) (H): ICD-10-CM

## 2022-06-07 RX ORDER — ALBUTEROL SULFATE 0.83 MG/ML
SOLUTION RESPIRATORY (INHALATION)
Qty: 150 ML | Refills: 4 | Status: SHIPPED | OUTPATIENT
Start: 2022-06-07 | End: 2022-11-07

## 2022-06-14 DIAGNOSIS — E84.9 CF (CYSTIC FIBROSIS) (H): ICD-10-CM

## 2022-06-14 RX ORDER — OMEPRAZOLE 40 MG/1
CAPSULE, DELAYED RELEASE ORAL
Qty: 180 CAPSULE | Refills: 1 | Status: SHIPPED | OUTPATIENT
Start: 2022-06-14 | End: 2022-12-19

## 2022-06-29 DIAGNOSIS — E84.9 CF (CYSTIC FIBROSIS) (H): ICD-10-CM

## 2022-06-29 DIAGNOSIS — E08.9 DIABETES MELLITUS RELATED TO CYSTIC FIBROSIS (H): ICD-10-CM

## 2022-06-29 DIAGNOSIS — E84.8 DIABETES MELLITUS RELATED TO CYSTIC FIBROSIS (H): ICD-10-CM

## 2022-06-29 RX ORDER — FLUTICASONE PROPIONATE AND SALMETEROL XINAFOATE 115; 21 UG/1; UG/1
AEROSOL, METERED RESPIRATORY (INHALATION)
Qty: 12 G | Refills: 2 | Status: SHIPPED | OUTPATIENT
Start: 2022-06-29 | End: 2022-11-28

## 2022-06-29 RX ORDER — AZITHROMYCIN 250 MG/1
TABLET, FILM COATED ORAL
Qty: 30 TABLET | Refills: 11 | Status: SHIPPED | OUTPATIENT
Start: 2022-06-29 | End: 2022-11-10

## 2022-06-29 NOTE — TELEPHONE ENCOUNTER
insulin glargine (LANTUS SOLOSTAR) 100 UNIT/ML pen        Last Written Prescription Date:  12/22/21  Last Fill Quantity: 15 ml,   # refills: 1  Last Office Visit : 10/12/21  Future Office visit:  None noted    Routing refill request to provider for review/approval because:  Insulin - refilled per clinic

## 2022-07-14 DIAGNOSIS — E84.9 CF (CYSTIC FIBROSIS) (H): ICD-10-CM

## 2022-07-14 RX ORDER — DORNASE ALFA 1 MG/ML
SOLUTION RESPIRATORY (INHALATION)
Qty: 75 ML | Refills: 3 | Status: SHIPPED | OUTPATIENT
Start: 2022-07-14 | End: 2022-11-11

## 2022-07-15 ENCOUNTER — TELEPHONE (OUTPATIENT)
Dept: PULMONOLOGY | Facility: CLINIC | Age: 27
End: 2022-07-15

## 2022-07-15 RX ORDER — INSULIN GLARGINE 100 [IU]/ML
INJECTION, SOLUTION SUBCUTANEOUS
Qty: 15 ML | Refills: 1 | Status: SHIPPED | OUTPATIENT
Start: 2022-07-15 | End: 2022-12-19

## 2022-07-19 DIAGNOSIS — K86.89 PANCREATIC INSUFFICIENCY: ICD-10-CM

## 2022-07-19 DIAGNOSIS — E84.9 CF (CYSTIC FIBROSIS) (H): ICD-10-CM

## 2022-07-19 RX ORDER — ERGOCALCIFEROL 1.25 MG/1
50000 CAPSULE, LIQUID FILLED ORAL
Qty: 36 CAPSULE | Refills: 1 | Status: SHIPPED | OUTPATIENT
Start: 2022-07-20 | End: 2022-11-28

## 2022-08-01 DIAGNOSIS — E84.0 CYSTIC FIBROSIS WITH PULMONARY MANIFESTATIONS (H): ICD-10-CM

## 2022-08-09 DIAGNOSIS — E08.9 DIABETES MELLITUS DUE TO CYSTIC FIBROSIS (H): ICD-10-CM

## 2022-08-09 DIAGNOSIS — E84.9 DIABETES MELLITUS DUE TO CYSTIC FIBROSIS (H): ICD-10-CM

## 2022-08-09 DIAGNOSIS — E84.9 CF (CYSTIC FIBROSIS) (H): ICD-10-CM

## 2022-08-09 RX ORDER — TOBRAMYCIN INHALATION SOLUTION 300 MG/5ML
INHALANT RESPIRATORY (INHALATION)
Qty: 280 ML | Refills: 3 | Status: SHIPPED | OUTPATIENT
Start: 2022-08-09 | End: 2022-11-11

## 2022-08-13 RX ORDER — BLOOD SUGAR DIAGNOSTIC
STRIP MISCELLANEOUS
Qty: 200 STRIP | Refills: 0 | Status: SHIPPED | OUTPATIENT
Start: 2022-08-13 | End: 2022-11-01

## 2022-08-13 NOTE — TELEPHONE ENCOUNTER
"Test strips    10/12/2021  [facility] [place]     Anjelica Galindo MD    Endocrinology, Diabetes, and Metabolism        \" Return in about 6 months (around 4/12/2022).\"      "

## 2022-09-06 DIAGNOSIS — E10.9 TYPE 1 DIABETES MELLITUS (H): ICD-10-CM

## 2022-09-09 RX ORDER — PROCHLORPERAZINE 25 MG/1
SUPPOSITORY RECTAL
Qty: 3 EACH | Refills: 0 | Status: SHIPPED | OUTPATIENT
Start: 2022-09-09 | End: 2022-10-12

## 2022-09-09 NOTE — TELEPHONE ENCOUNTER
Last Clinic Visit: 10/12/2021  [facility] [place]  Recommended 6 month follow up  NV 9/27/22

## 2022-09-20 NOTE — PROGRESS NOTES
Would like to talk about using the [employer]?    Marleni Alamo  is being evaluated via a billable video visit.      How would you like to obtain your AVS? WorkTouch  For the video visit, send the invitation by: Text to cell phone: [phone]  Will anyone else be joining your video visit? No        Outcome for 09/20/22 4:30 PM: Mediasmartt message sent  Paulineguy Tucker, VF   Outcome for 09/23/22 12:04 PM: Left Voicemail   Paulineguy Tucker, VF  Outcome for 09/26/22 12:46 PM: Replied to Cloud[employer] message  Sandy Jacqui, VF  Outcome for 09/26/22 3:57 PM: Spoke with patient's father. He is having issues with uploading dexcom. Dexcom Technical Support number given. Pt's father, Jah, will send 14 days of BG readings off dexcom via [employer].  Sandy Osman, VF  Outcome for 09/27/22 6:47 AM: Per patient, will send BG readings via Cloud[employer]. Readings are below.   Sandy Osman, VF     Marleni's readings fro her Accu-Chek GCM:  9/10: 11am-140, 4p-216, 720p-111  9/11: 1040a-112, 4p-121, 6p-241  9/12: 230p-287, 630p-112  9/13: 1050a-159, 240p-341, 6p-114, 10p-228  9/14: 4a-73, 1030a-125, 130p-343, 3p-272  9/15: using Dexcom  9/16: 1220p-208  9/17: 1120a-143, 6p-119  9/18: 420a-55, 10a-106  9/19: 150a-149, 1215p-303, 230p-247, 620p-73  9/20: 245p-324, 10p-308  9/21: 11am-122  9/22: 545a-133  9/23: 8p-122, 1020p-151  9/24: 9a-100, 530p-227, 820p-141  9/25: 520a-352, 9a-84, 215p-221, 650p-139  9/26: 940a-188, 215p-359, 440p-235    Marleni is a 27 year old female presents today for Follow Up and Video Visit    HPI    26-year-old female being seen in follow-up for CF related diabetes.  She reports that she was diagnosed with CF related diabetes around age 14.  She also has history of pancreatic insufficiency and cerebral palsy.  Patient was accompanied by her father (Jah) during this video visit.    She is currently using Lantus 18 units daily in the morning.   Humalog 1 unit per ~15 g of carbs for meal.    Takes around 8 units of  Humalog for breakfast, 4-5 for lunch and Dinner  Humalog correction 1 unit for every 50 above 200    She uses Dexcom CGM but ran out of supplies so using fingerstick blood glucose for the last few days.  Patient had difficulty with updating Dexcom data in the clarity portal.  We were not able to review recent data in the clarity portal.  Blood glucose data shared by patient is noted above  Can feel symptoms of low blood glucose, denies any episodes of severe hypoglycemia    Patient is able to self administer insulin.   Patient offers no concerns or complaints   Father is wondering if gayla CGM may be easier to use for her    Hemoglobin A1C   Date Value Ref Range Status   10/26/2021 6.8 (H) 0.0 - 5.6 % Final     Comment:     Normal <5.7%   Prediabetes 5.7-6.4%    Diabetes 6.5% or higher     Note: Adopted from ADA consensus guidelines.   08/31/2020 6.9 (H) 0 - 5.6 % Final     Comment:     Normal <5.7% Prediabetes 5.7-6.4%  Diabetes 6.5% or higher - adopted from ADA   consensus guidelines.             Past Medical History:   Diagnosis Date     Atopy      Cerebral palsy (H)     Botox injextions, managed by Dr. 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     Seizure disorder (H)     Multiple daily in infancy now infrequent (every few years)     Thrush, oral      Allergies  Allergies   Allergen Reactions     Augmentin Hives     Per mother     Ceftin Hives     Per mother     Vancomycin Hives     Per mother      Medications  Current Outpatient Medications   Medication Sig Dispense Refill     ADVAIR -21 MCG/ACT inhaler TAKE 2 PUFFS BY MOUTH TWICE A DAY 12 g 2     albuterol (PROVENTIL) (2.5 MG/3ML) 0.083% neb solution TAKE 1 VIAL (2.5 MG) BY NEBULIZATION 2 TIMES DAILY 150 mL 4     amylase-lipase-protease (CREON) 27294-78867 units CPEP per EC capsule TAKE 4 CAPSULES WITH MEALS AND 3 CAPSULES WITH SNACKS, FOR 3 MEALS AND 2 SNACKS PER DAY. 540 capsule 3     azithromycin (ZITHROMAX) 250 MG tablet TAKE 1 TABLET BY MOUTH EVERY DAY 30 tablet 11     BD STEVIE U/F 32G X 4 MM insulin pen needle USE 4 PEN NEEDLES DAILY OR AS DIRECTED. 400 each 2     blood glucose (ACCU-CHEK GUIDE) test strip Use to test blood sugar 3 times daily or as directed. Call clinic to schedule follow up appointment. 200 strip 0     blood glucose (NO BRAND SPECIFIED) test strip Use to test blood sugar 4 times daily or as directed. 100 strip 0     Continuous Blood Gluc  (DEXCOM G6 ) JAZMINE Use to read blood sugars as per 's instructions. 1 each 0     Continuous Blood Gluc Sensor (DEXCOM G6 SENSOR) MISC Change every 10 days. 3 each 0     Continuous Blood Gluc Transmit (DEXCOM G6 TRANSMITTER) MISC Change every 3 months. 1 each 3     glucagon (GLUCAGON EMERGENCY) 1 MG kit Use as directed for low blood sugar 1 mg 1     insulin aspart (NOVOLOG PEN) 100 UNIT/ML pen 2 units per 15g carbohydrate with meals and correction scale 3 mL 3     insulin lispro (HUMALOG KWIKPEN) 100 UNIT/ML (1 unit dial) KWIKPEN INJECT 10 UNITS SUBQ BEFORE BREAKFAST &1 U PER 12G OF CARBS FOR SNACKS &REMAINING MEALS OF THE DAY, ~5 UNITS @DINNER. MAX 20units DAILY 15 mL 0     insulin pen needle (30G X 8 MM) 30G X 8 MM miscellaneous Use 4 pen needles daily or as directed. 180 each 0     LANTUS SOLOSTAR 100 UNIT/ML soln INJECT 18 UNITS SUBCUTANEOUSLY DAILY. LAB DRAW NEEDED. [facility] [address] [facility] mL 1     mvw complete formulation (CHEWABLES) tablet Take 2  tablets by mouth daily 180 tablet 4     Nutritional Supplements (ENSURE ORIGINAL) LIQD Take 1 Can by mouth At Bedtime 1 Bottle 11     omeprazole (PRILOSEC) 40 MG DR capsule TAKE 1 CAPSULE BY MOUTH TWICE A  capsule 1     PULMOZYME 2.5 MG/2.5ML neb solution NEBULIZE AND INHALE THE CONTENTS OF ONE VIAL INTO THE LUNGS ONCE DAILY 75 mL 3     sodium chloride inhalant 7 % NEBU neb solution Take 4 mLs by nebulization daily 360 mL 11     tobramycin, PF, (CHER) 300 MG/5ML neb solution NEBULIZE AND INHALE THE CONTENTS OF 1 VIAL TWO TIMES A DAY FOR 28 DAYS ON AND 28 DAYS  mL 3     vitamin D2 (ERGOCALCIFEROL) 43888 units (1250 mcg) capsule Take 1 capsule (50,000 Units) by mouth Every Mon, Wed, Fri Morning 36 capsule 1     Family History  family history includes Breast Cancer in an other family member; Diabetes Type 1 in her maternal grandmother and maternal uncle; Diabetes Type 1 (age of onset: 12) in her mother; Thyroid Disease in her maternal aunt, maternal grandmother, mother, and paternal uncle.  Social History     Socioeconomic History     Marital status: Single     Spouse name: Not on file     Number of children: Not on file     Years of education: Not on file     Highest education level: Not on file   Occupational History     Not on file   Tobacco Use     Smoking status: Never Smoker     Smokeless tobacco: Never Used   Substance and Sexual Activity     Alcohol use: No     Alcohol/week: 0.0 standard drinks     Drug use: No     Sexual activity: Never   Other Topics Concern     Not on file   Social History Narrative    Marleni lives at home with mother in [place] with 2 dogs and a cat. 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 Marleni goes to transition school.      Social Determinants of Health     Financial Resource Strain: Not on file   Food Insecurity: Not on file   Transportation Needs: Not on file   Physical Activity: Not on file   Stress: Not on file   Social Connections: Not on file   Intimate Partner Violence:  Not on file   Housing Stability: Not on file   mother had hx of T1D  Live with her father     Physical Exam  There were no vitals taken for this visit.  There is no height or weight on file to calculate BMI.    GENERAL: Healthy, alert and no distress  EYES: Eyes grossly normal to inspection.  No discharge or erythema, or obvious scleral/conjunctival abnormalities.  RESP: No audible wheeze, cough, or visible cyanosis.  No visible retractions or increased work of breathing.    NEURO: Cranial nerves grossly intact.  Mentation and speech appropriate for age.  PSYCH:  affect normal/bright, judgement and insight intact, normal speech and appearance well-groomed.    RESULTS    Hemoglobin A1C   Date Value Ref Range Status   10/26/2021 6.8 (H) 0.0 - 5.6 % Final     Comment:     Normal <5.7%   Prediabetes 5.7-6.4%    Diabetes 6.5% or higher     Note: Adopted from ADA consensus guidelines.   08/31/2020 6.9 (H) 0 - 5.6 % Final     Comment:     Normal <5.7% Prediabetes 5.7-6.4%  Diabetes 6.5% or higher - adopted from ADA   consensus guidelines.         Assessment/Plan:    Cystic fibrosis related diabetes: No recent A1c.    Patient will be starting new CGM this week.  Limited fingerstick blood glucose data with high variability and pattern of postprandial highs  Would be helpful to review CGM data before making any insulin changes  Will have them meet with diabetes educator in 3 to 4 weeks to review CGM data  She is due for eye exam   also due for annual labs with her next pulmonary visit    Return to clinic in about 6 months    DAVID Herrera    Note: Chart documentation done in part with Dragon Voice Recognition software. Although reviewed after completion, some word and grammatical errors may remain. Please consider this when interpreting information in this chart    Video visit done using [employer] video visit start time 10:06 AM, video visit end time 10:28 AM    Time: I spent 30 minutes on the date of the encounter preparing  to see patient (including chart review and preparation), obtaining and or reviewing additional medical history, performing an evaluation, documenting clinical information in the electronic health record, independently interpreting results, communicating results to the patient, and/or coordinating care.

## 2022-09-27 ENCOUNTER — VIRTUAL VISIT (OUTPATIENT)
Dept: ENDOCRINOLOGY | Facility: CLINIC | Age: 27
End: 2022-09-27
Attending: INTERNAL MEDICINE
Payer: COMMERCIAL

## 2022-09-27 DIAGNOSIS — E08.9 DIABETES MELLITUS DUE TO CYSTIC FIBROSIS (H): Primary | ICD-10-CM

## 2022-09-27 DIAGNOSIS — E84.9 DIABETES MELLITUS DUE TO CYSTIC FIBROSIS (H): Primary | ICD-10-CM

## 2022-09-27 PROCEDURE — 99214 OFFICE O/P EST MOD 30 MIN: CPT | Mod: GT | Performed by: INTERNAL MEDICINE

## 2022-09-27 NOTE — PATIENT INSTRUCTIONS
Nice to see you today Marleni!     Please schedule visit with diabetes educator Paulette Danielle in 3 to 4 weeks.

## 2022-09-27 NOTE — LETTER
9/27/2022       RE: Marleni Alamo  [address] [place] [address]     Dear Colleague,    Thank you for referring your patient, Marleni Alamo, to the [facility] [place] at [facility]. Please see a copy of my visit note below.    Would like to talk about using the [employer]?    Marleni Alamo  is being evaluated via a billable video visit.      How would you like to obtain your AVS? [employer]  For the video visit, send the invitation by: Text to cell phone: [phone]  Will anyone else be joining your video visit? No        Outcome for 09/20/22 4:30 PM: [employer] message sent  Pauline Tucker, VF   Outcome for 09/23/22 12:04 PM: Left Voicemail   Pauline Tucker, VF  Outcome for 09/26/22 12:46 PM: Replied to [employer] message  Sandy Osman, VF  Outcome for 09/26/22 3:57 PM: Spoke with patient's father. He is having issues with uploading dexcom. Dexcom Technical Support number given. Pt's father, Jah, will send 14 days of BG readings off dexcom via [employer].  Sandy Osman, VF  Outcome for 09/27/22 6:47 AM: Per patient, will send BG readings via [employer]. Readings are below.   Sandy Osman, VF     Marleni's readings fro her Accu-Chek GCM:  9/10: 11am-140, 4p-216, 720p-111  9/11: 1040a-112, 4p-121, 6p-241  9/12: 230p-287, 630p-112  9/13: 1050a-159, 240p-341, 6p-114, 10p-228  9/14: 4a-73, 1030a-125, 130p-343, 3p-272  9/15: using Dexcom  9/16: 1220p-208  9/17: 1120a-143, 6p-119  9/18: 420a-55, 10a-106  9/19: 150a-149, 1215p-303, 230p-247, 620p-73  9/20: 245p-324, 10p-308  9/21: 11am-122  9/22: 545a-133  9/23: 8p-122, 1020p-151  9/24: 9a-100, 530p-227, 820p-141  9/25: 520a-352, 9a-84, 215p-221, 650p-139  9/26: 940a-188, 215p-359, 440p-235    Marleni is a 27 year old female presents today for Follow Up and Video Visit    HPI    26-year-old female being seen in follow-up for CF related diabetes.  She reports that she was diagnosed with CF  related diabetes around age 14.  She also has history of pancreatic insufficiency and cerebral palsy.  Patient was accompanied by her father (Jah) during this video visit.    She is currently using Lantus 18 units daily in the morning.   Humalog 1 unit per ~15 g of carbs for meal.    Takes around 8 units of Humalog for breakfast, 4-5 for lunch and Dinner  Humalog correction 1 unit for every 50 above 200    She uses Dexcom CGM but ran out of supplies so using fingerstick blood glucose for the last few days.  Patient had difficulty with updating Dexcom data in the clarity portal.  We were not able to review recent data in the clarity portal.  Blood glucose data shared by patient is noted above  Can feel symptoms of low blood glucose, denies any episodes of severe hypoglycemia    Patient is able to self administer insulin.   Patient offers no concerns or complaints   Father is wondering if gayla CGM may be easier to use for her    Hemoglobin A1C   Date Value Ref Range Status   10/26/2021 6.8 (H) 0.0 - 5.6 % Final     Comment:     Normal <5.7%   Prediabetes 5.7-6.4%    Diabetes 6.5% or higher     Note: Adopted from ADA consensus guidelines.   08/31/2020 6.9 (H) 0 - 5.6 % Final     Comment:     Normal <5.7% Prediabetes 5.7-6.4%  Diabetes 6.5% or higher - adopted from ADA   consensus guidelines.             Past Medical History:   Diagnosis Date     Atopy      Cerebral palsy (H)     Botox injextions, managed by Dr. 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     Seizure disorder (H)     Multiple daily in infancy now infrequent (every few years)     Thrush, oral      Allergies  Allergies   Allergen Reactions     Augmentin Hives     Per mother     Ceftin Hives     Per mother     Vancomycin Hives     Per mother     Medications  Current Outpatient Medications   Medication Sig Dispense Refill     ADVAIR -21 MCG/ACT inhaler TAKE 2 PUFFS BY MOUTH TWICE A DAY 12 g 2     albuterol (PROVENTIL) (2.5 MG/3ML) 0.083% neb solution TAKE 1 VIAL (2.5 MG) BY NEBULIZATION 2 TIMES DAILY 150 mL 4     amylase-lipase-protease (CREON) 34997-15637 units CPEP per EC capsule TAKE 4 CAPSULES WITH MEALS AND 3 CAPSULES WITH SNACKS, FOR 3 MEALS AND 2 SNACKS PER DAY. 540 capsule 3     azithromycin (ZITHROMAX) 250 MG tablet TAKE 1 TABLET BY MOUTH EVERY DAY 30 tablet 11     BD STEVIE U/F 32G X 4 MM insulin pen needle USE 4 PEN NEEDLES DAILY OR AS DIRECTED. 400 each 2     blood glucose (ACCU-CHEK GUIDE) test strip Use to test blood sugar 3 times daily or as directed. Call clinic to schedule follow up appointment. 200 strip 0     blood glucose (NO BRAND SPECIFIED) test strip Use to test blood sugar 4 times daily or as directed. 100 strip 0     Continuous Blood Gluc  (DEXCOM G6 ) JAZMINE Use to read blood sugars as per 's instructions. 1 each 0     Continuous Blood Gluc Sensor (DEXCOM G6 SENSOR) MISC Change every 10 days. 3 each 0     Continuous Blood Gluc Transmit (DEXCOM G6 TRANSMITTER) MISC Change every 3 months. 1 each 3     glucagon (GLUCAGON EMERGENCY) 1 MG kit Use as directed for low blood sugar 1 mg 1     insulin aspart (NOVOLOG PEN) 100 UNIT/ML pen 2 units per 15g carbohydrate with meals and correction scale 3 mL 3     insulin lispro (HUMALOG KWIKPEN) 100 UNIT/ML (1 unit dial) KWIKPEN INJECT 10 UNITS SUBQ BEFORE BREAKFAST &1 U PER 12G OF CARBS FOR SNACKS &REMAINING MEALS OF THE DAY, ~5 UNITS @DINNER. MAX  20units DAILY 15 mL 0     insulin pen needle (30G X 8 MM) 30G X 8 MM miscellaneous Use 4 pen needles daily or as directed. 180 each 0     LANTUS SOLOSTAR 100 UNIT/ML soln INJECT 18 UNITS SUBCUTANEOUSLY DAILY. LAB DRAW NEEDED. CALL [phone] TO SCHEDULE 15 mL 1     mvw complete formulation (CHEWABLES) tablet Take 2 tablets by mouth daily 180 tablet 4     Nutritional Supplements (ENSURE ORIGINAL) LIQD Take 1 Can by mouth At Bedtime 1 Bottle 11     omeprazole (PRILOSEC) 40 MG DR capsule TAKE 1 CAPSULE BY MOUTH TWICE A  capsule 1     PULMOZYME 2.5 MG/2.5ML neb solution NEBULIZE AND INHALE THE CONTENTS OF ONE VIAL INTO THE LUNGS ONCE DAILY 75 mL 3     sodium chloride inhalant 7 % NEBU neb solution Take 4 mLs by nebulization daily 360 mL 11     tobramycin, PF, (CHER) 300 MG/5ML neb solution NEBULIZE AND INHALE THE CONTENTS OF 1 VIAL TWO TIMES A DAY FOR 28 DAYS ON AND 28 DAYS  mL 3     vitamin D2 (ERGOCALCIFEROL) 51658 units (1250 mcg) capsule Take 1 capsule (50,000 Units) by mouth Every Mon, Wed, Fri Morning 36 capsule 1     Family History  family history includes Breast Cancer in an other family member; Diabetes Type 1 in her maternal grandmother and maternal uncle; Diabetes Type 1 (age of onset: 12) in her mother; Thyroid Disease in her maternal aunt, maternal grandmother, mother, and paternal uncle.  Social History     Socioeconomic History     Marital status: Single     Spouse name: Not on file     Number of children: Not on file     Years of education: Not on file     Highest education level: Not on file   Occupational History     Not on file   Tobacco Use     Smoking status: Never Smoker     Smokeless tobacco: Never Used   Substance and Sexual Activity     Alcohol use: No     Alcohol/week: 0.0 standard drinks     Drug use: No     Sexual activity: Never   Other Topics Concern     Not on file   Social History Narrative    Marleni lives at home with mother in [place] with 2 dogs and a cat. Mom  manages a tanning salon. Marleni goes to [employer] school.      Social Determinants of Health     Financial Resource Strain: Not on file   Food Insecurity: Not on file   Transportation Needs: Not on file   Physical Activity: Not on file   Stress: Not on file   Social Connections: Not on file   Intimate Partner Violence: Not on file   Housing Stability: Not on file   mother had hx of T1D  Live with her father     Physical Exam  There were no vitals taken for this visit.  There is no height or weight on file to calculate BMI.    GENERAL: Healthy, alert and no distress  EYES: Eyes grossly normal to inspection.  No discharge or erythema, or obvious scleral/conjunctival abnormalities.  RESP: No audible wheeze, cough, or visible cyanosis.  No visible retractions or increased work of breathing.    NEURO: Cranial nerves grossly intact.  Mentation and speech appropriate for age.  PSYCH:  affect normal/bright, judgement and insight intact, normal speech and appearance well-groomed.    RESULTS    Hemoglobin A1C   Date Value Ref Range Status   10/26/2021 6.8 (H) 0.0 - 5.6 % Final     Comment:     Normal <5.7%   Prediabetes 5.7-6.4%    Diabetes 6.5% or higher     Note: Adopted from ADA consensus guidelines.   08/31/2020 6.9 (H) 0 - 5.6 % Final     Comment:     Normal <5.7% Prediabetes 5.7-6.4%  Diabetes 6.5% or higher - adopted from ADA   consensus guidelines.         Assessment/Plan:    Cystic fibrosis related diabetes: No recent A1c.    Patient will be starting new CGM this week.  Limited fingerstick blood glucose data with high variability and pattern of postprandial highs  Would be helpful to review CGM data before making any insulin changes  Will have them meet with diabetes educator in 3 to 4 weeks to review CGM data  She is due for eye exam   also due for annual labs with her next pulmonary visit    Return to clinic in about 6 months    DAVID Herrera    Note: Chart documentation done in part with Dragon Voice  Recognition software. Although reviewed after completion, some word and grammatical errors may remain. Please consider this when interpreting information in this chart    Video visit done using [employer] video visit start time 10:06 AM, video visit end time 10:28 AM    Time: I spent 30 minutes on the date of the encounter preparing to see patient (including chart review and preparation), obtaining and or reviewing additional medical history, performing an evaluation, documenting clinical information in the electronic health record, independently interpreting results, communicating results to the patient, and/or coordinating care.                    Again, thank you for allowing me to participate in the care of your patient.      Sincerely,    Anjelica Galindo MD

## 2022-09-27 NOTE — LETTER
Date:September 28, 2022      Patient was self referred, no letter generated. Do not send.        [facility] Health Information

## 2022-10-06 DIAGNOSIS — E84.9 CF (CYSTIC FIBROSIS) (H): Primary | ICD-10-CM

## 2022-10-06 DIAGNOSIS — E08.9 DIABETES MELLITUS DUE TO CYSTIC FIBROSIS (H): ICD-10-CM

## 2022-10-06 DIAGNOSIS — E84.9 DIABETES MELLITUS DUE TO CYSTIC FIBROSIS (H): ICD-10-CM

## 2022-10-06 DIAGNOSIS — K86.89 PANCREATIC INSUFFICIENCY: ICD-10-CM

## 2022-10-10 DIAGNOSIS — E10.9 TYPE 1 DIABETES MELLITUS (H): ICD-10-CM

## 2022-10-12 ENCOUNTER — TELEPHONE (OUTPATIENT)
Dept: ENDOCRINOLOGY | Facility: CLINIC | Age: 27
End: 2022-10-12

## 2022-10-12 RX ORDER — PROCHLORPERAZINE 25 MG/1
SUPPOSITORY RECTAL
Qty: 3 EACH | Refills: 5 | Status: SHIPPED | OUTPATIENT
Start: 2022-10-12 | End: 2023-12-15

## 2022-10-15 ENCOUNTER — HEALTH MAINTENANCE LETTER (OUTPATIENT)
Age: 27
End: 2022-10-15

## 2022-10-18 ENCOUNTER — OFFICE VISIT (OUTPATIENT)
Dept: OPTOMETRY | Facility: CLINIC | Age: 27
End: 2022-10-18
Attending: INTERNAL MEDICINE
Payer: COMMERCIAL

## 2022-10-18 DIAGNOSIS — H52.03 HYPEROPIA, BILATERAL: ICD-10-CM

## 2022-10-18 DIAGNOSIS — E08.9 DIABETES MELLITUS DUE TO CYSTIC FIBROSIS (H): Primary | ICD-10-CM

## 2022-10-18 DIAGNOSIS — H52.223 REGULAR ASTIGMATISM OF BOTH EYES: ICD-10-CM

## 2022-10-18 DIAGNOSIS — E84.9 DIABETES MELLITUS DUE TO CYSTIC FIBROSIS (H): Primary | ICD-10-CM

## 2022-10-18 PROCEDURE — 92004 COMPRE OPH EXAM NEW PT 1/>: CPT | Performed by: OPTOMETRIST

## 2022-10-18 ASSESSMENT — VISUAL ACUITY
OS_SC: 20/40
OD_SC: 20/60
OS_PH_SC: 20/40
OD_SC: 20/60
OS_SC: 20/50
METHOD: SNELLEN - LINEAR
OD_PH_SC: 20/50
OD_SC+: -2
OS_PH_SC+: -1

## 2022-10-18 ASSESSMENT — REFRACTION_MANIFEST
OD_AXIS: 080
OD_SPHERE: +0.50
OS_SPHERE: -2.25
OD_CYLINDER: +3.00
OD_CYLINDER: +3.00
OS_SPHERE: -2.25
METHOD_AUTOREFRACTION: 1
OS_CYLINDER: +4.75
OS_AXIS: 088
OS_AXIS: 090
OD_SPHERE: PLANO
OS_CYLINDER: +4.50
OD_AXIS: 085

## 2022-10-18 ASSESSMENT — KERATOMETRY
OD_K1POWER_DIOPTERS: 44.50
OS_K1POWER_DIOPTERS: 44.50
OS_K2POWER_DIOPTERS: 48.25
OD_AXISANGLE2_DEGREES: 174
OD_AXISANGLE_DEGREES: 84
OS_AXISANGLE_DEGREES: 79
OD_K2POWER_DIOPTERS: 47.50
OS_AXISANGLE2_DEGREES: 169

## 2022-10-18 ASSESSMENT — CONF VISUAL FIELD
OD_SUPERIOR_TEMPORAL_RESTRICTION: 0
OS_INFERIOR_NASAL_RESTRICTION: 0
OS_SUPERIOR_TEMPORAL_RESTRICTION: 0
OD_NORMAL: 1
OS_SUPERIOR_NASAL_RESTRICTION: 0
OD_SUPERIOR_NASAL_RESTRICTION: 0
OS_INFERIOR_TEMPORAL_RESTRICTION: 0
OD_INFERIOR_NASAL_RESTRICTION: 0
OS_NORMAL: 1
OD_INFERIOR_TEMPORAL_RESTRICTION: 0

## 2022-10-18 ASSESSMENT — TONOMETRY: IOP_METHOD: BOTH EYES NORMAL BY PALPATION

## 2022-10-18 ASSESSMENT — EXTERNAL EXAM - RIGHT EYE: OD_EXAM: NORMAL

## 2022-10-18 ASSESSMENT — CUP TO DISC RATIO
OD_RATIO: 0.2
OS_RATIO: 0.2

## 2022-10-18 ASSESSMENT — EXTERNAL EXAM - LEFT EYE: OS_EXAM: NORMAL

## 2022-10-18 ASSESSMENT — SLIT LAMP EXAM - LIDS
COMMENTS: NORMAL
COMMENTS: NORMAL

## 2022-10-18 NOTE — Clinical Note
10/18/2022         RE: Marleni Alamo  [address] [place] [address]        Dear Colleague,    Thank you for referring your patient, Marleni Alamo, to the [facility]. Please see a copy of my visit note below.    Chief Complaint   Patient presents with     Diabetic Eye Exam     Accompanied by father in waiting room   Chief Complaint(s) and History of Present Illness(es)     Diabetic Eye Exam                Lab Results   Component Value Date    A1C 6.8 10/26/2021    A1C 6.9 08/31/2020    A1C 8.1 02/26/2018            Last Eye Exam: unknown  Dilated Previously: No, side effects of dilation explained today    What are you currently using to see?  does not use glasses or contacts    Distance Vision Acuity: Noticed gradual change in both eyes    Near Vision Acuity: Satisfied with vision while reading and using computer unaided    Eye Comfort: good  Do you use eye drops? 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       OBJECTIVE: See Ophthalmology exam    ASSESSMENT:    ICD-10-CM    1. Diabetes mellitus due to cystic fibrosis (H) - Both Eyes  E84.9 Adult Eye  Referral    E08.9       2. Regular astigmatism of both eyes - Both Eyes  H52.223       3. Hyperopia, bilateral - Both Eyes  H52.03           PLAN:    Marleni Alamo aware  eye exam results will be sent to Clinics, [facility].  Patient Instructions   Patient Education  Diabetes weakens the blood vessels all over the body, including the eyes. Damage to the blood vessels in the eyes can cause swelling or bleeding into part of the eye (called the retina). This is called diabetic retinopathy (MERLY-tin-AH-puh-thee). If not treated, this disease can cause vision loss or blindness.   Symptoms may include blurred or distorted vision, but many people have no symptoms. It's important  to see your eye doctor regularly to check for problems.   Early treatment and good control can help protect your vision. Here are the things you can do to help prevent vision loss:      1. Keep your blood sugar levels under tight control.      2. Bring high blood pressure under control.      3. No smoking.      4. Have yearly dilated eye exams.       Astigmatism results from curvature differential in the cornea and crystalline lens which can cause a distorted image, as light rays are prevented from meeting at a common focus.      Eyeglass prescription given.    The affects of the dilating drops last for 4- 6 hours.  You will be more sensitive to light and vision will be blurry up close.  Do not drive if you do not feel comfortable.  Mydriatic sunglasses were given if needed.    Recommend annual eye exams.      Prisca Mckeon O.D.  [address] [address]    [phone]               Again, thank you for allowing me to participate in the care of your patient.        Sincerely,        Prisca Mckeon, OD

## 2022-10-18 NOTE — PATIENT INSTRUCTIONS
Patient Education   Diabetes weakens the blood vessels all over the body, including the eyes. Damage to the blood vessels in the eyes can cause swelling or bleeding into part of the eye (called the retina). This is called diabetic retinopathy (MERLY-tin-AH-puh-thee). If not treated, this disease can cause vision loss or blindness.   Symptoms may include blurred or distorted vision, but many people have no symptoms. It's important to see your eye doctor regularly to check for problems.   Early treatment and good control can help protect your vision. Here are the things you can do to help prevent vision loss:      1. Keep your blood sugar levels under tight control.      2. Bring high blood pressure under control.      3. No smoking.      4. Have yearly dilated eye exams.       Astigmatism results from curvature differential in the cornea and crystalline lens which can cause a distorted image, as light rays are prevented from meeting at a common focus.      Eyeglass prescription given.    The affects of the dilating drops last for 4- 6 hours.  You will be more sensitive to light and vision will be blurry up close.  Do not drive if you do not feel comfortable.  Mydriatic sunglasses were given if needed.    Recommend annual eye exams.      Prisca Mckeon O.D.  [facility]   [address] [place] [address] [phone]

## 2022-10-18 NOTE — PROGRESS NOTES
Chief Complaint   Patient presents with     Diabetic Eye Exam     Accompanied by father in waiting room   Chief Complaint(s) and History of Present Illness(es)     Diabetic Eye Exam                Lab Results   Component Value Date    A1C 6.8 10/26/2021    A1C 6.9 08/31/2020    A1C 8.1 02/26/2018            Last Eye Exam: unknown  Dilated Previously: No, side effects of dilation explained today    What are you currently using to see?  does not use glasses or contacts    Distance Vision Acuity: Noticed gradual change in both eyes    Near Vision Acuity: Satisfied with vision while reading and using computer unaided    Eye Comfort: good  Do you use eye drops? 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       OBJECTIVE: See Ophthalmology exam    ASSESSMENT:    ICD-10-CM    1. Diabetes mellitus due to cystic fibrosis (H) - Both Eyes  E84.9 Adult Eye  Referral    E08.9       2. Regular astigmatism of both eyes - Both Eyes  H52.223       3. Hyperopia, bilateral - Both Eyes  H52.03           PLAN:    Marleni Alamo aware  eye exam results will be sent to Clinics, [facility].  Patient Instructions   Patient Education  Diabetes weakens the blood vessels all over the body, including the eyes. Damage to the blood vessels in the eyes can cause swelling or bleeding into part of the eye (called the retina). This is called diabetic retinopathy (MERLY-tin--puh-thee). If not treated, this disease can cause vision loss or blindness.   Symptoms may include blurred or distorted vision, but many people have no symptoms. It's important to see your eye doctor regularly to check for problems.   Early treatment and good control can help protect your vision. Here are the things you can do to help prevent vision loss:      1. Keep your blood sugar levels under tight control.      2. Bring  high blood pressure under control.      3. No smoking.      4. Have yearly dilated eye exams.       Astigmatism results from curvature differential in the cornea and crystalline lens which can cause a distorted image, as light rays are prevented from meeting at a common focus.      Eyeglass prescription given.    The affects of the dilating drops last for 4- 6 hours.  You will be more sensitive to light and vision will be blurry up close.  Do not drive if you do not feel comfortable.  Mydriatic sunglasses were given if needed.    Recommend annual eye exams.      Prisca Mckeon O.D.  [address] [id number] [phone]

## 2022-10-19 NOTE — TELEPHONE ENCOUNTER
This form is in regards to coverage under Medicare Part B.  PA team doesn't handle Part B requests.

## 2022-10-21 DIAGNOSIS — E08.9 DIABETES MELLITUS DUE TO CYSTIC FIBROSIS (H): ICD-10-CM

## 2022-10-21 DIAGNOSIS — E84.9 DIABETES MELLITUS DUE TO CYSTIC FIBROSIS (H): ICD-10-CM

## 2022-10-22 NOTE — TELEPHONE ENCOUNTER
insulin lispro (HUMALOG KWIKPEN) 100 UNIT/ML (1 unit dial) KWIKPEN 15 mL 0 9/26/2022         Last Written Prescription Date:  9-  Last Fill Quantity: 15ml,   # refills: 0  Last Office Visit : 9-  Future Office visit:  none    Routing refill request to provider for review/approval because:  Insulin - refilled per clinic        Kathleen M Doege RN

## 2022-10-24 RX ORDER — INSULIN LISPRO 100 [IU]/ML
INJECTION, SOLUTION INTRAVENOUS; SUBCUTANEOUS
Qty: 30 ML | Refills: 0 | Status: SHIPPED | OUTPATIENT
Start: 2022-10-24 | End: 2023-04-10

## 2022-10-24 NOTE — TELEPHONE ENCOUNTER
Short Acting Insulin Protocol Failed 10/22/2022 01:52 PM   Protocol Details  HgbA1C in past 3 or 6 months     A1c order in place through Dr Leger. Pt scheduled for lab draw 11/10/22.

## 2022-10-28 DIAGNOSIS — E84.9 DIABETES MELLITUS DUE TO CYSTIC FIBROSIS (H): ICD-10-CM

## 2022-10-28 DIAGNOSIS — E08.9 DIABETES MELLITUS DUE TO CYSTIC FIBROSIS (H): ICD-10-CM

## 2022-10-31 ENCOUNTER — APPOINTMENT (OUTPATIENT)
Dept: OPTOMETRY | Facility: CLINIC | Age: 27
End: 2022-10-31
Payer: MEDICARE

## 2022-10-31 PROCEDURE — 92340 FIT SPECTACLES MONOFOCAL: CPT | Performed by: OPTOMETRIST

## 2022-11-01 RX ORDER — BLOOD SUGAR DIAGNOSTIC
STRIP MISCELLANEOUS
Qty: 300 STRIP | Refills: 3 | Status: SHIPPED | OUTPATIENT
Start: 2022-11-01 | End: 2023-12-08

## 2022-11-07 DIAGNOSIS — E84.9 CF (CYSTIC FIBROSIS) (H): ICD-10-CM

## 2022-11-07 RX ORDER — ALBUTEROL SULFATE 0.83 MG/ML
SOLUTION RESPIRATORY (INHALATION)
Qty: 150 ML | Refills: 4 | Status: SHIPPED | OUTPATIENT
Start: 2022-11-07 | End: 2023-02-09

## 2022-11-09 DIAGNOSIS — K86.89 PANCREATIC INSUFFICIENCY: ICD-10-CM

## 2022-11-09 DIAGNOSIS — E84.9 CF (CYSTIC FIBROSIS) (H): ICD-10-CM

## 2022-11-09 RX ORDER — PEDIATRIC MULTIVIT 61/D3/VIT K 1500-800
CAPSULE ORAL
Qty: 180 TABLET | Refills: 4 | Status: SHIPPED | OUTPATIENT
Start: 2022-11-09 | End: 2023-12-26

## 2022-11-10 ENCOUNTER — OFFICE VISIT (OUTPATIENT)
Dept: PULMONOLOGY | Facility: CLINIC | Age: 27
End: 2022-11-10
Attending: INTERNAL MEDICINE
Payer: COMMERCIAL

## 2022-11-10 ENCOUNTER — OFFICE VISIT (OUTPATIENT)
Dept: PHARMACY | Facility: CLINIC | Age: 27
End: 2022-11-10
Payer: COMMERCIAL

## 2022-11-10 ENCOUNTER — LAB (OUTPATIENT)
Dept: LAB | Facility: CLINIC | Age: 27
End: 2022-11-10
Attending: INTERNAL MEDICINE
Payer: COMMERCIAL

## 2022-11-10 VITALS
DIASTOLIC BLOOD PRESSURE: 66 MMHG | HEART RATE: 69 BPM | OXYGEN SATURATION: 96 % | HEIGHT: 60 IN | BODY MASS INDEX: 23.75 KG/M2 | SYSTOLIC BLOOD PRESSURE: 100 MMHG | WEIGHT: 121 LBS

## 2022-11-10 DIAGNOSIS — E84.9 CF (CYSTIC FIBROSIS) (H): ICD-10-CM

## 2022-11-10 DIAGNOSIS — E08.9 DIABETES MELLITUS RELATED TO CYSTIC FIBROSIS (H): ICD-10-CM

## 2022-11-10 DIAGNOSIS — E84.8 DIABETES MELLITUS RELATED TO CYSTIC FIBROSIS (H): ICD-10-CM

## 2022-11-10 DIAGNOSIS — R79.89 ELEVATED LFTS: Primary | ICD-10-CM

## 2022-11-10 DIAGNOSIS — E84.9 CF (CYSTIC FIBROSIS) (H): Primary | ICD-10-CM

## 2022-11-10 DIAGNOSIS — J32.4 CHRONIC PANSINUSITIS: ICD-10-CM

## 2022-11-10 DIAGNOSIS — E84.9 DIABETES MELLITUS DUE TO CYSTIC FIBROSIS (H): ICD-10-CM

## 2022-11-10 DIAGNOSIS — E08.9 DIABETES MELLITUS DUE TO CYSTIC FIBROSIS (H): ICD-10-CM

## 2022-11-10 DIAGNOSIS — E08.9 DIABETES MELLITUS DUE TO CYSTIC FIBROSIS (H): Primary | ICD-10-CM

## 2022-11-10 DIAGNOSIS — K86.89 PANCREATIC INSUFFICIENCY: ICD-10-CM

## 2022-11-10 DIAGNOSIS — E84.9 DIABETES MELLITUS DUE TO CYSTIC FIBROSIS (H): Primary | ICD-10-CM

## 2022-11-10 LAB
ALBUMIN SERPL BCG-MCNC: 4.6 G/DL (ref 3.5–5.2)
ALP SERPL-CCNC: 305 U/L (ref 35–104)
ALT SERPL W P-5'-P-CCNC: 98 U/L (ref 10–35)
ANION GAP SERPL CALCULATED.3IONS-SCNC: 11 MMOL/L (ref 7–15)
AST SERPL W P-5'-P-CCNC: 40 U/L (ref 10–35)
BASOPHILS # BLD AUTO: 0.1 10E3/UL (ref 0–0.2)
BASOPHILS NFR BLD AUTO: 1 %
BILIRUB DIRECT SERPL-MCNC: <0.2 MG/DL (ref 0–0.3)
BILIRUB SERPL-MCNC: 0.8 MG/DL
BUN SERPL-MCNC: 9.2 MG/DL (ref 6–20)
CALCIUM SERPL-MCNC: 10.2 MG/DL (ref 8.6–10)
CHLORIDE SERPL-SCNC: 103 MMOL/L (ref 98–107)
CHOLEST SERPL-MCNC: 148 MG/DL
CK SERPL-CCNC: 37 U/L (ref 26–192)
CREAT SERPL-MCNC: 0.56 MG/DL (ref 0.51–0.95)
DEPRECATED HCO3 PLAS-SCNC: 26 MMOL/L (ref 22–29)
EOSINOPHIL # BLD AUTO: 0.2 10E3/UL (ref 0–0.7)
EOSINOPHIL NFR BLD AUTO: 2 %
ERYTHROCYTE [DISTWIDTH] IN BLOOD BY AUTOMATED COUNT: 17.2 % (ref 10–15)
EXPTIME-PRE: 4.96 SEC
FEF2575-%PRED-PRE: 93 %
FEF2575-PRE: 3.16 L/SEC
FEF2575-PRED: 3.38 L/SEC
FEFMAX-%PRED-PRE: 99 %
FEFMAX-PRE: 6.33 L/SEC
FEFMAX-PRED: 6.37 L/SEC
FEV1-%PRED-PRE: 91 %
FEV1-PRE: 2.59 L
FEV1FEV6-PRE: 89 %
FEV1FEV6-PRED: 86 %
FEV1FVC-PRE: 89 %
FEV1FVC-PRED: 86 %
FIFMAX-PRE: 4.97 L/SEC
FVC-%PRED-PRE: 88 %
FVC-PRE: 2.93 L
FVC-PRED: 3.29 L
GFR SERPL CREATININE-BSD FRML MDRD: >90 ML/MIN/1.73M2
GGT SERPL-CCNC: 183 U/L (ref 5–36)
GLUCOSE SERPL-MCNC: 158 MG/DL (ref 70–99)
HBA1C MFR BLD: 7 %
HCT VFR BLD AUTO: 37.6 % (ref 35–47)
HDLC SERPL-MCNC: 60 MG/DL
HGB BLD-MCNC: 11.4 G/DL (ref 11.7–15.7)
IMM GRANULOCYTES # BLD: 0 10E3/UL
IMM GRANULOCYTES NFR BLD: 0 %
INR PPP: 0.99 (ref 0.85–1.15)
IRON SERPL-MCNC: 18 UG/DL (ref 37–145)
LDLC SERPL CALC-MCNC: 60 MG/DL
LYMPHOCYTES # BLD AUTO: 1.7 10E3/UL (ref 0.8–5.3)
LYMPHOCYTES NFR BLD AUTO: 23 %
MAGNESIUM SERPL-MCNC: 1.8 MG/DL (ref 1.7–2.3)
MCH RBC QN AUTO: 23.5 PG (ref 26.5–33)
MCHC RBC AUTO-ENTMCNC: 30.3 G/DL (ref 31.5–36.5)
MCV RBC AUTO: 78 FL (ref 78–100)
MONOCYTES # BLD AUTO: 0.5 10E3/UL (ref 0–1.3)
MONOCYTES NFR BLD AUTO: 6 %
NEUTROPHILS # BLD AUTO: 4.9 10E3/UL (ref 1.6–8.3)
NEUTROPHILS NFR BLD AUTO: 68 %
NONHDLC SERPL-MCNC: 88 MG/DL
NRBC # BLD AUTO: 0 10E3/UL
NRBC BLD AUTO-RTO: 0 /100
PHOSPHATE SERPL-MCNC: 3 MG/DL (ref 2.5–4.5)
PLATELET # BLD AUTO: 330 10E3/UL (ref 150–450)
POTASSIUM SERPL-SCNC: 3.9 MMOL/L (ref 3.4–5.3)
PROT SERPL-MCNC: 8.2 G/DL (ref 6.4–8.3)
RBC # BLD AUTO: 4.85 10E6/UL (ref 3.8–5.2)
SODIUM SERPL-SCNC: 140 MMOL/L (ref 136–145)
TRIGL SERPL-MCNC: 140 MG/DL
TSH SERPL DL<=0.005 MIU/L-ACNC: 2.14 UIU/ML (ref 0.3–4.2)
WBC # BLD AUTO: 7.3 10E3/UL (ref 4–11)

## 2022-11-10 PROCEDURE — 83735 ASSAY OF MAGNESIUM: CPT

## 2022-11-10 PROCEDURE — 85610 PROTHROMBIN TIME: CPT

## 2022-11-10 PROCEDURE — 82785 ASSAY OF IGE: CPT

## 2022-11-10 PROCEDURE — 85018 HEMOGLOBIN: CPT

## 2022-11-10 PROCEDURE — 83036 HEMOGLOBIN GLYCOSYLATED A1C: CPT

## 2022-11-10 PROCEDURE — 84446 ASSAY OF VITAMIN E: CPT

## 2022-11-10 PROCEDURE — 99214 OFFICE O/P EST MOD 30 MIN: CPT | Mod: 25 | Performed by: INTERNAL MEDICINE

## 2022-11-10 PROCEDURE — 83540 ASSAY OF IRON: CPT

## 2022-11-10 PROCEDURE — 82306 VITAMIN D 25 HYDROXY: CPT

## 2022-11-10 PROCEDURE — 80061 LIPID PANEL: CPT

## 2022-11-10 PROCEDURE — 36415 COLL VENOUS BLD VENIPUNCTURE: CPT

## 2022-11-10 PROCEDURE — 82977 ASSAY OF GGT: CPT

## 2022-11-10 PROCEDURE — 82784 ASSAY IGA/IGD/IGG/IGM EACH: CPT

## 2022-11-10 PROCEDURE — 84590 ASSAY OF VITAMIN A: CPT

## 2022-11-10 PROCEDURE — 84100 ASSAY OF PHOSPHORUS: CPT

## 2022-11-10 PROCEDURE — 82248 BILIRUBIN DIRECT: CPT

## 2022-11-10 PROCEDURE — 97803 MED NUTRITION INDIV SUBSEQ: CPT | Performed by: DIETITIAN, REGISTERED

## 2022-11-10 PROCEDURE — 94375 RESPIRATORY FLOW VOLUME LOOP: CPT | Performed by: INTERNAL MEDICINE

## 2022-11-10 PROCEDURE — 99207 PR NO CHARGE LOS: CPT | Performed by: PHARMACIST

## 2022-11-10 PROCEDURE — 82550 ASSAY OF CK (CPK): CPT

## 2022-11-10 PROCEDURE — 85041 AUTOMATED RBC COUNT: CPT

## 2022-11-10 PROCEDURE — 84443 ASSAY THYROID STIM HORMONE: CPT

## 2022-11-10 PROCEDURE — G0463 HOSPITAL OUTPT CLINIC VISIT: HCPCS | Mod: 25

## 2022-11-10 RX ORDER — GLUCAGON 3 MG/1
1 POWDER NASAL PRN
Qty: 1 EACH | Refills: 1 | Status: SHIPPED | OUTPATIENT
Start: 2022-11-10

## 2022-11-10 RX ORDER — AZITHROMYCIN 250 MG/1
250 TABLET, FILM COATED ORAL
Qty: 30 TABLET | Refills: 11 | COMMUNITY
Start: 2022-11-11 | End: 2022-11-10

## 2022-11-10 ASSESSMENT — PAIN SCALES - GENERAL: PAINLEVEL: NO PAIN (0)

## 2022-11-10 NOTE — NURSING NOTE
Chief Complaint   Patient presents with     Labs Only     Labs drawn via  by vascular access.     Labs drawn with  by vascular access. Pt unable to void in lab, specimen cup sent with pt to next appointment.     Connie Alanis RN

## 2022-11-10 NOTE — Clinical Note
11/10/2022         RE: Marleni Alamo  [address] [place] [address]        Dear Colleague,    Thank you for referring your patient, Marleni Alamo, to the [facility] [place]. Please see a copy of my visit note below.    CF Annual Nutrition Assessment     Reason for Assessment  Assessed during Dr. Leger CF clinic r/t increased nutrition risk with diagnosis of CF per protocol.      Nutrition Significant PMH  Mild Lung Disease  Pancreatic Insufficient   CFRD   GERD  Cerebral palsy - managed at [facility]  Leoncio Canalou syndrome      Anthropometric Assessment  Height: 60 inches (152.4 cm)   Weight: 54.9 kg (121 lbs)  IBW based on BMI 22 for females and 23 for males per CF Foundation recs: 51 kg (112 lbs)  Body mass index is 23.63 kg/m .     Since coming to live with her dad she has been able to lose a little weight through healthy dietary changes. She and dad are happy with her current weight/eating pattern.    Wt Readings from Last 5 Encounters:   11/10/22 54.9 kg (121 lb)   21 54.4 kg (120 lb)   21 56.4 kg (124 lb 5.4 oz)   20 56.4 kg (124 lb 5.4 oz)   19 61.5 kg (135 lb 9.3 oz)        Pancreatic Enzymes  Brand: Creon [id number]  Dosin with meals, 3 with snacks = 1745 units lipase/kg/meal  Estimated Daily Intake: 18 caps = 7850 units lipase/kg/day    GI Comments: no s/sx malabsorption  Enzyme Program: None, does not qualify      Diet History and Assessment  Diet Preferences/Allergies/Intolerances: Regular  Intake Recall/Comments: Marleni reports that she maintains a good appetite. She is eating 2-3 meals daily and some snacks prn, not consistently. Dad says she does pretty good with eating veggies.   Diet Recall:  Breakfast- bowl cereal (cocoa solomon), 1/2 cup coffee with almond milk  Lunch- PB toast or mac+ cheese  Dinner- sushi or tacos or spaghetti; also drinks Boost or Ensure once daily  Snacks- apple or string  cheese    Calcium: ~2 servings/day; milk in cereal, some cheese, boost/ensure  Salt: Table salt, added to foods  Supplements: Yes - Boost or Ensure daily    Laboratory Assessment  Annual study labs updated today, vitamin levels pending during clinic visit.   Vitamin A- 0.38 wnl  Vitamin D- 46 wnl  Vitamin E- 11.4 wnl  Iron- 18 low, decreased from 27 last year  Lipid Panel- wnl    DEXA- 2018, lowest T-score -1    Current Vitamin/Mineral Supplements: MVW Complete chewable- 2 tablets/day; Vitamin D2 50,000 units 3x/week  Comments: obtains from [facility]    CF Related Diabetes Evaluation  Hgb A1C: 7% on 11/10/22    Lantus 18 units + Humalog 1:15  Dexcom CGM  Last seen by Dr. Galindo 9/27/22.       NUTRITION DIAGNOSIS  Impaired nutrient utilization related pancreatic insufficiency as evidenced by requires pancreatic enzyme replacement and vitamin/mineral supplementation to maintain nutrition status with CF.     Interventions/Recommendations  1) Oral Intake/Weight: Continue with good PO intake/variety for weight maintenance and overall health. Adalid and Marleni are interested in some additional quick, easy lunch ideas. Discussed batch-cooking on weekends as well as building up freezer stock of meals to re-heat. Plan to send GetMyRx message with additional ideas. Follows with endocrine team for CFRD.      2) Enzymes: no GI concerns noted. Will continue current enzyme dose.     3) Vitamin/Mineral Supplementation: Reviewed labs available today including low iron (decreased from last year). Reviewed options for supplementation and plan to start Vitron-C once daily x3 months. Discussed cost associated with vitamin/minerals. Adalid had tried applying for [employer] in the past but said he was not eligible, but also felt confused with certain questions. Will ask pharmacy team to reach out to Adalid about application.     GOALS:  1) Maintain BMI >/= 22 kg/m2.   2) Vitamin levels and iron wnl     FOLLOW-UP/MONITORING:  Visit patient within 12  month(s) for annual visit   -- Recommend re-check iron panel in 3-6 months to monitor adequacy of supplementation     Time Spent In Face-to-Face Patient Interactions: 15 minutes    Renetta Gilmore RD, LD  Cystic Fibrosis/Lung Transplant Dietitian  Pager [phone]          Respiratory Therapist Note:         Vest                Brand: Hill-Rom - traditional Hill Rom: Frequencies 8, 9, 10 at pressure 10 then frequencies 18, 19, 20 at pressure 6.                Cough Pause: Cough Pause; Yes                Vest Garment Size: Adult Medium                Last Fitting Date: Due as needed                Frequency of therapy: 14 times per week                Concerns: None         Exercise (purposeful and aerobic for >20 minutes each session): Yes - amount : Walking dogs - no cardio                Does this qualify as additional airway clearance: No         Alternative Airway Clearance:         Nebulized Medications                Bronchodilators: Albuterol                Mucolytic: Pulmozyme                Antibiotics: CHER                Additional Inhaled Medications: 0.9% Normal Saline                Spacer Use:          Review Cleaning: Yes. Top rack of .         Education and Transition Information                Correct order of inhaled medications: Yes                Mechanism of Action of inhaled medications: Yes                Frequency of inhaled medications: Yes                Dosage of inhaled medications: Yes                Other:          Home Care:                Nebulizer Cups (Brand/Type): Zhanna                Nebulizer Compressor                            Year Purchased: Frankly Chat Pro Neb Max - works                            [facility], Phone: [phone], Fax: [phone]                Nebulizer Supply Company:                            [facility], Phone: [phone], Fax: [phone]         Oxygen: N/A         Pulmonary Rehab                Site:                 Date  Completed:         Plan of Care and Goals for next visit: keep up the great work with your airway clearance. I will place an order through [facility] for more nebulizer cups and masks.        Again, thank you for allowing me to participate in the care of your patient.        Sincerely,        Edi Leger MD

## 2022-11-10 NOTE — PROGRESS NOTES
Clinical Pharmacy Consult:                                                    Marleni Alamo is a 27 year old female seen for a clinical pharmacist consult.  She was referred to me from Dr. Leger. Adalid Tyson was present for visit.    Reason for Consult: Annual Medication Review    Discussion: Full [facility] visit not completed today due to time, patient did not wish to stay for comprehensive review. Updates are as follows:    1. Marleni reports the majority of her medications today with some assistance from Adalid. Dad also helps with medications at home. They utilize local [facility] for most medications.    2. Azithromycin: Per visit with Dr. Leger. Unclear what dose of azithromycin Marleni should be on. Currently on 250mg three times weekly, usually dosing is either 250mg daily or 500mg three times weekly. Reviewed chart, no history of Pseudomonas since before 2018. Dad could not remember when Marleni grew Pseudomonas before then. Ok to stop azithromycin.    3. Glucagon: Adalid reports glucagon kit is likely . Wondering if this can be replaced. Offered education on Baqsimi as convenient alternative. Advised to reach out if not covered by insurance so other options can be reviewed. Adalid and Marleni agree to plan.    4. Trikafta: Per visit with Dr. Leger, Marleni is interested in starting Trikafta. Unfortunately today LFTs are elevated, and per Dr. Leger plan to completed liver work up and then consider Trikafta in the future. Advised pharmacist will provide education at that time, Adalid requests literature via [employer] to review in interim.      Plan:  1. Keep up the good work on medications!  2. Ok to stop azithromycin per Dr. Leger  3. Baqsimi sent to local [facility]   4. Liver work up and future consideration for Trikafta per Dr. Leger. Patient information on Trikafta sent via [employer].         Nora Singh, Zayda  Cystic Fibrosis MTM Pharmacist  [place] [facility]  [facility] Hospitalmail: [phone]

## 2022-11-10 NOTE — PATIENT INSTRUCTIONS
"Cystic Fibrosis Self-Care Plan    RECOMMENDATIONS:   Help us provide the best possible care. If you receive a questionnaire from the CF Foundation about your clinic experience today please fill it out.  It should take less than 5 minutes. Let us know what we are doing well and how we can improve.    Liver ultrasound  Otherwise continue current medication, nebs and vest therapy.   Start iron supplement \"Vitron C\" daily for 3 months. Prescription sent to [facility].         [facility] [place] Nurse line:  Yasmeen Nunez  [phone]     [facility] [place] Fax Number:      [phone]         Cystic Fibrosis Respiratory Therapists:   Nabila Sheikh                          [phone]          Debbie Payne   [phone]  Cystic Fibrosis Dietitians:              Renetta Gilmore              [phone]                            Megan Goddard                        [phone]   Cystic Fibrosis Diabetes Nurse:    Paulette Danielle               [phone]    Cystic Fibrosis Social Workers:     Kaykay Rachel               [phone]                     Zehra Ware               [phone]  Cystic Fibrosis Pharmacists:           Mónica Chapman                              [phone] (pager)         Nora Singh      [phone]   Cystic Fibrosis Genetic Counselor:   Cami Rutledge    [phone]    [facility] [facility] [place] website:  [email or web address]    COVID VACCINES:    You are eligible for the COVID-19 vaccine. Sign up for your COVID vaccine via OOYYO. Log in, select the menu bar, select schedule an appointment, and then select COVID-19 Vaccine 1st Dose. You may also schedule by calling this number [phone] however hold times can be long.       OR schedule through the [place] Department of Health Vaccine Connector at https://vaccineconnector.[place].gov/ or by calling [phone].      The best vaccine is the one that s available to you first.  All " COVID-19 vaccines currently available in the [place] (Mike & Mike, Pfizer and Moderna) have been shown to be highly effect at preventing COVID-19.       We re still learning how vaccines will affect the spread of COVID-19. After you ve been fully vaccinated against COVID-19, you should keep taking precautions in public places like wearing a mask, staying 6 feet apart from others, and avoiding crowds and poorly ventilated spaces until we know more.       MRN: [id number]   Clinic Date: November 10, 2022   Patient: Marleni Alamo     Annual Studies:   IGG   Date Value Ref Range Status   02/26/2018 1,320 695 - 1,620 mg/dL Final     Immunoglobulin G   Date Value Ref Range Status   10/26/2021 1,335 610-1,616 mg/dL Final     No results found for: INS  There are no preventive care reminders to display for this patient.    Pulmonary Function Tests  FEV1: amount of air you can blow out in 1 second  FVC: total amount of air you can take in and blow out    Your Goals:         PFT Latest Ref Rng & Units 11/10/2022   FVC L 2.93   FEV1 L 2.59   FVC% % 88   FEV1% % 91          Airway Clearance: The Most Important Way to Keep Your Lungs Healthy  Vest Settings:   Hill-Rom Frequencies: 8, 9, 10 Pressure 10 Then, Frequencies 18, 19, 20 Pressure 6     RespirTech: Quick Start with Pressure of     Do each frequency for 5 minutes; Deflate vest after each frequency & cough 3 times before beginning the next setting.    Vest and Neb Therapy should be done 2 times/day.    Good Nutrition Can Improve Lung Function and Overall Health    Take ALL of your vitamins with food    Take 1/2 of your enzymes before EVERY meal/snack and the other 1/2 mid-meal/snack    Wt Readings from Last 3 Encounters:   11/10/22 54.9 kg (121 lb)   11/01/21 54.4 kg (120 lb)   06/21/21 56.4 kg (124 lb 5.4 oz)       Body mass index is 23.63 kg/m .         National CF Foundation Recommendations for BMI in CF Adults: Women: at least 22 Men: at least 23         Controlling Blood Sugars Helps Prevent Lung Infections & Improves Nutrition  Test blood sugar:    In the morning before eating (goal is )    2 hours after a meal (goal is less than 150)    When pre-meal glucose is greater than 150 add correction    At bedtime (if less than 100 eat a snack with 15 grams of carbohydrates  Last A1C Results:   Hemoglobin A1C   Date Value Ref Range Status   10/26/2021 6.8 (H) 0.0 - 5.6 % Final     Comment:     Normal <5.7%   Prediabetes 5.7-6.4%    Diabetes 6.5% or higher     Note: Adopted from ADA consensus guidelines.   08/31/2020 6.9 (H) 0 - 5.6 % Final     Comment:     Normal <5.7% Prediabetes 5.7-6.4%  Diabetes 6.5% or higher - adopted from ADA   consensus guidelines.           If diabetic, measure A1C every 6 months. Goal: Under 7%    Staying Healthy  Research:  If you are interested in learning about research opportunities or have questions, please contact the CF Research Team at [phone] or [email or web address].    CF Foundation:  Compass is a personalized resource service to help you with the insurance, financial, legal and other issues you are facing.  It's free, confidential and available to anyone with CF.  Ask your  for more information or contact Compass directly at 143-NGGUVLS ([phone]) or compass@cff.org, or learn more at cff.org/compass.

## 2022-11-10 NOTE — LETTER
Date:December 2, 2022      Patient was self referred, no letter generated. Do not send.        [facility] Health Information

## 2022-11-11 LAB
DEPRECATED CALCIDIOL+CALCIFEROL SERPL-MC: 46 UG/L (ref 20–75)
IGG SERPL-MCNC: 1525 MG/DL (ref 610–1616)

## 2022-11-11 RX ORDER — TOBRAMYCIN INHALATION SOLUTION 300 MG/5ML
INHALANT RESPIRATORY (INHALATION)
Qty: 280 ML | Refills: 3 | Status: SHIPPED | OUTPATIENT
Start: 2022-11-11 | End: 2023-06-28

## 2022-11-11 RX ORDER — DORNASE ALFA 1 MG/ML
SOLUTION RESPIRATORY (INHALATION)
Qty: 75 ML | Refills: 3 | Status: SHIPPED | OUTPATIENT
Start: 2022-11-11 | End: 2023-03-10

## 2022-11-13 LAB
A-TOCOPHEROL VIT E SERPL-MCNC: 11.4 MG/L
ANNOTATION COMMENT IMP: ABNORMAL
BETA+GAMMA TOCOPHEROL SERPL-MCNC: <0.2 MG/L
RETINYL PALMITATE SERPL-MCNC: 0.11 MG/L
VIT A SERPL-MCNC: 0.38 MG/L

## 2022-11-14 LAB — IGE SERPL-ACNC: 45 KU/L (ref 0–114)

## 2022-11-14 NOTE — PROGRESS NOTES
CF Annual Nutrition Assessment     Reason for Assessment  Assessed during Dr. Leger CF clinic r/t increased nutrition risk with diagnosis of CF per protocol.      Nutrition Significant PMH  Mild Lung Disease  Pancreatic Insufficient   CFRD   GERD  Cerebral palsy - managed at [facility] syndrome      Anthropometric Assessment  Height: 60 inches (152.4 cm)   Weight: 54.9 kg (121 lbs)  IBW based on BMI 22 for females and 23 for males per CF Foundation recs: 51 kg (112 lbs)  Body mass index is 23.63 kg/m .     Since coming to live with her dad she has been able to lose a little weight through healthy dietary changes. She and dad are happy with her current weight/eating pattern.    Wt Readings from Last 5 Encounters:   11/10/22 54.9 kg (121 lb)   21 54.4 kg (120 lb)   21 56.4 kg (124 lb 5.4 oz)   20 56.4 kg (124 lb 5.4 oz)   19 61.5 kg (135 lb 9.3 oz)        Pancreatic Enzymes  Brand: Creon [id number]  Dosin with meals, 3 with snacks = 1745 units lipase/kg/meal  Estimated Daily Intake: 18 caps = 7850 units lipase/kg/day    GI Comments: no s/sx malabsorption  Enzyme Program: None, does not qualify      Diet History and Assessment  Diet Preferences/Allergies/Intolerances: Regular  Intake Recall/Comments: Marleni reports that she maintains a good appetite. She is eating 2-3 meals daily and some snacks prn, not consistently. Dad says she does pretty good with eating veggies.   Diet Recall:  Breakfast- bowl cereal (cocoa solomon), 1/2 cup coffee with almond milk  Lunch- PB toast or mac+ cheese  Dinner- sushi or tacos or spaghetti; also drinks Boost or Ensure once daily  Snacks- apple or string cheese    Calcium: ~2 servings/day; milk in cereal, some cheese, boost/ensure  Salt: Table salt, added to foods  Supplements: Yes - Boost or Ensure daily    Laboratory Assessment  Annual study labs updated today, vitamin levels pending during clinic visit.   Vitamin A- 0.38 wnl  Vitamin D-  46 wnl  Vitamin E- 11.4 wnl  Iron- 18 low, decreased from 27 last year  Lipid Panel- wnl    DEXA- 2018, lowest T-score -1    Current Vitamin/Mineral Supplements: MVW Complete chewable- 2 tablets/day; Vitamin D2 50,000 units 3x/week  Comments: obtains from P    CF Related Diabetes Evaluation  Hgb A1C: 7% on 11/10/22    Lantus 18 units + Humalog 1:15  Dexcom CGM  Last seen by Dr. Galindo 9/27/22.       NUTRITION DIAGNOSIS  Impaired nutrient utilization related pancreatic insufficiency as evidenced by requires pancreatic enzyme replacement and vitamin/mineral supplementation to maintain nutrition status with CF.     Interventions/Recommendations  1) Oral Intake/Weight: Continue with good PO intake/variety for weight maintenance and overall health. Adalid and Marleni are interested in some additional quick, easy lunch ideas. Discussed batch-cooking on weekends as well as building up freezer stock of meals to re-heat. Plan to send [employer] message with additional ideas. Follows with endocrine team for CFRD.      2) Enzymes: no GI concerns noted. Will continue current enzyme dose.     3) Vitamin/Mineral Supplementation: Reviewed labs available today including low iron (decreased from last year). Reviewed options for supplementation and plan to start Vitron-C once daily x3 months. Discussed cost associated with vitamin/minerals. Adalid had tried applying for [employer] in the past but said he was not eligible, but also felt confused with certain questions. Will ask pharmacy team to reach out to Adalid about application.     GOALS:  1) Maintain BMI >/= 22 kg/m2.   2) Vitamin levels and iron wnl     FOLLOW-UP/MONITORING:  Visit patient within 12 month(s) for annual visit   -- Recommend re-check iron panel in 3-6 months to monitor adequacy of supplementation     Time Spent In Face-to-Face Patient Interactions: 15 minutes    Renetta Gilmore RD, LD  Cystic Fibrosis/Lung Transplant Dietitian  Pager [phone]

## 2022-11-16 ENCOUNTER — MYC MEDICAL ADVICE (OUTPATIENT)
Dept: PULMONOLOGY | Facility: CLINIC | Age: 27
End: 2022-11-16

## 2022-11-17 NOTE — PROGRESS NOTES
Respiratory Therapist Note:         Vest                Brand: Hill-Rom - traditional Hill Rom: Frequencies 8, 9, 10 at pressure 10 then frequencies 18, 19, 20 at pressure 6.                Cough Pause: Cough Pause; Yes                Vest Garment Size: Adult Medium                Last Fitting Date: Due as needed                Frequency of therapy: 14 times per week                Concerns: None         Exercise (purposeful and aerobic for >20 minutes each session): Yes - amount : Walking dogs - no cardio                Does this qualify as additional airway clearance: No         Alternative Airway Clearance:         Nebulized Medications                Bronchodilators: Albuterol                Mucolytic: Pulmozyme                Antibiotics: CHER                Additional Inhaled Medications: 0.9% Normal Saline                Spacer Use:          Review Cleaning: Yes. Top rack of .         Education and Transition Information                Correct order of inhaled medications: Yes                Mechanism of Action of inhaled medications: Yes                Frequency of inhaled medications: Yes                Dosage of inhaled medications: Yes                Other:          Home Care:                Nebulizer Cups (Brand/Type): Zhanna                Nebulizer Compressor                            Year Purchased: Zhanna Pro Neb Max - works                            [facility], Phone: [phone], Fax: [phone]                Nebulizer Supply Company:                            [facility], Phone: [phone], Fax: [phone]         Oxygen: N/A         Pulmonary Rehab                Site:                 Date Completed:         Plan of Care and Goals for next visit: keep up the great work with your airway clearance. I will place an order through [facility] for more nebulizer cups and masks.

## 2022-11-25 DIAGNOSIS — E84.9 CF (CYSTIC FIBROSIS) (H): ICD-10-CM

## 2022-11-25 DIAGNOSIS — K86.89 PANCREATIC INSUFFICIENCY: ICD-10-CM

## 2022-11-28 RX ORDER — ERGOCALCIFEROL 1.25 MG/1
50000 CAPSULE, LIQUID FILLED ORAL
Qty: 36 CAPSULE | Refills: 1 | Status: SHIPPED | OUTPATIENT
Start: 2022-11-28 | End: 2023-04-17

## 2022-11-28 RX ORDER — FLUTICASONE PROPIONATE AND SALMETEROL XINAFOATE 115; 21 UG/1; UG/1
AEROSOL, METERED RESPIRATORY (INHALATION)
Qty: 12 G | Refills: 2 | Status: SHIPPED | OUTPATIENT
Start: 2022-11-28 | End: 2023-03-07

## 2022-12-14 NOTE — TELEPHONE ENCOUNTER
Additional attempt to reach patient via phone. Left another voicemail with name and call back. Offered to assist with helping schedule an imaging appointment. Per chart review, the [email or web address] message was also read already on 11/28/22.    INDRA Valadez

## 2022-12-14 NOTE — PROGRESS NOTES
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    Maintenance   Modulator: None currently, discussing Trikafta  Mutation: [id number]/H198des  AW Clearance: Vest  Bronchodilators:  Albuterol  Mucolytics: Pulmozyme, Hypertonic (7%),   Antibiotics Inh:  CHER  Antibiotics Oral: none   Other: Advair  Colonization Hx:  MRSA, MSSA, Streptococcus anginosus, Achromobacter xylosoxidans/denitrificans  Annual Studies:  Due November 2023  Contraindications to standard of care:    Cystic fibrosis/pulmonary: The patient reports very good exercise tolerance.  Recent increase in cough which is gradually improving and attributed to allergies.  No sputum production.  She is oxygenating well.  PFTs in the normal range and similar to her recent past.  The patient does not appear to be having a pulmonary exacerbation at this time.  She has been on azithromycin but it is unclear what dose.  In review of her cultures, she has not grown Pseudomonas in any of the recent cultures and has not grown any gram-negative's and a couple of years.  I have suggested that she discontinue her azithromycin.  She will continue vest therapy twice daily with current nebs including every other month CHER.  CHER could be reconsidered if she remains free of gram-negative organisms.    CFTR Modulation: Previously reluctant to initiate Trikafta due to concerns about blood draws and difficulty with consistent clinic follow-up.  At this time, she and her father are interested in initiating Trikafta.  We did discuss the risks and benefits including need for quarterly labs.  LFTs came back after her visit and were noted to be elevated.  Further evaluation will need to be pursued including a right upper quadrant ultrasound and possibly  hepatology consultation prior to initiation of Trikafta.    Exocrine pancreatic insufficiency: The patient did have about a 10 pound weight loss a year ago but has been stable since that time and in an adequate range.  She will continue her current pancreatic enzyme replacement and supplemental vitamins.  Vitamin levels are adequate on annual studies.    CF related diabetes: Glucose appears to be well controlled with current insulin regimen based on the patient's report.  Hemoglobin A1c is slightly above goal at 7.0.  Will defer to the endocrine service for optimization.    CF related sinus disease: Last sinus surgery was in 2019.  The patient denies symptoms of sinusitis at this time.    GERD: Adequately controlled with current omeprazole.    Healthcare maintenance: The patient completed annual studies today.Electrolytes and kidney function are within normal limits.  Calcium is mildly elevated, unclear significance.  Alkaline phosphatase, ALT and AST are elevated, bilirubin is within normal limits.  We will proceed with right upper quadrant ultrasound and possible hepatology consultation prior to initiating Trikafta.  Iron level is low, Vitron-C will be initiated based on the CF dietitian recommendations.  Hemoglobin is low at 11.4 although improved from previous.  The patient is up-to-date on her COVID vaccines and has received the influenza vaccine for this flu season.    Follow-up in 3 months with PFTs, sputum culture and labs.    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  This is her first CF clinic visit since June 2021.  The patient had COVID in May 2022.  Fortunately she had minimal symptoms which have resolved entirely.    Breathing is comfortable at rest.  No dyspnea with usual activities, exercise tolerance is  "at baseline.  Patient does report a \"lingering\" cough for the past few months.  No sputum production.  Cough is gradually improving, attributed possibly to allergies.  No chest pain.  No fever, chills or night sweats.  She is doing vest therapy twice daily.    Review of systems:  Appetite is good.  No ear pain, sore throat, sinus pain or rhinorrhea  No nausea, vomiting, diarrhea or abdominal pain  Easy bruising  Glucose 120-1:30 in the morning.  A complete ROS was otherwise negative except as noted in the HPI.    Current Outpatient Medications   Medication     ferrous fumarate 65 mg, [employer] [employer]. FE,-Vitamin C 125 mg (VITRON C)  MG TABS tablet     ADVAIR -21 MCG/ACT inhaler     albuterol (PROVENTIL) (2.5 MG/3ML) 0.083% neb solution     amylase-lipase-protease (CREON) 02374-95747 units CPEP per EC capsule     BD STEVIE U/F 32G X 4 MM insulin pen needle     blood glucose (ACCU-CHEK GUIDE) test strip     Continuous Blood Gluc  (DEXCOM G6 ) JAZMINE     Continuous Blood Gluc Sensor (DEXCOM G6 SENSOR) MISC     Continuous Blood Gluc Transmit (DEXCOM G6 TRANSMITTER) MISC     dornase daya (PULMOZYME) 2.5 MG/2.5ML neb solution     Glucagon (BAQSIMI TWO PACK) 3 MG/DOSE POWD     insulin lispro (HUMALOG KWIKPEN) 100 UNIT/ML (1 unit dial) KWIKPEN     LANTUS SOLOSTAR 100 UNIT/ML soln     mvw complete formulation (CHEWABLES) tablet     Nutritional Supplements (ENSURE ORIGINAL) LIQD     omeprazole (PRILOSEC) 40 MG DR capsule     sodium chloride inhalant 7 % NEBU neb solution     tobramycin, PF, (CHER) 300 MG/5ML neb solution     vitamin D2 (ERGOCALCIFEROL) 65350 units (1250 mcg) capsule     No current facility-administered medications for this visit.     Allergies   Allergen Reactions     Augmentin Hives     Per mother     Ceftin Hives     Per mother     Vancomycin Hives     Per mother     Past Medical History:   Diagnosis Date     Atopy      Cerebral palsy (H)     Botox injextions, managed by Dr. 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 7/25/2016     Seizure disorder (H)     Multiple daily in infancy now infrequent (every few years)     Thrush, oral        Past Surgical History:   Procedure Laterality Date     bilat antrostomies  04/2011     Bilater knee surgery with plates and pins Bilateral 2007     CHOLECYSTECTOMY, LAPOROSCOPIC  02/2015     Cleft palate repair and mandibular advancement  1996     gastrostomy in infancy       Multiple PET (ear tubes)       Multiple sinus surgeries  2015     OPTICAL TRACKING SYSTEM ENDOSCOPIC SINUS SURGERY Bilateral 03/01/2019    Procedure: Stealth Assisted Bilateral Maxillary Antrostomy, Ethmoidectomy, Sphenoidotomy, Frontal Sinusotomy;  Surgeon: Anny Carbajal MD;  Location: [facility] OR     RESECT TRACHEA WITH RECONSTRUCTION CHILD  1998    Rib for reconstruction     STRABISMUS SURGERY       surgery for meconium ileus, partial bowel resection  08/1995    Details not available     tracheostomy in infancy         Social History     Socioeconomic History     Marital status: Single     Spouse name: Not on file     Number of children: Not on file     Years of education: Not on file     Highest education level: Not on file   Occupational History     Not on file   Tobacco Use     Smoking status: Never     Smokeless tobacco: Never   Substance and Sexual Activity     Alcohol use: No     Alcohol/week: 0.0 standard drinks     Drug use: No     Sexual activity: Never   Other Topics Concern     Not on  file   Social History Narrative    Marleni lives at home with mother in [place] with 2 dogs and a cat. 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 Marleni goes to transition school.      Social Determinants of Health     Financial Resource Strain: Not on file   Food Insecurity: Not on file   Transportation Needs: Not on file   Physical Activity: Not on file   Stress: Not on file   Social Connections: Not on file   Intimate Partner Violence: Not on file   Housing Stability: Not on file         /66 (BP Location: Right arm, Patient Position: Chair, Cuff Size: Adult Regular)   Pulse 69   Ht 1.524 m (5')   Wt 54.9 kg (121 lb)   SpO2 96%   BMI 23.63 kg/m    Body mass index is 23.63 kg/m .  Exam:   GENERAL APPEARANCE: Well developed, well nourished, alert, and in no apparent distress.  EYES: PERRL, EOMI  HENT: Nasal mucosa with edema and no hyperemia. No nasal polyps.  EARS: Canals clear, TMs normal  MOUTH: Oral mucosa is moist, without any lesions, no tonsillar enlargement, no oropharyngeal exudate.  Bifid uvula.  NECK: supple, no masses, no thyromegaly.  LYMPHATICS: No significant axillary, cervical, or supraclavicular nodes.  RESP: normal percussion, good air flow throughout.  No crackles. No rhonchi. No wheezes.  CV: Normal S1, S2, regular rhythm, normal rate. No murmur.  No rub. No gallop. No LE edema.   ABDOMEN:  Bowel sounds normal, soft, nontender, no HSM or masses.   MS: extremities normal. No clubbing. No cyanosis.  SKIN: no rash on limited exam  NEURO: Mentation intact, speech normal, normal strength and tone, normal gait and stance  PSYCH: mentation appears normal. and affect normal/bright  Results:   Latest Reference Range & Units 11/10/22 14:22   Sodium 136 - 145 mmol/L 140   Potassium 3.4 - 5.3 mmol/L 3.9   Chloride 98 - 107 mmol/L 103   Carbon Dioxide (CO2) 22 - 29 mmol/L 26   Urea Nitrogen 6.0 - 20.0 mg/dL 9.2   Creatinine 0.51 - 0.95 mg/dL 0.56   GFR Estimate >60 mL/min/1.73m2 >90   Calcium 8.6 - 10.0  mg/dL 10.2 (H)   Anion Gap 7 - 15 mmol/L 11   Magnesium 1.7 - 2.3 mg/dL 1.8   Phosphorus 2.5 - 4.5 mg/dL 3.0   Albumin 3.5 - 5.2 g/dL 4.6   Protein Total 6.4 - 8.3 g/dL 8.2   Alkaline Phosphatase 35 - 104 U/L 305 (H)   ALT 10 - 35 U/L 98 (H)   AST 10 - 35 U/L 40 (H)   Bilirubin Direct 0.00 - 0.30 mg/dL <0.20   Bilirubin Total <=1.2 mg/dL 0.8   Cholesterol <200 mg/dL 148   CK Total 26 - 192 U/L 37   GGT 5 - 36 U/L 183 (H)   Glucose 70 - 99 mg/dL 158 (H)   HDL Cholesterol >=50 mg/dL 60   Hemoglobin A1C <5.7 % 7.0 (H)   IGE 0 - 114 kU/L 45   Iron 37 - 145 ug/dL 18 (L)   LDL Cholesterol Calculated <=100 mg/dL 60   Non HDL Cholesterol <130 mg/dL 88   Retinol Palmitate 0.00 - 0.10 mg/L 0.11 (H)   Triglycerides <150 mg/dL 140   TSH 0.30 - 4.20 uIU/mL 2.14   Vitamin A 0.30 - 1.20 mg/L 0.38   Vitamin A Interp  See Note   Vitamin D Deficiency screening 20 - 75 ug/L 46   Vitamin E 5.5 - 18.0 mg/L 11.4   Vitamin E Gamma 0.0 - 6.0 mg/L <0.2   WBC 4.0 - 11.0 10e3/uL 7.3   Hemoglobin 11.7 - 15.7 g/dL 11.4 (L)   Hematocrit 35.0 - 47.0 % 37.6   Platelet Count 150 - 450 10e3/uL 330   RBC Count 3.80 - 5.20 10e6/uL 4.85   MCV 78 - 100 fL 78   MCH 26.5 - 33.0 pg 23.5 (L)   MCHC 31.5 - 36.5 g/dL 30.3 (L)   RDW 10.0 - 15.0 % 17.2 (H)   % Neutrophils % 68   % Lymphocytes % 23   % Monocytes % 6   % Eosinophils % 2   % Basophils % 1   Absolute Basophils 0.0 - 0.2 10e3/uL 0.1   Absolute Eosinophils 0.0 - 0.7 10e3/uL 0.2   Absolute Immature Granulocytes <=0.4 10e3/uL 0.0   Absolute Lymphocytes 0.8 - 5.3 10e3/uL 1.7   Absolute Monocytes 0.0 - 1.3 10e3/uL 0.5   % Immature Granulocytes % 0   Absolute Neutrophils 1.6 - 8.3 10e3/uL 4.9   Absolute NRBCs 10e3/uL 0.0   NRBCs per 100 WBC <1 /100 0   INR 0.85 - 1.15  0.99   (H): Data is abnormally high  (L): Data is abnormally low         Latest Reference Range & Units 11/10/22 14:41   FVC-Pred L 3.29 (P)   FVC-Pre L 2.93 (P)   FVC-%Pred-Pre % 88 (P)   FEV1-Pre L 2.59 (P)   FEV1-%Pred-Pre % 91 (P)    FEV1FVC-Pred % 86 (P)   FEV1FVC-Pre % 89 (P)   FEV1FEV6-Pred % 86 (P)   FEV1FEV6-Pre % 89 (P)   FEFMax-Pred L/sec 6.37 (P)   FEFMax-Pre L/sec 6.33 (P)   FEFMax-%Pred-Pre % 99 (P)   FEF2575-Pred L/sec 3.38 (P)   FEF2575-Pre L/sec 3.16 (P)   PXG8777-%Pred-Pre % 93 (P)   FIFMax-Pre L/sec 4.97 (P)   ExpTime-Pre sec 4.96 (P)   (P): Preliminary          Results as noted above.    PFT Interpretation:  Normal spirometry.  Unchanged from previous.   Similar to recent best.  Valid Maneuver             CF Exacerbation  Absent

## 2022-12-16 NOTE — TELEPHONE ENCOUNTER
Spoke to patient's father, Jah. Provided him with number to call [facility] to schedule lab appointment and number to call imaging to schedule liver ultrasound. Jah verbalized understanding and will follow up with scheduling.    INDRA Valadez

## 2022-12-19 DIAGNOSIS — E84.8 DIABETES MELLITUS RELATED TO CYSTIC FIBROSIS (H): ICD-10-CM

## 2022-12-19 DIAGNOSIS — E84.9 CF (CYSTIC FIBROSIS) (H): ICD-10-CM

## 2022-12-19 DIAGNOSIS — E08.9 DIABETES MELLITUS RELATED TO CYSTIC FIBROSIS (H): ICD-10-CM

## 2022-12-19 RX ORDER — OMEPRAZOLE 40 MG/1
CAPSULE, DELAYED RELEASE ORAL
Qty: 180 CAPSULE | Refills: 1 | Status: SHIPPED | OUTPATIENT
Start: 2022-12-19 | End: 2023-05-01

## 2022-12-19 RX ORDER — INSULIN GLARGINE 100 [IU]/ML
INJECTION, SOLUTION SUBCUTANEOUS
Qty: 30 ML | Refills: 1 | Status: SHIPPED | OUTPATIENT
Start: 2022-12-19 | End: 2023-10-27

## 2022-12-19 NOTE — TELEPHONE ENCOUNTER
CYNDY and sent [employer] 12/19/2022 for pt to c/back to schedule f/up with Dr. Galindo in March 2023.

## 2022-12-19 NOTE — TELEPHONE ENCOUNTER
LANTUS SOLOSTAR 100 UNIT/ML      Last Written Prescription Date:  7-15-22  Last Fill Quantity: 15 ml,   # refills: 1  Last Office Visit : 9-27-22  Future Office visit:  none    Routing refill request to provider for review/approval because:  Insulin - refilled per clinic

## 2022-12-21 NOTE — TELEPHONE ENCOUNTER
CYNDY and sent MyChart 12/21/2022 for pt to c/back to schedule f/up with Dr. Galindo in March 2023. MAX NUMBER OF ATTEMPTS REACHED.

## 2023-01-18 ENCOUNTER — ANCILLARY PROCEDURE (OUTPATIENT)
Dept: ULTRASOUND IMAGING | Facility: CLINIC | Age: 28
End: 2023-01-18
Attending: INTERNAL MEDICINE
Payer: COMMERCIAL

## 2023-01-18 ENCOUNTER — LAB (OUTPATIENT)
Dept: LAB | Facility: CLINIC | Age: 28
End: 2023-01-18
Attending: INTERNAL MEDICINE
Payer: COMMERCIAL

## 2023-01-18 DIAGNOSIS — E84.9 CF (CYSTIC FIBROSIS) (H): ICD-10-CM

## 2023-01-18 DIAGNOSIS — R79.89 ELEVATED LFTS: ICD-10-CM

## 2023-01-18 DIAGNOSIS — Z11.59 ENCOUNTER FOR SCREENING FOR OTHER VIRAL DISEASES: ICD-10-CM

## 2023-01-18 DIAGNOSIS — E08.9 DIABETES MELLITUS RELATED TO CYSTIC FIBROSIS (H): ICD-10-CM

## 2023-01-18 DIAGNOSIS — R79.89 ELEVATED LFTS: Primary | ICD-10-CM

## 2023-01-18 DIAGNOSIS — E08.9 DIABETES MELLITUS DUE TO CYSTIC FIBROSIS (H): ICD-10-CM

## 2023-01-18 DIAGNOSIS — E84.8 DIABETES MELLITUS RELATED TO CYSTIC FIBROSIS (H): ICD-10-CM

## 2023-01-18 DIAGNOSIS — J32.4 CHRONIC PANSINUSITIS: ICD-10-CM

## 2023-01-18 DIAGNOSIS — E84.9 DIABETES MELLITUS DUE TO CYSTIC FIBROSIS (H): ICD-10-CM

## 2023-01-18 DIAGNOSIS — K86.89 PANCREATIC INSUFFICIENCY: ICD-10-CM

## 2023-01-18 LAB
ALBUMIN SERPL BCG-MCNC: 3.9 G/DL (ref 3.5–5.2)
ALP SERPL-CCNC: 192 U/L (ref 35–104)
ALT SERPL W P-5'-P-CCNC: 70 U/L (ref 10–35)
AST SERPL W P-5'-P-CCNC: ABNORMAL U/L
BILIRUB DIRECT SERPL-MCNC: ABNORMAL MG/DL
BILIRUB SERPL-MCNC: 0.8 MG/DL
CK SERPL-CCNC: 84 U/L (ref 26–192)
PROT SERPL-MCNC: 7.2 G/DL (ref 6.4–8.3)

## 2023-01-18 PROCEDURE — 86381 MITOCHONDRIAL ANTIBODY EACH: CPT

## 2023-01-18 PROCEDURE — 36415 COLL VENOUS BLD VENIPUNCTURE: CPT

## 2023-01-18 PROCEDURE — 82550 ASSAY OF CK (CPK): CPT

## 2023-01-18 PROCEDURE — 86706 HEP B SURFACE ANTIBODY: CPT

## 2023-01-18 PROCEDURE — 84155 ASSAY OF PROTEIN SERUM: CPT

## 2023-01-18 PROCEDURE — 86803 HEPATITIS C AB TEST: CPT

## 2023-01-18 PROCEDURE — 76705 ECHO EXAM OF ABDOMEN: CPT | Performed by: STUDENT IN AN ORGANIZED HEALTH CARE EDUCATION/TRAINING PROGRAM

## 2023-01-18 PROCEDURE — 87340 HEPATITIS B SURFACE AG IA: CPT

## 2023-01-18 NOTE — NURSING NOTE
Chief Complaint   Patient presents with     Labs Only     Labs drawn by RN via .     Labs collected from venipuncture by RN.     Praveena Kuo RN

## 2023-01-20 LAB
HBV SURFACE AB SERPL IA-ACNC: 47.22 M[IU]/ML
HBV SURFACE AB SERPL IA-ACNC: REACTIVE M[IU]/ML
HBV SURFACE AG SERPL QL IA: NONREACTIVE
HCV AB SERPL QL IA: NONREACTIVE
MITOCHONDRIA M2 IGG SER-ACNC: 3 U/ML

## 2023-01-23 DIAGNOSIS — E10.9 TYPE 1 DIABETES MELLITUS (H): ICD-10-CM

## 2023-01-24 DIAGNOSIS — E84.9 CF (CYSTIC FIBROSIS) (H): Primary | ICD-10-CM

## 2023-01-26 RX ORDER — PROCHLORPERAZINE 25 MG/1
SUPPOSITORY RECTAL
Qty: 1 EACH | Refills: 3 | Status: SHIPPED | OUTPATIENT
Start: 2023-01-26 | End: 2024-05-02

## 2023-01-27 DIAGNOSIS — E84.8 DIABETES MELLITUS RELATED TO CYSTIC FIBROSIS (H): Primary | ICD-10-CM

## 2023-01-27 DIAGNOSIS — E08.9 DIABETES MELLITUS RELATED TO CYSTIC FIBROSIS (H): Primary | ICD-10-CM

## 2023-02-01 RX ORDER — PEN NEEDLE, DIABETIC 32GX 5/32"
NEEDLE, DISPOSABLE MISCELLANEOUS
Qty: 400 EACH | Refills: 1 | Status: SHIPPED | OUTPATIENT
Start: 2023-02-01 | End: 2023-08-15

## 2023-02-02 DIAGNOSIS — E84.0 CYSTIC FIBROSIS WITH PULMONARY MANIFESTATIONS (H): ICD-10-CM

## 2023-02-09 ENCOUNTER — OFFICE VISIT (OUTPATIENT)
Dept: PHARMACY | Facility: CLINIC | Age: 28
End: 2023-02-09
Payer: COMMERCIAL

## 2023-02-09 ENCOUNTER — OFFICE VISIT (OUTPATIENT)
Dept: PULMONOLOGY | Facility: CLINIC | Age: 28
End: 2023-02-09
Attending: INTERNAL MEDICINE
Payer: COMMERCIAL

## 2023-02-09 ENCOUNTER — ALLIED HEALTH/NURSE VISIT (OUTPATIENT)
Dept: CARE COORDINATION | Facility: CLINIC | Age: 28
End: 2023-02-09

## 2023-02-09 VITALS
DIASTOLIC BLOOD PRESSURE: 64 MMHG | RESPIRATION RATE: 18 BRPM | SYSTOLIC BLOOD PRESSURE: 107 MMHG | HEIGHT: 61 IN | BODY MASS INDEX: 23.98 KG/M2 | OXYGEN SATURATION: 98 % | HEART RATE: 63 BPM | WEIGHT: 127 LBS

## 2023-02-09 DIAGNOSIS — K86.89 PANCREATIC INSUFFICIENCY: ICD-10-CM

## 2023-02-09 DIAGNOSIS — E84.9 DIABETES MELLITUS DUE TO CYSTIC FIBROSIS (H): ICD-10-CM

## 2023-02-09 DIAGNOSIS — E84.9 CF (CYSTIC FIBROSIS) (H): Primary | ICD-10-CM

## 2023-02-09 DIAGNOSIS — E08.9 DIABETES MELLITUS DUE TO CYSTIC FIBROSIS (H): ICD-10-CM

## 2023-02-09 DIAGNOSIS — Z13.9 RISK AND FUNCTIONAL ASSESSMENT: Primary | ICD-10-CM

## 2023-02-09 DIAGNOSIS — E84.9 CF (CYSTIC FIBROSIS) (H): ICD-10-CM

## 2023-02-09 DIAGNOSIS — E84.0 CYSTIC FIBROSIS WITH PULMONARY MANIFESTATIONS (H): Primary | ICD-10-CM

## 2023-02-09 DIAGNOSIS — E84.9 CYSTIC FIBROSIS EXACERBATION (H): ICD-10-CM

## 2023-02-09 PROCEDURE — 99207 PR NO CHARGE LOS: CPT | Performed by: PHARMACIST

## 2023-02-09 PROCEDURE — 94375 RESPIRATORY FLOW VOLUME LOOP: CPT | Performed by: INTERNAL MEDICINE

## 2023-02-09 PROCEDURE — 87070 CULTURE OTHR SPECIMN AEROBIC: CPT | Performed by: INTERNAL MEDICINE

## 2023-02-09 RX ORDER — ALBUTEROL SULFATE 0.83 MG/ML
2.5 SOLUTION RESPIRATORY (INHALATION) 2 TIMES DAILY
Qty: 180 ML | Refills: 11 | Status: SHIPPED | OUTPATIENT
Start: 2023-02-09 | End: 2024-02-14

## 2023-02-09 RX ORDER — DOXYCYCLINE 100 MG/1
100 CAPSULE ORAL 2 TIMES DAILY
Qty: 42 CAPSULE | Refills: 0 | Status: SHIPPED | OUTPATIENT
Start: 2023-02-09 | End: 2023-03-02

## 2023-02-09 ASSESSMENT — PAIN SCALES - GENERAL: PAINLEVEL: NO PAIN (0)

## 2023-02-09 NOTE — PROGRESS NOTES
Medication Therapy Management (MTM) Encounter    ASSESSMENT:                            Medication Adherence/Access: Patient would benefit from pillbox    CF: PFTs are stable. Warm handoff regarding neb cups to RT    CFTR: Trikafta labs today are within normal limits. Marleni would benefit from starting Trikafta, education today.    Pancreatic Insufficiency/Nutrition: annual vitamin labs from November are within normal limits.. BMI is at goal of 23 mg/k2 per CFF guidelines.    CFRD: Patient is meeting A1c goal of <7% per CFF guidelines.          PLAN:                            1. RT will send more neb cups    2. Start Trikafta and repeat labs in 1 month    3. Pillbox to be sent from [facility] pharmacy        Follow-up: 1 month for labs    SUBJECTIVE/OBJECTIVE:                          Marleni Alamo is a 27 year old female seen for a co-visit with Dr. Leger.Patient was accompanied by Adalid Tyson who is also her legal guardian.     Reason for visit: Annual Medication Review    Allergies/ADRs: Reviewed in chart  Past Medical History: Reviewed in chart  Tobacco: She reports that she has never smoked. She has never used smokeless tobacco.  Alcohol: none      Medication Adherence/Access:   Medication: Marleni manages her medications at home with help from Adalid  Pharmacy: [facility] [facility], [facility]  Sometimes have trouble with oral medications, wondering if pillbox would help  Also would like more neb cups      CF:    Genotype: [id number]  Inhaled medications:   Bronchodilator: albuterol nebs twice daily   Mucolytic: Pulmozyme daily, and hypertonic saline 7% only when sick   Antibiotic: Tobramycin nebs twice daily (cycling; on)   Other: Advair -21mcg 2 puffs twice daily  Oral medications:   Azithromycin: not indicated   CFTR modulator: none, previously declined due to pending liver workup and concern for blood draws. See below   Other: none  Current FEV1% Pred: 97%  Cultures (last growth):  throat cultures grow MRSA (6/21/21). Hx of PSA (prior to 2018)      CFTR: Patient is eligible for treatment with CFTR modulator therapy. Most appropriate choice for patient of currently available CFTR modulators is: elexacaftor/tezacaftor/ivacaftor based on age, CFTR mutation genotype, past medical history and current medications. Patient was previously naive to CFTR modulator.    CF Genotype: [id number]    Current weight: 57.6kg    Recommended dose two orange elexacaftor 100mg/tezacaftor 50mg/ivacaftor 75mg in the morning and one blue ivacaftor 150mg tablet in the evening    Drug interactions with CFTR modulator: none    Dose should be given with age-appropriate fat containing food (~10g or up to 20g if experiencing GI upset). Provided appropriate examples of fat-containing foods to patient (e.g. Whole milk, cheese, avocado, peanut butter).    Patient will not need dilated eye exam prior to initiation.    Baseline LFTs checked and are mildly elevated but improved from previous Recommend continuing to monitor LFTs every 4 weeks until within normal limits and the quarterly for the first year.  Additional recommended monitoring: none.  Lab Results   Component Value Date    ALT 70 (H) 01/18/2023    AST  01/18/2023      Comment:      Specimen is hemolyzed which can falsely elevate AST. Analysis of a non-hemolyzed specimen may result in a lower value.  Notified Arsen WELSH.   Specimen is hemolyzed which can falsely elevate AST. Analysis of a non-hemolyzed specimen may result in a lower value.    BILITOTAL 0.8 01/18/2023    DBIL  01/18/2023      Comment:      Unsatisfactory specimen - hemolyzed    Notified Arsen WELSH.     CKT 84 01/18/2023         Educated patient on potential side effects, including headache, GI disturbances, rash, increased mucus production/respiratory symptoms, weight gain, acne.  Education provided on how to administer medication, what to do if a dose is missed, monitoring prior to and while on  "therapy, medications/foods to avoid (e.g. grapefruit).  Written patient information from [employer] was provided to patient.  Discussed with patient how to obtain CFTR modulator from specialty pharmacy and informed patient they will need to bring home supply if hospitalized. Patient was engaged in teaching and verbalized understanding.    Patient enrolled in [employer], paperwork signed in clinic today .       Pancreatic Insufficiency/Nutrition: Pancreatic enzyme replacement with Creon 61251.  Patient is taking 4 capsules with meals and 3 capsules with snacks. Patient is not experiencing sign/symptoms of malabsorption.  Acid reducer: omeprazole 40 mg twice daily  Bowel regimen: none  Weight and BMI are increased  Vitamins include: MVW chewable 2 daily, and Vitamin D 50,000 units MWF  Supplements include: Ensure daily      CFRD:  Patient is currently using Lantus 18 units daily and Humalog 8 units with breakfast plus sliding scale. Has Baqsimi available for urgent lows (was able to pick this up after last visit, not used yet).  SMBG: Uses Dexcom G6 with backup Accu-chek  meter and supplies.   Ranges were not reported today due to time.  Patient is not experiencing hypoglycemia  Recent symptoms of high blood sugar? none  Most recent HgA1C:   Lab Results   Component Value Date    A1C 7.0 11/10/2022    A1C 6.8 10/26/2021    A1C 6.9 08/31/2020    A1C 8.1 02/26/2018     Patient follows with Dr. Galindo for endocrinology. Last visit 9/27/22.      Weight/BMI:      PFTs:        Today's Vitals:   BP Readings from Last 1 Encounters:   02/09/23 107/64     Pulse Readings from Last 1 Encounters:   02/09/23 63     Wt Readings from Last 1 Encounters:   02/09/23 127 lb (57.6 kg)     Ht Readings from Last 1 Encounters:   02/09/23 5' 1\" (1.549 m)     Estimated body mass index is 24 kg/m  as calculated from the following:    Height as of an earlier encounter on 2/9/23: 5' 1\" (1.549 m).    Weight as of an earlier encounter on 2/9/23: 127 lb " (57.6 kg).    Temp Readings from Last 1 Encounters:   11/14/19 98.3  F (36.8  C) (Oral)       ----------------      I spent 30 minutes with this patient today. I offer these suggestions for consideration by Dr. Leger during covisit. A copy of the visit note was provided to the patient's provider(s).    A summary of these recommendations was given to the patient (see AVS from today's appointment with Dr. Leger).    Nora Singh, PharmD  Cystic Fibrosis [facility] Pharmacist  [place] [facility]  Voicemail: [phone]         Medication Therapy Recommendations  CF (cystic fibrosis) (H)    Current Medication: elexacaftor-tezacaftor-ivacaftor & ivacaftor (TRIKAFTA) 100-50-75 & 150 MG tablet pack   Rationale: Synergistic therapy - Needs additional medication therapy - Indication   Recommendation: Start Medication   Status: Accepted per Provider         Pancreatic insufficiency    Current Medication: mvw complete formulation (CHEWABLES) tablet   Rationale: Patient forgets to take - Adherence - Adherence   Recommendation: Provide Adherence Intervention   Status: Accepted - no CPA Needed   Note: pillbox from [facility]

## 2023-02-09 NOTE — PROGRESS NOTES
CF clinic RT note:    Marleni and her dad were in clinic today and there is some confusion about her order of nebulizers. I reviewed the mechanism of each inhaled medication.   1) Albuterol should be given with every airway clearance therapy as the first nebulizer or the start of every therapy. (A- or first). Albuterol opens the airways and relaxes the airways from the other medicines that come next. Give twice daily when well as the first inhaled medication. If she is sick and needs 3 treatments per day, then still use albuterol as the first nebulizer.   2) Pulmozyme (can be a mild irritant, this helps thin thick mucus and some coughing with productive sputum is normal or expected several hours after using). Give daily. Currently using daily in the morning. If misses in the morning can use at bedtime instead. Use a separate nebulizer cup.  3) Corbin (twice daily morning and bedtime) every other month (on 28 days, off 28 days approximate rotation). Use last in a separate neb cup. This is an antibiotic to treat bacteria in your lungs, it can also be mildly irritating.  4) Saline is used on the months OFF Corbin. This is for time of vest and hydration of the airways while the vest is running. You can use the Albuterol cup to put your Saline in it after all the albuterol has fizzed out or you can use a separate cup.     5) advair inhaler    Marleni shared she feels overwhelmed with all her pills and being a diabetic and with 2 new pills starting. I thanked her for sharing her concerns and that we can work on this as a team with her and her dad. Her dad verbalized understanding and he repeated back the nebulizer schedule. No further questions.    Corbin months order:    Mornin)Albuterol  2) Pulmozyme (separate cup)  3) Corbin  (separate cup)  4)advair inhaler    Evenin) Albuterol  2) Corbin  3)advair inhaler      Off Corbin months order:     Mornin) Albuterol  2)Pulmozyme  3)saline  4)advair  inhaler    Evenin)Albuterol  2)saline  3)advair inhaler

## 2023-02-09 NOTE — PATIENT INSTRUCTIONS
Cystic Fibrosis Self-Care Plan    RECOMMENDATIONS:   Help us provide the best possible care. If you receive a questionnaire from the CF Foundation about your clinic experience today please fill it out.  It should take less than 5 minutes. Let us know what we are doing well and how we can improve.    Who can use Trikafta?   Patients with cystic fibrosis 12 years and older with at least 1 copy of T930how.    How does it work?  It partially fixes the underlying defect in CF cells.  It helps the chloride channel (CFTR) work better in airway lining cells and throughout the body.    What did the clinical trials show?   Patients had an average of 10-14% absolute improvement in FEV1.   There was more than 60% reduction in pulmonary exacerbations.  Patients had reduced breathing symptoms like cough, sputum and shortness of breath.   Patients had an increase in weight.  THESE ARE AVERAGES ACROSS ALL PATINTS IN THE TRIALS. RESULTS IN INDVIDUAL PATIENTS WILL VARY.    What are common side effects?   Headache   Colds  Abdominal (belly) pain   Diarrhea   Rash   Runny nose   Influenza (flu)   Increased blood pressure   Abnormal labs (liver test and muscle enzymes)  IF YOU DEVELOP ANY NEW SYMPTOMS AFTER STARTING TRIKAFTA, PLEASE CALL THE CF OFFICE  [phone]    SOME OF OUR PATIENTS HAVE REPORTED LARGE INCREASES IN SPUTUM PRODUCTION THE FIRST FEW DAYS ON TRIKAFTA. SOME HAVE ALSO HAD BODY ACHES AND HEADACHES THE FIRST FEW DAYS. WE HAVE FOUND THAT INCREASING FLUID INTAKE AND SOMETIMES TAKING ACETAMINOPHEN (TYLENOL) REDUCES THE SYMPTOMS.    Do I need any extra blood tests?  You will need blood tests to check your liver and muscle enzymes before starting the medication and at least every 3 months (with each clinic visit). Labs will be checked more frequently if liver or muscle enzymes have been elevated in the past or increase while on Trikafta.    Can I stop my other therapies?  All of the trials with Trikafta were performed with  patients continuing all of their standard treatments. We do not know if the medication will work as well if you are not doing your usual therapy. We will monitor your symptoms and PFTs for 6 months so we know how you respond to Trikafta and then discuss what changes might be safe. This is an area of active research at the CF Foundation.    Can I take Trikafta if I have liver disease?   You can use the standard dose if you have mild liver disease.     (Mild liver disease = Child-Anthony Class A; 5-6 points)    Does Trikafta interact with other medication?  Trikafta interacts with a number of medications. Our pharmacists have reviewed your medication and:  X It does not interact with any of your current medication  TRIKAFTA INTERACTS WITH GRAPEFRUIT. PLEASE AVOID GRAPEFRUIT OR GRAPEFRUIT JUICE.  PLEASE CONTACT THE CF OFFICE IF ANY PROVIDER RECOMMENDS YOU START OR STOP ANY MEDICATION.  TRIKAFTA DOES NOT INTERACT WITH ORAL CONTRACEPTIVES.    What is my Trikafta dose?     The standard dose is 2 orange tablets in the morning and 1 blue tablet in the evening    Start with 2 orange tablets in the morning.  If the liver tests look ok in 1 months we will add the blue pill in the evening.      How do I take my Trikafta?   Take the morning (orange tablets) and evening (blue tablets) dose 12 hours apart.   Take all doses with a fat containing meal.    What should I do if I miss a dose?   Try NOT to miss any doses.   If LESS than 6 hours has passed since you missed the dose, take it as soon as possible.   If MORE than 6 hours has passed since you missed your dose:  If you missed the morning dose (orange pills), you should take the morning dose (orange pills) as soon as possible and SKIP the evening dose (blue pills).  If you missed the evening dose (blue pills), do not take it and just start with the usual morning dose (orange pills) the next morning.    Can I take Trikafta if I am pregnant or nursing (breastfeeding)?  There is no  information regarding safety in pregnancy. Limited animal data did not show fetal damage with the individual components but effects of Trikafta in human pregnancies is unknown.  There is no information regarding the presence of Trikafta in human milk, the effects on  infants or the effects on milk production.        Start with 2 orange tablets in the morning.  If the liver tests look ok in 1 months we will add the blue pill in the evening.    Doxycycline 100mg twice daily for 3 weeks.  Take with food to avoid nausea.  Be careful with sun exposure.  Take doxycycline in the morning and evening.  Take the Vitron C in the middle of the day    Use albuterol with every therapy. Use albuterol first  Use pulmozyme once daily.   Use CHER twice daily every other month. Use CHER last.  Don't mix the nebs.          Cystic Fibrosis :    Ruth Bass  [phone]  [place] [facility] [place] Nurse line:  Yasmeen  [phone]     [facility] [place] Fax Number:      [phone]         Cystic Fibrosis Respiratory Therapists:   Nabila Sheikh                          [phone]          Debbie Payne   [phone]  Cystic Fibrosis Dietitians:              Renetta Gilmore              [phone]                            Megan Goddard                        [phone]   Cystic Fibrosis Diabetes Nurse:    Paulette Danielle               [phone]    Cystic Fibrosis Social Workers:     Kaykay Rachel               [phone]                     Zehra Ware               [phone]  Cystic Fibrosis Pharmacists:           Mónica Chapman                              [phone] (pager)         Nora Singh      [phone]   Cystic Fibrosis Genetic Counselor:   Cami Rutledge    [phone]    [facility] [place] website:  [email or web address]    COVID VACCINES:    You are eligible for the COVID-19 vaccine. Sign up for your COVID vaccine via [employer]. Log in, select the menu  bar, select schedule an appointment, and then select COVID-19 Vaccine 1st Dose. You may also schedule by calling this number [phone] however hold times can be long.       OR schedule through the [facility] of Health Vaccine Connector at [email or web address] or by calling [phone].      The best vaccine is the one that s available to you first.  All COVID-19 vaccines currently available in the [place] (Mike & Mike, Pfizer and Moderna) have been shown to be highly effect at preventing COVID-19.       We re still learning how vaccines will affect the spread of COVID-19. After you ve been fully vaccinated against COVID-19, you should keep taking precautions in public places like wearing a mask, staying 6 feet apart from others, and avoiding crowds and poorly ventilated spaces until we know more.       MRN: [id number]   Clinic Date: February 9, 2023   Patient: Marleni Alamo     Annual Studies:   IGG   Date Value Ref Range Status   02/26/2018 1,320 695 - 1,620 mg/dL Final     Immunoglobulin G   Date Value Ref Range Status   11/10/2022 1,525 610 - 1,616 mg/dL Final     No results found for: INS  There are no preventive care reminders to display for this patient.    Pulmonary Function Tests  FEV1: amount of air you can blow out in 1 second  FVC: total amount of air you can take in and blow out    Your Goals:         PFT Latest Ref Rng & Units 2/9/2023   FVC L 2.94   FEV1 L 2.58   FVC% % 97   FEV1% % 97          Airway Clearance: The Most Important Way to Keep Your Lungs Healthy  Vest Settings:   Hill-Rom Frequencies: 8, 9, 10 Pressure 10 Then, Frequencies 18, 19, 20 Pressure 6     RespirTech: Quick Start with Pressure of     Do each frequency for 5 minutes; Deflate vest after each frequency & cough 3 times before beginning the next setting.    Vest and Neb Therapy should be done 2-3 times/day.    Good Nutrition Can Improve Lung Function and Overall Health    Take ALL of  your vitamins with food    Take 1/2 of your enzymes before EVERY meal/snack and the other 1/2 mid-meal/snack    Wt Readings from Last 3 Encounters:   02/09/23 57.6 kg (127 lb)   11/10/22 54.9 kg (121 lb)   11/01/21 54.4 kg (120 lb)       Body mass index is 24 kg/m .         National CF Foundation Recommendations for BMI in CF Adults: Women: at least 22 Men: at least 23        Controlling Blood Sugars Helps Prevent Lung Infections & Improves Nutrition  Test blood sugar:    In the morning before eating (goal is )    2 hours after a meal (goal is less than 150)    When pre-meal glucose is greater than 150 add correction    At bedtime (if less than 100 eat a snack with 15 grams of carbohydrates  Last A1C Results:   Hemoglobin A1C   Date Value Ref Range Status   11/10/2022 7.0 (H) <5.7 % Final     Comment:     Normal <5.7%   Prediabetes 5.7-6.4%    Diabetes 6.5% or higher     Note: Adopted from ADA consensus guidelines.   08/31/2020 6.9 (H) 0 - 5.6 % Final     Comment:     Normal <5.7% Prediabetes 5.7-6.4%  Diabetes 6.5% or higher - adopted from ADA   consensus guidelines.           If diabetic, measure A1C every 6 months. Goal: Under 7%    Staying Healthy  Research:  If you are interested in learning about research opportunities or have questions, please contact the CF Research Team at [phone] or [email or web address].    CF Foundation:  Compass is a personalized resource service to help you with the insurance, financial, legal and other issues you are facing.  It's free, confidential and available to anyone with CF.  Ask your  for more information or contact Compass directly at 732-COMPASS ([phone]) or compass@cff.org, or learn more at cff.org/compass.

## 2023-02-09 NOTE — NURSING NOTE
"Marleni Alamo is a 27 year old year old who is being seen for RECHECK (Return Cystic Fibrosis )      Medications reviewed and Vital signs taken.    Specimen Collection Type: Throat Swab    Order(s) placed: CF Aerobic Bacterial    *IF AFB order placed - please enter \"PRIORITIZE AFB\" to order comments.       No results found for: ACIDFAST      No results found for: AFBSMS        Medications reviewed and vital signs taken.   Mecca Thornton CMA    "

## 2023-02-09 NOTE — PROGRESS NOTES
"Adult Cystic Fibrosis Program  Annual Psychosocial Assessment    Presenting Information:  Marleni is a 27-year-old female with cystic fibrosis, presenting in CF clinic for a regular follow up with primary CF provider, Dr. Edi Leger.  Met with Marleni for annual psychosocial assessment. Her dad and legal guardian, Jah, was also present. 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     Living situation:  Marleni lives with her dad, Jah, in [place], [place]. They have 3 dogs. Marleni also spends some time at her maternal grandparents on the weekends. Marleni and Jah deny any concerns about their living situation.      Family Constellation:  Marleni was raised by her mother, Anny. She had intermittent contact with her father until age 12 when he became more involved. She has no siblings. Sadly, Anny passed away a couple of year ago after some diabetic related complications and a non healing foot wound. Marleni's father, Jah, now has legal guardianship and Marleni lives with him full time. She see's her maternal grandparents frequently and they live in the [facility].     Social Support:  Anny reports good social support for Marleni. She gets along well with family members and draws additional support from friends, co-workers, and blu community. Marleni is also well supported by her father and grandparents. Marleni's mom, Anny, had two good friends who have children with CF, but otherwise Marleni has had no connections in the CF Community (not discussed today).    Adjustment to Illness:  Marleni was diagnosed with CF in infancy. 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 She was hospitalized for a few months after birth with various complications. She also had a cleft palat, a g-tube, and a trach for 2 years. Anny described this time as very stressful and upsetting for her. She described Marleni as \"purple\" when she was born because she couldn't breathe. After " "Marleni's first two years of life her health became more stable and she had minimal hospitalizations. She was hospitalized a couple times as a teenager and has also undergone a few sinus surgeries. She has not been hospitalized for the past several years.     Marleni and Jah describe her current health status as \"good\".  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 Marleni completes 2 vest treatments per day. Marleni does not formally exercise but walks her dogs often. Marleni will be starting trikafta soon. She was hesitant to do so up to this point because of the frequency of lab draws and Marleni's fear of needles. Marleni's mom, Anny, was previously her PCA for 40 hours a week. Marleni does not have a PCA currently and has not since her mom passed away. They do not feel this additional support is needed at this time.     Marleni is open about her CF diagnosis.         Advanced Care Planning:     Health Care Directive:  Marleni has previously received Health Care Directive education. This SW provided/reviewed education, including concept/purpose of health care directive, default health care agents and how to complete a directive. Jah is Marleni's legal guardian and NOK for decision making in he absence of a directive. They are not interested in filling one out today.     Education:  Marleni graduated from high school in 2014. She has not had additional schooling since graduation and has no plans for education at this time.      Employment:  Since highschool Marleni has been enrolled in a couple of vocational rehab programs. She has not been enrolled in one or worked in the past couple of years due to COVID. Jah and Marleni are working with her  currently to find her new employment but it has been a slow process. Marleni is looking forward to finding a job and has been very bored at home. " "    Finances:  Marleni receives income from her SSI and denies any financial concerns. She is financially supported by her father, Jah, as well.    Insurance:  Marleni is insured through her fathers employer ([employer]), Medicare, and [place] medicaid. Jah stated that Marleni's iron supplement VitronC is not covered through insurance. Discussed Bassem and Jah is likely over income but  will send resources just in case.    Mental Health/Coping:  Marleni reports her mood is \"good\" and that she is happy most of the time. She and her dad deny any current or past symptoms indicative of mood, anxiety, eating, learning or other mental health disorder. She also denies a family history of MH concerns. She does not take medication or see a therapist for mental health. SW did not complete the PHQ-9 or ALEXANDR-7 due to Marleni's cognitive delays. She denies any current stressors.    Marleni attends [employer] regularly and blu is an important part of her life.    Chemical Health:  aMrleni denies use of alcohol, tobacco or other drugs.    Leisure Activities/Interests:   Marleni enjoys playing games, spending time with her dad and grandparents, watching movies, going out to eat, and walking her dogs.     Intervention:  -Psychosocial Assessment  -Resource education/referral (healthwell)  -Supportive counseling    Assessment:  Marleni was in good spirits and receptive to SW visit. She appeared to be open in her responses. Her dad was supportive throughout the visit and encouraged Marleni to answer questions herself first. She seems to be adjusted well to life at her dad's. She hopes to get a job through a vocational rehab program soon but progress has been slow. Her mood is stable. She will be starting trikafta soon.     Marleni seems to be psychosocially stable overall, with access to relevant resources and supports.  No concerns expressed/noted.    Plan:  Re-consult for any psychosocial needs that may arise.    Complete psychosocial " assessment annually.  Continue to follow for regular clinic consult.    Zehra Ware, [facility]  Adult Cystic Fibrosis   Ph: [phone], Pager: [phone]

## 2023-02-10 ENCOUNTER — TELEPHONE (OUTPATIENT)
Dept: PULMONOLOGY | Facility: CLINIC | Age: 28
End: 2023-02-10
Payer: MEDICARE

## 2023-02-10 NOTE — TELEPHONE ENCOUNTER
Prior Authorization Approval    Authorization Effective Date: 1/11/2023  Authorization Expiration Date: 2/10/2024  Medication: Trikafta PA  Approved Dose/Quantity: 84 per 28 days  Reference #: Key: [id number]   Insurance Company: [employer] - Phone [phone] Fax [phone]  Expected CoPay: $0     CoPay Card Available: No    Foundation Assistance Needed:    Which Pharmacy is filling the prescription (Not needed for infusion/clinic administered): [facility] - [place], [place] - [address]  Pharmacy Notified: Yes  Patient Notified: Yes

## 2023-02-10 NOTE — TELEPHONE ENCOUNTER
PA Initiation    Medication: Trikafta PA  Insurance Company: [employer] - Phone [phone] Fax [phone]  Pharmacy Filling the Rx: [facility] - [place], [place] - [facility] [address]  Filling Pharmacy Phone:    Filling Pharmacy Fax:    Start Date: 2/10/2023    Key: [id number]

## 2023-02-14 LAB — BACTERIA SPEC CULT: NORMAL

## 2023-02-17 NOTE — PATIENT INSTRUCTIONS
See provider AVS for a summary of recommendations from today's visit.    Nora Singh, PharmD  Cystic Fibrosis MTM Pharmacist  [facility] [employer]  Voicemail: [phone]

## 2023-02-27 LAB
EXPTIME-PRE: 6.95 SEC
FEF2575-%PRED-PRE: 95 %
FEF2575-PRE: 3.07 L/SEC
FEF2575-PRED: 3.21 L/SEC
FEFMAX-%PRED-PRE: 98 %
FEFMAX-PRE: 6.27 L/SEC
FEFMAX-PRED: 6.37 L/SEC
FEV1-%PRED-PRE: 97 %
FEV1-PRE: 2.58 L
FEV1FEV6-PRE: 88 %
FEV1FEV6-PRED: 86 %
FEV1FVC-PRE: 88 %
FEV1FVC-PRED: 87 %
FIFMAX-PRE: 4.09 L/SEC
FVC-%PRED-PRE: 97 %
FVC-PRE: 2.94 L
FVC-PRED: 3.03 L

## 2023-03-07 DIAGNOSIS — E84.9 CF (CYSTIC FIBROSIS) (H): ICD-10-CM

## 2023-03-07 RX ORDER — FLUTICASONE PROPIONATE AND SALMETEROL XINAFOATE 115; 21 UG/1; UG/1
AEROSOL, METERED RESPIRATORY (INHALATION)
Qty: 12 G | Refills: 11 | Status: SHIPPED | OUTPATIENT
Start: 2023-03-07 | End: 2023-03-13 | Stop reason: ALTCHOICE

## 2023-03-08 ENCOUNTER — TELEPHONE (OUTPATIENT)
Dept: PULMONOLOGY | Facility: CLINIC | Age: 28
End: 2023-03-08
Payer: MEDICARE

## 2023-03-08 NOTE — TELEPHONE ENCOUNTER
"[facility]  March 8, 2023    Clinics, [facility]    Cystic fibrosis Provider: Edi Leger MD    Caller: Father    Clinical information:  Marleni Alamo's dad called with complaint of increased sinus congestion that has been developing for the last couple weeks, really noticed it since starting trikafta. Wondering if she is experiencing a \"purge\" in secretions related to recent trikafta start. No fever, chills, body aches. No increase in cough.       Plan:   Start flonase and saline nasal rinses to try to help move secretions.    Call back in 1-2 weeks with update on symptoms.    Call back with any new or worsening symptoms/concerns.    Caller verbalized understanding of plan and agrees with advice given.     Lexis Le RN     "

## 2023-03-08 NOTE — TELEPHONE ENCOUNTER
Mychart message from gregory Tyson that address is incorrect on [employer] GPS application. Updated [facility] GPS contact that address should be [address] [place] [address]  Message to update Jah that address change was submitted.

## 2023-03-10 DIAGNOSIS — E84.9 CF (CYSTIC FIBROSIS) (H): ICD-10-CM

## 2023-03-10 RX ORDER — DORNASE ALFA 1 MG/ML
SOLUTION RESPIRATORY (INHALATION)
Qty: 75 ML | Refills: 3 | Status: SHIPPED | OUTPATIENT
Start: 2023-03-10 | End: 2023-06-27

## 2023-03-13 DIAGNOSIS — E84.9 CF (CYSTIC FIBROSIS) (H): Primary | ICD-10-CM

## 2023-03-17 ENCOUNTER — TELEPHONE (OUTPATIENT)
Dept: PULMONOLOGY | Facility: CLINIC | Age: 28
End: 2023-03-17

## 2023-03-17 ENCOUNTER — VIRTUAL VISIT (OUTPATIENT)
Dept: PULMONOLOGY | Facility: CLINIC | Age: 28
End: 2023-03-17
Attending: STUDENT IN AN ORGANIZED HEALTH CARE EDUCATION/TRAINING PROGRAM
Payer: MEDICARE

## 2023-03-17 DIAGNOSIS — G80.9 CEREBRAL PALSY, UNSPECIFIED TYPE (H): ICD-10-CM

## 2023-03-17 DIAGNOSIS — K76.0 HEPATIC STEATOSIS: ICD-10-CM

## 2023-03-17 DIAGNOSIS — Q87.0 PIERRE ROBIN SYNDROME: ICD-10-CM

## 2023-03-17 DIAGNOSIS — K21.9 GASTROESOPHAGEAL REFLUX DISEASE WITHOUT ESOPHAGITIS: Primary | ICD-10-CM

## 2023-03-17 DIAGNOSIS — K86.81 EXOCRINE PANCREATIC INSUFFICIENCY: ICD-10-CM

## 2023-03-17 PROCEDURE — 99204 OFFICE O/P NEW MOD 45 MIN: CPT | Mod: VID | Performed by: STUDENT IN AN ORGANIZED HEALTH CARE EDUCATION/TRAINING PROGRAM

## 2023-03-17 NOTE — NURSING NOTE
Is the patient currently in the state of [place]? YES    Visit mode:VIDEO    If the visit is dropped, the patient can be reconnected by: VIDEO VISIT: Send to e-mail at: [email or web address]    Will anyone else be joining the visit? Yes, legal guardian Jah.      How would you like to obtain your AVS? MyChart    Are changes needed to the allergy or medication list? NO    Reason for visit: Follow-up.    LALY VELASQUEZ

## 2023-03-17 NOTE — LETTER
3/17/2023         RE: Marleni Alamo  [address] [place] [address]        Dear Colleague,    Thank you for referring your patient, Marleni Alamo, to the [facility] FOR [facility] AND [facility] [place]. Please see a copy of my visit note below.    Video-Visit Details    Type of service:  Video Visit    Video Start Time (time video started): 11:00    Video End Time (time video stopped): 11:30    Originating Location (pt. Location): Home        Distant Location (provider location):  Off-site    Mode of Communication:  Video Conference via [employer]    LALY ZUÑIGA LISANDRA    GI CLINIC VISIT - NEW PATIENT    CC/REFERRING PROVIDER:   REASON FOR CONSULTATION:   HPI:   27 year old female history of cystic fibrosis on Trikafta, EPI on PERT, GERD on PPI, cerebral palsy, 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 who presented to [facility] care.    The patient is currently doing well without any specific complaints.  Her GERD symptoms currently well controlled with PPI once a day.  Occasionally would have a flareup but not often.  She is having 1-2 soft BMs a day without diarrhea, steatorrhea, constipation.  Denied nausea, vomiting, abdominal pain.  She is taking the Creon as prescribed and weight has been stable.      ROS: 10pt ROS performed and otherwise negative.      ASSESSMENT/PLAN:    # CF on Trikafta  # EPI on PERT  # GERD on PPI  # Hepatic steatosis.  - Mildly elevated LFTs. US showed fatty liver. Stable.     Overall the patient is doing very well in terms of GI symptoms.  We reviewed the warning signs of possible GI complications of CF including: GERD, pancreatic insufficiency, constipation leading to obstruction.     - Continue with PPI. Mylantan, tums can be use prn for breakthrough symptoms.   - Ensure soft BMs daily. Miralax 1 cap as needed.   - RTC in 9 months.       Thank you for this consultation. It was a pleasure to participate in the care of this patient; please contact us with any further  questions.    Pt was seen and discussed with Dr. Troy.    Kristian Mead MD  Gastroenterology Fellow      PERTINENT PAST MEDICAL HISTORY:  As noted above.  Past Medical History:   Diagnosis Date    Atopy     Cerebral palsy (H)     Botox injextions, managed by Dr. 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  Past Surgical History:   Procedure Laterality Date    bilat antrostomies  04/2011    Bilater knee surgery with plates and pins Bilateral 2007    CHOLECYSTECTOMY, LAPOROSCOPIC  02/2015    Cleft palate repair and mandibular advancement  1996    gastrostomy in infancy      Multiple PET (ear tubes)      Multiple sinus surgeries  2015    OPTICAL TRACKING SYSTEM ENDOSCOPIC SINUS SURGERY Bilateral 03/01/2019    Procedure: Stealth Assisted Bilateral Maxillary Antrostomy, Ethmoidectomy, Sphenoidotomy, Frontal Sinusotomy;  Surgeon: Anny Carbajal MD;  Location: [facility] OR    RESECT TRACHEA WITH RECONSTRUCTION CHILD  1998    Rib for reconstruction    STRABISMUS SURGERY      surgery for meconium ileus, partial bowel resection  08/1995    Details not available    tracheostomy in infancy         PREVIOUS ENDOSCOPY:  No prior endoscopy    PERTINENT MEDICATIONS:  Current Outpatient  Medications   Medication    albuterol (PROVENTIL) (2.5 MG/3ML) 0.083% neb solution    amylase-lipase-protease (CREON) 24177-63423 units CPEP per EC capsule    BD STEVIE U/F 32G X 4 MM insulin pen needle    blood glucose (ACCU-CHEK GUIDE) test strip    Continuous Blood Gluc  (DEXCOM G6 ) JAZMINE    Continuous Blood Gluc Sensor (DEXCOM G6 SENSOR) MISC    Continuous Blood Gluc Transmit (DEXCOM G6 TRANSMITTER) MISC    elexacaftor-tezacaftor-ivacaftor & ivacaftor (TRIKAFTA) 100-50-75 & 150 MG tablet pack    ferrous fumarate 65 mg, [place]. FE,-Vitamin C 125 mg (VITRON C)  MG TABS tablet    Glucagon (BAQSIMI TWO PACK) 3 MG/DOSE POWD    insulin glargine (LANTUS SOLOSTAR) 100 UNIT/ML pen    insulin lispro (HUMALOG KWIKPEN) 100 UNIT/ML (1 unit dial) KWIKPEN    mometasone-formoterol (DULERA) 100-5 MCG/ACT inhaler    mvw complete formulation (CHEWABLES) tablet    Nutritional Supplements (ENSURE ORIGINAL) LIQD    omeprazole (PRILOSEC) 40 MG DR capsule    PULMOZYME 2.5 MG/2.5ML neb solution    sodium chloride inhalant 7 % NEBU neb solution    tobramycin, PF, (CHER) 300 MG/5ML neb solution    vitamin D2 (ERGOCALCIFEROL) 32635 units (1250 mcg) capsule     No current facility-administered medications for this visit.      - Anticoagulation/Antiplatelet Agents: none  Medications reviewed with patient today, see Medication List/Assessment for details.  No other NSAID/anticoagulation reported by patient.  No other OTC/herbal/supplements reported by patient.    SOCIAL HISTORY:  Tobacco: None  Alcohol: None  Drugs: None  Marijuana: None      FAMILY HISTORY:  No colon/panc/esophageal/other GI CA, no other Lombardo or other HPS-related Mehul. No IBD/celiac, no other AI/liver/thyroid disease. No known FH bleeding/clotting disorders.  Family History   Problem Relation Age of Onset    Diabetes Type 1 Mother 12    Thyroid Disease Mother     Diabetes Type 1 Maternal Uncle     Diabetes Type 1 Maternal Grandmother     Thyroid Disease  Maternal Grandmother     Breast Cancer Other         Great Grandmother    Thyroid Disease Paternal Uncle     Thyroid Disease Maternal Aunt         PHYSICAL EXAMINATION:  Video visit  Gen: alert and oriented, in NAD and non-toxic appearing  HEENT: MMM, no scleral icterus  Resp: breathing comfortably on room air  Skin: No jaundice or visible rashes  Neuro: grossly intact          Attestation signed by Gretel Troy MD at 4/8/2023 12:25 PM:  I participated in a video visit and discussed the management with the GI Fellow, Dr Mead on 3/17/2023. I reviewed the note and there are no changes to the past medical, family or social history. I agree with the documented findings and plan of care as outlined. A total of 45 minutes spent on the date of the encounter doing chart review, history and limited exam, documentation and further activities as noted above.     Gretel Troy MD  Gastroenterology       Again, thank you for allowing me to participate in the care of your patient.        Sincerely,        Sunshine Boyd MD

## 2023-03-17 NOTE — PROGRESS NOTES
Video-Visit Details    Type of service:  Video Visit    Video Start Time (time video started): 11:00    Video End Time (time video stopped): 11:30    Originating Location (pt. Location): Home        Distant Location (provider location):  Off-site    Mode of Communication:  Video Conference via [employer]    GI CLINIC VISIT - NEW PATIENT    CC/REFERRING PROVIDER:   REASON FOR CONSULTATION:   HPI:   27 year old female history of cystic fibrosis on Trikafta, EPI on PERT, GERD on PPI, cerebral palsy, 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 who presented to [facility].    The patient is currently doing well without any specific complaints.  Her GERD symptoms currently well controlled with PPI once a day.  Occasionally would have a flareup but not often.  She is having 1-2 soft BMs a day without diarrhea, steatorrhea, constipation.  Denied nausea, vomiting, abdominal pain.  She is taking the Creon as prescribed and weight has been stable.      ROS: 10pt ROS performed and otherwise negative.      ASSESSMENT/PLAN:    # CF on Trikafta  # EPI on PERT  # GERD on PPI  # Hepatic steatosis.  - Mildly elevated LFTs. US showed fatty liver. Stable.     Overall the patient is doing very well in terms of GI symptoms.  We reviewed the warning signs of possible GI complications of CF including: GERD, pancreatic insufficiency, constipation leading to obstruction.     - Continue with PPI. Mylantan, tums can be use prn for breakthrough symptoms.   - Ensure soft BMs daily. Miralax 1 cap as needed.   - RTC in 9 months.       Thank you for this consultation. It was a pleasure to participate in the care of this patient; please contact us with any further questions.    Pt was seen and discussed with Dr. Troy.    Kristian Mead MD  Gastroenterology Fellow      PERTINENT PAST MEDICAL HISTORY:  As noted above.  Past Medical History:   Diagnosis Date     Atopy      Cerebral palsy (H)     Botox injextions, managed by Dr. Lopez ([facility]  (cystic fibrosis) (H) 07/25/2016    Sweat Test: 8/15/95 Value: 85.0 Genotype: [id number], H/O Pseudomonas and MRSA, Baseline FEV1  2.48, FVC 2.76 (7/2015)     Cholelithiasis      Chronic pansinusitis 07/25/2016     Diabetes mellitus due to cystic fibrosis (H) 07/25/2016     Diabetes mellitus related to cystic fibrosis (H) 07/25/2016     Gastroesophageal reflux disease without esophagitis 07/25/2016     Infection due to 2019 novel coronavirus 05/2022    Minimal symptoms     MRSA (methicillin resistant staph aureus) culture positive      Pancreatic insufficiency 07/25/2016     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  Past Surgical History:   Procedure Laterality Date     bilat antrostomies  04/2011     Bilater knee surgery with plates and pins Bilateral 2007     CHOLECYSTECTOMY, LAPOROSCOPIC  02/2015     Cleft palate repair and mandibular advancement  1996     gastrostomy in infancy       Multiple PET (ear tubes)       Multiple sinus surgeries  2015     OPTICAL TRACKING SYSTEM ENDOSCOPIC SINUS SURGERY Bilateral 03/01/2019    Procedure: Stealth Assisted Bilateral Maxillary Antrostomy, Ethmoidectomy, Sphenoidotomy, Frontal Sinusotomy;  Surgeon: Anny Carbajal MD;  Location: [facility] OR     RESECT TRACHEA WITH RECONSTRUCTION CHILD  1998    Rib for reconstruction     STRABISMUS SURGERY       surgery for meconium ileus, partial bowel resection  08/1995    Details not available     tracheostomy in infancy         PREVIOUS ENDOSCOPY:  No prior endoscopy    PERTINENT MEDICATIONS:  Current Outpatient Medications   Medication     albuterol (PROVENTIL) (2.5 MG/3ML) 0.083% neb solution     amylase-lipase-protease (CREON) 50619-28234 units CPEP per EC capsule     BD STEVIE U/F 32G X 4 MM insulin pen needle     blood glucose (ACCU-CHEK GUIDE) test strip     Continuous  Blood Gluc  (DEXCOM G6 ) JAZMINE     Continuous Blood Gluc Sensor (DEXCOM G6 SENSOR) MISC     Continuous Blood Gluc Transmit (DEXCOM G6 TRANSMITTER) MISC     elexacaftor-tezacaftor-ivacaftor & ivacaftor (TRIKAFTA) 100-50-75 & 150 MG tablet pack     ferrous fumarate 65 mg, [place]. FE,-Vitamin C 125 mg (VITRON C)  MG TABS tablet     Glucagon (BAQSIMI TWO PACK) 3 MG/DOSE POWD     insulin glargine (LANTUS SOLOSTAR) 100 UNIT/ML pen     insulin lispro (HUMALOG KWIKPEN) 100 UNIT/ML (1 unit dial) KWIKPEN     mometasone-formoterol (DULERA) 100-5 MCG/ACT inhaler     mvw complete formulation (CHEWABLES) tablet     Nutritional Supplements (ENSURE ORIGINAL) LIQD     omeprazole (PRILOSEC) 40 MG DR capsule     PULMOZYME 2.5 MG/2.5ML neb solution     sodium chloride inhalant 7 % NEBU neb solution     tobramycin, PF, (CHER) 300 MG/5ML neb solution     vitamin D2 (ERGOCALCIFEROL) 56174 units (1250 mcg) capsule     No current facility-administered medications for this visit.      - Anticoagulation/Antiplatelet Agents: none  Medications reviewed with patient today, see Medication List/Assessment for details.  No other NSAID/anticoagulation reported by patient.  No other OTC/herbal/supplements reported by patient.    SOCIAL HISTORY:  Tobacco: None  Alcohol: None  Drugs: None  Marijuana: None      FAMILY HISTORY:  No colon/panc/esophageal/other GI CA, no other Lombardo or other HPS-related Mehul. No IBD/celiac, no other AI/liver/thyroid disease. No known FH bleeding/clotting disorders.  Family History   Problem Relation Age of Onset     Diabetes Type 1 Mother 12     Thyroid Disease Mother      Diabetes Type 1 Maternal Uncle      Diabetes Type 1 Maternal Grandmother      Thyroid Disease Maternal Grandmother      Breast Cancer Other         Great Grandmother     Thyroid Disease Paternal Uncle      Thyroid Disease Maternal Aunt         PHYSICAL EXAMINATION:  Video visit  Gen: alert and oriented, in NAD and non-toxic  appearing  HEENT: MMM, no scleral icterus  Resp: breathing comfortably on room air  Skin: No jaundice or visible rashes  Neuro: grossly intact

## 2023-03-17 NOTE — TELEPHONE ENCOUNTER
PA Needed    Medication: Trikafta   QTY/DS:   NEW INS:  Insurance Company: [employer] PART D (OPTUM)  Pharmacy Filling the Rx: [facility] - [place], [place] - [facility] [address]  PA : 3/17/2023  Date of last fill:2023

## 2023-03-21 NOTE — TELEPHONE ENCOUNTER
Prior Authorization Approval    Authorization Effective Date: 3/21/2023  Authorization Expiration Date: 12/31/2023  Medication: Trikafta PA Approved  Approved Dose/Quantity: 84 for 28 days  Reference #: [id number]   Insurance Company: Other (see comments)  Expected CoPay: $0     CoPay Card Available: No    Foundation Assistance Needed:    Which Pharmacy is filling the prescription (Not needed for infusion/clinic administered): [facility] - [place], [place] - [address]  Pharmacy Notified: Yes  Patient Notified: Yes

## 2023-03-26 ENCOUNTER — HEALTH MAINTENANCE LETTER (OUTPATIENT)
Age: 28
End: 2023-03-26

## 2023-03-27 ENCOUNTER — PHARMACY VISIT (OUTPATIENT)
Dept: ADMINISTRATIVE | Facility: CLINIC | Age: 28
End: 2023-03-27
Payer: MEDICARE

## 2023-03-27 NOTE — PROGRESS NOTES
Cystic Fibrosis Clinical Follow Up Assessment   Completed on  20:02:30 [facility], by Roseline Horvath        Patient  Patient Name: JESSENIA MEHTA  : 1995  EHR ID: [id number]       Activity Date      Activity Medications    TRIKAFTA        Care Details    What are the patient's goals for this therapy?   ? N/A      Did you identify any patient barriers to access and successful treatment?   ? No barriers to access identified      Is it appropriate to collect a PDC at this time? [QA Metric] (An MPR or PDC would not be appropriate for cycled medications or if the patient is on therapy   ? Yes      Document the date of the last refill of PDC   ? 2023-03-15      Document PDC   ? 0.95      Has the patient missed doses inappropriately?   ? No      Please select CURRENT side effect(s) for monitoring:   ? None          Summary Notes   Per text from dad 'Jessenia is doing well. She's been pretty stuffy/plugged up the past few weeks; flonase helps, but not 100%. No trouble with dosage. It seems that Jessenia has been more tired than usual, taking naps daily after lunch, around 4pm.' Discussed that fatigue is not a documented SE of Trikafta. Encouraged them to monitor and discuss with clinic if continues. He did not respond to further questioning. TM clinician will follow up at time of next Trikafta refill for TM assessment.    Roseline Horvath, Pharm.D.[place] [facility]  [address] [address]  [email or web address]  Office: [phone]

## 2023-04-03 NOTE — PROGRESS NOTES
[facility]  [facility]  April 4, 2023         Assessment and Plan:   Marleni Alamo is a 27 y.o female with cystic fibrosis, pancreatic insufficinecy, CFRD, cerebral palsy and Leoncio Crittenden Syndrome who is seen today for follow up. \    1. CF related sinus disease:  Headaches: notes 1 month of nasally voice, headache and intermittent congestion, not responsive to rinses or Flonase. H/o surgery in 2019, last seen by Dr. Carbajal in 2021. No fever, headaches also related to using screens (although recently had her vision checked) and possibly related to need for wisdom teeth removal (discussed multiple years ago, but never scheduled). Coincidentally, symptoms also started when Marleni initiated Trikafta.   - Increase Flonase to 2 sprays BID and monitor for improvement/nose bleeds  - Continue rinses  - Referral back to ENT  - Recommend dental appointment  - Continue Trikafta for now, if no other cause of headache and nasal/sinus symptoms and no significant improvement in pulmonary symptoms, consider stopping medication    2. Cystic fibrosis: no new pulmonary complaints today, no cough, feels the course of doxycycline back in February was very helpful. Sating 99% on room air; nebs and vesting BID. PFTs are improved to her prior best. Cultures + for MRSA, MSSA, Strep aginosus, Achromobacter.  - Continue nebs, Dulera and vesting  - On jamarcus nebs every other month for now (unless she remains clear of gram negative bacteria)    3. CFTR modulation: dF508, started Trikafta (two orange tablets int he am noly) about a month ago, no real changes other than some napping in the afternoon. Unclear if headaches and nasal/sinus symptoms above related to medication. US on 1/18 with increased hepatic parenchymal echogenicity which may be seen with hepatic parenchymal disease including hepatic steatosis. LFTs today with improvement in alk phos and ALT, AST stable from prior.   - Continue  Trikafta at reduced dose for now  - Will need labs every 3 months for the first year    4. Exocrine pancreatic insufficiency: no s/s of malabsoprtion.   - Continue enzymes and vitamins  - Iron level added on today by RD, will need to confirm frequency of Vitron C     5. CF related diabetes: last AIC of 7.0 on 11/10/22, up slightly from prior. BS has been low when going to bed, three separate times, down to the 50-60s. On Lantus and carb coverage, diet has not really changed.  - Discuss low BS with Paulette  - Due for follow up with Dr. Galindo     6. GERD: no issues.  - Continue omeprazole     RTC: in 6 weeks; labs in 3 months  Annual studies due: November 2023 (overdue for DEXA, last in 2018)    Yahaira Trinidad PA-C  Pulmonary, Allergy, Critical Care and Sleep Medicine        Interval History:     Patient overall has been feeling okay, but notes her nasal passages have become very plugged. Can smell, notes some headaches and dizziness. Symptoms for over a month. No fever or chills, no facial pain. Headaches and dizzy spells, occurring at the same time. Notes a lot of these episodes occur when she is looking at screens. Notes her vision was recently checked and she got new glasses in January. Nose feels full, no congestion or drippy nose. No PND. Also notes a bloody nose, very occasional. Cough is better, minimal. Feels the doxycycline was very helpful. No congestion or shortness of breath. Nebs and vesting BID. No new bloating or gas, has one BM per day, no floating or fat. Also needs her wisdom teeth removed as well.           Review of Systems:   Please see HPI. Otherwise, complete 10 point ROS negative.           Past Medical and Surgical History:     Past Medical History:   Diagnosis Date     Atopy      Cerebral palsy (H)     Botox injextions, managed by Dr. John Marley     CF (cystic fibrosis) (H) 07/25/2016    Sweat Test: 8/15/95 Value: 85.0 Genotype: [id number], H/O Pseudomonas and MRSA, Baseline FEV1   2.48, FVC 2.76 (7/2015)     Cholelithiasis      Chronic pansinusitis 07/25/2016     Diabetes mellitus due to cystic fibrosis (H) 07/25/2016     Diabetes mellitus related to cystic fibrosis (H) 07/25/2016     Gastroesophageal reflux disease without esophagitis 07/25/2016     Infection due to 2019 novel coronavirus 05/2022    Minimal symptoms     MRSA (methicillin resistant staph aureus) culture positive      Pancreatic insufficiency 07/25/2016     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     Seizure disorder (H)     Multiple daily in infancy now infrequent (every few years)     Thrush, oral      Past Surgical History:   Procedure Laterality Date     bilat antrostomies  04/2011     Bilater knee surgery with plates and pins Bilateral 2007     CHOLECYSTECTOMY, LAPOROSCOPIC  02/2015     Cleft palate repair and mandibular advancement  1996     gastrostomy in infancy       Multiple PET (ear tubes)       Multiple sinus surgeries  2015     OPTICAL TRACKING SYSTEM ENDOSCOPIC SINUS SURGERY Bilateral 03/01/2019    Procedure: Stealth Assisted Bilateral Maxillary Antrostomy, Ethmoidectomy, Sphenoidotomy, Frontal Sinusotomy;  Surgeon: Anny Carbajal MD;  Location: [facility] OR     RESECT TRACHEA WITH RECONSTRUCTION CHILD  1998    Rib for reconstruction     STRABISMUS SURGERY       surgery for meconium ileus, partial bowel resection  08/1995    Details not available     tracheostomy in infancy             Family History:     Family History   Problem Relation Age of Onset     Diabetes Type 1 Mother 12     Thyroid Disease Mother      Diabetes Type 1 Maternal Uncle      Diabetes Type 1 Maternal Grandmother      Thyroid Disease Maternal Grandmother      Breast Cancer Other         Great Grandmother     Thyroid Disease Paternal Uncle      Thyroid Disease Maternal Aunt             Social History:     Social History     Socioeconomic History     Marital status: Single     Spouse name: Not on file     Number of children: Not on file     Years of  education: Not on file     Highest education level: Not on file   Occupational History     Not on file   Tobacco Use     Smoking status: Never     Smokeless tobacco: Never   Vaping Use     Vaping status: Not on file   Substance and Sexual Activity     Alcohol use: No     Alcohol/week: 0.0 standard drinks of alcohol     Drug use: No     Sexual activity: Never   Other Topics Concern     Not on file   Social History Narrative    Marleni lives at home with mother in [place] with 2 dogs and a cat. 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 Marleni goes to transition school.      Social Determinants of Health     Financial Resource Strain: Not on file   Food Insecurity: Not on file   Transportation Needs: Not on file   Physical Activity: Not on file   Stress: Not on file   Social Connections: Not on file   Intimate Partner Violence: Not on file   Housing Stability: Not on file            Medications:     Current Outpatient Medications   Medication     fluticasone (FLONASE) 50 MCG/ACT nasal spray     albuterol (PROVENTIL) (2.5 MG/3ML) 0.083% neb solution     amylase-lipase-protease (CREON) 65408-00071 units CPEP per EC capsule     BD STEVIE U/F 32G X 4 MM insulin pen needle     blood glucose (ACCU-CHEK GUIDE) test strip     Continuous Blood Gluc  (DEXCOM G6 ) JAZMINE     Continuous Blood Gluc Sensor (DEXCOM G6 SENSOR) MISC     Continuous Blood Gluc Transmit (DEXCOM G6 TRANSMITTER) MISC     elexacaftor-tezacaftor-ivacaftor & ivacaftor (TRIKAFTA) 100-50-75 & 150 MG tablet pack     ferrous fumarate 65 mg, [place]. FE,-Vitamin C 125 mg (VITRON C)  MG TABS tablet     Glucagon (BAQSIMI TWO PACK) 3 MG/DOSE POWD     insulin glargine (LANTUS SOLOSTAR) 100 UNIT/ML pen     insulin lispro (HUMALOG KWIKPEN) 100 UNIT/ML (1 unit dial) KWIKPEN     mometasone-formoterol (DULERA) 100-5 MCG/ACT inhaler     mvw complete formulation (CHEWABLES) tablet     Nutritional Supplements (ENSURE ORIGINAL) LIQD     omeprazole (PRILOSEC) 40 MG   "capsule     PULMOZYME 2.5 MG/2.5ML neb solution     sodium chloride inhalant 7 % NEBU neb solution     tobramycin, PF, (CHER) 300 MG/5ML neb solution     vitamin D2 (ERGOCALCIFEROL) 09702 units (1250 mcg) capsule     No current facility-administered medications for this visit.            Physical Exam:   /69   Pulse 79   Ht 1.52 m (4' 11.84\")   Wt 57 kg (125 lb 10.6 oz)   SpO2 99%   BMI 24.67 kg/m      GENERAL: alert, NAD  HEENT: NCAT, EOMI, anicteric sclera, + nasal edema on right, possible small polyp; left nare appears clear; canals and TMs clear; no oral mucosal edema or erythema  Neck: no cervical or supraclavicular adenopathy  Respiratory: good air flow, no crackles  CV: RRR, S1S2, no murmurs noted  Abdomen: normoactive BS, soft and non tender  Lymph: no edema, + digital clubbing  Neuro: AAO X 3, CN 2-12 grossly intact  Psychiatric: normal affect, good eye contact  Skin: no rash, jaundice or lesions on limited exam         Data:   All laboratory and imaging data reviewed.      Cystic Fibrosis Culture  Specimen Description   Date Value Ref Range Status   06/21/2021 Swab Sputum  Final   08/31/2020 Throat  Final   11/14/2019 Throat  Final    Culture Micro   Date Value Ref Range Status   06/21/2021 Heavy growth  Normal junaid    Final   06/21/2021 (A)  Final    Moderate growth  Methicillin resistant Staphylococcus aureus (MRSA)     08/31/2020 Moderate growth  Normal junaid    Final   08/31/2020 (A)  Final    Light growth  Methicillin resistant Staphylococcus aureus (MRSA)     08/31/2020 Light growth  Strain 2  Staphylococcus aureus   (A)  Final        PFT interpretation:  Maneuver: valid and meets ATS guidelines  Normal spirometry        "

## 2023-04-04 ENCOUNTER — LAB (OUTPATIENT)
Dept: LAB | Facility: CLINIC | Age: 28
End: 2023-04-04
Attending: PHYSICIAN ASSISTANT
Payer: COMMERCIAL

## 2023-04-04 ENCOUNTER — OFFICE VISIT (OUTPATIENT)
Dept: PULMONOLOGY | Facility: CLINIC | Age: 28
End: 2023-04-04
Attending: PHYSICIAN ASSISTANT
Payer: COMMERCIAL

## 2023-04-04 ENCOUNTER — TELEPHONE (OUTPATIENT)
Dept: OTOLARYNGOLOGY | Facility: CLINIC | Age: 28
End: 2023-04-04

## 2023-04-04 ENCOUNTER — CLINICAL UPDATE (OUTPATIENT)
Dept: PHARMACY | Facility: CLINIC | Age: 28
End: 2023-04-04

## 2023-04-04 VITALS
BODY MASS INDEX: 24.67 KG/M2 | HEIGHT: 60 IN | HEART RATE: 79 BPM | DIASTOLIC BLOOD PRESSURE: 69 MMHG | SYSTOLIC BLOOD PRESSURE: 112 MMHG | WEIGHT: 125.66 LBS | OXYGEN SATURATION: 99 %

## 2023-04-04 DIAGNOSIS — K86.89 PANCREATIC INSUFFICIENCY: ICD-10-CM

## 2023-04-04 DIAGNOSIS — E84.9 CF (CYSTIC FIBROSIS) (H): Primary | ICD-10-CM

## 2023-04-04 DIAGNOSIS — R79.9 ABNORMAL FINDING OF BLOOD CHEMISTRY, UNSPECIFIED: ICD-10-CM

## 2023-04-04 DIAGNOSIS — E84.0 CYSTIC FIBROSIS WITH PULMONARY MANIFESTATIONS (H): ICD-10-CM

## 2023-04-04 DIAGNOSIS — E84.9 CF (CYSTIC FIBROSIS) (H): ICD-10-CM

## 2023-04-04 LAB
ALBUMIN SERPL BCG-MCNC: 4.3 G/DL (ref 3.5–5.2)
ALP SERPL-CCNC: 171 U/L (ref 35–104)
ALT SERPL W P-5'-P-CCNC: 58 U/L (ref 10–35)
AST SERPL W P-5'-P-CCNC: 40 U/L (ref 10–35)
BILIRUB DIRECT SERPL-MCNC: 0.27 MG/DL (ref 0–0.3)
BILIRUB SERPL-MCNC: 1 MG/DL
CK SERPL-CCNC: 52 U/L (ref 26–192)
EXPTIME-PRE: 5.24 SEC
FEF2575-%PRED-PRE: 118 %
FEF2575-PRE: 3.81 L/SEC
FEF2575-PRED: 3.21 L/SEC
FEFMAX-%PRED-PRE: 115 %
FEFMAX-PRE: 7.37 L/SEC
FEFMAX-PRED: 6.37 L/SEC
FERRITIN SERPL-MCNC: 9 NG/ML (ref 6–175)
FEV1-%PRED-PRE: 101 %
FEV1-PRE: 2.67 L
FEV1FEV6-PRE: 89 %
FEV1FEV6-PRED: 86 %
FEV1FVC-PRE: 89 %
FEV1FVC-PRED: 87 %
FIFMAX-PRE: 4.42 L/SEC
FVC-%PRED-PRE: 98 %
FVC-PRE: 3 L
FVC-PRED: 3.03 L
IRON BINDING CAPACITY (ROCHE): 441 UG/DL (ref 240–430)
IRON SATN MFR SERPL: 7 % (ref 15–46)
IRON SERPL-MCNC: 32 UG/DL (ref 37–145)
IRON SERPL-MCNC: 32 UG/DL (ref 37–145)
PROT SERPL-MCNC: 7 G/DL (ref 6.4–8.3)

## 2023-04-04 PROCEDURE — 94375 RESPIRATORY FLOW VOLUME LOOP: CPT | Performed by: PHYSICIAN ASSISTANT

## 2023-04-04 PROCEDURE — G0463 HOSPITAL OUTPT CLINIC VISIT: HCPCS | Performed by: PHYSICIAN ASSISTANT

## 2023-04-04 PROCEDURE — 36415 COLL VENOUS BLD VENIPUNCTURE: CPT

## 2023-04-04 PROCEDURE — 99214 OFFICE O/P EST MOD 30 MIN: CPT | Mod: 25 | Performed by: PHYSICIAN ASSISTANT

## 2023-04-04 PROCEDURE — 82550 ASSAY OF CK (CPK): CPT

## 2023-04-04 PROCEDURE — 87077 CULTURE AEROBIC IDENTIFY: CPT | Performed by: PHYSICIAN ASSISTANT

## 2023-04-04 PROCEDURE — 99207 PR NO CHARGE LOS: CPT | Performed by: PHARMACIST

## 2023-04-04 PROCEDURE — 83550 IRON BINDING TEST: CPT

## 2023-04-04 PROCEDURE — 83540 ASSAY OF IRON: CPT

## 2023-04-04 PROCEDURE — 82728 ASSAY OF FERRITIN: CPT

## 2023-04-04 PROCEDURE — 80076 HEPATIC FUNCTION PANEL: CPT

## 2023-04-04 RX ORDER — FLUTICASONE PROPIONATE 50 MCG
1 SPRAY, SUSPENSION (ML) NASAL 2 TIMES DAILY
COMMUNITY
Start: 2023-04-04

## 2023-04-04 ASSESSMENT — PAIN SCALES - GENERAL: PAINLEVEL: NO PAIN (0)

## 2023-04-04 NOTE — NURSING NOTE
Chief Complaint   Patient presents with     Blood Draw     Labs drawn via  by EMT in lab.      Labs collected from venipuncture by EMT.    Nabila Merchant RN

## 2023-04-04 NOTE — TELEPHONE ENCOUNTER
[facility] Call Center    Phone Message    May a detailed message be left on voicemail: yes     Reason for Call: Appointment Intake    Referring Provider Name: Yahaira Trinidad PA-C  Diagnosis and/or Symptoms: H/o CF with prior sinus surgery, increased nasal congestion, question of polyp, seen Dr. Carbajal in 2021, suggested imaging and repeat surgery if symptoms recur    Pt's referral states it is urgent. Scheduled first opening with Dr Carbajal as a return pt. Added to the wait list as a high priority. Please review for sooner opening please.    Action Taken: Message routed to:  [facility] ([facility] ENT    Travel Screening: Not Applicable

## 2023-04-04 NOTE — LETTER
4/4/2023         RE: Marleni Alamo  [address] [place] [address]        Dear Colleague,    Thank you for referring your patient, Marleni Alamo, to the [facility] [place]. Please see a copy of my visit note below.    [facility]  April 4, 2023         Assessment and Plan:   Marleni Alamo is a 27 y.o female with cystic fibrosis, pancreatic insufficinecy, CFRD, cerebral palsy and Leoncio Waldo Syndrome who is seen today for follow up. \    1. CF related sinus disease:  Headaches: notes 1 month of nasally voice, headache and intermittent congestion, not responsive to rinses or Flonase. H/o surgery in 2019, last seen by Dr. Carbajal in 2021. No fever, headaches also related to using screens (although recently had her vision checked) and possibly related to need for wisdom teeth removal (discussed multiple years ago, but never scheduled). Coincidentally, symptoms also started when Marleni initiated Trikafta.   - Increase Flonase to 2 sprays BID and monitor for improvement/nose bleeds  - Continue rinses  - Referral back to ENT  - Recommend dental appointment  - Continue Trikafta for now, if no other cause of headache and nasal/sinus symptoms and no significant improvement in pulmonary symptoms, consider stopping medication    2. Cystic fibrosis: no new pulmonary complaints today, no cough, feels the course of doxycycline back in February was very helpful. Sating 99% on room air; nebs and vesting BID. PFTs are improved to her prior best. Cultures + for MRSA, MSSA, Strep aginosus, Achromobacter.  - Continue nebs, Dulera and vesting  - On jamarcus nebs every other month for now (unless she remains clear of gram negative bacteria)    3. CFTR modulation: dF508, started Trikafta (two orange tablets int he am noly) about a month ago, no real changes other than some napping in the afternoon. Unclear if  headaches and nasal/sinus symptoms above related to medication. US on 1/18 with increased hepatic parenchymal echogenicity which may be seen with hepatic parenchymal disease including hepatic steatosis. LFTs today with improvement in alk phos and ALT, AST stable from prior.   - Continue Trikafta at reduced dose for now  - Will need labs every 3 months for the first year    4. Exocrine pancreatic insufficiency: no s/s of malabsoprtion.   - Continue enzymes and vitamins  - Iron level added on today by RD, will need to confirm frequency of Vitron C     5. CF related diabetes: last AIC of 7.0 on 11/10/22, up slightly from prior. BS has been low when going to bed, three separate times, down to the 50-60s. On Lantus and carb coverage, diet has not really changed.  - Discuss low BS with Paulette  - Due for follow up with Dr. Galindo     6. GERD: no issues.  - Continue omeprazole     RTC: in 6 weeks; labs in 3 months  Annual studies due: November 2023 (overdue for DEXA, last in 2018)    Yahaira Trinidad PA-C  Pulmonary, Allergy, Critical Care and Sleep Medicine        Interval History:     Patient overall has been feeling okay, but notes her nasal passages have become very plugged. Can smell, notes some headaches and dizziness. Symptoms for over a month. No fever or chills, no facial pain. Headaches and dizzy spells, occurring at the same time. Notes a lot of these episodes occur when she is looking at screens. Notes her vision was recently checked and she got new glasses in January. Nose feels full, no congestion or drippy nose. No PND. Also notes a bloody nose, very occasional. Cough is better, minimal. Feels the doxycycline was very helpful. No congestion or shortness of breath. Nebs and vesting BID. No new bloating or gas, has one BM per day, no floating or fat. Also needs her wisdom teeth removed as well.           Review of Systems:   Please see HPI. Otherwise, complete 10 point ROS negative.           Past Medical and  Surgical History:     Past Medical History:   Diagnosis Date     Atopy      Cerebral palsy (H)     Botox injextions, managed by Dr. John Marley     CF (cystic fibrosis) (H) 07/25/2016    Sweat Test: 8/15/95 Value: 85.0 Genotype: [id number], H/O Pseudomonas and MRSA, Baseline FEV1  2.48, FVC 2.76 (7/2015)     Cholelithiasis      Chronic pansinusitis 07/25/2016     Diabetes mellitus due to cystic fibrosis (H) 07/25/2016     Diabetes mellitus related to cystic fibrosis (H) 07/25/2016     Gastroesophageal reflux disease without esophagitis 07/25/2016     Infection due to 2019 novel coronavirus 05/2022    Minimal symptoms     MRSA (methicillin resistant staph aureus) culture positive      Pancreatic insufficiency 07/25/2016     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     Seizure disorder (H)     Multiple daily in infancy now infrequent (every few years)     Thrush, oral      Past Surgical History:   Procedure Laterality Date     bilat antrostomies  04/2011     Bilater knee surgery with plates and pins Bilateral 2007     CHOLECYSTECTOMY, LAPOROSCOPIC  02/2015     Cleft palate repair and mandibular advancement  1996     gastrostomy in infancy       Multiple PET (ear tubes)       Multiple sinus surgeries  2015     OPTICAL TRACKING SYSTEM ENDOSCOPIC SINUS SURGERY Bilateral 03/01/2019    Procedure: Stealth Assisted Bilateral Maxillary Antrostomy, Ethmoidectomy, Sphenoidotomy, Frontal Sinusotomy;  Surgeon: Anny Carbajal MD;  Location: [facility] OR     RESECT TRACHEA WITH RECONSTRUCTION CHILD  1998    Rib for reconstruction     STRABISMUS SURGERY       surgery for meconium ileus, partial bowel resection  08/1995    Details not available     tracheostomy in infancy             Family History:     Family History   Problem Relation Age of Onset     Diabetes Type 1 Mother 12     Thyroid Disease Mother      Diabetes Type 1 Maternal Uncle      Diabetes Type 1 Maternal Grandmother      Thyroid Disease Maternal Grandmother      Breast  Cancer Other         Great Grandmother     Thyroid Disease Paternal Uncle      Thyroid Disease Maternal Aunt             Social History:     Social History     Socioeconomic History     Marital status: Single     Spouse name: Not on file     Number of children: Not on file     Years of education: Not on file     Highest education level: Not on file   Occupational History     Not on file   Tobacco Use     Smoking status: Never     Smokeless tobacco: Never   Vaping Use     Vaping status: Not on file   Substance and Sexual Activity     Alcohol use: No     Alcohol/week: 0.0 standard drinks of alcohol     Drug use: No     Sexual activity: Never   Other Topics Concern     Not on file   Social History Narrative    Marleni lives at home with mother in [place] with 2 dogs and a cat. 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 Marleni goes to transition school.      Social Determinants of Health     Financial Resource Strain: Not on file   Food Insecurity: Not on file   Transportation Needs: Not on file   Physical Activity: Not on file   Stress: Not on file   Social Connections: Not on file   Intimate Partner Violence: Not on file   Housing Stability: Not on file            Medications:     Current Outpatient Medications   Medication     fluticasone (FLONASE) 50 MCG/ACT nasal spray     albuterol (PROVENTIL) (2.5 MG/3ML) 0.083% neb solution     amylase-lipase-protease (CREON) 79316-40653 units CPEP per EC capsule     BD STEVIE U/F 32G X 4 MM insulin pen needle     blood glucose (ACCU-CHEK GUIDE) test strip     Continuous Blood Gluc  (DEXCOM G6 ) JAZMINE     Continuous Blood Gluc Sensor (DEXCOM G6 SENSOR) MISC     Continuous Blood Gluc Transmit (DEXCOM G6 TRANSMITTER) MISC     elexacaftor-tezacaftor-ivacaftor & ivacaftor (TRIKAFTA) 100-50-75 & 150 MG tablet pack     ferrous fumarate 65 mg, [place]. FE,-Vitamin C 125 mg (VITRON C)  MG TABS tablet     Glucagon (BAQSIMI TWO PACK) 3 MG/DOSE POWD     insulin glargine (LANTUS  "SOLOSTAR) 100 UNIT/ML pen     insulin lispro (HUMALOG KWIKPEN) 100 UNIT/ML (1 unit dial) KWIKPEN     mometasone-formoterol (DULERA) 100-5 MCG/ACT inhaler     mvw complete formulation (CHEWABLES) tablet     Nutritional Supplements (ENSURE ORIGINAL) LIQD     omeprazole (PRILOSEC) 40 MG DR capsule     PULMOZYME 2.5 MG/2.5ML neb solution     sodium chloride inhalant 7 % NEBU neb solution     tobramycin, PF, (CHER) 300 MG/5ML neb solution     vitamin D2 (ERGOCALCIFEROL) 49375 units (1250 mcg) capsule     No current facility-administered medications for this visit.            Physical Exam:   /69   Pulse 79   Ht 1.52 m (4' 11.84\")   Wt 57 kg (125 lb 10.6 oz)   SpO2 99%   BMI 24.67 kg/m      GENERAL: alert, NAD  HEENT: NCAT, EOMI, anicteric sclera, + nasal edema on right, possible small polyp; left nare appears clear; canals and TMs clear; no oral mucosal edema or erythema  Neck: no cervical or supraclavicular adenopathy  Respiratory: good air flow, no crackles  CV: RRR, S1S2, no murmurs noted  Abdomen: normoactive BS, soft and non tender  Lymph: no edema, + digital clubbing  Neuro: AAO X 3, CN 2-12 grossly intact  Psychiatric: normal affect, good eye contact  Skin: no rash, jaundice or lesions on limited exam         Data:   All laboratory and imaging data reviewed.      Cystic Fibrosis Culture  Specimen Description   Date Value Ref Range Status   06/21/2021 Swab Sputum  Final   08/31/2020 Throat  Final   11/14/2019 Throat  Final    Culture Micro   Date Value Ref Range Status   06/21/2021 Heavy growth  Normal junaid    Final   06/21/2021 (A)  Final    Moderate growth  Methicillin resistant Staphylococcus aureus (MRSA)     08/31/2020 Moderate growth  Normal junaid    Final   08/31/2020 (A)  Final    Light growth  Methicillin resistant Staphylococcus aureus (MRSA)     08/31/2020 Light growth  Strain 2  Staphylococcus aureus   (A)  Final        PFT interpretation:  Maneuver: valid and meets ATS guidelines  Normal " spirometry            Again, thank you for allowing me to participate in the care of your patient.        Sincerely,        Yahaira Trinidad PA-C

## 2023-04-04 NOTE — PATIENT INSTRUCTIONS
Cystic Fibrosis Self-Care Plan       Patient: Marleni Alamo   MRN: [id number]   Clinic Date: April 4, 2023     RECOMMENDATIONS:  1. Continue nebulizers and vest therapy.   2. Increase Flonase to 2 sprays twice/day. If no improvement or increase in bloody noses, go back to 1 spray twice/day.   3. ENT referral today--last saw Dr. Carbajal in Nov 2021.  4. Call Paulette and discuss lower blood sugars. Make appointment with Dr. Galindo.   5. Make a dental appointment to discuss wisdom teeth.   6. Continue current dose of Trikafta for now.     Annual Studies:   IGG   Date Value Ref Range Status   02/26/2018 1,320 695 - 1,620 mg/dL Final     Immunoglobulin G   Date Value Ref Range Status   11/10/2022 1,525 610 - 1,616 mg/dL Final     No results found for: INS  There are no preventive care reminders to display for this patient.    Pulmonary Function Tests  FEV1: amount of air you can blow out in 1 second  FVC: total amount of air you can take in and blow out    Your Goals:             Latest Ref Rng & Units 4/4/2023    10:46 AM   PFT   FVC L 3.00  P   FEV1 L 2.67  P   FVC% % 98  P   FEV1% % 101  P      P Preliminary result          Airway Clearance: The Most Important Way to Keep Your Lungs Healthy  Vest Settings:   [place] Frequencies: 8, 9, 10 Pressure 10 Then, Frequencies 18, 19, 20 Pressure 6     RespirTech: Quick Start with Pressure of     Do each frequency for 5 minutes; Deflate vest after each frequency & cough 3 times before beginning the next setting.    Vest and Neb Therapy should be done 2 times/day.    Good Nutrition Can Improve Lung Function and Overall Health    Take ALL of your vitamins with food    Take 1/2 of your enzymes before EVERY meal/snack and the other 1/2 mid-meal/snack    Wt Readings from Last 3 Encounters:   04/04/23 57 kg (125 lb 10.6 oz)   02/09/23 57.6 kg (127 lb)   11/10/22 54.9 kg (121 lb)       Body mass index is 24.67 kg/m .         National CF Foundation Recommendations for BMI in CF  Adults: Women: at least 22 Men: at least 23        Controlling Blood Sugars Helps Prevent Lung Infections & Improves Nutrition  Test blood sugar:    In the morning before eating (goal is )    2 hours after a meal (goal is less than 150)    When pre-meal glucose is greater than 150 add correction    At bedtime (if less than 100 eat a snack with 15 grams of carbohydrates  Last A1C Results:   Hemoglobin A1C   Date Value Ref Range Status   11/10/2022 7.0 (H) <5.7 % Final     Comment:     Normal <5.7%   Prediabetes 5.7-6.4%    Diabetes 6.5% or higher     Note: Adopted from ADA consensus guidelines.   08/31/2020 6.9 (H) 0 - 5.6 % Final     Comment:     Normal <5.7% Prediabetes 5.7-6.4%  Diabetes 6.5% or higher - adopted from ADA   consensus guidelines.           If diabetic, measure A1C every 6 months. Goal is under 7%.    Staying Healthy  Research: If you are interested in learning about research opportunities or have questions, please contact Stella Gatica at [phone] or [email or web address].    CF Foundation: Compass is a personalized resource service to help you with the insurance, financial, legal and other issues you are facing.  It's free, confidential and available to anyone with CF.  Ask your  for more information or contact Compass directly at [facility] ([phone]) or compass@cff.org, or learn more at cff.org/compass.       CF Nurse Line: Paul Montes De Oca: [phone]  Chhaya Munoz, RT: [phone]    Megan Gilmore , Dieticians: [phone]    Paulette Danielle, Diabetes Nurse: [phone]   Kaykay Tavarez: [phone] or Zehra Heaton at [phone], Social Workers  [email or web address]

## 2023-04-04 NOTE — NURSING NOTE
"Marleni Alamo is a 27 year old year old who is being seen for Cystic Fibrosis (CF Follow up )      Medications reviewed and Vital signs taken.    Specimen Collection Type: Throat Swab    Order(s) placed: CF Aerobic Bacterial    *IF AFB order placed - please enter \"PRIORITIZE AFB\" to order comments.       No results found for: ACIDFAST      No results found for: AFBSMS      Vitals were taken and medications were reconciled.     Dolly Link RMA  11:15 AM      "
DISCHARGE

## 2023-04-04 NOTE — PROGRESS NOTES
Clinical Update:                                                    At the request of Yahaira Trinidad PA-C, a chart review was conducted for Marleni Alamo.    Reason for Chart Review: Trikafta Lab Monitoring     Discussion: Marleni has been on Trikafta since 2/17/23. Marleni has a history of LFT abnormalities which has required closer monitoring Per chart review, patient continues modified dose of Trikafta  2 orange daily.    Labs were reviewed from 4/4/2023 at [facility] . ALT is elevated at 1.7 x the upper limit of normal and AST is elevated at 1.1 x the upper limit of normal.    Lab Results   Component Value Date    ALT 58 (H) 04/04/2023    AST 40 (H) 04/04/2023    BILITOTAL 1.0 04/04/2023    DBIL 0.27 04/04/2023    CKT 52 04/04/2023       Marleni also reported today during visit that she has been having some sinus/headache pains, which started around the same time she started Trikafta. Denies symptoms of infection. Marleni is also getting a workup for wisdom teeth removal and eye exam which could be factors. Will continue to monitor for now.      Plan:  1. Continue Trikafta 2 orange daily  2. Recheck hepatic panel and CK in 3 months        Nora Singh, PharmD  Cystic Fibrosis MTM Pharmacist  [facility]  Voicemail: [phone]

## 2023-04-07 ENCOUNTER — TELEPHONE (OUTPATIENT)
Dept: PULMONOLOGY | Facility: CLINIC | Age: 28
End: 2023-04-07
Payer: COMMERCIAL

## 2023-04-07 DIAGNOSIS — E84.9 DIABETES MELLITUS DUE TO CYSTIC FIBROSIS (H): ICD-10-CM

## 2023-04-07 DIAGNOSIS — E08.9 DIABETES MELLITUS DUE TO CYSTIC FIBROSIS (H): ICD-10-CM

## 2023-04-07 NOTE — TELEPHONE ENCOUNTER
insulin lispro (HUMALOG KWIKPEN) 100 UNIT/ML (1 unit dial) KWIKPEN 30 mL 0 10/24/2022         Last Written Prescription Date:  10-  Last Fill Quantity: 30ML,   # refills: 0  Last Office Visit : 9-  Future Office visit:  NONE    Routing refill request to provider for review/approval because:  Insulin - refilled per clinic      Kathleen M Doege RN

## 2023-04-09 LAB
BACTERIA SPEC CULT: ABNORMAL
BACTERIA SPEC CULT: ABNORMAL

## 2023-04-10 RX ORDER — INSULIN LISPRO 100 [IU]/ML
INJECTION, SOLUTION INTRAVENOUS; SUBCUTANEOUS
Qty: 30 ML | Refills: 1 | Status: SHIPPED | OUTPATIENT
Start: 2023-04-10 | End: 2024-05-03

## 2023-04-13 ENCOUNTER — TELEPHONE (OUTPATIENT)
Dept: NUTRITION | Facility: CLINIC | Age: 28
End: 2023-04-13
Payer: COMMERCIAL

## 2023-04-13 NOTE — PROGRESS NOTES
Nutrition Note    Iron panel updated 4/4/23. Overall improving, however iron remains low with high iron binding capacity. Marleni is currently taking Vitron-C 3x/week.     Recs/Interventions:  Sent mychart with update to increase iron to daily.     Will follow.     Renetta Gilmore RD, LD, CACFD  Cystic Fibrosis/Lung Transplant Dietitian  Pager [phone]

## 2023-04-16 DIAGNOSIS — E84.9 CF (CYSTIC FIBROSIS) (H): ICD-10-CM

## 2023-04-16 DIAGNOSIS — K86.89 PANCREATIC INSUFFICIENCY: ICD-10-CM

## 2023-04-17 ENCOUNTER — OFFICE VISIT (OUTPATIENT)
Dept: OTOLARYNGOLOGY | Facility: CLINIC | Age: 28
End: 2023-04-17
Payer: COMMERCIAL

## 2023-04-17 VITALS
DIASTOLIC BLOOD PRESSURE: 63 MMHG | HEIGHT: 61 IN | BODY MASS INDEX: 24.17 KG/M2 | HEART RATE: 72 BPM | WEIGHT: 128 LBS | SYSTOLIC BLOOD PRESSURE: 99 MMHG

## 2023-04-17 DIAGNOSIS — R47.9 SPEECH IMPEDIMENT: ICD-10-CM

## 2023-04-17 DIAGNOSIS — J32.4 CHRONIC PANSINUSITIS: Primary | ICD-10-CM

## 2023-04-17 PROCEDURE — 99212 OFFICE O/P EST SF 10 MIN: CPT | Mod: 25 | Performed by: OTOLARYNGOLOGY

## 2023-04-17 PROCEDURE — 31231 NASAL ENDOSCOPY DX: CPT | Performed by: OTOLARYNGOLOGY

## 2023-04-17 RX ORDER — ERGOCALCIFEROL 1.25 MG/1
50000 CAPSULE, LIQUID FILLED ORAL
Qty: 36 CAPSULE | Refills: 1 | Status: SHIPPED | OUTPATIENT
Start: 2023-04-17 | End: 2023-05-09

## 2023-04-17 ASSESSMENT — PAIN SCALES - GENERAL: PAINLEVEL: NO PAIN (0)

## 2023-04-17 NOTE — LETTER
4/17/2023       RE: Marleni Alamo  [address] [place] [address]     Dear Colleague,    Thank you for referring your patient, Marleni Alamo, to the [facility] [place] at [facility]. Please see a copy of my visit note below.    HISTORY OF PRESENT ILLNESS: Marleni returns for follow-up today.  She is a 27-year-old woman with a history of cystic fibrosis and chronic rhinosinusitis related to CF. She is being seen due to concern for recurrence of nasal polyps. Her speech is hypernasal but she does not c/o nasal congestion or drainage. Accompanied by her father today.    Last surgery 3/19/2019:  Optical tracking system endoscopic sinus surgery (Bilateral) Procedure: Stealth Assisted Bilateral Maxillary Antrostomy, Ethmoidectomy, Sphenoidotomy, Frontal Sinusotomy;  Surgeon: Anny Carbajal MD;  Location: [facility] OR    PHYSICAL EXAM: alert, no distress.     Procedure:  Endoscopy indicated for exam of sinus cavities  Topical anesthetic/decongestant spray applied.  Rigid scope used for visualization.  Findings: post surgical changes, scant drainage, mucous mostly clear, scan mucopurulence, moderate adenoid hypertrophy.    Oral cavity: presence of bifid uvula.     ASSESSMENT: chronic rhino sinusitis related to cystic fibrosis. No significant worsening. Will order CT sinus to rule out sinus opacification, limited endoscopic exam today due to patient tolerance. Will also have her meet with [facility] for assessment of speech.     15 minutes spent on the date of the encounter doing chart review, history and exam, documentation and further activities per the note. This time is in addition to separately billable procedure.              Again, thank you for allowing me to participate in the care of your patient.      Sincerely,    Anny Carbajal MD

## 2023-04-17 NOTE — PROGRESS NOTES
HISTORY OF PRESENT ILLNESS: Marleni returns for follow-up today.  She is a 27-year-old woman with a history of cystic fibrosis and chronic rhinosinusitis related to CF. She is being seen due to concern for recurrence of nasal polyps. Her speech is hypernasal but she does not c/o nasal congestion or drainage. Accompanied by her father today.    Last surgery 3/19/2019:  Optical tracking system endoscopic sinus surgery (Bilateral) Procedure: Stealth Assisted Bilateral Maxillary Antrostomy, Ethmoidectomy, Sphenoidotomy, Frontal Sinusotomy;  Surgeon: Anny Carbajal MD;  Location: [facility] OR    PHYSICAL EXAM: alert, no distress.     Procedure:  Endoscopy indicated for exam of sinus cavities  Topical anesthetic/decongestant spray applied.  Rigid scope used for visualization.  Findings: post surgical changes, scant drainage, mucous mostly clear, scan mucopurulence, moderate adenoid hypertrophy.    Oral cavity: presence of bifid uvula.     ASSESSMENT: chronic rhino sinusitis related to cystic fibrosis. No significant worsening. Will order CT sinus to rule out sinus opacification, limited endoscopic exam today due to patient tolerance. Will also have her meet with [facility] for assessment of speech.     15 minutes spent on the date of the encounter doing chart review, history and exam, documentation and further activities per the note. This time is in addition to separately billable procedure.

## 2023-04-17 NOTE — PATIENT INSTRUCTIONS
1. Please complete CT sinus and follow-up with [facility] voice clinic.   2. Please call the ENT clinic with any questions,concerns, new or worsening symptoms.    -Clinic number is [phone]   - Kamila's direct line (Dr. Carbajal's nurse) [phone]

## 2023-05-01 DIAGNOSIS — E84.9 CF (CYSTIC FIBROSIS) (H): ICD-10-CM

## 2023-05-01 RX ORDER — OMEPRAZOLE 40 MG/1
CAPSULE, DELAYED RELEASE ORAL
Qty: 180 CAPSULE | Refills: 1 | Status: SHIPPED | OUTPATIENT
Start: 2023-05-01 | End: 2023-11-08

## 2023-05-05 NOTE — TELEPHONE ENCOUNTER
Email from gregory Tyson stating address is still incorrect on [place] [employer], working with contact at [facility] Nora Ovalle sent in new application

## 2023-05-09 DIAGNOSIS — E84.9 CF (CYSTIC FIBROSIS) (H): ICD-10-CM

## 2023-05-09 DIAGNOSIS — K86.89 PANCREATIC INSUFFICIENCY: ICD-10-CM

## 2023-05-09 RX ORDER — ERGOCALCIFEROL 1.25 MG/1
50000 CAPSULE, LIQUID FILLED ORAL
Qty: 36 CAPSULE | Refills: 1 | Status: SHIPPED | OUTPATIENT
Start: 2023-05-10 | End: 2023-10-05

## 2023-05-22 DIAGNOSIS — E84.9 CF (CYSTIC FIBROSIS) (H): ICD-10-CM

## 2023-05-22 RX ORDER — ELEXACAFTOR, TEZACAFTOR, AND IVACAFTOR 100-50-75
KIT ORAL
Qty: 84 TABLET | Refills: 3 | Status: SHIPPED | OUTPATIENT
Start: 2023-05-22 | End: 2023-09-07

## 2023-05-25 ENCOUNTER — LAB (OUTPATIENT)
Dept: LAB | Facility: CLINIC | Age: 28
End: 2023-05-25
Attending: PHYSICIAN ASSISTANT
Payer: COMMERCIAL

## 2023-05-25 ENCOUNTER — OFFICE VISIT (OUTPATIENT)
Dept: PULMONOLOGY | Facility: CLINIC | Age: 28
End: 2023-05-25
Attending: PHYSICIAN ASSISTANT
Payer: COMMERCIAL

## 2023-05-25 VITALS
WEIGHT: 130.07 LBS | HEART RATE: 65 BPM | HEIGHT: 60 IN | OXYGEN SATURATION: 100 % | SYSTOLIC BLOOD PRESSURE: 111 MMHG | DIASTOLIC BLOOD PRESSURE: 72 MMHG | BODY MASS INDEX: 25.54 KG/M2

## 2023-05-25 DIAGNOSIS — E08.9 DIABETES MELLITUS RELATED TO CYSTIC FIBROSIS (H): ICD-10-CM

## 2023-05-25 DIAGNOSIS — E84.9 DIABETES MELLITUS DUE TO CYSTIC FIBROSIS (H): ICD-10-CM

## 2023-05-25 DIAGNOSIS — E84.9 CF (CYSTIC FIBROSIS) (H): ICD-10-CM

## 2023-05-25 DIAGNOSIS — K86.89 PANCREATIC INSUFFICIENCY: ICD-10-CM

## 2023-05-25 DIAGNOSIS — E84.9 CYSTIC FIBROSIS EXACERBATION (H): ICD-10-CM

## 2023-05-25 DIAGNOSIS — E08.9 DIABETES MELLITUS DUE TO CYSTIC FIBROSIS (H): ICD-10-CM

## 2023-05-25 DIAGNOSIS — L81.9 HYPOPIGMENTED SKIN LESION: ICD-10-CM

## 2023-05-25 DIAGNOSIS — J32.4 CHRONIC PANSINUSITIS: ICD-10-CM

## 2023-05-25 DIAGNOSIS — E84.8 DIABETES MELLITUS RELATED TO CYSTIC FIBROSIS (H): ICD-10-CM

## 2023-05-25 DIAGNOSIS — E84.9 CF (CYSTIC FIBROSIS) (H): Primary | ICD-10-CM

## 2023-05-25 DIAGNOSIS — E84.0 CYSTIC FIBROSIS WITH PULMONARY MANIFESTATIONS (H): ICD-10-CM

## 2023-05-25 LAB
ALBUMIN SERPL BCG-MCNC: 4.1 G/DL (ref 3.5–5.2)
ALP SERPL-CCNC: 143 U/L (ref 35–104)
ALT SERPL W P-5'-P-CCNC: 46 U/L (ref 10–35)
AST SERPL W P-5'-P-CCNC: 35 U/L (ref 10–35)
BILIRUB DIRECT SERPL-MCNC: 0.28 MG/DL (ref 0–0.3)
BILIRUB SERPL-MCNC: 1 MG/DL
CK SERPL-CCNC: 488 U/L (ref 26–192)
EXPTIME-PRE: 6.24 SEC
FEF2575-%PRED-PRE: 115 %
FEF2575-PRE: 3.71 L/SEC
FEF2575-PRED: 3.21 L/SEC
FEFMAX-%PRED-PRE: 110 %
FEFMAX-PRE: 7.06 L/SEC
FEFMAX-PRED: 6.37 L/SEC
FEV1-%PRED-PRE: 97 %
FEV1-PRE: 2.57 L
FEV1FEV6-PRE: 90 %
FEV1FEV6-PRED: 86 %
FEV1FVC-PRE: 90 %
FEV1FVC-PRED: 87 %
FIFMAX-PRE: 4.84 L/SEC
FVC-%PRED-PRE: 94 %
FVC-PRE: 2.86 L
FVC-PRED: 3.03 L
PROT SERPL-MCNC: 7 G/DL (ref 6.4–8.3)

## 2023-05-25 PROCEDURE — 80076 HEPATIC FUNCTION PANEL: CPT | Performed by: PATHOLOGY

## 2023-05-25 PROCEDURE — G0463 HOSPITAL OUTPT CLINIC VISIT: HCPCS | Performed by: INTERNAL MEDICINE

## 2023-05-25 PROCEDURE — 99214 OFFICE O/P EST MOD 30 MIN: CPT | Mod: 25 | Performed by: INTERNAL MEDICINE

## 2023-05-25 PROCEDURE — 94375 RESPIRATORY FLOW VOLUME LOOP: CPT | Performed by: INTERNAL MEDICINE

## 2023-05-25 PROCEDURE — 87077 CULTURE AEROBIC IDENTIFY: CPT | Performed by: INTERNAL MEDICINE

## 2023-05-25 PROCEDURE — 87070 CULTURE OTHR SPECIMN AEROBIC: CPT | Performed by: INTERNAL MEDICINE

## 2023-05-25 PROCEDURE — 82550 ASSAY OF CK (CPK): CPT | Performed by: PATHOLOGY

## 2023-05-25 PROCEDURE — 36415 COLL VENOUS BLD VENIPUNCTURE: CPT | Performed by: PATHOLOGY

## 2023-05-25 RX ORDER — SODIUM CHLORIDE FOR INHALATION 7 %
4 VIAL, NEBULIZER (ML) INHALATION DAILY
Qty: 360 ML | Refills: 11
Start: 2023-05-25 | End: 2023-09-25

## 2023-05-25 ASSESSMENT — PAIN SCALES - GENERAL: PAINLEVEL: NO PAIN (0)

## 2023-05-25 NOTE — LETTER
5/25/2023         RE: Marleni Alamo  [address] [place] [address]        Dear Colleague,    Thank you for referring your patient, Marleni Alamo, to the [facility]. Please see a copy of my visit note below.    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  Trikafta was initiated in February 2023.  Maintenance   Modulator:  Trikafta it was initiated in February 2023.  Trikafta  Mutation: [id number]  AW Clearance: Vest  Bronchodilators:  Albuterol  Mucolytics: Pulmozyme, Hypertonic (7%),   Antibiotics Inh:  CHER  Antibiotics Oral: none   Other: Advair  Colonization Hx:  MRSA, MSSA, Streptococcus anginosus, Achromobacter xylosoxidans/denitrificans  Annual Studies:  Due November 2023  Contraindications to standard of care:     Cystic fibrosis/pulmonary: The patient reports good exercise tolerance.  Cough at baseline.  Sputum production occurs during vest therapy which which she does 2x a day. She has excellent oxygenation at 100% in clinic today.  PFTs at the low end of her recent baseline. The patient does not appear to be having a pulmonary exacerbation at this time. She will continue vest therapy twice daily with current nebs including every other month CHER.  CHER could be reconsidered if she remains free of gram-negative organisms.    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  Trikafta was initiated in February of this year.  The patient's father reports a more nasal voice since starting Trikafta but otherwise no notable change in symptoms.  No significant change in cough, sputum production or exercise tolerance.  PFTs are in an excellent range but similar to baseline.  Given the  excellent level, significant improvement would not necessarily be expected.  Alkaline phosphatase is mildly elevated but actually the best it has been in the past 2 years.  ALT is mildly elevated but also lower than previous.  AST is within normal limits and improved from previous.  Bilirubin is within normal limits.  CK is noted to be elevated today, previously normal.  Trikafta labs will be rechecked in 6 weeks to monitor CK and again with follow-up and 3 months..  We discussed risks and benefits of Trikafta and the patient's experience to date.  The patient and her father would like to continue the medication for a full year before deciding the impact.  Of note, the patient's weight has improved since initiating Trikafta.    Exocrine pancreatic insufficiency: The patient did have about a 10 pound weight loss a year ago  in 2020.  Now gradual increase, possibly related to initiation of Trikafta.  No malabsorptive symptoms.  Continue current enzyme replacement and supplemental vitamins.Vitamin levels will be rechecked in November with annual studies.    CF related diabetes: Glucose appears to be well controlled with current insulin regimen based on the patient's report. Will defer to the endocrine service for optimization.    CF related sinus disease: Last sinus surgery was in 2019.  The patient denies symptoms of sinusitis at this time. She was seen at ENT clinic two weeks ago by Dr. Carbajal. They found nothing relevant and no polyps. She will see a speech therapist at [facility].  Sinus CT was also recommended.     GERD: Adequately controlled with current omeprazole.    Body Spasms: The patients father made mention of intermittent light body spasms. No link to Trikafta initiation, have been happening for many years. Continue to monitor     Skin lesion: 2mm hypopigmented lesion with ruffed skin right side of neck. No pain on palpation. Advised to not wear any necklaces in two weeks. Referral to derm.  "They will cancel this appointment if lesion subsides.     Healthcare maintenance: The patient is up-to-date on her COVID vaccines and has received the influenza vaccine for this flu season. Annual studies due 11/23    Psychosocial: The patient has been enjoying time at her cabin, her and her [place] hope to [place]. She has two small elderly dogs who she loves to be with.     Follow-up in 3 months with PFTs, sputum culture and labs. Labs repeated in 6 weeks to evaluate CK level.     Edi Leger MD     CF HPI  The patient was seen and examined by Edi Leger MD.  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    Since starting Trikafta, the patient and her father noticed that her voice is \"more nasally\".  Breathing comfortable at rest, dyspnea with moderate exertion such as 2 blocks on flat ground or climbing stairs, similar to baseline. No cough, sputum production during vest treatments which she does 2x a day.  Sputum swallowed 2 unable to describe.  No fever, chills, or night sweats.     Review of Systems   Appetite is good  No ear pain, sore throat, sinus pain  No vision changes  No nausea, vomiting, diarrhea or abdominal pain.  No swollen lymph nodes  Small white lump right side of neck  A complete ROS was otherwise negative except as noted in the HPI.    Current Outpatient Medications   Medication     albuterol (PROVENTIL) (2.5 MG/3ML) 0.083% neb solution     amylase-lipase-protease (CREON) 68136-45095 units CPEP per EC capsule     BD STEVIE U/F 32G X 4 MM insulin pen needle     blood glucose (ACCU-CHEK GUIDE) test strip     Continuous Blood Gluc  (DEXCOM G6 ) JAZMINE     Continuous Blood Gluc Sensor (DEXCOM G6 SENSOR) MISC     Continuous Blood Gluc Transmit (DEXCOM G6 TRANSMITTER) MISC     ferrous fumarate 65 mg, [place]. FE,-Vitamin C 125 mg (VITRON C)  MG " TABS tablet     fluticasone (FLONASE) 50 MCG/ACT nasal spray     Glucagon (BAQSIMI TWO PACK) 3 MG/DOSE POWD     insulin glargine (LANTUS SOLOSTAR) 100 UNIT/ML pen     insulin lispro (HUMALOG KWIKPEN) 100 UNIT/ML (1 unit dial) KWIKPEN     mometasone-formoterol (DULERA) 100-5 MCG/ACT inhaler     mvw complete formulation (CHEWABLES) tablet     Nutritional Supplements (ENSURE ORIGINAL) LIQD     omeprazole (PRILOSEC) 40 MG DR capsule     PULMOZYME 2.5 MG/2.5ML neb solution     sodium chloride inhalant 7 % NEBU neb solution     tobramycin, PF, (CHER) 300 MG/5ML neb solution     TRIKAFTA 100-50-75 & 150 MG tablet pack     vitamin D2 (ERGOCALCIFEROL) 22409 units (1250 mcg) capsule     No current facility-administered medications for this visit.     Allergies   Allergen Reactions     Amoxicillin-Pot Clavulanate Hives     Per mother     Ceftin Hives     Per mother     Vancomycin Hives     Per mother     Past Medical History:   Diagnosis Date     Atopy      Cerebral palsy (H)     Botox injextions, managed by Dr. John Marley     CF (cystic fibrosis) (H) 07/25/2016    Sweat Test: 8/15/95 Value: 85.0 Genotype: [id number], H/O Pseudomonas and MRSA, Baseline FEV1  2.48, FVC 2.76 (7/2015)     Cholelithiasis      Chronic pansinusitis 07/25/2016     Diabetes mellitus due to cystic fibrosis (H) 07/25/2016     Diabetes mellitus related to cystic fibrosis (H) 07/25/2016     Gastroesophageal reflux disease without esophagitis 07/25/2016     Infection due to 2019 novel coronavirus 05/2022    Minimal symptoms     MRSA (methicillin resistant staph aureus) culture positive      Pancreatic insufficiency 07/25/2016     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     Seizure disorder (H)     Multiple daily in infancy now infrequent (every few years)     Thrush, oral        Past Surgical History:   Procedure Laterality Date     bilat antrostomies  04/2011     Bilater knee surgery with plates and pins Bilateral 2007     CHOLECYSTECTOMY,  "LAPOROSCOPIC  02/2015     Cleft palate repair and mandibular advancement  1996     gastrostomy in infancy       Multiple PET (ear tubes)       Multiple sinus surgeries  2015     OPTICAL TRACKING SYSTEM ENDOSCOPIC SINUS SURGERY Bilateral 03/01/2019    Procedure: Stealth Assisted Bilateral Maxillary Antrostomy, Ethmoidectomy, Sphenoidotomy, Frontal Sinusotomy;  Surgeon: Anny Carbajal MD;  Location: [facility] OR     RESECT TRACHEA WITH RECONSTRUCTION CHILD  1998    Rib for reconstruction     STRABISMUS SURGERY       surgery for meconium ileus, partial bowel resection  08/1995    Details not available     tracheostomy in infancy         Social History     Socioeconomic History     Marital status: Single     Spouse name: Not on file     Number of children: Not on file     Years of education: Not on file     Highest education level: Not on file   Occupational History     Not on file   Tobacco Use     Smoking status: Never     Smokeless tobacco: Never   Vaping Use     Vaping status: Not on file   Substance and Sexual Activity     Alcohol use: No     Alcohol/week: 0.0 standard drinks of alcohol     Drug use: No     Sexual activity: Never   Other Topics Concern     Not on file   Social History Narrative    Marleni lives at home with mother in [place] with 2 dogs and a cat. 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 Marleni goes to transition school.      Social Determinants of Health     Financial Resource Strain: Not on file   Food Insecurity: Not on file   Transportation Needs: Not on file   Physical Activity: Not on file   Stress: Not on file   Social Connections: Not on file   Intimate Partner Violence: Not on file   Housing Stability: Not on file         /72   Pulse 65   Ht 1.52 m (4' 11.84\")   Wt 59 kg (130 lb 1.1 oz)   SpO2 100%   BMI 25.54 kg/m    Body mass index is 25.54 kg/m .  Exam:   GENERAL APPEARANCE: Well developed, well nourished, alert, and in no apparent distress.  EYES: PERRL, EOMI  HENT: Nasal mucosa edema and " no hyperemia. No nasal polyps.  EARS: Canals clear, TMs normal  MOUTH: Oral mucosa is moist, without any lesions, no tonsillar enlargement, no oropharyngeal exudate. bifid uvula  NECK: supple, no masses, no thyromegaly.  LYMPHATICS: No significant axillary, cervical, or supraclavicular nodes.  RESP: normal percussion, good air flow throughout.  No crackles. No rhonchi. No wheezes.  CV: Normal S1, S2, regular rhythm, normal rate. No murmur.  No rub. No gallop. No LE edema.   ABDOMEN:  Bowel sounds normal, soft, nontender, no HSM or masses.   MS: LE braces,  No clubbing. No cyanosis.  SKIN:  2mm hypopigmented lesion with ruffed skin right side of neck   NEURO: Mentation intact, speech at baseline, normal strength and tone, gait impacted by ankle braces  PSYCH: mentation  and affect at baseline  Results:  Recent Results (from the past 168 hour(s))   Hepatic function panel    Collection Time: 05/25/23 12:37 PM   Result Value Ref Range    Protein Total 7.0 6.4 - 8.3 g/dL    Albumin 4.1 3.5 - 5.2 g/dL    Bilirubin Total 1.0 <=1.2 mg/dL    Alkaline Phosphatase 143 (H) 35 - 104 U/L    AST 35 10 - 35 U/L    ALT 46 (H) 10 - 35 U/L    Bilirubin Direct 0.28 0.00 - 0.30 mg/dL   CK total    Collection Time: 05/25/23 12:37 PM   Result Value Ref Range     (H) 26 - 192 U/L   General PFT Lab (Please always keep checked)    Collection Time: 05/25/23 12:44 PM   Result Value Ref Range    FVC-Pred 3.03 L    FVC-Pre 2.86 L    FVC-%Pred-Pre 94 %    FEV1-Pre 2.57 L    FEV1-%Pred-Pre 97 %    FEV1FVC-Pred 87 %    FEV1FVC-Pre 90 %    FEFMax-Pred 6.37 L/sec    FEFMax-Pre 7.06 L/sec    FEFMax-%Pred-Pre 110 %    FEF2575-Pred 3.21 L/sec    FEF2575-Pre 3.71 L/sec    LID6756-%Pred-Pre 115 %    ExpTime-Pre 6.24 sec    FIFMax-Pre 4.84 L/sec    FEV1FEV6-Pred 86 %    FEV1FEV6-Pre 90 %                   Results as noted above.    PFT Interpretation:  Normal spirometry.  Decreased from previous.  Below recent best.   Valid Maneuver             CF  Exacerbation  Absent      Scribe Disclosure:   I, Angelo Link, am serving as a scribe; to document services personally performed by Dr. Edi Leger- -based on data collection and the provider's statements to me.     Provider Disclosure:  I agree with above History, Review of Systems, Physical exam and Plan.  I have reviewed the content of the documentation and have edited it as needed. I have personally performed the services documented here and the documentation accurately represents those services and the decisions I have made.      Electronically signed by:  Dr. Edi Leger        Again, thank you for allowing me to participate in the care of your patient.        Sincerely,        Edi Leger MD

## 2023-05-25 NOTE — PATIENT INSTRUCTIONS
Cystic Fibrosis Self-Care Plan    RECOMMENDATIONS:   Help us provide the best possible care. If you receive a questionnaire from the CF Foundation about your clinic experience today please fill it out.  It should take less than 5 minutes. Let us know what we are doing well and how we can improve.    Labs in 6 weeks  Speech clinic appointment  Dermatology appointment  Otherwise continue current medication, nebs and vest therapy.           Cystic Fibrosis :    Ruth Bass  [phone]  [facility] [place] Nurse line:  Yasmeen    [phone]     [facility] [place] Fax Number:      [phone]         Cystic Fibrosis Respiratory Therapists:   Nabila Sheikh                          [phone]          Debbie Payne   [phone]  Cystic Fibrosis Dietitians:              Renetta Gilmore              [phone]                            Megan Goddard                        [phone]   Cystic Fibrosis Diabetes Nurse:    Paulette Danielle               [phone]    Cystic Fibrosis Social Workers:     Kaykay Rachel               [phone]                     Zehra Ware               [phone]  Cystic Fibrosis Pharmacists:           Mónica Chapman                              [phone] (pager)         Nora Singh      [phone]   Cystic Fibrosis Genetic Counselor:   Cami Rutledge    [phone]    [facility] [place] website:  [email or web address]    We have recently learned about an albuterol neb solution shortage due to a manufacturing delay. There is still a small supply coming in but not enough to meet the current demand. We do not yet have an estimate for when this will become widely available again.    We our asking our CF community to do the following:    Please take time to check your supply of albuterol neb solution at home. We recommend keeping at least a 2-week supply of albuterol nebs at home in case of illness.    2.  If you have an  albuterol inhaler at home, you can use 4 puffs of the inhaler with a spacer in place of the nebulized albuterol at the start of your treatments. It is important to use a spacer for the best technique. If you do not have a spacer at home or have questions on technique, we will be happy to review and send one to your home address. Please also take a moment to check that your albuterol HFA inhaler is available and not .  inhalers may be less effective as the medication loses its potency or power. In some instances your team may suggest another alternative instead of an albuterol inhaler.    3.  Please take care in requesting refills. Albuterol neb solution is a life-saving medication for patients having severe asthma attacks and other emergency respiratory conditions. Let s work together to make sure that albuterol neb solution is available to those who need it urgently.    Reach out to your care team with questions and to confirm your planned alternative for albuterol nebs. [employer] will be the fastest way to connect. If possible, please reserve the nursing line for more urgent concerns as we are short on nursing staff.    Here are some reputable sites with more information:    https://www.cidrap.[facility]/resilient-drug-supply/us-liquid-albuterol-apfzmmmu-fjcdfcfi-jowamy-after-major-supplier-shuts-down    https://www.[employer]//health/albuterol-shortage    https://www.ashp.org/drug-shortages/current-shortages/drug-shortage-detail.aspx?bx=342&loginreturnUrl=SSOCheckOnly       MRN: [id number]   Clinic Date: May 25, 2023   Patient: Marleni Alamo     Annual Studies:   IGG   Date Value Ref Range Status   2018 1,320 695 - 1,620 mg/dL Final     Immunoglobulin G   Date Value Ref Range Status   11/10/2022 1,525 610 - 1,616 mg/dL Final     No results found for: INS  There are no preventive care reminders to display for this patient.    Pulmonary Function Tests  FEV1: amount of air you can blow out in  1 second  FVC: total amount of air you can take in and blow out    Your Goals:             Latest Ref Rng & Units 5/25/2023    12:44 PM   PFT   FVC L 2.86  P   FEV1 L 2.57  P   FVC% % 94  P   FEV1% % 97  P      P Preliminary result          Airway Clearance: The Most Important Way to Keep Your Lungs Healthy  Vest Settings:   Hill-Rom Frequencies: 8, 9, 10 Pressure 10 Then, Frequencies 18, 19, 20 Pressure 6     RespirTech: Quick Start with Pressure of     Do each frequency for 5 minutes; Deflate vest after each frequency & cough 3 times before beginning the next setting.    Vest and Neb Therapy should be done 2 times/day.    Good Nutrition Can Improve Lung Function and Overall Health    Take ALL of your vitamins with food    Take 1/2 of your enzymes before EVERY meal/snack and the other 1/2 mid-meal/snack    Wt Readings from Last 3 Encounters:   05/25/23 59 kg (130 lb 1.1 oz)   04/17/23 58.1 kg (128 lb)   04/04/23 57 kg (125 lb 10.6 oz)       Body mass index is 25.54 kg/m .         National CF Foundation Recommendations for BMI in CF Adults: Women: at least 22 Men: at least 23        Controlling Blood Sugars Helps Prevent Lung Infections & Improves Nutrition  Test blood sugar:    In the morning before eating (goal is )    2 hours after a meal (goal is less than 150)    When pre-meal glucose is greater than 150 add correction    At bedtime (if less than 100 eat a snack with 15 grams of carbohydrates  Last A1C Results:   Hemoglobin A1C   Date Value Ref Range Status   11/10/2022 7.0 (H) <5.7 % Final     Comment:     Normal <5.7%   Prediabetes 5.7-6.4%    Diabetes 6.5% or higher     Note: Adopted from ADA consensus guidelines.   08/31/2020 6.9 (H) 0 - 5.6 % Final     Comment:     Normal <5.7% Prediabetes 5.7-6.4%  Diabetes 6.5% or higher - adopted from ADA   consensus guidelines.           If diabetic, measure A1C every 6 months. Goal: Under 7%    Staying Healthy  Research:  If you are interested in learning  about research opportunities or have questions, please contact the CF Research Team at [phone] or [email or web address].    CF Foundation:  Compass is a personalized resource service to help you with the insurance, financial, legal and other issues you are facing.  It's free, confidential and available to anyone with CF.  Ask your  for more information or contact Compass directly at [phone] ([phone]) or compass@cff.org, or learn more at cff.org/compass.

## 2023-05-25 NOTE — NURSING NOTE
"Marleni Alamo is a 27 year old year old who is being seen for Cystic Fibrosis (CF Follow up )      Medications reviewed and Vital signs taken.    Specimen Collection Type: Throat Swab    Order(s) placed: CF Aerobic Bacterial    *IF AFB order placed - please enter \"PRIORITIZE AFB\" to order comments.       No results found for: ACIDFAST      No results found for: AFBSMS        Vitals were taken and medications were reconciled.     Dolly Link RMA  1:28 PM    "

## 2023-05-25 NOTE — PROGRESS NOTES
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  Trikafta was initiated in February 2023.  Maintenance   Modulator:  Trikafta it was initiated in February 2023.  Trikafta  Mutation: [id number]  AW Clearance: Vest  Bronchodilators:  Albuterol  Mucolytics: Pulmozyme, Hypertonic (7%),   Antibiotics Inh:  CHER  Antibiotics Oral: none   Other: Advair  Colonization Hx:  MRSA, MSSA, Streptococcus anginosus, Achromobacter xylosoxidans/denitrificans  Annual Studies:  Due November 2023  Contraindications to standard of care:     Cystic fibrosis/pulmonary: The patient reports good exercise tolerance.  Cough at baseline.  Sputum production occurs during vest therapy which which she does 2x a day. She has excellent oxygenation at 100% in clinic today.  PFTs at the low end of her recent baseline. The patient does not appear to be having a pulmonary exacerbation at this time. She will continue vest therapy twice daily with current nebs including every other month CHER.  CHER could be reconsidered if she remains free of gram-negative organisms.    CFTR Modulation: The patient is an O244scq homozygote.  Trikafta was initiated in February of this year.  The patient's father reports a more nasal voice since starting Trikafta but otherwise no notable change in symptoms.  No significant change in cough, sputum production or exercise tolerance.  PFTs are in an excellent range but similar to baseline.  Given the excellent level, significant improvement would not necessarily be expected.  Alkaline phosphatase is mildly elevated but actually the best it has been in the past 2 years.  ALT is mildly elevated but also lower than previous.  AST is within normal limits and improved from previous.  Bilirubin  is within normal limits.  CK is noted to be elevated today, previously normal.  Trikafta labs will be rechecked in 6 weeks to monitor CK and again with follow-up and 3 months..  We discussed risks and benefits of Trikafta and the patient's experience to date.  The patient and her father would like to continue the medication for a full year before deciding the impact.  Of note, the patient's weight has improved since initiating Trikafta.    Exocrine pancreatic insufficiency: The patient did have about a 10 pound weight loss a year ago  in 2020.  Now gradual increase, possibly related to initiation of Trikafta.  No malabsorptive symptoms.  Continue current enzyme replacement and supplemental vitamins.Vitamin levels will be rechecked in November with annual studies.    CF related diabetes: Glucose appears to be well controlled with current insulin regimen based on the patient's report. Will defer to the endocrine service for optimization.    CF related sinus disease: Last sinus surgery was in 2019.  The patient denies symptoms of sinusitis at this time. She was seen at ENT clinic two weeks ago by Dr. Carbajal. They found nothing relevant and no polyps. She will see a speech therapist at [facility] voice clinic.  Sinus CT was also recommended.     GERD: Adequately controlled with current omeprazole.    Body Spasms: The patients father made mention of intermittent light body spasms. No link to Trikafta initiation, have been happening for many years. Continue to monitor     Skin lesion: 2mm hypopigmented lesion with ruffed skin right side of neck. No pain on palpation. Advised to not wear any necklaces in two weeks. Referral to derm. They will cancel this appointment if lesion subsides.     Healthcare maintenance: The patient is up-to-date on her COVID vaccines and has received the influenza vaccine for this flu season. Annual studies due 11/23    Psychosocial: The patient has been enjoying time at her cabin, her and her  "[place]. She has two small elderly dogs who she loves to be with.     Follow-up in 3 months with PFTs, sputum culture and labs. Labs repeated in 6 weeks to evaluate CK level.     Edi Leger MD     CF HPI  The patient was seen and examined by Edi Leger MD.  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    Since starting Trikafta, the patient and her father noticed that her voice is \"more nasally\".  Breathing comfortable at rest, dyspnea with moderate exertion such as 2 blocks on flat ground or climbing stairs, similar to baseline. No cough, sputum production during vest treatments which she does 2x a day.  Sputum swallowed 2 unable to describe.  No fever, chills, or night sweats.     Review of Systems   Appetite is good  No ear pain, sore throat, sinus pain  No vision changes  No nausea, vomiting, diarrhea or abdominal pain.  No swollen lymph nodes  Small white lump right side of neck  A complete ROS was otherwise negative except as noted in the HPI.    Current Outpatient Medications   Medication     albuterol (PROVENTIL) (2.5 MG/3ML) 0.083% neb solution     amylase-lipase-protease (CREON) 51534-23580 units CPEP per EC capsule     BD STEVIE U/F 32G X 4 MM insulin pen needle     blood glucose (ACCU-CHEK GUIDE) test strip     Continuous Blood Gluc  (DEXCOM G6 ) JAZMINE     Continuous Blood Gluc Sensor (DEXCOM G6 SENSOR) MISC     Continuous Blood Gluc Transmit (DEXCOM G6 TRANSMITTER) MISC     ferrous fumarate 65 mg, [place]. FE,-Vitamin C 125 mg (VITRON C)  MG TABS tablet     fluticasone (FLONASE) 50 MCG/ACT nasal spray     Glucagon (BAQSIMI TWO PACK) 3 MG/DOSE POWD     insulin glargine (LANTUS SOLOSTAR) 100 UNIT/ML pen     insulin lispro (HUMALOG KWIKPEN) 100 UNIT/ML (1 unit dial) KWIKPEN     mometasone-formoterol (DULERA) 100-5 MCG/ACT inhaler     " mvw complete formulation (CHEWABLES) tablet     Nutritional Supplements (ENSURE ORIGINAL) LIQD     omeprazole (PRILOSEC) 40 MG DR capsule     PULMOZYME 2.5 MG/2.5ML neb solution     sodium chloride inhalant 7 % NEBU neb solution     tobramycin, PF, (CHER) 300 MG/5ML neb solution     TRIKAFTA 100-50-75 & 150 MG tablet pack     vitamin D2 (ERGOCALCIFEROL) 86230 units (1250 mcg) capsule     No current facility-administered medications for this visit.     Allergies   Allergen Reactions     Amoxicillin-Pot Clavulanate Hives     Per mother     Ceftin Hives     Per mother     Vancomycin Hives     Per mother     Past Medical History:   Diagnosis Date     Atopy      Cerebral palsy (H)     Botox injextions, managed by Dr. John Marley     CF (cystic fibrosis) (H) 07/25/2016    Sweat Test: 8/15/95 Value: 85.0 Genotype: [id number], H/O Pseudomonas and MRSA, Baseline FEV1  2.48, FVC 2.76 (7/2015)     Cholelithiasis      Chronic pansinusitis 07/25/2016     Diabetes mellitus due to cystic fibrosis (H) 07/25/2016     Diabetes mellitus related to cystic fibrosis (H) 07/25/2016     Gastroesophageal reflux disease without esophagitis 07/25/2016     Infection due to 2019 novel coronavirus 05/2022    Minimal symptoms     MRSA (methicillin resistant staph aureus) culture positive      Pancreatic insufficiency 07/25/2016     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     Seizure disorder (H)     Multiple daily in infancy now infrequent (every few years)     Thrush, oral        Past Surgical History:   Procedure Laterality Date     bilat antrostomies  04/2011     Bilater knee surgery with plates and pins Bilateral 2007     CHOLECYSTECTOMY, LAPOROSCOPIC  02/2015     Cleft palate repair and mandibular advancement  1996     gastrostomy in infancy       Multiple PET (ear tubes)       Multiple sinus surgeries  2015     OPTICAL TRACKING SYSTEM ENDOSCOPIC SINUS SURGERY Bilateral 03/01/2019    Procedure: Stealth Assisted Bilateral Maxillary  "Antrostomy, Ethmoidectomy, Sphenoidotomy, Frontal Sinusotomy;  Surgeon: Anny Carbajal MD;  Location: [facility] OR     RESECT TRACHEA WITH RECONSTRUCTION CHILD  1998    Rib for reconstruction     STRABISMUS SURGERY       surgery for meconium ileus, partial bowel resection  08/1995    Details not available     tracheostomy in infancy         Social History     Socioeconomic History     Marital status: Single     Spouse name: Not on file     Number of children: Not on file     Years of education: Not on file     Highest education level: Not on file   Occupational History     Not on file   Tobacco Use     Smoking status: Never     Smokeless tobacco: Never   Vaping Use     Vaping status: Not on file   Substance and Sexual Activity     Alcohol use: No     Alcohol/week: 0.0 standard drinks of alcohol     Drug use: No     Sexual activity: Never   Other Topics Concern     Not on file   Social History Narrative    Marleni lives at home with mother in [place] with 2 dogs and a cat. 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 Marleni goes to transition school.      Social Determinants of Health     Financial Resource Strain: Not on file   Food Insecurity: Not on file   Transportation Needs: Not on file   Physical Activity: Not on file   Stress: Not on file   Social Connections: Not on file   Intimate Partner Violence: Not on file   Housing Stability: Not on file         /72   Pulse 65   Ht 1.52 m (4' 11.84\")   Wt 59 kg (130 lb 1.1 oz)   SpO2 100%   BMI 25.54 kg/m    Body mass index is 25.54 kg/m .  Exam:   GENERAL APPEARANCE: Well developed, well nourished, alert, and in no apparent distress.  EYES: PERRL, EOMI  HENT: Nasal mucosa edema and no hyperemia. No nasal polyps.  EARS: Canals clear, TMs normal  MOUTH: Oral mucosa is moist, without any lesions, no tonsillar enlargement, no oropharyngeal exudate. bifid uvula  NECK: supple, no masses, no thyromegaly.  LYMPHATICS: No significant axillary, cervical, or supraclavicular " nodes.  RESP: normal percussion, good air flow throughout.  No crackles. No rhonchi. No wheezes.  CV: Normal S1, S2, regular rhythm, normal rate. No murmur.  No rub. No gallop. No LE edema.   ABDOMEN:  Bowel sounds normal, soft, nontender, no HSM or masses.   MS: LE braces,  No clubbing. No cyanosis.  SKIN:  2mm hypopigmented lesion with ruffed skin right side of neck   NEURO: Mentation intact, speech at baseline, normal strength and tone, gait impacted by ankle braces  PSYCH: mentation  and affect at baseline  Results:  Recent Results (from the past 168 hour(s))   Hepatic function panel    Collection Time: 05/25/23 12:37 PM   Result Value Ref Range    Protein Total 7.0 6.4 - 8.3 g/dL    Albumin 4.1 3.5 - 5.2 g/dL    Bilirubin Total 1.0 <=1.2 mg/dL    Alkaline Phosphatase 143 (H) 35 - 104 U/L    AST 35 10 - 35 U/L    ALT 46 (H) 10 - 35 U/L    Bilirubin Direct 0.28 0.00 - 0.30 mg/dL   CK total    Collection Time: 05/25/23 12:37 PM   Result Value Ref Range     (H) 26 - 192 U/L   General PFT Lab (Please always keep checked)    Collection Time: 05/25/23 12:44 PM   Result Value Ref Range    FVC-Pred 3.03 L    FVC-Pre 2.86 L    FVC-%Pred-Pre 94 %    FEV1-Pre 2.57 L    FEV1-%Pred-Pre 97 %    FEV1FVC-Pred 87 %    FEV1FVC-Pre 90 %    FEFMax-Pred 6.37 L/sec    FEFMax-Pre 7.06 L/sec    FEFMax-%Pred-Pre 110 %    FEF2575-Pred 3.21 L/sec    FEF2575-Pre 3.71 L/sec    ZIM4478-%Pred-Pre 115 %    ExpTime-Pre 6.24 sec    FIFMax-Pre 4.84 L/sec    FEV1FEV6-Pred 86 %    FEV1FEV6-Pre 90 %                   Results as noted above.    PFT Interpretation:  Normal spirometry.  Decreased from previous.  Below recent best.   Valid Maneuver             CF Exacerbation  Absent      Scribe Disclosure:   Angelo WATKINS, am serving as a scribe; to document services personally performed by Dr. Edi Leger- -based on data collection and the provider's statements to me.     Provider Disclosure:  I agree with above History, Review of  Systems, Physical exam and Plan.  I have reviewed the content of the documentation and have edited it as needed. I have personally performed the services documented here and the documentation accurately represents those services and the decisions I have made.      Electronically signed by:  Dr. Edi Leger

## 2023-05-26 DIAGNOSIS — E84.9 CF (CYSTIC FIBROSIS) (H): ICD-10-CM

## 2023-05-26 RX ORDER — ELEXACAFTOR, TEZACAFTOR, AND IVACAFTOR 100-50-75
KIT ORAL
Qty: 84 TABLET | Refills: 3 | OUTPATIENT
Start: 2023-05-26

## 2023-05-30 LAB
BACTERIA SPEC CULT: ABNORMAL

## 2023-06-05 ENCOUNTER — CLINICAL UPDATE (OUTPATIENT)
Dept: PHARMACY | Facility: CLINIC | Age: 28
End: 2023-06-05
Payer: COMMERCIAL

## 2023-06-05 DIAGNOSIS — E84.9 CF (CYSTIC FIBROSIS) (H): Primary | ICD-10-CM

## 2023-06-05 PROCEDURE — 99207 PR NO CHARGE LOS: CPT | Performed by: PHARMACIST

## 2023-06-05 NOTE — PROGRESS NOTES
Clinical Update:                                                    A chart review was conducted for Marleni Alamo.    Reason for Chart Review: Trikafta Quarterly Lab Monitoring 1/4     Discussion: Marleni has been on Trikafta since 2/16/23.  Per chart review, patient continues full dose Trikafta     Labs were reviewed from 5/25/23 at [facility] . ALT is elevated at 1.3 x the upper limit of normal and CK is elevated at 1.6x above acceptable range of <300 U/L. Per Dr. Edi Leger MD, Marleni to repeat CK in 6 weeks due to elevation.    Lab Results   Component Value Date    ALT 46 (H) 05/25/2023    AST 35 05/25/2023    BILITOTAL 1.0 05/25/2023    DBIL 0.28 05/25/2023     (H) 05/25/2023       Plan:  1. Continue Trikafta   2. Recheck hepatic panel in 6 weeks and recheck quarterly labs in 3 months        Mónica Chapman, PharmD   Medication Therapy Management   Cystic Fibrosis Pharmacist

## 2023-06-27 DIAGNOSIS — E84.9 CF (CYSTIC FIBROSIS) (H): ICD-10-CM

## 2023-06-27 RX ORDER — DORNASE ALFA 1 MG/ML
SOLUTION RESPIRATORY (INHALATION)
Qty: 75 ML | Refills: 3 | Status: SHIPPED | OUTPATIENT
Start: 2023-06-27 | End: 2023-11-03

## 2023-06-28 RX ORDER — TOBRAMYCIN INHALATION SOLUTION 300 MG/5ML
INHALANT RESPIRATORY (INHALATION)
Qty: 280 ML | Refills: 3 | Status: SHIPPED | OUTPATIENT
Start: 2023-06-28 | End: 2024-03-06

## 2023-07-16 DIAGNOSIS — E84.9 CYSTIC FIBROSIS EXACERBATION (H): ICD-10-CM

## 2023-07-17 RX ORDER — DOXYCYCLINE 100 MG/1
100 CAPSULE ORAL 2 TIMES DAILY
Qty: 42 CAPSULE | Refills: 0 | OUTPATIENT
Start: 2023-07-17 | End: 2023-08-07

## 2023-07-18 NOTE — ED PROVIDER NOTES
[place] EMERGENCY DEPARTMENT ([facility])  October 6, 2019    History     Chief Complaint   Patient presents with     Medication Refill     HPI  Marleni Alamo is a 24 year old female with a history of cystic fibrosis  and diabetes who presented to the Emergency Department today because she ran out of her insulin.  She is accompanied by her father who gives history that patient is no longer being seen in the Pediatric Endocrinology clinic and that she has run out of her insulin yet today, specifically her NovoLog. She does not have anyone else that could not refill this.  Her blood glucose was 180  the last time it was checked but and did take her Lantus. However, blood glucose has been yet slowly climbing.  They are here only to get a refill for the insulin, and she has no other complaints at this time.      This part of the medical record was transcribed by Manasa Coombs Scribmallika, from a dictation done by Dillan Cartwright MD.     PAST MEDICAL HISTORY  Past Medical History:   Diagnosis Date     Atopy      Cerebral palsy (H)     Botox injextions, managed by Dr. 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     Seizure disorder (H)     Multiple daily in infancy now infrequent (every few years)     Thrush, oral      PAST SURGICAL HISTORY  Past Surgical History:   Procedure Laterality Date     bilat antrostomies  4/2011     Bilater knee surgery with plates and pins Bilateral 2007      CHOLECYSTECTOMY, LAPOROSCOPIC  Feb 2015     Cleft palate repair and mandibular advancement  1996     gastrostomy in infancy       Multiple PET (ear tubes)       Multiple sinus surgeries  2015     OPTICAL TRACKING SYSTEM ENDOSCOPIC SINUS SURGERY Bilateral 3/1/2019    Procedure: Stealth Assisted Bilateral Maxillary Antrostomy, Ethmoidectomy, Sphenoidotomy, Frontal Sinusotomy;  Surgeon: Anny Carbajal MD;  Location: [facility] OR     RESECT TRACHEA WITH RECONSTRUCTION CHILD  1998    Rib for reconstruction     surgery for meconium ileus, partial bowel resection  8/1995    Details not available     tracheostomy in infancy       FAMILY HISTORY  Family History   Problem Relation Age of Onset     Diabetes Type 1 Mother 12     Thyroid Disease Mother      Diabetes Type 1 Maternal Uncle      Diabetes Type 1 Maternal Grandmother      Thyroid Disease Maternal Grandmother      Breast Cancer Other         Great Grandmother     Thyroid Disease Paternal Uncle      Thyroid Disease Maternal Aunt      SOCIAL HISTORY  Social History     Tobacco Use     Smoking status: Never Smoker     Smokeless tobacco: Never Used   Substance Use Topics     Alcohol use: No     Alcohol/week: 0.0 standard drinks     MEDICATIONS  No current facility-administered medications for this encounter.      Current Outpatient Medications   Medication     insulin aspart (NOVOLOG PEN) 100 UNIT/ML pen     albuterol (PROVENTIL) (2.5 MG/3ML) 0.083% neb solution     amylase-lipase-protease (CREON) 51612-35746 units CPEP per EC capsule     azithromycin (ZITHROMAX) 500 MG tablet     budesonide (PULMICORT) 1 MG/2ML neb solution     fluticasone-salmeterol (ADVAIR HFA) 115-21 MCG/ACT inhaler     glucagon (GLUCAGON EMERGENCY) 1 MG kit     insulin glargine (LANTUS SOLOSTAR) 100 UNIT/ML pen     multivitamin CF formula (AQUADEKS) chewable tablet     Nutritional Supplements (ENSURE ORIGINAL) LIQD     omeprazole (PRILOSEC) 40 MG DR capsule     PULMOZYME 1 MG/ML neb solution     sodium  chloride inhalant 7 % NEBU neb solution     tobramycin, PF, (CHER) 300 MG/5ML neb solution     vitamin D2 (ERGOCALCIFEROL) 64899 units (1250 mcg) capsule     ALLERGIES  Allergies   Allergen Reactions     Augmentin Hives     Per mother     Ceftin Hives     Per mother     Vancomycin Hives     Per mother       I have reviewed the Medications, Allergies, Past Medical and Surgical History, and Social History in the Epic system.    Review of Systems   All other systems reviewed and are negative.      Physical Exam   BP: 120/57  Pulse: 94  Temp: 98.4  F (36.9  C)  Resp: 20  Height: 152.4 cm (5')  Weight: 64.2 kg (141 lb 8 oz)  SpO2: 97 %      Physical Exam  Vitals signs and nursing note reviewed.   Constitutional:       General: She is not in acute distress.     Appearance: She is well-developed. She is not ill-appearing or toxic-appearing.   HENT:      Head: Normocephalic and atraumatic.   Eyes:      General: No scleral icterus.     Pupils: Pupils are equal, round, and reactive to light.   Neck:      Musculoskeletal: Normal range of motion and neck supple.   Cardiovascular:      Rate and Rhythm: Normal rate.   Pulmonary:      Effort: Pulmonary effort is normal. No respiratory distress.   Skin:     General: Skin is warm and dry.      Coloration: Skin is not pale.      Findings: No rash.   Neurological:      Mental Status: She is alert and oriented to person, place, and time.      Sensory: No sensory deficit.   Psychiatric:         Behavior: Behavior normal.         ED Course        Procedures               Labs Ordered and Resulted from Time of ED Arrival Up to the Time of Departure from the ED   GLUCOSE BY METER - Abnormal; Notable for the following components:       Result Value    Glucose 286 (*)     All other components within normal limits            Assessments & Plan (with Medical Decision Making)   Patient presented to the ER for refill of insulin.  This was done, and she was given 3 refills of this.  I also spoke  with the Endocrinology fellow, and they will work with the patient and her family to arrange follow-up. She was discharged in good condition.    This part of the medical record was transcribed by Saturnino Lincoln Medical Scribe, from a dictation done by Dillan Cartwright MD.     I have reviewed the nursing notes.    I have reviewed the findings, diagnosis, plan and need for follow up with the patient.    Discharge Medication List as of 10/6/2019  5:02 PM          Final diagnoses:   Diabetes mellitus due to cystic fibrosis (H)       10/6/2019   [facility], [place], EMERGENCY DEPARTMENT     Dillan Cartwright MD  10/10/19 1570     23 y.o  @ 40w5d sent from office for nonreactive FHT, vaginal bleeding, and VE of 4cm,  now with category I tracing    -Admit to L&D for risk-reducing IOL  -Routine labs ordered  -Blood transfusion and labor consents obtained  -pain management PRN  -For Pitocin    d/w Dr. iMmi Lamb, SUSHIL-BC

## 2023-08-09 ENCOUNTER — LAB (OUTPATIENT)
Dept: LAB | Facility: CLINIC | Age: 28
End: 2023-08-09
Payer: COMMERCIAL

## 2023-08-09 DIAGNOSIS — E84.9 CF (CYSTIC FIBROSIS) (H): ICD-10-CM

## 2023-08-09 DIAGNOSIS — E08.9 DIABETES MELLITUS RELATED TO CYSTIC FIBROSIS (H): ICD-10-CM

## 2023-08-09 DIAGNOSIS — E08.9 DIABETES MELLITUS DUE TO CYSTIC FIBROSIS (H): ICD-10-CM

## 2023-08-09 DIAGNOSIS — L81.9 HYPOPIGMENTED SKIN LESION: ICD-10-CM

## 2023-08-09 DIAGNOSIS — E84.8 DIABETES MELLITUS RELATED TO CYSTIC FIBROSIS (H): ICD-10-CM

## 2023-08-09 DIAGNOSIS — J32.4 CHRONIC PANSINUSITIS: ICD-10-CM

## 2023-08-09 DIAGNOSIS — K86.89 PANCREATIC INSUFFICIENCY: ICD-10-CM

## 2023-08-09 DIAGNOSIS — E84.9 DIABETES MELLITUS DUE TO CYSTIC FIBROSIS (H): ICD-10-CM

## 2023-08-09 LAB
ALBUMIN SERPL BCG-MCNC: 4.3 G/DL (ref 3.5–5.2)
ALP SERPL-CCNC: 140 U/L (ref 35–104)
ALT SERPL W P-5'-P-CCNC: 35 U/L (ref 0–50)
AST SERPL W P-5'-P-CCNC: 25 U/L (ref 0–45)
BILIRUB DIRECT SERPL-MCNC: 0.34 MG/DL (ref 0–0.3)
BILIRUB SERPL-MCNC: 1.4 MG/DL
CK SERPL-CCNC: 60 U/L (ref 26–192)
PROT SERPL-MCNC: 7.2 G/DL (ref 6.4–8.3)

## 2023-08-09 PROCEDURE — 82040 ASSAY OF SERUM ALBUMIN: CPT

## 2023-08-09 PROCEDURE — 36415 COLL VENOUS BLD VENIPUNCTURE: CPT

## 2023-08-09 PROCEDURE — 82550 ASSAY OF CK (CPK): CPT

## 2023-08-09 NOTE — NURSING NOTE
Chief Complaint   Patient presents with    Labs Only     Pt has labs drawn via  by VAT.      Labs collected from venipuncture by Vascular Access RN.    Sonu Robins RN

## 2023-08-12 DIAGNOSIS — E08.9 DIABETES MELLITUS RELATED TO CYSTIC FIBROSIS (H): ICD-10-CM

## 2023-08-12 DIAGNOSIS — E84.8 DIABETES MELLITUS RELATED TO CYSTIC FIBROSIS (H): ICD-10-CM

## 2023-08-15 ENCOUNTER — CLINICAL UPDATE (OUTPATIENT)
Dept: PHARMACY | Facility: CLINIC | Age: 28
End: 2023-08-15
Payer: COMMERCIAL

## 2023-08-15 ENCOUNTER — TELEPHONE (OUTPATIENT)
Dept: PULMONOLOGY | Facility: CLINIC | Age: 28
End: 2023-08-15
Payer: COMMERCIAL

## 2023-08-15 DIAGNOSIS — E84.9 CF (CYSTIC FIBROSIS) (H): Primary | ICD-10-CM

## 2023-08-15 PROCEDURE — 99207 PR NO CHARGE LOS: CPT | Performed by: PHARMACIST

## 2023-08-15 RX ORDER — PEN NEEDLE, DIABETIC 32GX 5/32"
NEEDLE, DISPOSABLE MISCELLANEOUS
Qty: 400 EACH | Refills: 1 | Status: SHIPPED | OUTPATIENT
Start: 2023-08-15 | End: 2024-04-16

## 2023-08-15 NOTE — TELEPHONE ENCOUNTER
Contacted patient's father per Dr. Leger request. Recent bilirubin level elevated. Recommending repeating LFTs and CK in 4-6 weeks. No answer, voicemail left requesting call back.    Lexis Le RN

## 2023-08-15 NOTE — TELEPHONE ENCOUNTER
Pen needle    9/27/2022  [facility] [place]      Anjelica Galindo MD  Endocrinology, Diabetes, and Metabolism       nv: 10/3/23

## 2023-08-15 NOTE — PROGRESS NOTES
Clinical Update:                                                    A chart review was conducted for Marleni Alamo.    Reason for Chart Review: Trikafta Lab Monitoring     Discussion: Marleni has been on Trikafta since 2/16/23. Patient had recent history of LFT and CK elevations which requires closer monitoring.  Per chart review, patient continues full dose Trikafta     Labs were reviewed from  8/9/23  at [facility] . bilirubin is elevated at 1.2 x the upper limit of normal.    Lab Results   Component Value Date    ALT 35 08/09/2023    AST 25 08/09/2023    BILITOTAL 1.4 (H) 08/09/2023    DBIL 0.34 (H) 08/09/2023    CKT 60 08/09/2023     Plan:  1. Continue Trikafta   2. Recheck hepatic panel and CK in  4-6 weeks per Dr. Leger. Warm hand-off to Lexis Le RN for follow-up with patient and dad.        Mónica Chapman, PharmD   Medication Therapy Management   Cystic Fibrosis Pharmacist

## 2023-08-17 ENCOUNTER — PHARMACY VISIT (OUTPATIENT)
Dept: ADMINISTRATIVE | Facility: CLINIC | Age: 28
End: 2023-08-17
Payer: COMMERCIAL

## 2023-08-17 NOTE — PROGRESS NOTES
Cystic Fibrosis Clinical Follow Up Assessment   Completed on 2023/08/17 16:34:56 [facility], by Xiomy Damon        Activity Date 2023/08/17     Activity Medications    PULMOZYME    TRIKAFTA    Tobramycin        Care Details    What are the patient's goals for this therapy?   ? 5/8/2023: Per Dad, make Marleni's prescriptions easier to manage and maintain.      Did you identify any patient barriers to access and successful treatment?   ? No barriers to access identified      Is it appropriate to collect a PDC at this time? [QA Metric] (An MPR or PDC would not be appropriate for cycled medications or if the patient is on therapy   ? Yes      Document PDC   ? 0.9      Has the patient missed doses inappropriately?   ? No      Please select CURRENT side effect(s) for monitoring:   ? None          Summary Notes  I had the pleasure of speaking to Dad via text for TM. Side effects: none. Doses: She continues to use a pill box and that works well for her to keep medications and vitamins on track. No health, allergy or medication changes. No questions/concerns.   - Will continue quarterly TM.       Cherrie DAMON, PharmD, CSP  Therapy Management Pharmacist  [address] [address]   [email or web address]  [email or web address]   Specialty: [phone]  Mail Order: [phone]

## 2023-08-25 NOTE — TELEPHONE ENCOUNTER
Patient's father called back, updated him on need for repeat labs early September (4-6 weeks after last appointment). Lab orders in computer, provided father with lab scheduling phone number.     Will watch for results.    Lexis Le RN

## 2023-08-25 NOTE — TELEPHONE ENCOUNTER
Contacted dad x2, no answer, left message requesting call back. Will also send Harry'st message.    Lexis Le RN

## 2023-09-07 DIAGNOSIS — E84.0 CYSTIC FIBROSIS WITH PULMONARY MANIFESTATIONS (H): ICD-10-CM

## 2023-09-07 DIAGNOSIS — E84.9 CF (CYSTIC FIBROSIS) (H): ICD-10-CM

## 2023-09-07 RX ORDER — ELEXACAFTOR, TEZACAFTOR, AND IVACAFTOR 100-50-75
KIT ORAL
Qty: 84 TABLET | Refills: 0 | Status: SHIPPED | OUTPATIENT
Start: 2023-09-07 | End: 2023-10-05

## 2023-09-14 ENCOUNTER — OFFICE VISIT (OUTPATIENT)
Dept: ENDOCRINOLOGY | Facility: CLINIC | Age: 28
End: 2023-09-14
Payer: COMMERCIAL

## 2023-09-14 DIAGNOSIS — E10.628 TYPE 1 DIABETES MELLITUS WITH PRESSURE CALLUS (H): ICD-10-CM

## 2023-09-14 DIAGNOSIS — R60.0 PERIPHERAL EDEMA: Primary | ICD-10-CM

## 2023-09-14 DIAGNOSIS — L84 TYPE 1 DIABETES MELLITUS WITH PRESSURE CALLUS (H): ICD-10-CM

## 2023-09-14 DIAGNOSIS — M24.573 EQUINUS CONTRACTURE OF ANKLE: ICD-10-CM

## 2023-09-14 DIAGNOSIS — M79.672 LEFT FOOT PAIN: ICD-10-CM

## 2023-09-14 PROCEDURE — 99204 OFFICE O/P NEW MOD 45 MIN: CPT | Performed by: PODIATRIST

## 2023-09-14 RX ORDER — UREA 20 %
CREAM (GRAM) TOPICAL
Qty: 170 G | Refills: 5 | Status: SHIPPED | OUTPATIENT
Start: 2023-09-14 | End: 2023-09-15

## 2023-09-14 NOTE — PROGRESS NOTES
Past Medical History:   Diagnosis Date    Atopy     Cerebral palsy (H)     Botox injextions, managed by Dr. 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    Seizure disorder (H)     Multiple daily in infancy now infrequent (every few years)    Thrush, oral      Patient Active Problem List   Diagnosis    CF (cystic fibrosis) (H)    Diabetes mellitus due to cystic fibrosis (H)    Diabetes mellitus related to cystic fibrosis (H)    Pancreatic insufficiency    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    Gastroesophageal reflux disease without esophagitis    Chronic pansinusitis    Diabetes mellitus, type 2 (H)     Past Surgical History:   Procedure Laterality Date    bilat antrostomies  04/2011    Bilater knee surgery with plates and pins Bilateral 2007    CHOLECYSTECTOMY, LAPOROSCOPIC  02/2015    Cleft palate repair and mandibular advancement  1996    gastrostomy in infancy      Multiple PET (ear tubes)      Multiple sinus surgeries  2015    OPTICAL TRACKING SYSTEM ENDOSCOPIC SINUS SURGERY Bilateral 03/01/2019    Procedure: Stealth Assisted Bilateral Maxillary Antrostomy, Ethmoidectomy, Sphenoidotomy, Frontal Sinusotomy;  Surgeon: Anny Carbajal MD;  Location: [facility] OR    RESECT TRACHEA WITH RECONSTRUCTION CHILD  1998    Rib for reconstruction    STRABISMUS SURGERY      surgery for meconium ileus, partial bowel resection  08/1995    Details not available     tracheostomy in infancy       Social History     Socioeconomic History    Marital status: Single     Spouse name: Not on file    Number of children: Not on file    Years of education: Not on file    Highest education level: Not on file   Occupational History    Not on file   Tobacco Use    Smoking status: Never    Smokeless tobacco: Never   Substance and Sexual Activity    Alcohol use: No     Alcohol/week: 0.0 standard drinks of alcohol    Drug use: No    Sexual activity: Never   Other Topics Concern    Not on file   Social History Narrative    Marleni lives at home with mother in [place] with 2 dogs and a cat. 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 Marleni goes to transition school.      Social Determinants of Health     Financial Resource Strain: Not on file   Food Insecurity: Not on file   Transportation Needs: Not on file   Physical Activity: Not on file   Stress: Not on file   Social Connections: Not on file   Intimate Partner Violence: Not on file   Housing Stability: Not on file     Family History   Problem Relation Age of Onset    Diabetes Type 1 Mother 12    Thyroid Disease Mother     Diabetes Type 1 Maternal Uncle     Diabetes Type 1 Maternal Grandmother     Thyroid Disease Maternal Grandmother     Breast Cancer Other         Great Grandmother    Thyroid Disease Paternal Uncle     Thyroid Disease Maternal Aunt      Lab Results   Component Value Date    A1C 7.0 11/10/2022    A1C 6.8 10/26/2021    A1C 6.9 08/31/2020    A1C 8.1 02/26/2018     Lab Results   Component Value Date    AST 25 08/09/2023    AST 24 08/31/2020     Lab Results   Component Value Date    ALT 35 08/09/2023    ALT 50 08/31/2020     No results found for: BILICONJ   Lab Results   Component Value Date    BILITOTAL 1.4 08/09/2023    BILITOTAL 0.8 08/31/2020     Lab Results   Component Value Date    ALBUMIN 4.3 08/09/2023    ALBUMIN 3.7 10/26/2021    ALBUMIN 3.8 08/31/2020     Lab Results   Component Value Date    PROTTOTAL 7.2 08/09/2023    PROTTOTAL  8.2 08/31/2020      Lab Results   Component Value Date    ALKPHOS 140 08/09/2023    ALKPHOS 217 08/31/2020       Answers submitted by the patient for this visit:  Symptoms you have experienced in the last 30 days (Submitted on 9/13/2023)  General Symptoms: No  Skin Symptoms: No  HENT Symptoms: No  EYE SYMPTOMS: No  HEART SYMPTOMS: No  LUNG SYMPTOMS: No  INTESTINAL SYMPTOMS: No  URINARY SYMPTOMS: No  GYNECOLOGIC SYMPTOMS: No  BREAST SYMPTOMS: No  SKELETAL SYMPTOMS: No  BLOOD SYMPTOMS: No  NERVOUS SYSTEM SYMPTOMS: No  MENTAL HEALTH SYMPTOMS: No        Subjective findings- 28-year-old presents with caregiver for diabetic foot cares.  Relates June or July started getting foot pain and swelling in the left foot that is gotten better, no injuries, wears AFOs, has Plastizote lining in them that may be wearing out, relates he gets pain in the right plantar MPJs after walking about a mile, relates to no numbness tingling or neuropathy, no ulcers or sores in the past.  Relates did have hammertoe surgery on the left foot in the past.    Objective findings- DP and PT are 2 out of 4 bilaterally.  Has decreased hair growth bilaterally.  Has mild edema of the dorsal left foot, has mild peripheral edema bilaterally.  Has equinus bilaterally.  Has dorsally contracted digits 1 through 5 bilaterally, has plantarly prominent 2 through 4 MPJs with hyperkeratotic tissue buildup.  Sharp dull is intact with 5.07 Somers Delmer monofilament bilaterally, proprioception is intact bilaterally, deep tendon reflexes are intact bilaterally, no gross tendon voids bilaterally.  No pain on palpation, no pain range of motion bilaterally.  No erythema, no drainage, no odor, no calor bilaterally.    Assessment and plan- Diabetes with equinus contracture and Hammertoes, callus on the MPJs, left foot pain with edema and mild peripheral edema.  Diagnosis and treatment options discussed with them.  Advised them on stretching.  Prescription for physical  therapy given and use discussed with them.  Prescription for compression socks 15 to 20 mmHg given use discussed with them.  Prescription for Urea cream given and use discussed with them if this is not covered by insurance I discussed with them to get the over-the-counter Urea cream.  Prescription to orthotics and prosthetics given to have the AFOs evaluated and new pink Plastizote applied as needed.  Return to clinic and see me in 3 months.            Moderate level of medical decision making.

## 2023-09-14 NOTE — LETTER
9/14/2023       RE: Marleni Alamo  [address] [place] [address]     Dear Colleague,    Thank you for referring your patient, Marleni Alamo, to the [facility] ENDOCR[facility]OLOG[facility] [place] at [facility]. Please see a copy of my visit note below.    Past Medical History:   Diagnosis Date     Atopy      Cerebral palsy (H)     Botox injextions, managed by Dr. John Marley     CF (cystic fibrosis) (H) 07/25/2016    Sweat Test: 8/15/95 Value: 85.0 Genotype: [id number], H/O Pseudomonas and MRSA, Baseline FEV1  2.48, FVC 2.76 (7/2015)     Cholelithiasis      Chronic pansinusitis 07/25/2016     Diabetes mellitus due to cystic fibrosis (H) 07/25/2016     Diabetes mellitus related to cystic fibrosis (H) 07/25/2016     Gastroesophageal reflux disease without esophagitis 07/25/2016     Infection due to 2019 novel coronavirus 05/2022    Minimal symptoms     MRSA (methicillin resistant staph aureus) culture positive      Pancreatic insufficiency 07/25/2016     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     Seizure disorder (H)     Multiple daily in infancy now infrequent (every few years)     Thrush, oral      Patient Active Problem List   Diagnosis     CF (cystic fibrosis) (H)     Diabetes mellitus due to cystic fibrosis (H)     Diabetes mellitus related to cystic fibrosis (H)     Pancreatic insufficiency     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     Gastroesophageal reflux disease without esophagitis     Chronic pansinusitis     Diabetes mellitus, type 2 (H)     Past Surgical History:   Procedure Laterality Date     bilat antrostomies  04/2011     Bilater knee surgery with plates and pins Bilateral 2007     CHOLECYSTECTOMY, LAPOROSCOPIC  02/2015     Cleft palate repair and mandibular advancement  1996     gastrostomy in infancy       Multiple PET (ear tubes)       Multiple sinus surgeries  2015     OPTICAL TRACKING SYSTEM ENDOSCOPIC SINUS SURGERY Bilateral 03/01/2019     Procedure: Stealth Assisted Bilateral Maxillary Antrostomy, Ethmoidectomy, Sphenoidotomy, Frontal Sinusotomy;  Surgeon: Anny Carbajal MD;  Location: [facility] OR     RESECT TRACHEA WITH RECONSTRUCTION CHILD  1998    Rib for reconstruction     STRABISMUS SURGERY       surgery for meconium ileus, partial bowel resection  08/1995    Details not available     tracheostomy in infancy       Social History     Socioeconomic History     Marital status: Single     Spouse name: Not on file     Number of children: Not on file     Years of education: Not on file     Highest education level: Not on file   Occupational History     Not on file   Tobacco Use     Smoking status: Never     Smokeless tobacco: Never   Substance and Sexual Activity     Alcohol use: No     Alcohol/week: 0.0 standard drinks of alcohol     Drug use: No     Sexual activity: Never   Other Topics Concern     Not on file   Social History Narrative    Marleni lives at home with mother in [place] with 2 dogs and a cat. 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 Marleni goes to transition school.      Social Determinants of Health     Financial Resource Strain: Not on file   Food Insecurity: Not on file   Transportation Needs: Not on file   Physical Activity: Not on file   Stress: Not on file   Social Connections: Not on file   Intimate Partner Violence: Not on file   Housing Stability: Not on file     Family History   Problem Relation Age of Onset     Diabetes Type 1 Mother 12     Thyroid Disease Mother      Diabetes Type 1 Maternal Uncle      Diabetes Type 1 Maternal Grandmother      Thyroid Disease Maternal Grandmother      Breast Cancer Other         Great Grandmother     Thyroid Disease Paternal Uncle      Thyroid Disease Maternal Aunt      Lab Results   Component Value Date    A1C 7.0 11/10/2022    A1C 6.8 10/26/2021    A1C 6.9 08/31/2020    A1C 8.1 02/26/2018     Lab Results   Component Value Date    AST 25 08/09/2023    AST 24 08/31/2020     Lab Results   Component Value  Date    ALT 35 08/09/2023    ALT 50 08/31/2020     No results found for: BILICONJ   Lab Results   Component Value Date    BILITOTAL 1.4 08/09/2023    BILITOTAL 0.8 08/31/2020     Lab Results   Component Value Date    ALBUMIN 4.3 08/09/2023    ALBUMIN 3.7 10/26/2021    ALBUMIN 3.8 08/31/2020     Lab Results   Component Value Date    PROTTOTAL 7.2 08/09/2023    PROTTOTAL 8.2 08/31/2020      Lab Results   Component Value Date    ALKPHOS 140 08/09/2023    ALKPHOS 217 08/31/2020       Answers submitted by the patient for this visit:  Symptoms you have experienced in the last 30 days (Submitted on 9/13/2023)  General Symptoms: No  Skin Symptoms: No  HENT Symptoms: No  EYE SYMPTOMS: No  HEART SYMPTOMS: No  LUNG SYMPTOMS: No  INTESTINAL SYMPTOMS: No  URINARY SYMPTOMS: No  GYNECOLOGIC SYMPTOMS: No  BREAST SYMPTOMS: No  SKELETAL SYMPTOMS: No  BLOOD SYMPTOMS: No  NERVOUS SYSTEM SYMPTOMS: No  MENTAL HEALTH SYMPTOMS: No        Subjective findings- 28-year-old presents with caregiver for diabetic foot cares.  Relates June or July started getting foot pain and swelling in the left foot that is gotten better, no injuries, wears AFOs, has Plastizote lining in them that may be wearing out, relates he gets pain in the right plantar MPJs after walking about a mile, relates to no numbness tingling or neuropathy, no ulcers or sores in the past.  Relates did have hammertoe surgery on the left foot in the past.    Objective findings- DP and PT are 2 out of 4 bilaterally.  Has decreased hair growth bilaterally.  Has mild edema of the dorsal left foot, has mild peripheral edema bilaterally.  Has equinus bilaterally.  Has dorsally contracted digits 1 through 5 bilaterally, has plantarly prominent 2 through 4 MPJs with hyperkeratotic tissue buildup.  Sharp dull is intact with 5.07 Louisa Delmer monofilament bilaterally, proprioception is intact bilaterally, deep tendon reflexes are intact bilaterally, no gross tendon voids bilaterally.  No  pain on palpation, no pain range of motion bilaterally.  No erythema, no drainage, no odor, no calor bilaterally.    Assessment and plan- Diabetes with equinus contracture and Hammertoes, callus on the MPJs, left foot pain with edema and mild peripheral edema.  Diagnosis and treatment options discussed with them.  Advised them on stretching.  Prescription for physical therapy given and use discussed with them.  Prescription for compression socks 15 to 20 mmHg given use discussed with them.  Prescription for Urea cream given and use discussed with them if this is not covered by insurance I discussed with them to get the over-the-counter Urea cream.  Prescription to orthotics and prosthetics given to have the AFOs evaluated and new pink Plastizote applied as needed.  Return to clinic and see me in 3 months.            Moderate level of medical decision making.      Again, thank you for allowing me to participate in the care of your patient.      Sincerely,    Yong Mosley DPM

## 2023-09-15 DIAGNOSIS — L84 TYPE 1 DIABETES MELLITUS WITH PRESSURE CALLUS (H): ICD-10-CM

## 2023-09-15 DIAGNOSIS — E10.628 TYPE 1 DIABETES MELLITUS WITH PRESSURE CALLUS (H): ICD-10-CM

## 2023-09-15 RX ORDER — UREA 20 %
CREAM (GRAM) TOPICAL
Qty: 170 G | Refills: 5 | Status: SHIPPED | OUTPATIENT
Start: 2023-09-15 | End: 2023-09-15

## 2023-09-15 RX ORDER — UREA 20 %
CREAM (GRAM) TOPICAL
Qty: 170 G | Refills: 5 | Status: SHIPPED | OUTPATIENT
Start: 2023-09-15

## 2023-09-20 ENCOUNTER — LAB (OUTPATIENT)
Dept: LAB | Facility: CLINIC | Age: 28
End: 2023-09-20
Payer: COMMERCIAL

## 2023-09-20 DIAGNOSIS — E84.9 CF (CYSTIC FIBROSIS) (H): ICD-10-CM

## 2023-09-20 LAB
ALBUMIN SERPL BCG-MCNC: 4.3 G/DL (ref 3.5–5.2)
ALP SERPL-CCNC: 148 U/L (ref 35–104)
ALT SERPL W P-5'-P-CCNC: 48 U/L (ref 0–50)
AST SERPL W P-5'-P-CCNC: 33 U/L (ref 0–45)
BILIRUB DIRECT SERPL-MCNC: 0.31 MG/DL (ref 0–0.3)
BILIRUB SERPL-MCNC: 1.1 MG/DL
CK SERPL-CCNC: 65 U/L (ref 26–192)
PROT SERPL-MCNC: 7.1 G/DL (ref 6.4–8.3)

## 2023-09-20 PROCEDURE — 36415 COLL VENOUS BLD VENIPUNCTURE: CPT

## 2023-09-20 PROCEDURE — 82550 ASSAY OF CK (CPK): CPT

## 2023-09-20 PROCEDURE — 80076 HEPATIC FUNCTION PANEL: CPT

## 2023-09-21 ENCOUNTER — CLINICAL UPDATE (OUTPATIENT)
Dept: PHARMACY | Facility: CLINIC | Age: 28
End: 2023-09-21
Payer: COMMERCIAL

## 2023-09-21 DIAGNOSIS — E84.9 CF (CYSTIC FIBROSIS) (H): Primary | ICD-10-CM

## 2023-09-21 PROCEDURE — 99207 PR NO CHARGE LOS: CPT | Performed by: PHARMACIST

## 2023-09-21 NOTE — PROGRESS NOTES
Clinical Update:                                                    A chart review was conducted for Marleni Alamo.    Reason for Chart Review: Trikafta Lab Monitoring     Discussion: Marleni has been on Trikafta since 2/16/23. Patient had recent history of LFT and CK elevations which requires closer monitoring.  Per chart review, patient continues full dose Trikafta     Labs were reviewed from  9/20/23  at  [facility] . All modulator labs are within normal limits.    Lab Results   Component Value Date    ALT 48 09/20/2023    AST 33 09/20/2023    BILITOTAL 1.1 09/20/2023    DBIL 0.31 (H) 09/20/2023    CKT 65 09/20/2023     Plan:  1. Continue Trikafta   2. Recheck hepatic panel and CK in  3 months  with next clinic visit      Nora Singh, PharmD  Cystic Fibrosis MTM Pharmacist  [facility]  Voicemail: [phone]

## 2023-09-25 DIAGNOSIS — E84.9 CF (CYSTIC FIBROSIS) (H): ICD-10-CM

## 2023-09-25 RX ORDER — SODIUM CHLORIDE FOR INHALATION 7 %
4 VIAL, NEBULIZER (ML) INHALATION DAILY
Qty: 360 ML | Refills: 11 | Status: SHIPPED | OUTPATIENT
Start: 2023-09-25 | End: 2024-10-02

## 2023-10-02 NOTE — PROGRESS NOTES
Patient is showing 5/5 MNCM met.   Yahaira Mccord, VF  Outcome for 10/02/23 12:25 PM :Sent patient [employer] message asking them to upload their BG data and Dexcom instructions.  Yahaira Mccord  Outcome for 10/03/23 8:12 AM :Glucose data sent in [employer] Message  Yahaira Yepez is a 28 year old  female presents today for Cystic Fibrosis (CF Follow up )    HPI  28 year old  female being seen in follow-up for CF related diabetes.  She reports that she was diagnosed with CF related diabetes around age 14.  She also has history of pancreatic insufficiency and cerebral palsy.  Patient was accompanied by Jah (father)    She is currently using Lantus 18 units daily in the morning.  Humalog 1 unit per ~12 g of carbs for meal.    Takes around 8 units of Humalog for breakfast, 3-4 lunch and 3 units with Dinner  Humalog correction 1 unit for every 50 above 200    She uses Dexcom G6 CGM   Can feel symptoms of low blood glucose, denies any episodes of severe hypoglycemia  CGM data was downloaded and reviewed  Average sensor glucose 172, standard deviation 67, time in range 54%, 2% low, 29% high, 14% very high  She has patterns of both high and lows after breakfast and dinner  CGM BG was 55 during clinic visit. Patient was feeling symptomatic. Apple juice and crackers given    Patient is using mostly fixed dose Premeal insulin.  Usually eats cereal plus peanut butter toast for breakfast and takes 8 units of Humalog.  She always eats peanut butter toast along with Trikafta dose.  Today did not eat cereal and blood glucose was low before lunch  Father also reports lows after dinner occasionally    On Trifakta     Patient is able to self administer insulin.  Rotates injection sites between abdomen and arms and thigh  Patient offers no concerns or complaints     Last eye exam 2022  Follows with podiatry     Hemoglobin A1C   Date Value Ref Range Status   11/10/2022 7.0 (H) <5.7 % Final     Comment:     Normal <5.7%   Prediabetes  5.7-6.4%    Diabetes 6.5% or higher     Note: Adopted from ADA consensus guidelines.   08/31/2020 6.9 (H) 0 - 5.6 % Final     Comment:     Normal <5.7% Prediabetes 5.7-6.4%  Diabetes 6.5% or higher - adopted from ADA   consensus guidelines.         Past Medical History:   Diagnosis Date    Atopy     Cerebral palsy (H)     Botox injextions, managed by Dr. 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 400 each 1    blood glucose (ACCU-CHEK GUIDE) test strip Use to test blood sugar 3 times daily or as directed. Call clinic to schedule follow up appointment. 300 strip 3    COMPRESSION STOCKINGS 1 each by Device route daily for 30 days 1 each 2    Continuous Blood Gluc  (DEXCOM G6 ) JAZMINE Use to read blood sugars as per 's instructions. 1 each 0    Continuous Blood Gluc  Sensor (DEXCOM G6 SENSOR) MISC CHANGE EVERY 10 DAYS 3 each 5    Continuous Blood Gluc Transmit (DEXCOM G6 TRANSMITTER) MISC CHANGE EVERY 3 MONTHS 1 each 3    ferrous fumarate 65 mg, [place]. FE,-Vitamin C 125 mg (VITRON C)  MG TABS tablet Take 1 tablet by mouth daily 30 tablet 3    fluticasone (FLONASE) 50 MCG/ACT nasal spray Spray 1 spray into both nostrils 2 times daily      insulin glargine (LANTUS SOLOSTAR) 100 UNIT/ML pen INJECT 18 UNITS SUBCUTANEOUSLY DAILY. 30 mL 1    insulin lispro (HUMALOG KWIKPEN) 100 UNIT/ML (1 unit dial) KWIKPEN INJECT 10 UNITS SUBQ BEFORE BREAKFAST &1 U PER 12G OF CARBS FOR SNACKS &REMAINING MEALS OF THE DAY, ~5 UNITS @DINNER. MAX 30UNITS DAILY. Please call  soon to schedule follow up visit with Dr Galindo in Sept 2023. 30 mL 1    lipase-protease-amylase (CREON) 83242-72195-565830 units CPEP per EC capsule TAKE 4 CAPSULES WITH MEALS AND 3 CAPSULES WITH SNACKS, FOR 3 MEALS AND 2 SNACKS PER DAY. 540 capsule 3    mometasone-formoterol (DULERA) 100-5 MCG/ACT inhaler Inhale 2 puffs into the lungs 2 times daily 13 g 11    mvw complete formulation (CHEWABLES) tablet CHEW AND SWALLOW TWO TABLETS BY MOUTH ONCE DAILY 180 tablet 4    Nutritional Supplements (ENSURE ORIGINAL) LIQD Take 1 Can by mouth At Bedtime 1 Bottle 11    omeprazole (PRILOSEC) 40 MG DR capsule TAKE 1 CAPSULE BY MOUTH TWICE A  capsule 1    PULMOZYME 2.5 MG/2.5ML neb solution NEBULIZE CONTENTS OF ONE VIAL INTO THE LUNGS ONCE DAILY 75 mL 3    sodium chloride inhalant 7 % NEBU neb solution Take 4 mLs by nebulization daily When not on CHER 360 mL 11    tobramycin, PF, (CHER) 300 MG/5ML neb solution NEBULIZE CONTENTS OF ONE VIAL INTO THE LUNGS TWO TIMES A DAY FOR 28 DAYS AND 28 DAYS  mL 3    TRIKAFTA 100-50-75 & 150 MG tablet pack TAKE 2 ORANGE TABLETS IN THE MORNING. SWALLOW WHOLE WITH FAT-CONTAINING FOOD. (SKIP BLUE). 84 tablet 0    UREA 20 INTENSIVE HYDRATING 20 % external cream EXTERNALLY APPLY 1 DOSE  TOPICALLY DAILY TO FOOT CALLUSES. 170 g 5    vitamin D2 (ERGOCALCIFEROL) 02843 units (1250 mcg) capsule Take 1 capsule (50,000 Units) by mouth Every Mon, Wed, Fri Morning 36 capsule 1    Glucagon (BAQSIMI TWO PACK) 3 MG/DOSE POWD Spray 1 spray (3 mg) in nostril as needed in the event of unconscious hypoglycemia or hypoglycemic seizure. May repeat dose if no response after 15 minutes. (Patient not taking: Reported on 2/9/2023) 1 each 1     Family History  family history includes Breast Cancer in an other family member; Diabetes Type 1 in her maternal grandmother and maternal uncle; Diabetes Type 1 (age of onset: 12) in her mother; Thyroid Disease in her maternal aunt, maternal grandmother, mother, and paternal uncle.  Social History     Socioeconomic History    Marital status: Single     Spouse name: Not on file    Number of children: Not on file    Years of education: Not on file    Highest education level: Not on file   Occupational History    Not on file   Tobacco Use    Smoking status: Never    Smokeless tobacco: Never   Substance and Sexual Activity    Alcohol use: No     Alcohol/week: 0.0 standard drinks of alcohol    Drug use: No    Sexual activity: Never   Other Topics Concern    Not on file   Social History Narrative    Marleni lives at home with mother in [place] with 2 dogs and a cat. 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 Marleni goes to transition school.      Social Determinants of Health     Financial Resource Strain: Not on file   Food Insecurity: Not on file   Transportation Needs: Not on file   Physical Activity: Not on file   Stress: Not on file   Social Connections: Not on file   Interpersonal Safety: Not on file   Housing Stability: Not on file   mother had hx of T1D  Live with her father     Physical Exam  /73 (BP Location: Right arm, Patient Position: Sitting)   Pulse 74   SpO2 98%   There is no height or weight on file to calculate BMI.    GENERAL: Healthy, alert and no distress  EYES: Eyes  grossly normal to inspection.  No discharge or erythema, or obvious scleral/conjunctival abnormalities.  RESP: No audible wheeze, cough, or visible cyanosis.  No visible retractions or increased work of breathing.  Lungs clear to auscultation bilaterally  Heart: Regular rate rhythm  Skin: No lipohypertrophy at insulin injection sites  NEURO: Cranial nerves grossly intact.  Mentation and speech appropriate for age.  PSYCH:  affect normal/bright, judgement and insight intact, normal speech and appearance well-groomed.    RESULTS    Hemoglobin A1C   Date Value Ref Range Status   11/10/2022 7.0 (H) <5.7 % Final     Comment:     Normal <5.7%   Prediabetes 5.7-6.4%    Diabetes 6.5% or higher     Note: Adopted from ADA consensus guidelines.   08/31/2020 6.9 (H) 0 - 5.6 % Final     Comment:     Normal <5.7% Prediabetes 5.7-6.4%  Diabetes 6.5% or higher - adopted from ADA   consensus guidelines.         Assessment/Plan:    Cystic fibrosis related diabetes:   Suboptimal control with high variability after meals  Patient was counseled about focusing on carb counting and adjusting meal dose based on carb intake  Use carb ratio of 15 for meal insulin calculation.   Reduce glargine dose to 16 units daily in the morning  A1c would be done with upcoming annual labs  Interested in upgrading to Dexcom G7 CGM.  New prescription was sent    Return to clinic in about 6 months    DAVID Herrera      Note: Chart documentation done in part with Dragon Voice Recognition software. Although reviewed after completion, some word and grammatical errors may remain.  Please consider this when interpreting information in this chart     Time: I spent 30 minutes on the date of the encounter preparing to see patient (including chart review and preparation), obtaining and or reviewing additional medical history, performing an evaluation, documenting clinical information in the electronic health record, independently interpreting results,  communicating results to the patient, and/or coordinating care.

## 2023-10-03 ENCOUNTER — OFFICE VISIT (OUTPATIENT)
Dept: ENDOCRINOLOGY | Facility: CLINIC | Age: 28
End: 2023-10-03
Attending: INTERNAL MEDICINE
Payer: COMMERCIAL

## 2023-10-03 VITALS — DIASTOLIC BLOOD PRESSURE: 73 MMHG | HEART RATE: 74 BPM | OXYGEN SATURATION: 98 % | SYSTOLIC BLOOD PRESSURE: 114 MMHG

## 2023-10-03 DIAGNOSIS — E08.9 DIABETES MELLITUS RELATED TO CYSTIC FIBROSIS (H): Primary | ICD-10-CM

## 2023-10-03 DIAGNOSIS — E84.8 DIABETES MELLITUS RELATED TO CYSTIC FIBROSIS (H): Primary | ICD-10-CM

## 2023-10-03 PROCEDURE — 99214 OFFICE O/P EST MOD 30 MIN: CPT | Performed by: INTERNAL MEDICINE

## 2023-10-03 PROCEDURE — G0463 HOSPITAL OUTPT CLINIC VISIT: HCPCS | Performed by: INTERNAL MEDICINE

## 2023-10-03 RX ORDER — ACYCLOVIR 400 MG/1
TABLET ORAL
Qty: 3 EACH | Refills: 5 | Status: SHIPPED | OUTPATIENT
Start: 2023-10-03

## 2023-10-03 ASSESSMENT — PAIN SCALES - GENERAL: PAINLEVEL: NO PAIN (0)

## 2023-10-03 NOTE — NURSING NOTE
Chief Complaint   Patient presents with    Cystic Fibrosis     CF Follow up      Vitals were taken and medications were reconciled.     Dolly Link RMA  11:45 AM

## 2023-10-03 NOTE — LETTER
10/3/2023       RE: Marleni Alamo  [address] [place] [address]     Dear Colleague,    Thank you for referring your patient, Marleni Alamo, to the [facility] [place] [facility]. Please see a copy of my visit note below.    Patient is showing 5/5 MNCM met.   ALEXA Kohler  Outcome for 10/02/23 12:25 PM :Sent patient [employer] message asking them to upload their BG data and Dexcom instructions.  Yahaira Mccord  Outcome for 10/03/23 8:12 AM :Glucose data sent in [employer] Message  Yahaira Yepez is a 28 year old  female presents today for Cystic Fibrosis (CF Follow up )    HPI  28 year old  female being seen in follow-up for CF related diabetes.  She reports that she was diagnosed with CF related diabetes around age 14.  She also has history of pancreatic insufficiency and cerebral palsy.  Patient was accompanied by Jah (father)    She is currently using Lantus 18 units daily in the morning.  Humalog 1 unit per ~12 g of carbs for meal.    Takes around 8 units of Humalog for breakfast, 3-4 lunch and 3 units with Dinner  Humalog correction 1 unit for every 50 above 200    She uses Dexcom G6 CGM   Can feel symptoms of low blood glucose, denies any episodes of severe hypoglycemia  CGM data was downloaded and reviewed  Average sensor glucose 172, standard deviation 67, time in range 54%, 2% low, 29% high, 14% very high  She has patterns of both high and lows after breakfast and dinner  CGM BG was 55 during clinic visit. Patient was feeling symptomatic. Apple juice and crackers given    Patient is using mostly fixed dose Premeal insulin.  Usually eats cereal plus peanut butter toast for breakfast and takes 8 units of Humalog.  She always eats peanut butter toast along with Trikafta dose.  Today did not eat cereal and blood glucose was low before lunch  Father also reports lows after dinner occasionally    On Trifakta     Patient is  able to self administer insulin.  Rotates injection sites between abdomen and arms and thigh  Patient offers no concerns or complaints     Last eye exam 2022  Follows with podiatry     Hemoglobin A1C   Date Value Ref Range Status   11/10/2022 7.0 (H) <5.7 % Final     Comment:     Normal <5.7%   Prediabetes 5.7-6.4%    Diabetes 6.5% or higher     Note: Adopted from ADA consensus guidelines.   08/31/2020 6.9 (H) 0 - 5.6 % Final     Comment:     Normal <5.7% Prediabetes 5.7-6.4%  Diabetes 6.5% or higher - adopted from ADA   consensus guidelines.         Past Medical History:   Diagnosis Date    Atopy     Cerebral palsy (H)     Botox injextions, managed by Dr. 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 400 each 1    blood glucose (ACCU-CHEK GUIDE) test strip Use to test blood sugar 3 times daily or  as directed. Call clinic to schedule follow up appointment. 300 strip 3    COMPRESSION STOCKINGS 1 each by Device route daily for 30 days 1 each 2    Continuous Blood Gluc  (DEXCOM G6 ) JAZMINE Use to read blood sugars as per 's instructions. 1 each 0    Continuous Blood Gluc Sensor (DEXCOM G6 SENSOR) MISC CHANGE EVERY 10 DAYS 3 each 5    Continuous Blood Gluc Transmit (DEXCOM G6 TRANSMITTER) MISC CHANGE EVERY 3 MONTHS 1 each 3    ferrous fumarate 65 mg, [place]. FE,-Vitamin C 125 mg (VITRON C)  MG TABS tablet Take 1 tablet by mouth daily 30 tablet 3    fluticasone (FLONASE) 50 MCG/ACT nasal spray Spray 1 spray into both nostrils 2 times daily      insulin glargine (LANTUS SOLOSTAR) 100 UNIT/ML pen INJECT 18 UNITS SUBCUTANEOUSLY DAILY. 30 mL 1    insulin lispro (HUMALOG KWIKPEN) 100 UNIT/ML (1 unit dial) KWIKPEN INJECT 10 UNITS SUBQ BEFORE BREAKFAST &1 U PER 12G OF CARBS FOR SNACKS &REMAINING MEALS OF THE DAY, ~5 UNITS @DINNER. MAX 30UNITS DAILY. Please call  soon to schedule follow up visit with Dr Galindo in Sept 2023. 30 mL 1    lipase-protease-amylase (CREON) [id number] units CPEP per EC capsule TAKE 4 CAPSULES WITH MEALS AND 3 CAPSULES WITH SNACKS, FOR 3 MEALS AND 2 SNACKS PER DAY. 540 capsule 3    mometasone-formoterol (DULERA) 100-5 MCG/ACT inhaler Inhale 2 puffs into the lungs 2 times daily 13 g 11    mvw complete formulation (CHEWABLES) tablet CHEW AND SWALLOW TWO TABLETS BY MOUTH ONCE DAILY 180 tablet 4    Nutritional Supplements (ENSURE ORIGINAL) LIQD Take 1 Can by mouth At Bedtime 1 Bottle 11    omeprazole (PRILOSEC) 40 MG DR capsule TAKE 1 CAPSULE BY MOUTH TWICE A  capsule 1    PULMOZYME 2.5 MG/2.5ML neb solution NEBULIZE CONTENTS OF ONE VIAL INTO THE LUNGS ONCE DAILY 75 mL 3    sodium chloride inhalant 7 % NEBU neb solution Take 4 mLs by nebulization daily When not on CHER 360 mL 11    tobramycin, PF, (CHER) 300 MG/5ML neb solution NEBULIZE CONTENTS  OF ONE VIAL INTO THE LUNGS TWO TIMES A DAY FOR 28 DAYS AND 28 DAYS  mL 3    TRIKAFTA 100-50-75 & 150 MG tablet pack TAKE 2 ORANGE TABLETS IN THE MORNING. SWALLOW WHOLE WITH FAT-CONTAINING FOOD. (SKIP BLUE). 84 tablet 0    UREA 20 INTENSIVE HYDRATING 20 % external cream EXTERNALLY APPLY 1 DOSE TOPICALLY DAILY TO FOOT CALLUSES. 170 g 5    vitamin D2 (ERGOCALCIFEROL) 61506 units (1250 mcg) capsule Take 1 capsule (50,000 Units) by mouth Every Mon, Wed, Fri Morning 36 capsule 1    Glucagon (BAQSIMI TWO PACK) 3 MG/DOSE POWD Spray 1 spray (3 mg) in nostril as needed in the event of unconscious hypoglycemia or hypoglycemic seizure. May repeat dose if no response after 15 minutes. (Patient not taking: Reported on 2/9/2023) 1 each 1     Family History  family history includes Breast Cancer in an other family member; Diabetes Type 1 in her maternal grandmother and maternal uncle; Diabetes Type 1 (age of onset: 12) in her mother; Thyroid Disease in her maternal aunt, maternal grandmother, mother, and paternal uncle.  Social History     Socioeconomic History    Marital status: Single     Spouse name: Not on file    Number of children: Not on file    Years of education: Not on file    Highest education level: Not on file   Occupational History    Not on file   Tobacco Use    Smoking status: Never    Smokeless tobacco: Never   Substance and Sexual Activity    Alcohol use: No     Alcohol/week: 0.0 standard drinks of alcohol    Drug use: No    Sexual activity: Never   Other Topics Concern    Not on file   Social History Narrative    Marleni lives at home with mother in [place] with 2 dogs and a cat. 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 Marleni goes to transition school.      Social Determinants of Health     Financial Resource Strain: Not on file   Food Insecurity: Not on file   Transportation Needs: Not on file   Physical Activity: Not on file   Stress: Not on file   Social Connections: Not on file   Interpersonal Safety: Not on  file   Housing Stability: Not on file   mother had hx of T1D  Live with her father     Physical Exam  /73 (BP Location: Right arm, Patient Position: Sitting)   Pulse 74   SpO2 98%   There is no height or weight on file to calculate BMI.    GENERAL: Healthy, alert and no distress  EYES: Eyes grossly normal to inspection.  No discharge or erythema, or obvious scleral/conjunctival abnormalities.  RESP: No audible wheeze, cough, or visible cyanosis.  No visible retractions or increased work of breathing.  Lungs clear to auscultation bilaterally  Heart: Regular rate rhythm  Skin: No lipohypertrophy at insulin injection sites  NEURO: Cranial nerves grossly intact.  Mentation and speech appropriate for age.  PSYCH:  affect normal/bright, judgement and insight intact, normal speech and appearance well-groomed.    RESULTS    Hemoglobin A1C   Date Value Ref Range Status   11/10/2022 7.0 (H) <5.7 % Final     Comment:     Normal <5.7%   Prediabetes 5.7-6.4%    Diabetes 6.5% or higher     Note: Adopted from ADA consensus guidelines.   08/31/2020 6.9 (H) 0 - 5.6 % Final     Comment:     Normal <5.7% Prediabetes 5.7-6.4%  Diabetes 6.5% or higher - adopted from ADA   consensus guidelines.         Assessment/Plan:    Cystic fibrosis related diabetes:   Suboptimal control with high variability after meals  Patient was counseled about focusing on carb counting and adjusting meal dose based on carb intake  Use carb ratio of 15 for meal insulin calculation.   Reduce glargine dose to 16 units daily in the morning  A1c would be done with upcoming annual labs  Interested in upgrading to Dexcom G7 CGM.  New prescription was sent    Return to clinic in about 6 months    DAVID Herrera      Note: Chart documentation done in part with Dragon Voice Recognition software. Although reviewed after completion, some word and grammatical errors may remain.  Please consider this when interpreting information in this chart     Time: I spent  30 minutes on the date of the encounter preparing to see patient (including chart review and preparation), obtaining and or reviewing additional medical history, performing an evaluation, documenting clinical information in the electronic health record, independently interpreting results, communicating results to the patient, and/or coordinating care.     Again, thank you for allowing me to participate in the care of your patient.      Sincerely,    Anjelica Galindo MD

## 2023-10-05 DIAGNOSIS — E08.9 DIABETES MELLITUS DUE TO CYSTIC FIBROSIS (H): ICD-10-CM

## 2023-10-05 DIAGNOSIS — K86.89 PANCREATIC INSUFFICIENCY: ICD-10-CM

## 2023-10-05 DIAGNOSIS — E84.9 DIABETES MELLITUS DUE TO CYSTIC FIBROSIS (H): ICD-10-CM

## 2023-10-05 DIAGNOSIS — E84.9 CF (CYSTIC FIBROSIS) (H): ICD-10-CM

## 2023-10-05 RX ORDER — ELEXACAFTOR, TEZACAFTOR, AND IVACAFTOR 100-50-75
KIT ORAL
Qty: 84 TABLET | Refills: 2 | Status: SHIPPED | OUTPATIENT
Start: 2023-10-05 | End: 2024-01-15

## 2023-10-05 RX ORDER — BACILLUS COAGULANS 1B CELL
1 CAPSULE ORAL DAILY
Qty: 30 TABLET | Refills: 3 | Status: SHIPPED | OUTPATIENT
Start: 2023-10-05 | End: 2023-10-09

## 2023-10-05 RX ORDER — ERGOCALCIFEROL 1.25 MG/1
CAPSULE, LIQUID FILLED ORAL
Qty: 36 CAPSULE | Refills: 1 | Status: SHIPPED | OUTPATIENT
Start: 2023-10-05 | End: 2024-01-10

## 2023-10-07 ENCOUNTER — DOCUMENTATION ONLY (OUTPATIENT)
Dept: ENDOCRINOLOGY | Facility: CLINIC | Age: 28
End: 2023-10-07
Payer: COMMERCIAL

## 2023-10-08 DIAGNOSIS — E84.9 DIABETES MELLITUS DUE TO CYSTIC FIBROSIS (H): ICD-10-CM

## 2023-10-08 DIAGNOSIS — E84.9 CF (CYSTIC FIBROSIS) (H): ICD-10-CM

## 2023-10-08 DIAGNOSIS — K86.89 PANCREATIC INSUFFICIENCY: ICD-10-CM

## 2023-10-08 DIAGNOSIS — E08.9 DIABETES MELLITUS DUE TO CYSTIC FIBROSIS (H): ICD-10-CM

## 2023-10-08 NOTE — PROGRESS NOTES
Received a call from the patient Grandmother Ms.Jackie Henriquez from [phone] regarding the patient for persistent sugars in 300s and b/l leg swelling for a couple of day. Patient is compliant with her insulin. Was seen by  on 3 rd October. She is being managed for Dystic fibrosis related diabetes. She is on CHO 1: 15 and Lantus 16 units.     Advised the grandmother to go to the ER for evaluation if peristent hyperglycemia and worsening symptoms. The grandmother and patient agree with plan.     Discussed with attending.       Bennie Sterling MD   Endocrinology Fellow

## 2023-10-09 RX ORDER — BACILLUS COAGULANS 1B CELL
CAPSULE ORAL
Qty: 60 TABLET | Refills: 1 | Status: SHIPPED | OUTPATIENT
Start: 2023-10-09 | End: 2023-12-18

## 2023-10-27 DIAGNOSIS — E84.8 DIABETES MELLITUS RELATED TO CYSTIC FIBROSIS (H): ICD-10-CM

## 2023-10-27 DIAGNOSIS — E84.9 CF (CYSTIC FIBROSIS) (H): ICD-10-CM

## 2023-10-27 DIAGNOSIS — E08.9 DIABETES MELLITUS RELATED TO CYSTIC FIBROSIS (H): ICD-10-CM

## 2023-10-27 RX ORDER — INSULIN GLARGINE 100 [IU]/ML
INJECTION, SOLUTION SUBCUTANEOUS
Qty: 15 ML | Refills: 3 | Status: SHIPPED | OUTPATIENT
Start: 2023-10-27 | End: 2024-05-02

## 2023-10-27 NOTE — TELEPHONE ENCOUNTER
insulin glargine (LANTUS SOLOSTAR) 100 UNIT/ML   Last Written Prescription Date:  12/19/22  Last Fill Quantity: 30ML,   # refills: 1  Last Office Visit : 10/3/23  Future Office visit:  4/16/24  Routing refill request to provider for review/approval because:  Insulin - refilled per clinic

## 2023-11-02 ENCOUNTER — THERAPY VISIT (OUTPATIENT)
Dept: PHYSICAL THERAPY | Facility: CLINIC | Age: 28
End: 2023-11-02
Attending: PODIATRIST
Payer: COMMERCIAL

## 2023-11-02 DIAGNOSIS — R29.898 ANKLE WEAKNESS: ICD-10-CM

## 2023-11-02 DIAGNOSIS — M24.573 ANKLE CONTRACTURE, UNSPECIFIED LATERALITY: Primary | ICD-10-CM

## 2023-11-02 DIAGNOSIS — E84.9 CF (CYSTIC FIBROSIS) (H): ICD-10-CM

## 2023-11-02 PROCEDURE — 97161 PT EVAL LOW COMPLEX 20 MIN: CPT | Mod: GP | Performed by: PHYSICAL THERAPIST

## 2023-11-02 PROCEDURE — 97110 THERAPEUTIC EXERCISES: CPT | Mod: GP | Performed by: PHYSICAL THERAPIST

## 2023-11-03 RX ORDER — DORNASE ALFA 1 MG/ML
SOLUTION RESPIRATORY (INHALATION)
Qty: 75 ML | Refills: 11 | Status: SHIPPED | OUTPATIENT
Start: 2023-11-03

## 2023-11-05 PROBLEM — R29.898 ANKLE WEAKNESS: Status: ACTIVE | Noted: 2023-11-05

## 2023-11-05 PROBLEM — M24.573 ANKLE CONTRACTURE, UNSPECIFIED LATERALITY: Status: ACTIVE | Noted: 2023-11-05

## 2023-11-05 NOTE — PROGRESS NOTES
PHYSICAL THERAPY EVALUATION  Type of Visit: Evaluation    See electronic medical record for Abuse and Falls Screening details.    Subjective       Presenting condition or subjective complaint: rehabilitation for toe that was surgicaly repaired  Date of onset: 09/14/23 (MD appointment)    Relevant medical history: Diabetes   Dates & types of surgery: 9.18.2017, surgery to separate joined toes and straighten middle toe    Prior diagnostic imaging/testing results: X-ray     Prior therapy history for the same diagnosis, illness or injury: No      Living Environment  Social support: With family members   Type of home: House   Stairs to enter the home: No       Ramp: No   Stairs inside the home: Yes 12 Is there a railing: Yes   Help at home: Self Cares (home health aide/personal care attendant, family, etc); Home management tasks (cooking, cleaning); Medication and/or finances; Home and Yard maintenance tasks; Assist for driving and community activities  Equipment owned:       Employment: No    Hobbies/Interests: walking dogs (3), playing games: Watts, PS5    Patient goals for therapy: walk longer distances cofortably while wearing AFO's         Objective   FOOT/ANKLE EVALUATION    INTEGUMENTARY (edema, incisions):  patient reports more swelling when It is hor and wears compression socks  POSTURE: Standing Posture: Rounded shoulders, Forward head, Lordosis increased, left leg is longer, wears bilateral AFO , without AFO toes not on the ground, bunions.  GAIT:   Weightbearing Status: WBAT  Assistive Device(s):  bilateral AFO  Gait Deviations: Stance time decreased  Stride length decreased  No heel strike no push off, no DF on stance  BALANCE/PROPRIOCEPTION: Single Leg Stance Eyes Open (seconds): bilateral 2 sec    ROM:   (Degrees) Left AROM Left PROM  Right AROM Right PROM   Ankle Dorsiflexion -25  -20    Ankle Plantarflexion 45  30    Ankle Inversion 0  10    Ankle Eversion 5  5    Great Toe Flexion  No to minimal toe  movement  No to minimal toe movement    Great Toe Extension  No to minimal toe movement   No to minimal toe movement    STRENGTH:   Pain: - none + mild ++ moderate +++ severe  Strength Scale: 0-5/5 Left Right   Ankle Dorsiflexion 1 1   Ankle Plantarflexion     Ankle Inversion 3- 3-   Ankle Eversion 3- 3-   Great Toe Flexion     Great Toe Extension     Anterior Tibialis     Posterior Tibialis     Peroneals     Extensor Digitorum     Gastroc/Soleus       FLEXIBILITY:  gastroc soleus, toe movement   PALPATION:  NA  JOINT MOBILITY:  hypomobility in talocrural, mid foot, subtalar,and MTP.PIP    Assessment & Plan   CLINICAL IMPRESSIONS  Medical Diagnosis: contracture of ankle, weakness, gait abnormality    Treatment Diagnosis: contracture of ankle weakness, gait abnormality   Impression/Assessment: Patient is a 28 year old female with bilateral ankle contracture, weakness, gait abnormality complaints.  The following significant findings have been identified: Decreased ROM/flexibility, Decreased joint mobility, Decreased strength, Impaired balance, Impaired gait, Impaired muscle performance, and Impaired posture. These impairments interfere with their ability to perform self care tasks, household chores, household mobility, and community mobility as compared to previous level of function.     Clinical Decision Making (Complexity):  Clinical Presentation: Stable/Uncomplicated  Clinical Presentation Rationale: based on medical and personal factors listed in PT evaluation  Clinical Decision Making (Complexity): Low complexity    PLAN OF CARE  Treatment Interventions:  Interventions: Gait Training, Manual Therapy, Neuromuscular Re-education, Therapeutic Activity, Therapeutic Exercise    Long Term Goals     PT Goal 1  Goal Identifier: walking  Goal Description: walking 45-1 hour  Rationale: to maximize safety and independence within the community;to maximize safety and independence with transportation;to maximize safety and  independence with performance of ADLs and functional tasks (walking the dog,health wise)  Goal Progress: walking 30 min with poor balance  Target Date: 01/11/23      Frequency of Treatment: 1x/ week  Duration of Treatment: 10 weeks    Recommended Referrals to Other Professionals:  orthotics, already ordered  Education Assessment:   Learner/Method: Patient;Family;Demonstration;Pictures/Video    Risks and benefits of evaluation/treatment have been explained.   Patient/Family/caregiver agrees with Plan of Care.     Evaluation Time:     PT Eval, Low Complexity Minutes (55041): 18       Signing Clinician: GIAN Lees [facility]                                                                                   OUTPATIENT PHYSICAL THERAPY      PLAN OF TREATMENT FOR OUTPATIENT REHABILITATION   Patient's Last Name, First Name, Marleni Breen YOB: 1995   Provider's Name   [facility]   Medical Record No.  [id number]     Onset Date: 09/14/23 (MD appointment)  Start of Care Date: 11/02/23     Medical Diagnosis:  contracture of ankle, weakness, gait abnormality      PT Treatment Diagnosis:  contracture of ankle weakness, gait abnormality Plan of Treatment  Frequency/Duration: 1x/ week/ 10 weeks    Certification date from 11/02/23 to 01/11/24         See note for plan of treatment details and functional goals     Chrystal Sarmiento, GIAN                         I CERTIFY THE NEED FOR THESE SERVICES FURNISHED UNDER        THIS PLAN OF TREATMENT AND WHILE UNDER MY CARE     (Physician attestation of this document indicates review and certification of the therapy plan).                Referring Provider:  Yong Mosley      Initial Assessment  See Epic Evaluation- Start of Care Date: 11/02/23

## 2023-11-08 DIAGNOSIS — E84.9 CF (CYSTIC FIBROSIS) (H): ICD-10-CM

## 2023-11-08 RX ORDER — OMEPRAZOLE 40 MG/1
CAPSULE, DELAYED RELEASE ORAL
Qty: 180 CAPSULE | Refills: 1 | Status: SHIPPED | OUTPATIENT
Start: 2023-11-08 | End: 2024-04-15

## 2023-11-09 ENCOUNTER — PHARMACY VISIT (OUTPATIENT)
Dept: ADMINISTRATIVE | Facility: CLINIC | Age: 28
End: 2023-11-09
Payer: COMMERCIAL

## 2023-11-09 NOTE — PROGRESS NOTES
Cystic Fibrosis Clinical Follow Up Assessment   Completed on 2023/11/09 20:24:24 [facility], by Xiomy Damon        Activity Date 2023/11/09     Activity Medications    PULMOZYME    CHER    TRIKAFTA        Care Details    What are the patient's goals for this therapy?   ? 5/8/2023: Per Dad, make Marleni's prescriptions easier to manage and maintain.      Did you identify any patient barriers to access and successful treatment?   ? No barriers to access identified      Is it appropriate to collect a PDC at this time? [QA Metric] (An MPR or PDC would not be appropriate for cycled medications or if the patient is on therapy   ? Yes      Document PDC   ? 1      Has the patient missed doses inappropriately?   ? No      Please select CURRENT side effect(s) for monitoring:   ? None          Summary Notes  I had the pleasure of speaking to Dad via text for TM.   States pt is doing a great job with her medications. Side effects: none. Doses: She is taking her pills daily as scheduled and is able to manage the pills well with the pillbox.   Trikafta: states they haven t had noticeable results from Trikafta. She is sticking to the plan and hopeful for good results. Confirmed she is taking just the two orange pills daily (skipping the blue tablet).   No health, allergy or medication changes. Dad did not respond to questions/concerns question.   - Dad stated they didn t need to refill Trikafta this month. Last fill 10/11/2023.   - Will follow to ensure they fill next month and will continue quarterly TM.       Cherrie DAMON, PharmD, CSP  Therapy Management Pharmacist  [address] [address]   [email or web address]  [email or web address]   Specialty: [phone]  Mail Order: [phone]

## 2023-11-15 ENCOUNTER — LAB (OUTPATIENT)
Dept: LAB | Facility: CLINIC | Age: 28
End: 2023-11-15
Payer: COMMERCIAL

## 2023-11-15 DIAGNOSIS — R79.89 ELEVATED LFTS: ICD-10-CM

## 2023-11-15 DIAGNOSIS — E84.9 CF (CYSTIC FIBROSIS) (H): ICD-10-CM

## 2023-11-15 PROCEDURE — 84080 ASSAY ALKALINE PHOSPHATASES: CPT | Mod: 90

## 2023-11-15 PROCEDURE — 80076 HEPATIC FUNCTION PANEL: CPT | Mod: 90

## 2023-11-15 PROCEDURE — 82550 ASSAY OF CK (CPK): CPT

## 2023-11-15 PROCEDURE — 84075 ASSAY ALKALINE PHOSPHATASE: CPT | Mod: 59

## 2023-11-15 PROCEDURE — 86708 HEPATITIS A ANTIBODY: CPT

## 2023-11-15 PROCEDURE — 36415 COLL VENOUS BLD VENIPUNCTURE: CPT

## 2023-11-15 PROCEDURE — 99000 SPECIMEN HANDLING OFFICE-LAB: CPT

## 2023-11-16 ENCOUNTER — THERAPY VISIT (OUTPATIENT)
Dept: PHYSICAL THERAPY | Facility: CLINIC | Age: 28
End: 2023-11-16
Payer: COMMERCIAL

## 2023-11-16 DIAGNOSIS — M24.573 ANKLE CONTRACTURE, UNSPECIFIED LATERALITY: Primary | ICD-10-CM

## 2023-11-16 DIAGNOSIS — R29.898 ANKLE WEAKNESS: ICD-10-CM

## 2023-11-16 LAB
ALBUMIN SERPL BCG-MCNC: 4.3 G/DL (ref 3.5–5.2)
ALP SERPL-CCNC: 134 U/L (ref 40–150)
ALT SERPL W P-5'-P-CCNC: 35 U/L (ref 0–50)
AST SERPL W P-5'-P-CCNC: 28 U/L (ref 0–45)
BILIRUB DIRECT SERPL-MCNC: 0.25 MG/DL (ref 0–0.3)
BILIRUB SERPL-MCNC: 1.1 MG/DL
CK SERPL-CCNC: 69 U/L (ref 26–192)
HAV IGG SER QL IA: NONREACTIVE
PROT SERPL-MCNC: 7.3 G/DL (ref 6.4–8.3)

## 2023-11-16 PROCEDURE — 97110 THERAPEUTIC EXERCISES: CPT | Mod: GP | Performed by: PHYSICAL THERAPIST

## 2023-11-16 PROCEDURE — 97112 NEUROMUSCULAR REEDUCATION: CPT | Mod: GP | Performed by: PHYSICAL THERAPIST

## 2023-11-18 LAB
ALP BONE SERPL-CCNC: 63 U/L
ALP LIVER SERPL-CCNC: 68 U/L
ALP OTHER SERPL-CCNC: 0 U/L
ALP SERPL-CCNC: 131 U/L

## 2023-11-28 ENCOUNTER — CLINICAL UPDATE (OUTPATIENT)
Dept: PHARMACY | Facility: CLINIC | Age: 28
End: 2023-11-28
Payer: COMMERCIAL

## 2023-11-28 DIAGNOSIS — E84.9 CF (CYSTIC FIBROSIS) (H): Primary | ICD-10-CM

## 2023-11-28 PROCEDURE — 99207 PR NO CHARGE LOS: CPT | Performed by: PHARMACIST

## 2023-11-28 NOTE — PROGRESS NOTES
Clinical Update:                                                    A chart review was conducted for Marleni Alamo.    Reason for Chart Review: Trikafta Quarterly Lab Monitoring 3/4     Discussion: Marleni has been on Trikafta since 2/16/23. Patient had recent history of LFT and CK elevations which requires closer monitoring. Per chart review, patient continues full dose Trikafta      Labs were reviewed from  11/15/23  at [facility] . All modulator labs are within normal limits.    Lab Results   Component Value Date    ALT 35 11/15/2023    AST 28 11/15/2023    BILITOTAL 1.1 11/15/2023    DBIL 0.25 11/15/2023    CKT 69 11/15/2023     Plan:  1. Continue Trikafta   2. Recheck hepatic panel and CK in 3 months        Mónica Chapman, PharmD   Medication Therapy Management   Cystic Fibrosis Pharmacist

## 2023-12-06 DIAGNOSIS — K86.81 EXOCRINE PANCREATIC INSUFFICIENCY: ICD-10-CM

## 2023-12-06 DIAGNOSIS — Z79.51 LONG TERM CURRENT USE OF INHALED STEROID: ICD-10-CM

## 2023-12-06 DIAGNOSIS — E08.9 DIABETES MELLITUS DUE TO CYSTIC FIBROSIS (H): ICD-10-CM

## 2023-12-06 DIAGNOSIS — E84.9 DIABETES MELLITUS DUE TO CYSTIC FIBROSIS (H): ICD-10-CM

## 2023-12-06 DIAGNOSIS — Z79.52 LONG TERM CURRENT USE OF SYSTEMIC STEROIDS: ICD-10-CM

## 2023-12-06 DIAGNOSIS — K86.89 PANCREATIC INSUFFICIENCY: Primary | ICD-10-CM

## 2023-12-06 DIAGNOSIS — Z79.899 ENCOUNTER FOR LONG-TERM (CURRENT) USE OF MEDICATIONS: ICD-10-CM

## 2023-12-07 ENCOUNTER — THERAPY VISIT (OUTPATIENT)
Dept: PHYSICAL THERAPY | Facility: CLINIC | Age: 28
End: 2023-12-07
Payer: COMMERCIAL

## 2023-12-07 DIAGNOSIS — M24.573 ANKLE CONTRACTURE, UNSPECIFIED LATERALITY: Primary | ICD-10-CM

## 2023-12-07 DIAGNOSIS — E84.9 DIABETES MELLITUS DUE TO CYSTIC FIBROSIS (H): ICD-10-CM

## 2023-12-07 DIAGNOSIS — R29.898 ANKLE WEAKNESS: ICD-10-CM

## 2023-12-07 DIAGNOSIS — E08.9 DIABETES MELLITUS DUE TO CYSTIC FIBROSIS (H): ICD-10-CM

## 2023-12-07 PROCEDURE — 97110 THERAPEUTIC EXERCISES: CPT | Mod: GP | Performed by: PHYSICAL THERAPIST

## 2023-12-07 PROCEDURE — 97112 NEUROMUSCULAR REEDUCATION: CPT | Mod: GP | Performed by: PHYSICAL THERAPIST

## 2023-12-07 PROCEDURE — 97140 MANUAL THERAPY 1/> REGIONS: CPT | Mod: GP | Performed by: PHYSICAL THERAPIST

## 2023-12-08 RX ORDER — BLOOD SUGAR DIAGNOSTIC
STRIP MISCELLANEOUS
Qty: 300 STRIP | Refills: 3 | Status: SHIPPED | OUTPATIENT
Start: 2023-12-08

## 2023-12-09 NOTE — TELEPHONE ENCOUNTER
10/3/2023  [facility] [place]     Anjelica Galindo MD  Endocrinology, Diabetes, and Metabolism

## 2023-12-15 ENCOUNTER — APPOINTMENT (OUTPATIENT)
Dept: LAB | Facility: CLINIC | Age: 28
End: 2023-12-15
Attending: INTERNAL MEDICINE
Payer: COMMERCIAL

## 2023-12-15 ENCOUNTER — OFFICE VISIT (OUTPATIENT)
Dept: PULMONOLOGY | Facility: CLINIC | Age: 28
End: 2023-12-15
Attending: INTERNAL MEDICINE
Payer: COMMERCIAL

## 2023-12-15 ENCOUNTER — CLINICAL UPDATE (OUTPATIENT)
Dept: PHARMACY | Facility: CLINIC | Age: 28
End: 2023-12-15
Payer: COMMERCIAL

## 2023-12-15 VITALS
DIASTOLIC BLOOD PRESSURE: 66 MMHG | BODY MASS INDEX: 27.17 KG/M2 | OXYGEN SATURATION: 99 % | HEART RATE: 71 BPM | HEIGHT: 60 IN | WEIGHT: 138.4 LBS | TEMPERATURE: 98.5 F | RESPIRATION RATE: 16 BRPM | SYSTOLIC BLOOD PRESSURE: 103 MMHG

## 2023-12-15 DIAGNOSIS — E84.9 CF (CYSTIC FIBROSIS) (H): Primary | ICD-10-CM

## 2023-12-15 DIAGNOSIS — E84.9 DIABETES MELLITUS DUE TO CYSTIC FIBROSIS (H): ICD-10-CM

## 2023-12-15 DIAGNOSIS — E84.0 CYSTIC FIBROSIS WITH PULMONARY MANIFESTATIONS (H): Primary | ICD-10-CM

## 2023-12-15 DIAGNOSIS — Z79.899 ENCOUNTER FOR LONG-TERM (CURRENT) USE OF MEDICATIONS: ICD-10-CM

## 2023-12-15 DIAGNOSIS — Z79.52 LONG TERM CURRENT USE OF SYSTEMIC STEROIDS: ICD-10-CM

## 2023-12-15 DIAGNOSIS — K86.81 EXOCRINE PANCREATIC INSUFFICIENCY: ICD-10-CM

## 2023-12-15 DIAGNOSIS — Z79.51 LONG TERM CURRENT USE OF INHALED STEROID: ICD-10-CM

## 2023-12-15 DIAGNOSIS — K86.89 PANCREATIC INSUFFICIENCY: ICD-10-CM

## 2023-12-15 DIAGNOSIS — E08.9 DIABETES MELLITUS DUE TO CYSTIC FIBROSIS (H): ICD-10-CM

## 2023-12-15 LAB
ALBUMIN SERPL BCG-MCNC: 4.2 G/DL (ref 3.5–5.2)
ALBUMIN UR-MCNC: NEGATIVE MG/DL
ALP SERPL-CCNC: 120 U/L (ref 40–150)
ALT SERPL W P-5'-P-CCNC: 26 U/L (ref 0–50)
ANION GAP SERPL CALCULATED.3IONS-SCNC: 7 MMOL/L (ref 7–15)
APPEARANCE UR: CLEAR
AST SERPL W P-5'-P-CCNC: 17 U/L (ref 0–45)
BACTERIA #/AREA URNS HPF: ABNORMAL /HPF
BASOPHILS # BLD AUTO: 0 10E3/UL (ref 0–0.2)
BASOPHILS NFR BLD AUTO: 0 %
BILIRUB DIRECT SERPL-MCNC: 0.38 MG/DL (ref 0–0.3)
BILIRUB SERPL-MCNC: 1.6 MG/DL
BILIRUB UR QL STRIP: NEGATIVE
BUN SERPL-MCNC: 16.1 MG/DL (ref 6–20)
CALCIUM SERPL-MCNC: 9.4 MG/DL (ref 8.6–10)
CHLORIDE SERPL-SCNC: 105 MMOL/L (ref 98–107)
CHOLEST SERPL-MCNC: 114 MG/DL
CK SERPL-CCNC: 54 U/L (ref 26–192)
COLOR UR AUTO: YELLOW
CREAT SERPL-MCNC: 0.59 MG/DL (ref 0.51–0.95)
CREAT UR-MCNC: 90.2 MG/DL
DEPRECATED HCO3 PLAS-SCNC: 25 MMOL/L (ref 22–29)
EGFRCR SERPLBLD CKD-EPI 2021: >90 ML/MIN/1.73M2
EOSINOPHIL # BLD AUTO: 0.2 10E3/UL (ref 0–0.7)
EOSINOPHIL NFR BLD AUTO: 2 %
ERYTHROCYTE [DISTWIDTH] IN BLOOD BY AUTOMATED COUNT: 15.6 % (ref 10–15)
ERYTHROCYTE [SEDIMENTATION RATE] IN BLOOD BY WESTERGREN METHOD: 13 MM/HR (ref 0–20)
EXPTIME-PRE: 3.89 SEC
FASTING STATUS PATIENT QL REPORTED: NO
FEF2575-%PRED-PRE: 116 %
FEF2575-PRE: 3.68 L/SEC
FEF2575-PRED: 3.16 L/SEC
FEFMAX-%PRED-PRE: 112 %
FEFMAX-PRE: 7.22 L/SEC
FEFMAX-PRED: 6.39 L/SEC
FEV1-%PRED-PRE: 100 %
FEV1-PRE: 2.64 L
FEV1FEV6-PRE: 90 %
FEV1FEV6-PRED: 86 %
FEV1FVC-PRE: 90 %
FEV1FVC-PRED: 87 %
FIFMAX-PRE: 4.77 L/SEC
FVC-%PRED-PRE: 96 %
FVC-PRE: 2.92 L
FVC-PRED: 3.03 L
GGT SERPL-CCNC: 67 U/L (ref 5–36)
GLUCOSE SERPL-MCNC: 223 MG/DL (ref 70–99)
GLUCOSE UR STRIP-MCNC: 150 MG/DL
HBA1C MFR BLD: 6.5 %
HCT VFR BLD AUTO: 39.8 % (ref 35–47)
HDLC SERPL-MCNC: 50 MG/DL
HGB BLD-MCNC: 12.8 G/DL (ref 11.7–15.7)
HGB UR QL STRIP: NEGATIVE
IMM GRANULOCYTES # BLD: 0 10E3/UL
IMM GRANULOCYTES NFR BLD: 0 %
INR PPP: 1.05 (ref 0.85–1.15)
IRON SERPL-MCNC: 186 UG/DL (ref 37–145)
KETONES UR STRIP-MCNC: NEGATIVE MG/DL
LDLC SERPL CALC-MCNC: 48 MG/DL
LEUKOCYTE ESTERASE UR QL STRIP: ABNORMAL
LYMPHOCYTES # BLD AUTO: 1.6 10E3/UL (ref 0.8–5.3)
LYMPHOCYTES NFR BLD AUTO: 21 %
MAGNESIUM SERPL-MCNC: 1.9 MG/DL (ref 1.7–2.3)
MCH RBC QN AUTO: 28.4 PG (ref 26.5–33)
MCHC RBC AUTO-ENTMCNC: 32.2 G/DL (ref 31.5–36.5)
MCV RBC AUTO: 88 FL (ref 78–100)
MICROALBUMIN UR-MCNC: <12 MG/L
MICROALBUMIN/CREAT UR: NORMAL MG/G{CREAT}
MONOCYTES # BLD AUTO: 0.5 10E3/UL (ref 0–1.3)
MONOCYTES NFR BLD AUTO: 7 %
MUCOUS THREADS #/AREA URNS LPF: PRESENT /LPF
NEUTROPHILS # BLD AUTO: 5.5 10E3/UL (ref 1.6–8.3)
NEUTROPHILS NFR BLD AUTO: 70 %
NITRATE UR QL: NEGATIVE
NONHDLC SERPL-MCNC: 64 MG/DL
NRBC # BLD AUTO: 0 10E3/UL
NRBC BLD AUTO-RTO: 0 /100
PH UR STRIP: 5.5 [PH] (ref 5–7)
PHOSPHATE SERPL-MCNC: 2.7 MG/DL (ref 2.5–4.5)
PLATELET # BLD AUTO: 192 10E3/UL (ref 150–450)
POTASSIUM SERPL-SCNC: 4.5 MMOL/L (ref 3.4–5.3)
PROT SERPL-MCNC: 6.9 G/DL (ref 6.4–8.3)
RBC # BLD AUTO: 4.5 10E6/UL (ref 3.8–5.2)
RBC URINE: 3 /HPF
SODIUM SERPL-SCNC: 137 MMOL/L (ref 135–145)
SP GR UR STRIP: 1.03 (ref 1–1.03)
SQUAMOUS EPITHELIAL: 17 /HPF
TRANSITIONAL EPI: 1 /HPF
TRIGL SERPL-MCNC: 80 MG/DL
TSH SERPL DL<=0.005 MIU/L-ACNC: 2.98 UIU/ML (ref 0.3–4.2)
UROBILINOGEN UR STRIP-MCNC: NORMAL MG/DL
VIT D+METAB SERPL-MCNC: 74 NG/ML (ref 20–50)
WBC # BLD AUTO: 7.9 10E3/UL (ref 4–11)
WBC URINE: 4 /HPF

## 2023-12-15 PROCEDURE — 83735 ASSAY OF MAGNESIUM: CPT | Performed by: INTERNAL MEDICINE

## 2023-12-15 PROCEDURE — 82570 ASSAY OF URINE CREATININE: CPT | Performed by: INTERNAL MEDICINE

## 2023-12-15 PROCEDURE — 82785 ASSAY OF IGE: CPT | Performed by: INTERNAL MEDICINE

## 2023-12-15 PROCEDURE — 36415 COLL VENOUS BLD VENIPUNCTURE: CPT | Performed by: INTERNAL MEDICINE

## 2023-12-15 PROCEDURE — 84443 ASSAY THYROID STIM HORMONE: CPT | Performed by: INTERNAL MEDICINE

## 2023-12-15 PROCEDURE — 85610 PROTHROMBIN TIME: CPT | Performed by: INTERNAL MEDICINE

## 2023-12-15 PROCEDURE — 84446 ASSAY OF VITAMIN E: CPT | Performed by: INTERNAL MEDICINE

## 2023-12-15 PROCEDURE — 97803 MED NUTRITION INDIV SUBSEQ: CPT | Performed by: DIETITIAN, REGISTERED

## 2023-12-15 PROCEDURE — 82248 BILIRUBIN DIRECT: CPT | Performed by: INTERNAL MEDICINE

## 2023-12-15 PROCEDURE — 83036 HEMOGLOBIN GLYCOSYLATED A1C: CPT | Performed by: INTERNAL MEDICINE

## 2023-12-15 PROCEDURE — 99207 PR NO CHARGE LOS: CPT | Performed by: PHARMACIST

## 2023-12-15 PROCEDURE — 82374 ASSAY BLOOD CARBON DIOXIDE: CPT | Performed by: INTERNAL MEDICINE

## 2023-12-15 PROCEDURE — 82306 VITAMIN D 25 HYDROXY: CPT | Performed by: INTERNAL MEDICINE

## 2023-12-15 PROCEDURE — 82784 ASSAY IGA/IGD/IGG/IGM EACH: CPT | Performed by: INTERNAL MEDICINE

## 2023-12-15 PROCEDURE — 82550 ASSAY OF CK (CPK): CPT | Performed by: INTERNAL MEDICINE

## 2023-12-15 PROCEDURE — 83540 ASSAY OF IRON: CPT | Performed by: INTERNAL MEDICINE

## 2023-12-15 PROCEDURE — 85652 RBC SED RATE AUTOMATED: CPT | Performed by: INTERNAL MEDICINE

## 2023-12-15 PROCEDURE — 84590 ASSAY OF VITAMIN A: CPT | Performed by: INTERNAL MEDICINE

## 2023-12-15 PROCEDURE — 82947 ASSAY GLUCOSE BLOOD QUANT: CPT | Performed by: INTERNAL MEDICINE

## 2023-12-15 PROCEDURE — 85025 COMPLETE CBC W/AUTO DIFF WBC: CPT | Performed by: INTERNAL MEDICINE

## 2023-12-15 PROCEDURE — 87070 CULTURE OTHR SPECIMN AEROBIC: CPT | Performed by: INTERNAL MEDICINE

## 2023-12-15 PROCEDURE — 82435 ASSAY OF BLOOD CHLORIDE: CPT | Performed by: INTERNAL MEDICINE

## 2023-12-15 PROCEDURE — G0463 HOSPITAL OUTPT CLINIC VISIT: HCPCS | Performed by: INTERNAL MEDICINE

## 2023-12-15 PROCEDURE — 84100 ASSAY OF PHOSPHORUS: CPT | Performed by: INTERNAL MEDICINE

## 2023-12-15 PROCEDURE — 81001 URINALYSIS AUTO W/SCOPE: CPT | Performed by: INTERNAL MEDICINE

## 2023-12-15 PROCEDURE — 99215 OFFICE O/P EST HI 40 MIN: CPT | Mod: 25 | Performed by: INTERNAL MEDICINE

## 2023-12-15 PROCEDURE — 94375 RESPIRATORY FLOW VOLUME LOOP: CPT | Performed by: INTERNAL MEDICINE

## 2023-12-15 PROCEDURE — 80061 LIPID PANEL: CPT | Performed by: INTERNAL MEDICINE

## 2023-12-15 PROCEDURE — 82977 ASSAY OF GGT: CPT | Performed by: INTERNAL MEDICINE

## 2023-12-15 ASSESSMENT — PAIN SCALES - GENERAL: PAINLEVEL: NO PAIN (0)

## 2023-12-15 NOTE — NURSING NOTE
Chief Complaint   Patient presents with    Blood Draw     Labs drawn via  by Vascular Access Team in lab.     Labs collected from venipuncture by Vascular Access Team. Patient not fasting. Vitals taken.     Cleo Robles RN

## 2023-12-15 NOTE — LETTER
12/15/2023         RE: Marleni Alamo  [address] [place] [address]        Dear Colleague,    Thank you for referring your patient, Marleni Alamo, to [facility]. Please see a copy of my visit note below.    Reason for Visit  Marleni Alamo is a 28 year old year old female who is being seen for Blood Draw (Labs drawn via  by Vascular Access Team in lab.) and RECHECK (Return Cystic Fibrosis )      Assessment and plan:   Marleni Alamo is a 28-year-old female with cystic fibrosis, pancreatic insufficinecy, CFRD, cerebral palsy and Leoncio Letts Syndrome.  Trikafta was initiated in February 2023.  Maintenance   Modulator:  Trikafta it was initiated in February 2023.  Trikafta  Mutation: [id number]  AW Clearance: Vest  Bronchodilators:  Albuterol  Mucolytics: Pulmozyme, Hypertonic (7%),   Antibiotics Inh:  CHER  Antibiotics Oral: none   Other: Advair  Colonization Hx:  MRSA, MSSA, Streptococcus anginosus, Achromobacter xylosoxidans/denitrificans  Annual Studies:  Due December 2024  Contraindications to standard of care:    Pulmonary/cystic fibrosis: Exercise tolerance at baseline.  No cough or sputum.  Oxygenating well saturation 99%.  PFTs in the high normal range and similar to baseline.  Patient does not appear to be having a pulmonary exacerbation at this time.  She will continue her current vest therapy and nebs.  If the patient remains without gram-negative's, could consider stopping CHER nebs.    CFTR Modulation: Patient is an Z712ler homozygote.  She reports marked symptomatic improvement with Trikafta.  Baseline PFTs are in an excellent range so would not expect a dramatic change in PFTs from baseline.  Reviewed risks and benefits of Trikafta with the patient and her father, the patient indicates she has had significant symptomatic improvement and would like to continue treatment.  Alkaline phosphatase and transaminases  intermittently elevated in the past, now in range.  Bilirubin mildly elevated.  CK within normal limits.  Recheck Trikafta labs in 2 months and again follow-up in 4 months.    CF-related diabetes: Patient reports intermittent hypoglycemia.  It appears this is often overnight.  Lantus will be reduced to 15 units.  The patient was encouraged to follow-up with endocrinology.    Pancreatic insufficiency: The patient denies symptoms of malabsorption.  Vitamin levels with annual studies are pending and will be reviewed with the CF dietitian when available.  For now, continue current pancreatic enzyme replacement and supplemental vitamins.    CF-related sinus disease: Patient does not appear to be having an acute sinusitis at this time.  Continue Flonase and ENT follow-up as needed.    Reflux: Adequately controlled with omeprazole.    Healthcare maintenance: The patient has received her COVID booster and influenza vaccine for this flu season.  She is encouraged to receive a Tdap booster and hepatitis B series locally.  Animal studies were completed and available results were reviewed with the patient and her father.  Electrolytes within normal limits.  Normal kidney function.  Bilirubin elevated but LFTs otherwise within normal limits as noted above.  Hemoglobin A1c elevated at 6.5, improved from previous, insulin adjustment as noted above, otherwise defer to endocrinology.  Iron level is elevated.  Replacement will be reduced to twice weekly.  CBC is unremarkable.    Labs in 2 months.  Follow-up in 4 months with Trikafta labs.    IEdi, have spent 40 minutes on the day of the visit to review the chart, interview and examine the patient, review labs and imaging, formulate a plan and submit orders. Time documented is excluding time spent for PFT interpretation.     Edi Leger MD             CF HPI  The patient was seen and examined by Edi eLger MD   Marleni Alamo is a 28-year-old female  with cystic fibrosis, pancreatic insufficinecy, CFRD, cerebral palsy and Leoncio [place] Syndrome.  Trikafta was initiated in February 2023.  Since her last visit, patient had an episode of left foot cellulitis treated through her local urgent care.  No respiratory illnesses since her last visit.  Last seen in clinic in May 2023.    Breathing is comfortable at rest.  Dyspnea with prolonged walking, similar to baseline.  The patient walks her dog 1-2 times per week for exercise.  No cough.  No sputum.  No chest pain.  Vest therapy twice daily.  No fever, chills or night sweats.    Review of systems:  Occasional episodes of hypoglycemia, sometimes overnight.  The patient is unable to specify frequency.  Appetite is good  No ear pain, sore throat, sinus pain or rhinorrhea  No nausea, vomiting, diarrhea or abdominal pain.  A complete ROS was otherwise negative except as noted in the HPI.    Current Outpatient Medications   Medication    ACCU-CHEK GUIDE test strip    albuterol (PROVENTIL) (2.5 MG/3ML) 0.083% neb solution    BD STEVIE U/F 32G X 4 MM insulin pen needle    Continuous Blood Gluc  (DEXCOM G6 ) JAZMINE    Continuous Blood Gluc Sensor (DEXCOM G7 SENSOR) MISC    Continuous Blood Gluc Transmit (DEXCOM G6 TRANSMITTER) MISC    fluticasone (FLONASE) 50 MCG/ACT nasal spray    Glucagon (BAQSIMI TWO PACK) 3 MG/DOSE POWD    insulin lispro (HUMALOG KWIKPEN) 100 UNIT/ML (1 unit dial) KWIKPEN    LANTUS SOLOSTAR 100 UNIT/ML soln    lipase-protease-amylase (CREON) 55952-66124-795152 units CPEP per EC capsule    mometasone-formoterol (DULERA) 100-5 MCG/ACT inhaler    mvw complete formulation (CHEWABLES) tablet    Nutritional Supplements (ENSURE ORIGINAL) LIQD    omeprazole (PRILOSEC) 40 MG DR capsule    PULMOZYME 2.5 MG/2.5ML neb solution    sodium chloride inhalant 7 % NEBU neb solution    tobramycin, PF, (CHER) 300 MG/5ML neb solution    TRIKAFTA 100-50-75 & 150 MG tablet pack    UREA 20 INTENSIVE HYDRATING 20 %  external cream    vitamin D2 (ERGOCALCIFEROL) 78592 units (1250 mcg) capsule    VITRON-C  MG TABS tablet     No current facility-administered medications for this visit.     Allergies   Allergen Reactions    Amoxicillin-Pot Clavulanate Hives     Per mother    Ceftin Hives     Per mother    Vancomycin Hives     Per mother     Past Medical History:   Diagnosis Date    Atopy     Cerebral palsy (H)     Botox injextions, managed by Dr. John Marley    CF (cystic fibrosis) (H) 07/25/2016    Sweat Test: 8/15/95 Value: 85.0 Genotype: [id number], H/O Pseudomonas and MRSA, Baseline FEV1  2.48, FVC 2.76 (7/2015)    Cholelithiasis     Chronic pansinusitis 07/25/2016    Diabetes mellitus due to cystic fibrosis (H) 07/25/2016    Diabetes mellitus related to cystic fibrosis (H) 07/25/2016    Gastroesophageal reflux disease without esophagitis 07/25/2016    Infection due to 2019 novel coronavirus 05/2022    Minimal symptoms    MRSA (methicillin resistant staph aureus) culture positive     Pancreatic insufficiency 07/25/2016    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    Seizure disorder (H)     Multiple daily in infancy now infrequent (every few years)    Thrush, oral        Past Surgical History:   Procedure Laterality Date    bilat antrostomies  04/2011    Bilater knee surgery with plates and pins Bilateral 2007    CHOLECYSTECTOMY, LAPOROSCOPIC  02/2015    Cleft palate repair and mandibular advancement  1996    gastrostomy in infancy      Multiple PET (ear tubes)      Multiple sinus surgeries  2015    OPTICAL TRACKING SYSTEM ENDOSCOPIC SINUS SURGERY Bilateral 03/01/2019    Procedure: Stealth Assisted Bilateral Maxillary Antrostomy, Ethmoidectomy, Sphenoidotomy, Frontal Sinusotomy;  Surgeon: Anny Carbajal MD;  Location: [facility] OR    RESECT TRACHEA WITH RECONSTRUCTION CHILD  1998    Rib for reconstruction    STRABISMUS SURGERY      surgery for meconium ileus, partial bowel resection  08/1995    Details not available     "tracheostomy in infancy         Social History     Socioeconomic History    Marital status: Single     Spouse name: Not on file    Number of children: Not on file    Years of education: Not on file    Highest education level: Not on file   Occupational History    Not on file   Tobacco Use    Smoking status: Never    Smokeless tobacco: Never   Substance and Sexual Activity    Alcohol use: No     Alcohol/week: 0.0 standard drinks of alcohol    Drug use: No    Sexual activity: Never   Other Topics Concern    Not on file   Social History Narrative    Marleni lives at home with mother in [place] with 2 dogs and a cat. 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 Marleni goes to [employer] school.      Social Determinants of Health     Financial Resource Strain: Not on file   Food Insecurity: Not on file   Transportation Needs: Not on file   Physical Activity: Not on file   Stress: Not on file   Social Connections: Not on file   Interpersonal Safety: Not on file   Housing Stability: Not on file         /66 (BP Location: Right arm, Patient Position: Sitting, Cuff Size: Adult Regular)   Pulse 71   Temp 98.5  F (36.9  C) (Oral)   Resp 16   Ht 1.52 m (4' 11.84\")   Wt 62.8 kg (138 lb 6.4 oz)   SpO2 99%   BMI 27.17 kg/m    Body mass index is 27.17 kg/m .  Exam:   GENERAL APPEARANCE: Well developed, well nourished, alert, and in no apparent distress.  EYES: PERRL, EOMI  HENT: Nasal mucosa with edema and no hyperemia. No nasal polyps.  EARS: Canals clear, TMs normal  MOUTH: Bifid uvula, oral mucosa is moist, without any lesions, no tonsillar enlargement, no oropharyngeal exudate.  NECK: supple, no masses, no thyromegaly.  LYMPHATICS: No significant axillary, cervical, or supraclavicular nodes.  RESP: normal percussion, good air flow throughout.  No crackles. No rhonchi. No wheezes.  CV: Normal S1, S2, regular rhythm, normal rate. No murmur.  No rub. No gallop. No LE edema.   ABDOMEN:  Bowel sounds normal, soft, nontender, no HSM " or masses.   MS: extremities normal. No clubbing. No cyanosis.  SKIN: no rash on limited exam  NEURO: Mentation intact, speech at baseline, normal strength and tone, bilateral ankle braces with associated gait impairment  PSYCH: mentation appears normal. and affect normal/bright  Results:  Recent Results (from the past 168 hour(s))   Routine UA with microscopic    Collection Time: 12/15/23 11:58 AM   Result Value Ref Range    Color Urine Yellow Colorless, Straw, Light Yellow, Yellow    Appearance Urine Clear Clear    Glucose Urine 150 (A) Negative mg/dL    Bilirubin Urine Negative Negative    Ketones Urine Negative Negative mg/dL    Specific Gravity Urine 1.026 1.003 - 1.035    Blood Urine Negative Negative    pH Urine 5.5 5.0 - 7.0    Protein Albumin Urine Negative Negative mg/dL    Urobilinogen Urine Normal Normal, 2.0 mg/dL    Nitrite Urine Negative Negative    Leukocyte Esterase Urine Trace (A) Negative    Bacteria Urine Few (A) None Seen /HPF    Mucus Urine Present (A) None Seen /LPF    RBC Urine 3 (H) <=2 /HPF    WBC Urine 4 <=5 /HPF    Squamous Epithelials Urine 17 (H) <=1 /HPF    Transitional Epithelials Urine 1 <=1 /HPF   Erythrocyte sedimentation rate auto    Collection Time: 12/15/23 11:58 AM   Result Value Ref Range    Erythrocyte Sedimentation Rate 13 0 - 20 mm/hr   INR    Collection Time: 12/15/23 11:58 AM   Result Value Ref Range    INR 1.05 0.85 - 1.15   CBC with platelets and differential    Collection Time: 12/15/23 11:58 AM   Result Value Ref Range    WBC Count 7.9 4.0 - 11.0 10e3/uL    RBC Count 4.50 3.80 - 5.20 10e6/uL    Hemoglobin 12.8 11.7 - 15.7 g/dL    Hematocrit 39.8 35.0 - 47.0 %    MCV 88 78 - 100 fL    MCH 28.4 26.5 - 33.0 pg    MCHC 32.2 31.5 - 36.5 g/dL    RDW 15.6 (H) 10.0 - 15.0 %    Platelet Count 192 150 - 450 10e3/uL    % Neutrophils 70 %    % Lymphocytes 21 %    % Monocytes 7 %    % Eosinophils 2 %    % Basophils 0 %    % Immature Granulocytes 0 %    NRBCs per 100 WBC 0 <1 /100     Absolute Neutrophils 5.5 1.6 - 8.3 10e3/uL    Absolute Lymphocytes 1.6 0.8 - 5.3 10e3/uL    Absolute Monocytes 0.5 0.0 - 1.3 10e3/uL    Absolute Eosinophils 0.2 0.0 - 0.7 10e3/uL    Absolute Basophils 0.0 0.0 - 0.2 10e3/uL    Absolute Immature Granulocytes 0.0 <=0.4 10e3/uL    Absolute NRBCs 0.0 10e3/uL   General PFT Lab (Please always keep checked)    Collection Time: 12/15/23 12:16 PM   Result Value Ref Range    FVC-Pred 3.03 L    FVC-Pre 2.92 L    FVC-%Pred-Pre 96 %    FEV1-Pre 2.64 L    FEV1-%Pred-Pre 100 %    FEV1FVC-Pred 87 %    FEV1FVC-Pre 90 %    FEFMax-Pred 6.39 L/sec    FEFMax-Pre 7.22 L/sec    FEFMax-%Pred-Pre 112 %    FEF2575-Pred 3.16 L/sec    FEF2575-Pre 3.68 L/sec    IUU6139-%Pred-Pre 116 %    ExpTime-Pre 3.89 sec    FIFMax-Pre 4.77 L/sec    FEV1FEV6-Pred 86 %    FEV1FEV6-Pre 90 %           Results as noted above.    PFT Interpretation:  Normal spirometry.  Unchanged from previous.   Similar to recent best.  Valid Maneuver    CF Exacerbation  Absent      Sincerely,        Edi Leger MD

## 2023-12-15 NOTE — PATIENT INSTRUCTIONS
Cystic Fibrosis Self-Care Plan    RECOMMENDATIONS:   Help us provide the best possible care. If you receive a questionnaire from the CF Foundation about your clinic experience today please fill it out.  It should take less than 5 minutes. Let us know what we are doing well and how we can improve.    Get your TDAP vaccine (whooping cough and tetanus) and Hep B vaccine series.  Reduce your Lantus to 15 units every morning.  Call Dr. Galindo if you are having a lot of high or low blood sugars.  Labs in 2 months and with your next visit in 4 months (Shannan is fine)  Otherwise continue current medication, nebs and vest therapy.     YOUR GOAL:  Happy Holidays!      Cystic Fibrosis :    Ruth Bass  [phone]  [facility] Nurse line:  Lilliana CASTILLO   [phone]     [facility] Fax Number:      [phone]         Cystic Fibrosis Respiratory Therapists:   Nabila Sheikh                          [phone]          Debbie Payne   [phone]  Cystic Fibrosis Dietitians:              Renetta Gilmore              [phone]                            Megan Goddard                        [phone]   Cystic Fibrosis Diabetes Nurse:    Paulette Danielle               [phone]    Cystic Fibrosis Social Workers:     Magi Sarkar              [phone]                     Zehra Ware               [phone]  Cystic Fibrosis Pharmacists:           Mónica Chapman                              [phone] (pager)         Nora Singh      [phone]   Cystic Fibrosis Genetic Counselor:   Cami Rutledge    [phone]    [facility] [facility] website:  [email or web address]    We have recently learned about an albuterol neb solution shortage due to a manufacturing delay. There is still a small supply coming in but not enough to meet the current demand. We do not yet have an estimate for when this will become widely available again.    We our asking  our CF community to do the following:    Please take time to check your supply of albuterol neb solution at home. We recommend keeping at least a 2-week supply of albuterol nebs at home in case of illness.    2.  If you have an albuterol inhaler at home, you can use 4 puffs of the inhaler with a spacer in place of the nebulized albuterol at the start of your treatments. It is important to use a spacer for the best technique. If you do not have a spacer at home or have questions on technique, we will be happy to review and send one to your home address. Please also take a moment to check that your albuterol HFA inhaler is available and not .  inhalers may be less effective as the medication loses its potency or power. In some instances your team may suggest another alternative instead of an albuterol inhaler.    3.  Please take care in requesting refills. Albuterol neb solution is a life-saving medication for patients having severe asthma attacks and other emergency respiratory conditions. Let s work together to make sure that albuterol neb solution is available to those who need it urgently.    Reach out to your care team with questions and to confirm your planned alternative for albuterol nebs. Vet[employer] will be the fastest way to connect. If possible, please reserve the nursing line for more urgent concerns as we are short on nursing staff.    Here are some reputable sites with more information:    https://www.cidrap.[facility].edu/resilient-drug-supply/us-liquid-albuterol-lhlqczxg-pcfhvbvw-hkpksx-after-major-supplier-shuts-down    https://www.[employer]//health/albuterol-shortage    https://www.ashp.org/drug-shortages/current-shortages/drug-shortage-detail.aspx?px=384&loginreturnUrl=SSOCheckOnly       MRN: [id number]   Clinic Date: December 15, 2023   Patient: Marleni Alamo     Annual Studies:   IGG   Date Value Ref Range Status   2018 1,320 695 - 1,620 mg/dL Final     Immunoglobulin G   Date  "Value Ref Range Status   11/10/2022 1,525 610 - 1,616 mg/dL Final     No results found for: \"INS\"  There are no preventive care reminders to display for this patient.    Pulmonary Function Tests  FEV1: amount of air you can blow out in 1 second  FVC: total amount of air you can take in and blow out    Your Goals:             Latest Ref Rng & Units 12/15/2023    12:16 PM   PFT   FVC L 2.92  P   FEV1 L 2.64  P   FVC% % 96  P   FEV1% % 100  P      P Preliminary result          Airway Clearance: The Most Important Way to Keep Your Lungs Healthy  Vest Settings:   Hill-Rom Frequencies: 8, 9, 10 Pressure 10 Then, Frequencies 18, 19, 20 Pressure 6     RespirTech: Quick Start with Pressure of     Do each frequency for 5 minutes; Deflate vest after each frequency & cough 3 times before beginning the next setting.    Vest and Neb Therapy should be done 2 times/day.    Good Nutrition Can Improve Lung Function and Overall Health    Take ALL of your vitamins with food    Take 1/2 of your enzymes before EVERY meal/snack and the other 1/2 mid-meal/snack    Wt Readings from Last 3 Encounters:   12/15/23 62.8 kg (138 lb 6.4 oz)   05/25/23 59 kg (130 lb 1.1 oz)   04/17/23 58.1 kg (128 lb)       Body mass index is 27.17 kg/m .         National CF Foundation Recommendations for BMI in CF Adults: Women: at least 22 Men: at least 23        Controlling Blood Sugars Helps Prevent Lung Infections & Improves Nutrition  Test blood sugar:    In the morning before eating (goal is )    2 hours after a meal (goal is less than 150)    When pre-meal glucose is greater than 150 add correction    At bedtime (if less than 100 eat a snack with 15 grams of carbohydrates  Last A1C Results:   Hemoglobin A1C   Date Value Ref Range Status   11/10/2022 7.0 (H) <5.7 % Final     Comment:     Normal <5.7%   Prediabetes 5.7-6.4%    Diabetes 6.5% or higher     Note: Adopted from ADA consensus guidelines.   08/31/2020 6.9 (H) 0 - 5.6 % Final     " Comment:     Normal <5.7% Prediabetes 5.7-6.4%  Diabetes 6.5% or higher - adopted from ADA   consensus guidelines.           If diabetic, measure A1C every 6 months. Goal: Under 7%    Staying Healthy  Research:  If you are interested in learning about research opportunities or have questions, please contact the CF Research Team at [phone] or [email or web address].    CF Foundation:  Compass is a personalized resource service to help you with the insurance, financial, legal and other issues you are facing.  It's free, confidential and available to anyone with CF.  Ask your  for more information or contact Compass directly at 108-COMPASS ([phone]) or compass@cff.org, or learn more at cff.org/compass.

## 2023-12-15 NOTE — PROGRESS NOTES
Clinical Update:                                                    A chart review was conducted for Marleni Alamo.    Reason for Chart Review: Trikafta Lab Monitoring     Discussion: Marleni has been on Trikafta since 2/16/23. Patient had recent history of LFT and CK elevations which requires closer monitoring. Per chart review, patient continues full dose Trikafta      Labs were reviewed from  12/15/23  at [facility]  with annual labs. bilirubin is elevated at 1.3 x the upper limit of normal., Majority of bilirubin elevation is indirect bilirubin which can be caused by CDCD2B2/3 inhibition by elexacaftor and does not require intervention..    Lab Results   Component Value Date    ALT 26 12/15/2023    AST 17 12/15/2023    BILITOTAL 1.6 (H) 12/15/2023    DBIL 0.38 (H) 12/15/2023    CKT 54 12/15/2023     Plan:  1. Continue Trikafta   2. Recheck hepatic panel and CK in 3 months        Nora Singh, PharmD  Cystic Fibrosis MTM Pharmacist  [facility]  Voicemail: [phone]

## 2023-12-15 NOTE — PROGRESS NOTES
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  Trikafta was initiated in February 2023.  Maintenance   Modulator:  Trikafta it was initiated in February 2023.  Trikafta  Mutation: [id number]  AW Clearance: Vest  Bronchodilators:  Albuterol  Mucolytics: Pulmozyme, Hypertonic (7%),   Antibiotics Inh:  CHER  Antibiotics Oral: none   Other: Advair  Colonization Hx:  MRSA, MSSA, Streptococcus anginosus, Achromobacter xylosoxidans/denitrificans  Annual Studies:  Due December 2024  Contraindications to standard of care:    Pulmonary/cystic fibrosis: Exercise tolerance at baseline.  No cough or sputum.  Oxygenating well saturation 99%.  PFTs in the high normal range and similar to baseline.  Patient does not appear to be having a pulmonary exacerbation at this time.  She will continue her current vest therapy and nebs.  If the patient remains without gram-negative's, could consider stopping CHER nebs.    CFTR Modulation: Patient is an [id number] homozygote.  She reports marked symptomatic improvement with Trikafta.  Baseline PFTs are in an excellent range so would not expect a dramatic change in PFTs from baseline.  Reviewed risks and benefits of Trikafta with the patient and her father, the patient indicates she has had significant symptomatic improvement and would like to continue treatment.  Alkaline phosphatase and transaminases intermittently elevated in the past, now in range.  Bilirubin mildly elevated.  CK within normal limits.  Recheck Trikafta labs in 2 months and again follow-up in 4 months.    CF-related diabetes: Patient reports intermittent hypoglycemia.  It appears this is often overnight.  Lantus will  be reduced to 15 units.  The patient was encouraged to follow-up with endocrinology.    Pancreatic insufficiency: The patient denies symptoms of malabsorption.  Vitamin levels with annual studies are pending and will be reviewed with the CF dietitian when available.  For now, continue current pancreatic enzyme replacement and supplemental vitamins.    CF-related sinus disease: Patient does not appear to be having an acute sinusitis at this time.  Continue Flonase and ENT follow-up as needed.    Reflux: Adequately controlled with omeprazole.    Healthcare maintenance: The patient has received her COVID booster and influenza vaccine for this flu season.  She is encouraged to receive a Tdap booster and hepatitis B series locally.  Animal studies were completed and available results were reviewed with the patient and her father.  Electrolytes within normal limits.  Normal kidney function.  Bilirubin elevated but LFTs otherwise within normal limits as noted above.  Hemoglobin A1c elevated at 6.5, improved from previous, insulin adjustment as noted above, otherwise defer to endocrinology.  Iron level is elevated.  Replacement will be reduced to twice weekly.  CBC is unremarkable.    Labs in 2 months.  [facility]    IEdi, have spent 40 minutes on the day of the visit to review the chart, interview and examine the patient, review labs and imaging, formulate a plan and submit orders. Time documented is excluding time spent for PFT interpretation.     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  Trikafta was initiated in February 2023.  Since her last visit, patient had an episode of left foot cellulitis treated through her local urgent care.  No respiratory illnesses since her last  visit.  Last seen in clinic in May 2023.    Breathing is comfortable at rest.  Dyspnea with prolonged walking, similar to baseline.  The patient walks her dog 1-2 times per week for exercise.  No cough.  No sputum.  No chest pain.  Vest therapy twice daily.  No fever, chills or night sweats.    Review of systems:  Occasional episodes of hypoglycemia, sometimes overnight.  The patient is unable to specify frequency.  Appetite is good  No ear pain, sore throat, sinus pain or rhinorrhea  No nausea, vomiting, diarrhea or abdominal pain.  A complete ROS was otherwise negative except as noted in the HPI.    Current Outpatient Medications   Medication    ACCU-CHEK GUIDE test strip    albuterol (PROVENTIL) (2.5 MG/3ML) 0.083% neb solution    BD STEVIE U/F 32G X 4 MM insulin pen needle    Continuous Blood Gluc  (DEXCOM G6 ) JAZMINE    Continuous Blood Gluc Sensor (DEXCOM G7 SENSOR) MISC    Continuous Blood Gluc Transmit (DEXCOM G6 TRANSMITTER) MISC    fluticasone (FLONASE) 50 MCG/ACT nasal spray    Glucagon (BAQSIMI TWO PACK) 3 MG/DOSE POWD    insulin lispro (HUMALOG KWIKPEN) 100 UNIT/ML (1 unit dial) KWIKPEN    LANTUS SOLOSTAR 100 UNIT/ML soln    lipase-protease-amylase (CREON) 19001-39571-924976 units CPEP per EC capsule    mometasone-formoterol (DULERA) 100-5 MCG/ACT inhaler    mvw complete formulation (CHEWABLES) tablet    Nutritional Supplements (ENSURE ORIGINAL) LIQD    omeprazole (PRILOSEC) 40 MG DR capsule    PULMOZYME 2.5 MG/2.5ML neb solution    sodium chloride inhalant 7 % NEBU neb solution    tobramycin, PF, (CHER) 300 MG/5ML neb solution    TRIKAFTA 100-50-75 & 150 MG tablet pack    UREA 20 INTENSIVE HYDRATING 20 % external cream    vitamin D2 (ERGOCALCIFEROL) 61607 units (1250 mcg) capsule    VITRON-C  MG TABS tablet     No current facility-administered medications for this visit.     Allergies   Allergen Reactions    Amoxicillin-Pot Clavulanate Hives     Per mother    Ceftin Hives     Per  mother    Vancomycin Hives     Per mother     Past Medical History:   Diagnosis Date    Atopy     Cerebral palsy (H)     Botox injextions, managed by Dr. 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    Seizure disorder (H)     Multiple daily in infancy now infrequent (every few years)    Thrush, oral        Past Surgical History:   Procedure Laterality Date    bilat antrostomies  04/2011    Bilater knee surgery with plates and pins Bilateral 2007    CHOLECYSTECTOMY, LAPOROSCOPIC  02/2015    Cleft palate repair and mandibular advancement  1996    gastrostomy in infancy      Multiple PET (ear tubes)      Multiple sinus surgeries  2015    OPTICAL TRACKING SYSTEM ENDOSCOPIC SINUS SURGERY Bilateral 03/01/2019    Procedure: Stealth Assisted Bilateral Maxillary Antrostomy, Ethmoidectomy, Sphenoidotomy, Frontal Sinusotomy;  Surgeon: Anny Carbajal MD;  Location: [facility] OR    RESECT TRACHEA WITH RECONSTRUCTION CHILD  1998    Rib for reconstruction    STRABISMUS SURGERY      surgery for meconium ileus, partial bowel resection  08/1995    Details not available    tracheostomy in infancy         Social History     Socioeconomic History    Marital status: Single     Spouse name: Not on file    Number of children: Not on file    Years of education: Not on file    Highest education level: Not on file   Occupational History    Not on file   Tobacco Use     "Smoking status: Never    Smokeless tobacco: Never   Substance and Sexual Activity    Alcohol use: No     Alcohol/week: 0.0 standard drinks of alcohol    Drug use: No    Sexual activity: Never   Other Topics Concern    Not on file   Social History Narrative    Marleni lives at home with mother in [place] with 2 dogs and a cat. 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 Marleni goes to transition school.      Social Determinants of Health     Financial Resource Strain: Not on file   Food Insecurity: Not on file   Transportation Needs: Not on file   Physical Activity: Not on file   Stress: Not on file   Social Connections: Not on file   Interpersonal Safety: Not on file   Housing Stability: Not on file         /66 (BP Location: Right arm, Patient Position: Sitting, Cuff Size: Adult Regular)   Pulse 71   Temp 98.5  F (36.9  C) (Oral)   Resp 16   Ht 1.52 m (4' 11.84\")   Wt 62.8 kg (138 lb 6.4 oz)   SpO2 99%   BMI 27.17 kg/m    Body mass index is 27.17 kg/m .  Exam:   GENERAL APPEARANCE: Well developed, well nourished, alert, and in no apparent distress.  EYES: PERRL, EOMI  HENT: Nasal mucosa with edema and no hyperemia. No nasal polyps.  EARS: Canals clear, TMs normal  MOUTH: Bifid uvula, oral mucosa is moist, without any lesions, no tonsillar enlargement, no oropharyngeal exudate.  NECK: supple, no masses, no thyromegaly.  LYMPHATICS: No significant axillary, cervical, or supraclavicular nodes.  RESP: normal percussion, good air flow throughout.  No crackles. No rhonchi. No wheezes.  CV: Normal S1, S2, regular rhythm, normal rate. No murmur.  No rub. No gallop. No LE edema.   ABDOMEN:  Bowel sounds normal, soft, nontender, no HSM or masses.   MS: extremities normal. No clubbing. No cyanosis.  SKIN: no rash on limited exam  NEURO: Mentation intact, speech at baseline, normal strength and tone, bilateral ankle braces with associated gait impairment  PSYCH: mentation appears normal. and affect " normal/bright  Results:  Recent Results (from the past 168 hour(s))   Routine UA with microscopic    Collection Time: 12/15/23 11:58 AM   Result Value Ref Range    Color Urine Yellow Colorless, Straw, Light Yellow, Yellow    Appearance Urine Clear Clear    Glucose Urine 150 (A) Negative mg/dL    Bilirubin Urine Negative Negative    Ketones Urine Negative Negative mg/dL    Specific Gravity Urine 1.026 1.003 - 1.035    Blood Urine Negative Negative    pH Urine 5.5 5.0 - 7.0    Protein Albumin Urine Negative Negative mg/dL    Urobilinogen Urine Normal Normal, 2.0 mg/dL    Nitrite Urine Negative Negative    Leukocyte Esterase Urine Trace (A) Negative    Bacteria Urine Few (A) None Seen /HPF    Mucus Urine Present (A) None Seen /LPF    RBC Urine 3 (H) <=2 /HPF    WBC Urine 4 <=5 /HPF    Squamous Epithelials Urine 17 (H) <=1 /HPF    Transitional Epithelials Urine 1 <=1 /HPF   Erythrocyte sedimentation rate auto    Collection Time: 12/15/23 11:58 AM   Result Value Ref Range    Erythrocyte Sedimentation Rate 13 0 - 20 mm/hr   INR    Collection Time: 12/15/23 11:58 AM   Result Value Ref Range    INR 1.05 0.85 - 1.15   CBC with platelets and differential    Collection Time: 12/15/23 11:58 AM   Result Value Ref Range    WBC Count 7.9 4.0 - 11.0 10e3/uL    RBC Count 4.50 3.80 - 5.20 10e6/uL    Hemoglobin 12.8 11.7 - 15.7 g/dL    Hematocrit 39.8 35.0 - 47.0 %    MCV 88 78 - 100 fL    MCH 28.4 26.5 - 33.0 pg    MCHC 32.2 31.5 - 36.5 g/dL    RDW 15.6 (H) 10.0 - 15.0 %    Platelet Count 192 150 - 450 10e3/uL    % Neutrophils 70 %    % Lymphocytes 21 %    % Monocytes 7 %    % Eosinophils 2 %    % Basophils 0 %    % Immature Granulocytes 0 %    NRBCs per 100 WBC 0 <1 /100    Absolute Neutrophils 5.5 1.6 - 8.3 10e3/uL    Absolute Lymphocytes 1.6 0.8 - 5.3 10e3/uL    Absolute Monocytes 0.5 0.0 - 1.3 10e3/uL    Absolute Eosinophils 0.2 0.0 - 0.7 10e3/uL    Absolute Basophils 0.0 0.0 - 0.2 10e3/uL    Absolute Immature Granulocytes 0.0  <=0.4 10e3/uL    Absolute NRBCs 0.0 10e3/uL   General PFT Lab (Please always keep checked)    Collection Time: 12/15/23 12:16 PM   Result Value Ref Range    FVC-Pred 3.03 L    FVC-Pre 2.92 L    FVC-%Pred-Pre 96 %    FEV1-Pre 2.64 L    FEV1-%Pred-Pre 100 %    FEV1FVC-Pred 87 %    FEV1FVC-Pre 90 %    FEFMax-Pred 6.39 L/sec    FEFMax-Pre 7.22 L/sec    FEFMax-%Pred-Pre 112 %    FEF2575-Pred 3.16 L/sec    FEF2575-Pre 3.68 L/sec    EKM1220-%Pred-Pre 116 %    ExpTime-Pre 3.89 sec    FIFMax-Pre 4.77 L/sec    FEV1FEV6-Pred 86 %    FEV1FEV6-Pre 90 %                   Results as noted above.    PFT Interpretation:  Normal spirometry.  Unchanged from previous.   Similar to recent best.  Valid Maneuver             CF Exacerbation  Absent

## 2023-12-15 NOTE — NURSING NOTE
"Marleni Alamo is a 28 year old year old who is being seen for Blood Draw (Labs drawn via  by Vascular Access Team in lab.) and RECHECK (Return Cystic Fibrosis )      Medications reviewed and Vital signs taken.    Specimen Collection Type: Throat Swab    Order(s) placed: CF Aerobic Bacterial    *IF AFB order placed - please enter \"PRIORITIZE AFB\" to order comments.       No results found for: \"ACIDFAST\"      No results found for: \"AFBSMS\"      Medications reviewed and vital signs taken.   Mecca Thornton CMA    "

## 2023-12-18 DIAGNOSIS — K86.89 PANCREATIC INSUFFICIENCY: ICD-10-CM

## 2023-12-18 DIAGNOSIS — E84.9 CF (CYSTIC FIBROSIS) (H): ICD-10-CM

## 2023-12-18 DIAGNOSIS — E84.9 DIABETES MELLITUS DUE TO CYSTIC FIBROSIS (H): ICD-10-CM

## 2023-12-18 DIAGNOSIS — E08.9 DIABETES MELLITUS DUE TO CYSTIC FIBROSIS (H): ICD-10-CM

## 2023-12-18 LAB
A-TOCOPHEROL VIT E SERPL-MCNC: 11.2 MG/L
ANNOTATION COMMENT IMP: NORMAL
BETA+GAMMA TOCOPHEROL SERPL-MCNC: <0.2 MG/L
IGE SERPL-ACNC: 19 KU/L (ref 0–114)
IGG SERPL-MCNC: 1028 MG/DL (ref 610–1616)
RETINYL PALMITATE SERPL-MCNC: 0.08 MG/L
VIT A SERPL-MCNC: 0.41 MG/L

## 2023-12-18 RX ORDER — BACILLUS COAGULANS 1B CELL
1 CAPSULE ORAL
Qty: 60 TABLET | Refills: 1 | Status: SHIPPED | OUTPATIENT
Start: 2023-12-18

## 2023-12-18 NOTE — PROGRESS NOTES
CF Annual Nutrition Assessment     Reason for Assessment  Assessed during Dr. Leger CF clinic r/t increased nutrition risk with diagnosis of CF per protocol.   - Marleni was accompanied by father at clinic visit today.      Nutrition Significant PMH  Mild Lung Disease - started Trikafta 2023  Pancreatic Insufficient   CFRD   GERD  Cerebral palsy - managed at [facility]  Leoncio Palisade syndrome      Anthropometric Assessment  Height: 60 inches (152.4 cm)   Weight: 54.9 kg (121 lbs)  IBW based on BMI 22 for females and 23 for males per CF Foundation recs: 51 kg (112 lbs)  Body mass index is 27.17 kg/m .     Weight is up ~10 lbs in the last year, potentially related to Trikafta start; also decreased activity level.     Wt Readings from Last 5 Encounters:   12/15/23 62.8 kg (138 lb 6.4 oz)   23 59 kg (130 lb 1.1 oz)   23 58.1 kg (128 lb)   23 57 kg (125 lb 10.6 oz)   23 57.6 kg (127 lb)     Pancreatic Enzymes  Brand: Creon [id number]  Dosin with meals, 3 with snacks = 1745 units lipase/kg/meal  Estimated Daily Intake: 18 caps = 7850 units lipase/kg/day    GI Comments: no s/sx malabsorption; some concerns from Dad about diarrhea in AM.    Enzyme Program: None, does not qualify      Diet History and Assessment  Diet Preferences/Allergies/Intolerances: Regular  Intake Recall/Comments: Marleni reports that she maintains a good appetite. Consumes TID meals and some snacks prn. Overall consumes food from all food groups and Dad says she does pretty good with eating veggies. Dad enjoys cooking foods at home; will also eat take-out occasionally. Some concerns that breakfast may be contributing to GI concerns.     Diet Recall:  Breakfast- large bowl of cereal (cocoa solomon), 1/2 cup coffee with almond milk, toast with PB for fat with Trikafta  Lunch- PB toast or mac+ cheese  Dinner- sushi or tacos or spaghetti; also drinks Boost or Ensure once daily  Snacks- apple or string cheese with  trikafta    Calcium: ~2 servings/day; milk in cereal, some cheese, boost/ensure  Salt: Table salt, added to foods  Supplements: Yes - Boost or Ensure daily; typically uses these for added calories and general nutrition/micronutrient content    Laboratory Assessment  Annual study labs updated today, vitamin levels pending during clinic visit.   Vitamin A- pending  Vitamin D- pending  Vitamin E- pending  Iron- 186 high - currently on daily supplementation (iron deficiency per iron panel 4/4/23)  Lipid Panel- wnl    DEXA- 2018, lowest T-score -1    Current Vitamin/Mineral Supplements: MVW Complete chewable- 2 tablets/day; Vitamin D2 50,000 units 3x/week  Comments: obtains from [facility]    CF Related Diabetes Evaluation  Hgb A1C: 6.5% on 12/15/23  Lantus 15 units (adjusted per MD today) + Humalog (correction + carb coverage 1:15)  Dexcom CGM  Last seen by Dr. Galindo 10/3/23.      NUTRITION DIAGNOSIS  Impaired nutrient utilization related pancreatic insufficiency as evidenced by requires pancreatic enzyme replacement and vitamin/mineral supplementation to maintain nutrition status with CF.     Interventions/Recommendations  1) Oral Intake/Weight: Continue with good PO intake/variety for weight maintenance and overall health. Discussed nutrition trends and goals -- noted that patients have experienced weight gain with Trikafta and that along with improved health, nutrition needs may be changing. At this time, Marleni enjoys drinking Boost/Ensure with dinner as she has done this for many years. Discussed that may not be necessary for added calories at this time and could consider modifying frequency/portion size. Also plan to become more physically active.     Some GI concerns noted in AM after breakfast and dad's concerns with large bowl of cereal. Discussed using smaller portion of cereal and adding protein such as eggs, yogurt, cheese, nuts, etc. This may also help with blood sugars. Marleni had concerns about hypoglycemia -->  reviewed that insulin can be adjusted based on the carb content that she consumes.     2) Enzymes: GI symptoms do not sound malabsorptive. Will continue current enzyme dose for now, but can readdress in the future. Plan for pt to send Mychart if symptoms do not improve with changes to breakfast.    3) Vitamin/Mineral Supplementation: Vitamin levels still pending at time of visit. Noted high iron level today. Plan for Dad to confirm Vitron-C dosing at home -- Marleni is taking daily. Will decrease Vitron-C to 2x/week.    GOALS:  1) Maintain BMI >/= 22 kg/m2.   2) Vitamin levels and iron wnl     FOLLOW-UP/MONITORING:  Visit patient within 12 month(s) for annual visit   -- check-in at next visit regarding blood sugar control / need for carb counting education     Time Spent In Face-to-Face Patient Interactions: 15 minutes    Renetta Gilmore RD, LD  Cystic Fibrosis/Lung Transplant Dietitian  Pager [phone]

## 2023-12-20 LAB — BACTERIA SPEC CULT: NORMAL

## 2023-12-26 DIAGNOSIS — E84.9 CF (CYSTIC FIBROSIS) (H): ICD-10-CM

## 2023-12-26 DIAGNOSIS — K86.89 PANCREATIC INSUFFICIENCY: ICD-10-CM

## 2023-12-26 RX ORDER — PEDIATRIC MULTIVIT 61/D3/VIT K 1500-800
CAPSULE ORAL
Qty: 180 TABLET | Refills: 4 | Status: SHIPPED | OUTPATIENT
Start: 2023-12-26

## 2024-01-02 ENCOUNTER — TELEPHONE (OUTPATIENT)
Dept: PULMONOLOGY | Facility: CLINIC | Age: 29
End: 2024-01-02
Payer: COMMERCIAL

## 2024-01-02 NOTE — TELEPHONE ENCOUNTER
Attempted to contact patient's father to discuss high vitamin D result from most recent labs. Per MD and CF Dietitian plan will be to stop ergocalciferol.    Left message requesting call back.     Lexis Le RN

## 2024-01-10 NOTE — TELEPHONE ENCOUNTER
Called and spoke with patient's dad, Jah. Reviewed recent elevated Vitamin D level with him and CF dietitian's recommendation to stop taking ergocalciferol.    Patient's father verbalized understanding and agreement with stopping ergocalciferol.    Lexis Le RN

## 2024-01-15 ENCOUNTER — TELEPHONE (OUTPATIENT)
Dept: PULMONOLOGY | Facility: CLINIC | Age: 29
End: 2024-01-15
Payer: COMMERCIAL

## 2024-01-15 DIAGNOSIS — E84.9 CF (CYSTIC FIBROSIS) (H): ICD-10-CM

## 2024-01-15 RX ORDER — ELEXACAFTOR, TEZACAFTOR, AND IVACAFTOR 100-50-75
KIT ORAL
Qty: 84 TABLET | Refills: 3 | Status: SHIPPED | OUTPATIENT
Start: 2024-01-15 | End: 2024-04-30

## 2024-01-15 NOTE — TELEPHONE ENCOUNTER
KATHY APPROVED    Medication: TRIKAFTA 100-50-75 & 150 MG PO TBPK  Amount: $ 15,000  [facility] Name: [facility] CF treatment [facility] Effective Date: 4/16/2023  Foundation Expiration Date: 4/15/2024

## 2024-01-15 NOTE — TELEPHONE ENCOUNTER
KATHY APPROVED    Medication: MVW COMPLETE FORMULATION PO  Amount: $ 1,500  [facility] Name: [facility] Vitamin/Supplement kathy  Foundation Effective Date: 4/16/2023  Foundation Expiration Date: 4/15/2024

## 2024-01-25 ENCOUNTER — OFFICE VISIT (OUTPATIENT)
Dept: ENDOCRINOLOGY | Facility: CLINIC | Age: 29
End: 2024-01-25
Payer: COMMERCIAL

## 2024-01-25 DIAGNOSIS — L84 TYPE 1 DIABETES MELLITUS WITH PRESSURE CALLUS (H): Primary | ICD-10-CM

## 2024-01-25 DIAGNOSIS — M24.573 EQUINUS CONTRACTURE OF ANKLE: ICD-10-CM

## 2024-01-25 DIAGNOSIS — E10.628 TYPE 1 DIABETES MELLITUS WITH PRESSURE CALLUS (H): Primary | ICD-10-CM

## 2024-01-25 PROCEDURE — 99214 OFFICE O/P EST MOD 30 MIN: CPT | Performed by: PODIATRIST

## 2024-01-25 NOTE — PROGRESS NOTES
Past Medical History:   Diagnosis Date    Atopy     Cerebral palsy (H)     Botox injextions, managed by Dr. John Marley    CF (cystic fibrosis) (H) 07/25/2016    Sweat Test: 8/15/95 Value: 85.0 Genotype: [id number], H/O Pseudomonas and MRSA, Baseline FEV1  2.48, FVC 2.76 (7/2015)    Cholelithiasis     Chronic pansinusitis 07/25/2016    Diabetes mellitus due to cystic fibrosis (H) 07/25/2016    Diabetes mellitus related to cystic fibrosis (H) 07/25/2016    Gastroesophageal reflux disease without esophagitis 07/25/2016    Infection due to 2019 novel coronavirus 05/2022    Minimal symptoms    MRSA (methicillin resistant staph aureus) culture positive     Pancreatic insufficiency 07/25/2016    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    Seizure disorder (H)     Multiple daily in infancy now infrequent (every few years)    Thrush, oral      Patient Active Problem List   Diagnosis    CF (cystic fibrosis) (H)    Diabetes mellitus due to cystic fibrosis (H)    Diabetes mellitus related to cystic fibrosis (H)    Pancreatic insufficiency    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    Gastroesophageal reflux disease without esophagitis    Chronic pansinusitis    Diabetes mellitus, type 2 (H)    Ankle contracture, unspecified laterality    Ankle weakness     Past Surgical History:   Procedure Laterality Date    bilat antrostomies  04/2011    Bilater knee surgery with plates and pins Bilateral 2007    CHOLECYSTECTOMY, LAPOROSCOPIC  02/2015    Cleft palate repair and mandibular advancement  1996    gastrostomy in infancy      Multiple PET (ear tubes)      Multiple sinus surgeries  2015    OPTICAL TRACKING SYSTEM ENDOSCOPIC SINUS SURGERY Bilateral 03/01/2019    Procedure: Stealth Assisted Bilateral Maxillary Antrostomy, Ethmoidectomy, Sphenoidotomy, Frontal Sinusotomy;  Surgeon: Anny Carbajal MD;  Location: [facility] OR    RESECT TRACHEA WITH RECONSTRUCTION CHILD  1998    Rib for reconstruction    STRABISMUS SURGERY      surgery for meconium ileus,  partial bowel resection  08/1995    Details not available    tracheostomy in infancy       Social History     Socioeconomic History    Marital status: Single     Spouse name: Not on file    Number of children: Not on file    Years of education: Not on file    Highest education level: Not on file   Occupational History    Not on file   Tobacco Use    Smoking status: Never    Smokeless tobacco: Never   Substance and Sexual Activity    Alcohol use: No     Alcohol/week: 0.0 standard drinks of alcohol    Drug use: No    Sexual activity: Never   Other Topics Concern    Not on file   Social History Narrative    Marleni lives at home with mother in [place] with 2 dogs and a cat. 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 Marleni goes to transition school.      Social Determinants of Health     Financial Resource Strain: Not on file   Food Insecurity: Not on file   Transportation Needs: Not on file   Physical Activity: Not on file   Stress: Not on file   Social Connections: Not on file   Interpersonal Safety: Not on file   Housing Stability: Not on file     Family History   Problem Relation Age of Onset    Diabetes Type 1 Mother 12    Thyroid Disease Mother     Diabetes Type 1 Maternal Uncle     Diabetes Type 1 Maternal Grandmother     Thyroid Disease Maternal Grandmother     Breast Cancer Other         Great Grandmother    Thyroid Disease Paternal Uncle     Thyroid Disease Maternal Aunt        Lab Results   Component Value Date    A1C 6.5 12/15/2023    A1C 7.0 11/10/2022    A1C 6.8 10/26/2021    A1C 6.9 08/31/2020    A1C 8.1 02/26/2018           Subjective findings- 28-year-old presents with caregiver for diabetic foot cares.  Relates to no injuries, wears AFOs, she relates did not get the AFOs looked at since last visit but would like to do that, relates to no numbness tingling or neuropathy, no ulcers or sores since we seen her last, relates was using the urea cream and is intermittently using it now and feels that helped, relates  did physical therapy and has stretching exercises that helped as well.     Objective findings- DP and PT are 2 out of 4 bilaterally.  Has decreased hair growth bilaterally.  Has minimal peripheral edema bilaterally.  Has equinus bilaterally.  Has dorsally contracted digits 1 through 5 bilaterally, has plantarly prominent 2 through 4 MPJs with hyperkeratotic tissue buildup.  Sharp/dull is intact with 5.07 [place] monofilament bilaterally, proprioception is intact bilaterally, deep tendon reflexes are intact bilaterally, no gross tendon voids bilaterally.  No pain on palpation, no pain range of motion bilaterally.  No erythema, no drainage, no odor, no calor bilaterally.     Assessment and plan- Diabetes with equinus contracture and Hammertoes, callus on the MPJs, left foot pain with edema is resolved.  Diagnosis and treatment options discussed with them.  Continue the urea cream.  Prescription to orthotics and prosthetics given to have the AFOs evaluated.  Return to clinic and see me 4 months.                 Moderate level of medical decision making.

## 2024-02-13 DIAGNOSIS — E84.9 CF (CYSTIC FIBROSIS) (H): ICD-10-CM

## 2024-02-14 RX ORDER — ALBUTEROL SULFATE 0.83 MG/ML
2.5 SOLUTION RESPIRATORY (INHALATION)
Qty: 180 ML | Refills: 9 | Status: SHIPPED | OUTPATIENT
Start: 2024-02-14 | End: 2024-05-03

## 2024-02-15 ENCOUNTER — LAB (OUTPATIENT)
Dept: LAB | Facility: CLINIC | Age: 29
End: 2024-02-15
Payer: COMMERCIAL

## 2024-02-15 DIAGNOSIS — K86.81 EXOCRINE PANCREATIC INSUFFICIENCY: ICD-10-CM

## 2024-02-15 DIAGNOSIS — E84.0 CYSTIC FIBROSIS WITH PULMONARY MANIFESTATIONS (H): ICD-10-CM

## 2024-02-15 LAB
ALBUMIN SERPL BCG-MCNC: 4.5 G/DL (ref 3.5–5.2)
ALP SERPL-CCNC: 131 U/L (ref 40–150)
ALT SERPL W P-5'-P-CCNC: 32 U/L (ref 0–50)
AST SERPL W P-5'-P-CCNC: 20 U/L (ref 0–45)
BILIRUB DIRECT SERPL-MCNC: 0.34 MG/DL (ref 0–0.3)
BILIRUB SERPL-MCNC: 1.2 MG/DL
CK SERPL-CCNC: 44 U/L (ref 26–192)
PROT SERPL-MCNC: 7.2 G/DL (ref 6.4–8.3)

## 2024-02-15 PROCEDURE — 82550 ASSAY OF CK (CPK): CPT

## 2024-02-15 PROCEDURE — 80076 HEPATIC FUNCTION PANEL: CPT

## 2024-02-15 PROCEDURE — 36415 COLL VENOUS BLD VENIPUNCTURE: CPT

## 2024-02-16 ENCOUNTER — CLINICAL UPDATE (OUTPATIENT)
Dept: PHARMACY | Facility: CLINIC | Age: 29
End: 2024-02-16
Payer: COMMERCIAL

## 2024-02-16 DIAGNOSIS — E84.9 CF (CYSTIC FIBROSIS) (H): Primary | ICD-10-CM

## 2024-02-16 PROCEDURE — 99207 PR NO CHARGE LOS: CPT | Performed by: PHARMACIST

## 2024-02-16 NOTE — PROGRESS NOTES
Clinical Update:                                                    A chart review was conducted for Marleni Alamo.    Reason for Chart Review: Trikafta Quarterly Lab Monitoring 4/4     Discussion: Marleni has been on Trikafta since 2/16/23. Patient had recent history of LFT and CK elevations which requires closer monitoring. Per chart review, patient continues full dose Trikafta       Labs were reviewed from  2/15/24  at [facility] . Bilirubin direct is 1.1x ULN and all other modulator labs are within normal limits, which has improved since previous lab draw. Majority of bilirubin elevation is indirect bilirubin which can be caused by BFZJ1Y2/3 inhibition by elexacaftor and does not require intervention.     Marleni has completed quarterly lab monitoring.    Lab Results   Component Value Date    ALT 32 02/15/2024    AST 20 02/15/2024    BILITOTAL 1.2 02/15/2024    DBIL 0.34 (H) 02/15/2024    CKT 44 02/15/2024     Plan:  1. Continue Trikafta   2. Recheck hepatic panel and CK in 6 months         Mónica Chapman, PharmD   Medication Therapy Management   Cystic Fibrosis Pharmacist

## 2024-02-27 DIAGNOSIS — E84.0 CYSTIC FIBROSIS WITH PULMONARY MANIFESTATIONS (H): ICD-10-CM

## 2024-03-06 DIAGNOSIS — E84.9 CF (CYSTIC FIBROSIS) (H): ICD-10-CM

## 2024-03-06 RX ORDER — TOBRAMYCIN INHALATION SOLUTION 300 MG/5ML
INHALANT RESPIRATORY (INHALATION)
Qty: 280 ML | Refills: 3 | Status: SHIPPED | OUTPATIENT
Start: 2024-03-06

## 2024-03-20 DIAGNOSIS — E84.9 CF (CYSTIC FIBROSIS) (H): ICD-10-CM

## 2024-03-21 RX ORDER — MOMETASONE FUROATE AND FORMOTEROL FUMARATE DIHYDRATE 100; 5 UG/1; UG/1
2 AEROSOL RESPIRATORY (INHALATION) 2 TIMES DAILY
Qty: 13 G | Refills: 11 | Status: SHIPPED | OUTPATIENT
Start: 2024-03-21 | End: 2024-05-03

## 2024-04-12 DIAGNOSIS — E84.8 DIABETES MELLITUS RELATED TO CYSTIC FIBROSIS (H): ICD-10-CM

## 2024-04-12 DIAGNOSIS — E84.9 CF (CYSTIC FIBROSIS) (H): ICD-10-CM

## 2024-04-12 DIAGNOSIS — E08.9 DIABETES MELLITUS RELATED TO CYSTIC FIBROSIS (H): ICD-10-CM

## 2024-04-15 RX ORDER — OMEPRAZOLE 40 MG/1
CAPSULE, DELAYED RELEASE ORAL
Qty: 180 CAPSULE | Refills: 1 | Status: SHIPPED | OUTPATIENT
Start: 2024-04-15 | End: 2024-05-03

## 2024-04-16 ENCOUNTER — OFFICE VISIT (OUTPATIENT)
Dept: ENDOCRINOLOGY | Facility: CLINIC | Age: 29
End: 2024-04-16
Attending: INTERNAL MEDICINE
Payer: COMMERCIAL

## 2024-04-16 ENCOUNTER — LAB (OUTPATIENT)
Dept: LAB | Facility: CLINIC | Age: 29
End: 2024-04-16
Payer: COMMERCIAL

## 2024-04-16 VITALS — OXYGEN SATURATION: 98 % | HEART RATE: 73 BPM | DIASTOLIC BLOOD PRESSURE: 71 MMHG | SYSTOLIC BLOOD PRESSURE: 109 MMHG

## 2024-04-16 DIAGNOSIS — Z79.51 LONG TERM CURRENT USE OF INHALED STEROID: ICD-10-CM

## 2024-04-16 DIAGNOSIS — E08.9 DIABETES MELLITUS RELATED TO CYSTIC FIBROSIS (H): Primary | ICD-10-CM

## 2024-04-16 DIAGNOSIS — K86.81 EXOCRINE PANCREATIC INSUFFICIENCY: ICD-10-CM

## 2024-04-16 DIAGNOSIS — E84.0 CYSTIC FIBROSIS WITH PULMONARY MANIFESTATIONS (H): ICD-10-CM

## 2024-04-16 DIAGNOSIS — E84.8 DIABETES MELLITUS RELATED TO CYSTIC FIBROSIS (H): Primary | ICD-10-CM

## 2024-04-16 DIAGNOSIS — K86.89 PANCREATIC INSUFFICIENCY: ICD-10-CM

## 2024-04-16 DIAGNOSIS — Z79.899 ENCOUNTER FOR LONG-TERM (CURRENT) USE OF MEDICATIONS: ICD-10-CM

## 2024-04-16 DIAGNOSIS — E84.9 DIABETES MELLITUS DUE TO CYSTIC FIBROSIS (H): ICD-10-CM

## 2024-04-16 DIAGNOSIS — E08.9 DIABETES MELLITUS DUE TO CYSTIC FIBROSIS (H): ICD-10-CM

## 2024-04-16 PROCEDURE — 82550 ASSAY OF CK (CPK): CPT

## 2024-04-16 PROCEDURE — 36415 COLL VENOUS BLD VENIPUNCTURE: CPT

## 2024-04-16 PROCEDURE — G0463 HOSPITAL OUTPT CLINIC VISIT: HCPCS | Performed by: INTERNAL MEDICINE

## 2024-04-16 PROCEDURE — 99214 OFFICE O/P EST MOD 30 MIN: CPT | Performed by: INTERNAL MEDICINE

## 2024-04-16 PROCEDURE — 80076 HEPATIC FUNCTION PANEL: CPT

## 2024-04-16 RX ORDER — PEN NEEDLE, DIABETIC 32GX 5/32"
NEEDLE, DISPOSABLE MISCELLANEOUS
Qty: 400 EACH | Refills: 2 | Status: SHIPPED | OUTPATIENT
Start: 2024-04-16

## 2024-04-16 ASSESSMENT — PAIN SCALES - GENERAL: PAINLEVEL: NO PAIN (0)

## 2024-04-16 NOTE — NURSING NOTE
Chief Complaint   Patient presents with    Cystic Fibrosis     CF Follow up      Vitals were taken and medications were reconciled.     Dolly Link RMA  11:51 AM

## 2024-04-16 NOTE — LETTER
4/16/2024       RE: Marleni Alamo  [address] [place] [address]     Dear Colleague,    Thank you for referring your patient, Marleni Alamo, to the [facility] [place] at [facility]. Please see a copy of my visit note below.            Marleni is a 28 year old  female presents today for Cystic Fibrosis (CF Follow up )    HPI  28 year old  female being seen in follow-up for CF related diabetes.  She reports that she was diagnosed with CF related diabetes around age 14.  She also has history of pancreatic insufficiency and cerebral palsy.  Patient was accompanied by Jah (father)    She is currently using Lantus 15 units daily in the morning.  Takes around 8 units of Humalog for breakfast, 3-5 units lunch and Dinner.  Adjusts lunch and dinner dosed based on Premeal insulin.  Has not been able to count carbs and usually takes fixed dose for meals    Humalog correction 1 unit for every 50 above 200    She uses Dexcom G7 CGM   Can feel symptoms of low blood glucose, denies any episodes of severe hypoglycemia  CGM data was downloaded and reviewed  Average sensor glucose 185, standard deviation 64, time in range 50%, 1% low, 32% high, 16% very high  Pattern of postprandial highs, and occasional sporadic lows which appear to be related to meal insulin    On Trifakta     Patient is able to self administer insulin.  Rotates injection sites between abdomen and arms and thigh  Father helps with putting the CGM    Hemoglobin A1C   Date Value Ref Range Status   12/15/2023 6.5 (H) <5.7 % Final     Comment:     Normal <5.7%   Prediabetes 5.7-6.4%    Diabetes 6.5% or higher     Note: Adopted from ADA consensus guidelines.   08/31/2020 6.9 (H) 0 - 5.6 % Final     Comment:     Normal <5.7% Prediabetes 5.7-6.4%  Diabetes 6.5% or higher - adopted from ADA   consensus guidelines.         Past Medical History:   Diagnosis Date     Atopy      Cerebral palsy  (H)     Botox injextions, managed by Dr. John Marley     CF (cystic fibrosis) (H) 07/25/2016    Sweat Test: 8/15/95 Value: 85.0 Genotype: [id number], H/O Pseudomonas and MRSA, Baseline FEV1  2.48, FVC 2.76 (7/2015)     Cholelithiasis      Chronic pansinusitis 07/25/2016     Diabetes mellitus due to cystic fibrosis (H) 07/25/2016     Diabetes mellitus related to cystic fibrosis (H) 07/25/2016     Gastroesophageal reflux disease without esophagitis 07/25/2016     Infection due to 2019 novel coronavirus 05/2022    Minimal symptoms     MRSA (methicillin resistant staph aureus) culture positive      Pancreatic insufficiency 07/25/2016     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 CALL CLINIC TO SCHEDULE FOLLOW UP APPOINTMENT. 300 strip 3     albuterol (PROVENTIL) (2.5 MG/3ML) 0.083% neb solution TAKE 1 VIAL (2.5 MG) BY NEBULIZATION 2 TIMES DAILY 180 mL 9     BD STEVIE U/F 32G X 4 MM insulin pen needle USE 4 PEN NEEDLES DAILY OR AS DIRECTED. 400 each 1     Continuous Blood Gluc Sensor (DEXCOM G7 SENSOR) MISC Change every 10 days. 3 each 5     ferrous fumarate 65 mg, [place]. FE,-Vitamin C 125 mg (VITRON-C)  MG TABS tablet Take 1 tablet by mouth twice a week 60 tablet 1     fluticasone (FLONASE) 50 MCG/ACT nasal spray Spray 1 spray into both nostrils 2 times daily       Glucagon (BAQSIMI TWO PACK) 3 MG/DOSE POWD Spray 1 spray (3 mg) in nostril as needed in the event of unconscious hypoglycemia or hypoglycemic seizure. May repeat dose if no response after 15 minutes. 1 each 1     insulin  lispro (HUMALOG KWIKPEN) 100 UNIT/ML (1 unit dial) KWIKPEN INJECT 10 UNITS SUBQ BEFORE BREAKFAST &1 U PER 12G OF CARBS FOR SNACKS &REMAINING MEALS OF THE DAY, ~5 UNITS @DINNER. MAX 30UNITS DAILY. Please call  soon to schedule follow up visit with Dr Galindo in Sept 2023. 30 mL 1     LANTUS SOLOSTAR 100 UNIT/ML soln INJECT 18 UNITS SUBCUTANEOUSLY DAILY. 15 mL 3     lipase-protease-amylase (CREON) 33923-28996-682566 units CPEP per EC capsule TAKE 4 CAPSULES WITH MEALS AND 3 CAPSULES WITH SNACKS, FOR 3 MEALS AND 2 SNACKS PER DAY. 540 capsule 3     mometasone-formoterol (DULERA) 100-5 MCG/ACT inhaler INHALE 2 PUFFS INTO THE LUNGS TWICE A DAY 13 g 11     mvw complete formulation (CHEWABLES) tablet CHEW AND SWALLOW TWO TABLETS BY MOUTH ONCE DAILY 180 tablet 4     Nutritional Supplements (ENSURE ORIGINAL) LIQD Take 1 Can by mouth At Bedtime 1 Bottle 11     omeprazole (PRILOSEC) 40 MG DR capsule TAKE 1 CAPSULE BY MOUTH TWICE A  capsule 1     PULMOZYME 2.5 MG/2.5ML neb solution NEBULIZE CONTENTS OF ONE VIAL(2.5ML) INTO THE LUNGS ONCE DAILY 75 mL 11     sodium chloride inhalant 7 % NEBU neb solution Take 4 mLs by nebulization daily When not on CHER 360 mL 11     tobramycin, PF, (CHER) 300 MG/5ML neb solution NEBULIZE CONTENTS OF ONE VIAL(5ML) INTO THE LUNGS TWO TIMES A DAY FOR 28 DAYS AND 28 DAYS  mL 3     TRIKAFTA 100-50-75 & 150 MG tablet pack TAKE 2 ORANGE TABLETS BY MOUTH EVERY MORNING. SWALLOW WHOLE WITH FAT-CONTAINING FOOD. (SKIP BLUE TABLETS). 84 tablet 3     UREA 20 INTENSIVE HYDRATING 20 % external cream EXTERNALLY APPLY 1 DOSE TOPICALLY DAILY TO FOOT CALLUSES. 170 g 5     Continuous Blood Gluc  (DEXCOM G6 ) JAZMINE Use to read blood sugars as per 's instructions. 1 each 0     Continuous Blood Gluc Transmit (DEXCOM G6 TRANSMITTER) MISC CHANGE EVERY 3 MONTHS 1 each 3     Family History  family history includes Breast Cancer in an other family member; Diabetes Type 1 in  her maternal grandmother and maternal uncle; Diabetes Type 1 (age of onset: 12) in her mother; Thyroid Disease in her maternal aunt, maternal grandmother, mother, and paternal uncle.  Social History     Socioeconomic History     Marital status: Single     Spouse name: Not on file     Number of children: Not on file     Years of education: Not on file     Highest education level: Not on file   Occupational History     Not on file   Tobacco Use     Smoking status: Never     Smokeless tobacco: Never   Substance and Sexual Activity     Alcohol use: No     Alcohol/week: 0.0 standard drinks of alcohol     Drug use: No     Sexual activity: Never   Other Topics Concern     Not on file   Social History Narrative    Marleni lives at home with mother in [place] with 2 dogs and a cat. 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 Marleni goes to transition school.      Social Determinants of Health     Financial Resource Strain: Not on file   Food Insecurity: Not on file   Transportation Needs: Not on file   Physical Activity: Not on file   Stress: Not on file   Social Connections: Not on file   Interpersonal Safety: Not on file   Housing Stability: Not on file   mother had hx of T1D  Live with her father     Physical Exam  /71 (BP Location: Right arm)   Pulse 73   SpO2 98%   There is no height or weight on file to calculate BMI.    GENERAL: Healthy, alert and no distress  EYES: Eyes grossly normal to inspection.  No discharge or erythema, or obvious scleral/conjunctival abnormalities.  RESP: No audible wheeze, cough, or visible cyanosis.  No visible retractions or increased work of breathing.  Lungs clear to auscultation bilaterally  Heart: Regular rate rhythm  Skin: No lipohypertrophy at insulin injection sites  NEURO: Cranial nerves grossly intact.  Mentation and speech appropriate for age.  PSYCH:  affect normal/bright,  normal speech and appearance well-groomed.    RESULTS    Hemoglobin A1C   Date Value Ref Range Status    12/15/2023 6.5 (H) <5.7 % Final     Comment:     Normal <5.7%   Prediabetes 5.7-6.4%    Diabetes 6.5% or higher     Note: Adopted from ADA consensus guidelines.   08/31/2020 6.9 (H) 0 - 5.6 % Final     Comment:     Normal <5.7% Prediabetes 5.7-6.4%  Diabetes 6.5% or higher - adopted from ADA   consensus guidelines.         Assessment/Plan:    Cystic fibrosis related diabetes:   Suboptimal control with variability after meals   Has been unable to do carb counting for more precise meal insulin dosing  Counseled about reviewing CGM data and also adjustment of meal insulin dosing  Reduce glargine dose to 14 units daily in the morning    Return to clinic in about 6 months    DAVID Herrera    Note: Chart documentation done in part with Dragon Voice Recognition software. Although reviewed after completion, some word and grammatical errors may remain.  Please consider this when interpreting information in this chart     Time: I spent 30 minutes on the date of the encounter preparing to see patient (including chart review and preparation), obtaining and or reviewing additional medical history, performing an evaluation, documenting clinical information in the electronic health record, independently interpreting results, communicating results to the patient, and/or coordinating care.               Again, thank you for allowing me to participate in the care of your patient.      Sincerely,    Anjelica Galindo MD

## 2024-04-16 NOTE — PROGRESS NOTES
Marleni is a 28 year old  female presents today for Cystic Fibrosis (CF Follow up )    HPI  28 year old  female being seen in follow-up for CF related diabetes.  She reports that she was diagnosed with CF related diabetes around age 14.  She also has history of pancreatic insufficiency and cerebral palsy.  Patient was accompanied by Jah (father)    She is currently using Lantus 15 units daily in the morning.  Takes around 8 units of Humalog for breakfast, 3-5 units lunch and Dinner.  Adjusts lunch and dinner dosed based on Premeal insulin.  Has not been able to count carbs and usually takes fixed dose for meals    Humalog correction 1 unit for every 50 above 200    She uses Dexcom G7 CGM   Can feel symptoms of low blood glucose, denies any episodes of severe hypoglycemia  CGM data was downloaded and reviewed  Average sensor glucose 185, standard deviation 64, time in range 50%, 1% low, 32% high, 16% very high  Pattern of postprandial highs, and occasional sporadic lows which appear to be related to meal insulin    On Trifakta     Patient is able to self administer insulin.  Rotates injection sites between abdomen and arms and thigh  Father helps with putting the CGM    Hemoglobin A1C   Date Value Ref Range Status   12/15/2023 6.5 (H) <5.7 % Final     Comment:     Normal <5.7%   Prediabetes 5.7-6.4%    Diabetes 6.5% or higher     Note: Adopted from ADA consensus guidelines.   08/31/2020 6.9 (H) 0 - 5.6 % Final     Comment:     Normal <5.7% Prediabetes 5.7-6.4%  Diabetes 6.5% or higher - adopted from ADA   consensus guidelines.         Past Medical History:   Diagnosis Date    Atopy     Cerebral palsy (H)     Botox injextions, managed by Dr. 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 CALL CLINIC TO SCHEDULE FOLLOW UP APPOINTMENT. 300 strip 3    albuterol (PROVENTIL) (2.5 MG/3ML) 0.083% neb solution TAKE 1 VIAL (2.5 MG) BY NEBULIZATION 2 TIMES DAILY 180 mL 9    BD STEVIE U/F 32G X 4 MM insulin pen needle USE 4 PEN NEEDLES DAILY OR AS DIRECTED. 400 each 1    Continuous Blood Gluc Sensor (DEXCOM G7 SENSOR) MISC Change every 10 days. 3 each 5    ferrous fumarate 65 mg, Kaw. FE,-Vitamin C 125 mg (VITRON-C)  MG TABS tablet Take 1 tablet by mouth twice a week 60 tablet 1    fluticasone (FLONASE) 50 MCG/ACT nasal spray Spray 1 spray into both nostrils 2 times daily      Glucagon (BAQSIMI TWO PACK) 3 MG/DOSE POWD Spray 1 spray (3 mg) in nostril as needed in the event of unconscious hypoglycemia or hypoglycemic seizure. May repeat dose if no response after 15 minutes. 1 each 1    insulin lispro (HUMALOG KWIKPEN) 100 UNIT/ML (1 unit dial) KWIKPEN INJECT 10 UNITS SUBQ BEFORE BREAKFAST &1 U PER 12G OF CARBS FOR SNACKS &REMAINING MEALS OF THE DAY, ~5 UNITS @DINNER. MAX 30UNITS DAILY. Please call  soon to schedule follow up visit with Dr Galindo in Sept 2023. 30 mL 1    LANTUS SOLOSTAR 100 UNIT/ML soln INJECT 18 UNITS  SUBCUTANEOUSLY DAILY. 15 mL 3    lipase-protease-amylase (CREON) 55461-96530-576571 units CPEP per EC capsule TAKE 4 CAPSULES WITH MEALS AND 3 CAPSULES WITH SNACKS, FOR 3 MEALS AND 2 SNACKS PER DAY. 540 capsule 3    mometasone-formoterol (DULERA) 100-5 MCG/ACT inhaler INHALE 2 PUFFS INTO THE LUNGS TWICE A DAY 13 g 11    mvw complete formulation (CHEWABLES) tablet CHEW AND SWALLOW TWO TABLETS BY MOUTH ONCE DAILY 180 tablet 4    Nutritional Supplements (ENSURE ORIGINAL) LIQD Take 1 Can by mouth At Bedtime 1 Bottle 11    omeprazole (PRILOSEC) 40 MG DR capsule TAKE 1 CAPSULE BY MOUTH TWICE A  capsule 1    PULMOZYME 2.5 MG/2.5ML neb solution NEBULIZE CONTENTS OF ONE VIAL(2.5ML) INTO THE LUNGS ONCE DAILY 75 mL 11    sodium chloride inhalant 7 % NEBU neb solution Take 4 mLs by nebulization daily When not on CHER 360 mL 11    tobramycin, PF, (CHER) 300 MG/5ML neb solution NEBULIZE CONTENTS OF ONE VIAL(5ML) INTO THE LUNGS TWO TIMES A DAY FOR 28 DAYS AND 28 DAYS  mL 3    TRIKAFTA 100-50-75 & 150 MG tablet pack TAKE 2 ORANGE TABLETS BY MOUTH EVERY MORNING. SWALLOW WHOLE WITH FAT-CONTAINING FOOD. (SKIP BLUE TABLETS). 84 tablet 3    UREA 20 INTENSIVE HYDRATING 20 % external cream EXTERNALLY APPLY 1 DOSE TOPICALLY DAILY TO FOOT CALLUSES. 170 g 5    Continuous Blood Gluc  (DEXCOM G6 ) JAZMINE Use to read blood sugars as per 's instructions. 1 each 0    Continuous Blood Gluc Transmit (DEXCOM G6 TRANSMITTER) MISC CHANGE EVERY 3 MONTHS 1 each 3     Family History  family history includes Breast Cancer in an other family member; Diabetes Type 1 in her maternal grandmother and maternal uncle; Diabetes Type 1 (age of onset: 12) in her mother; Thyroid Disease in her maternal aunt, maternal grandmother, mother, and paternal uncle.  Social History     Socioeconomic History    Marital status: Single     Spouse name: Not on file    Number of children: Not on file    Years of education: Not on file     Highest education level: Not on file   Occupational History    Not on file   Tobacco Use    Smoking status: Never    Smokeless tobacco: Never   Substance and Sexual Activity    Alcohol use: No     Alcohol/week: 0.0 standard drinks of alcohol    Drug use: No    Sexual activity: Never   Other Topics Concern    Not on file   Social History Narrative    Marleni lives at home with mother in [place] with 2 dogs and a cat. 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 Marleni goes to transition school.      Social Determinants of Health     Financial Resource Strain: Not on file   Food Insecurity: Not on file   Transportation Needs: Not on file   Physical Activity: Not on file   Stress: Not on file   Social Connections: Not on file   Interpersonal Safety: Not on file   Housing Stability: Not on file   mother had hx of T1D  Live with her father     Physical Exam  /71 (BP Location: Right arm)   Pulse 73   SpO2 98%   There is no height or weight on file to calculate BMI.    GENERAL: Healthy, alert and no distress  EYES: Eyes grossly normal to inspection.  No discharge or erythema, or obvious scleral/conjunctival abnormalities.  RESP: No audible wheeze, cough, or visible cyanosis.  No visible retractions or increased work of breathing.  Lungs clear to auscultation bilaterally  Heart: Regular rate rhythm  Skin: No lipohypertrophy at insulin injection sites  NEURO: Cranial nerves grossly intact.  Mentation and speech appropriate for age.  PSYCH:  affect normal/bright,  normal speech and appearance well-groomed.    RESULTS    Hemoglobin A1C   Date Value Ref Range Status   12/15/2023 6.5 (H) <5.7 % Final     Comment:     Normal <5.7%   Prediabetes 5.7-6.4%    Diabetes 6.5% or higher     Note: Adopted from ADA consensus guidelines.   08/31/2020 6.9 (H) 0 - 5.6 % Final     Comment:     Normal <5.7% Prediabetes 5.7-6.4%  Diabetes 6.5% or higher - adopted from ADA   consensus guidelines.         Assessment/Plan:    Cystic fibrosis related  diabetes:   Suboptimal control with variability after meals   Has been unable to do carb counting for more precise meal insulin dosing  Counseled about reviewing CGM data and also adjustment of meal insulin dosing  Reduce glargine dose to 14 units daily in the morning    Return to clinic in about 6 months    DAVID Herrera    Note: Chart documentation done in part with Dragon Voice Recognition software. Although reviewed after completion, some word and grammatical errors may remain.  Please consider this when interpreting information in this chart     Time: I spent 30 minutes on the date of the encounter preparing to see patient (including chart review and preparation), obtaining and or reviewing additional medical history, performing an evaluation, documenting clinical information in the electronic health record, independently interpreting results, communicating results to the patient, and/or coordinating care.

## 2024-04-17 LAB
ALBUMIN SERPL BCG-MCNC: 4.3 G/DL (ref 3.5–5.2)
ALP SERPL-CCNC: 147 U/L (ref 40–150)
ALT SERPL W P-5'-P-CCNC: 37 U/L (ref 0–50)
AST SERPL W P-5'-P-CCNC: 27 U/L (ref 0–45)
BILIRUB DIRECT SERPL-MCNC: <0.2 MG/DL (ref 0–0.3)
BILIRUB SERPL-MCNC: 1.2 MG/DL
CK SERPL-CCNC: 56 U/L (ref 26–192)
PROT SERPL-MCNC: 7.2 G/DL (ref 6.4–8.3)

## 2024-04-17 NOTE — TELEPHONE ENCOUNTER
LVD:  10/3/2023  [facility] Anjelica Platt MD  Endocrinology, Diabetes, and Metabolism Diabetes mellitus related to cystic fibrosis      Refilled per protocol.

## 2024-04-30 DIAGNOSIS — E84.9 CF (CYSTIC FIBROSIS) (H): ICD-10-CM

## 2024-04-30 RX ORDER — ELEXACAFTOR, TEZACAFTOR, AND IVACAFTOR 100-50-75
KIT ORAL
Qty: 84 TABLET | Refills: 0 | Status: SHIPPED | OUTPATIENT
Start: 2024-04-30 | End: 2024-05-02

## 2024-05-02 ENCOUNTER — OFFICE VISIT (OUTPATIENT)
Dept: PHARMACY | Facility: CLINIC | Age: 29
End: 2024-05-02
Payer: COMMERCIAL

## 2024-05-02 ENCOUNTER — OFFICE VISIT (OUTPATIENT)
Dept: PULMONOLOGY | Facility: CLINIC | Age: 29
End: 2024-05-02
Attending: INTERNAL MEDICINE
Payer: COMMERCIAL

## 2024-05-02 VITALS
DIASTOLIC BLOOD PRESSURE: 66 MMHG | HEIGHT: 60 IN | BODY MASS INDEX: 27.35 KG/M2 | SYSTOLIC BLOOD PRESSURE: 102 MMHG | WEIGHT: 139.33 LBS | HEART RATE: 67 BPM | OXYGEN SATURATION: 97 %

## 2024-05-02 DIAGNOSIS — E08.9 DIABETES MELLITUS RELATED TO CYSTIC FIBROSIS (H): ICD-10-CM

## 2024-05-02 DIAGNOSIS — E84.9 DIABETES MELLITUS DUE TO CYSTIC FIBROSIS (H): ICD-10-CM

## 2024-05-02 DIAGNOSIS — R21 RASH: ICD-10-CM

## 2024-05-02 DIAGNOSIS — J32.4 CHRONIC PANSINUSITIS: ICD-10-CM

## 2024-05-02 DIAGNOSIS — K86.89 PANCREATIC INSUFFICIENCY: ICD-10-CM

## 2024-05-02 DIAGNOSIS — E08.9 DIABETES MELLITUS DUE TO CYSTIC FIBROSIS (H): ICD-10-CM

## 2024-05-02 DIAGNOSIS — E84.0 CYSTIC FIBROSIS WITH PULMONARY MANIFESTATIONS (H): Primary | ICD-10-CM

## 2024-05-02 DIAGNOSIS — Z79.899 ENCOUNTER FOR LONG-TERM (CURRENT) USE OF MEDICATIONS: ICD-10-CM

## 2024-05-02 DIAGNOSIS — K86.81 EXOCRINE PANCREATIC INSUFFICIENCY: ICD-10-CM

## 2024-05-02 DIAGNOSIS — Z79.51 LONG TERM CURRENT USE OF INHALED STEROID: ICD-10-CM

## 2024-05-02 DIAGNOSIS — E84.9 CF (CYSTIC FIBROSIS) (H): Primary | ICD-10-CM

## 2024-05-02 DIAGNOSIS — E84.0 CYSTIC FIBROSIS WITH PULMONARY MANIFESTATIONS (H): ICD-10-CM

## 2024-05-02 DIAGNOSIS — E84.8 DIABETES MELLITUS RELATED TO CYSTIC FIBROSIS (H): ICD-10-CM

## 2024-05-02 LAB
EXPTIME-PRE: 4.25 SEC
FEF2575-%PRED-PRE: 114 %
FEF2575-PRE: 3.6 L/SEC
FEF2575-PRED: 3.15 L/SEC
FEFMAX-%PRED-PRE: 113 %
FEFMAX-PRE: 7.28 L/SEC
FEFMAX-PRED: 6.39 L/SEC
FEV1-%PRED-PRE: 101 %
FEV1-PRE: 2.67 L
FEV1FEV6-PRE: 91 %
FEV1FEV6-PRED: 86 %
FEV1FVC-PRE: 91 %
FEV1FVC-PRED: 87 %
FIFMAX-PRE: 4.16 L/SEC
FVC-%PRED-PRE: 97 %
FVC-PRE: 2.95 L
FVC-PRED: 3.02 L

## 2024-05-02 PROCEDURE — 94375 RESPIRATORY FLOW VOLUME LOOP: CPT | Performed by: INTERNAL MEDICINE

## 2024-05-02 PROCEDURE — 99214 OFFICE O/P EST MOD 30 MIN: CPT | Mod: 25 | Performed by: INTERNAL MEDICINE

## 2024-05-02 PROCEDURE — 99207 PR NO CHARGE LOS: CPT | Performed by: PHARMACIST

## 2024-05-02 PROCEDURE — 87077 CULTURE AEROBIC IDENTIFY: CPT | Performed by: INTERNAL MEDICINE

## 2024-05-02 PROCEDURE — G0463 HOSPITAL OUTPT CLINIC VISIT: HCPCS | Performed by: INTERNAL MEDICINE

## 2024-05-02 RX ORDER — INSULIN GLARGINE 100 [IU]/ML
INJECTION, SOLUTION SUBCUTANEOUS
Status: SHIPPED
Start: 2024-05-02

## 2024-05-02 RX ORDER — ELEXACAFTOR, TEZACAFTOR, AND IVACAFTOR 100-50-75
KIT ORAL
Qty: 84 TABLET | Refills: 4 | Status: SHIPPED | OUTPATIENT
Start: 2024-05-02 | End: 2024-07-30

## 2024-05-02 NOTE — PROGRESS NOTES
Medication Therapy Management (MTM) Encounter    ASSESSMENT:                            Medication Adherence/Access: Patient may benefit from benefit investigation at [facility] and renewal of [employer] - innovate | mentor | invest.    CF:   All modulator labs are within normal limits.     Pancreatic Insufficiency/Nutrition:   See above regarding Bozuko latoya      CFRD:   Patient is not meeting A1c goal of <7% per CFF guidelines.     Rash:  Stable     PLAN:                            Keep up the great work on medications!  Pharmacist to review medication review and Bozuko latoya for [facility] and will follow up with options via [facility] [employer] per patient request      Follow-up: 3 months for clinic visit    SUBJECTIVE/OBJECTIVE:                          Marleni Alamo is a 28 year old female seen for a co-visit with Dr. Leger and team.Patient was accompanied by Adalid Tyson.     Reason for visit: Annual Medication Review    Allergies/ADRs: Reviewed in chart  Past Medical History: Reviewed in chart  Tobacco: She reports that she has never smoked. She has never been exposed to tobacco smoke. She has never used smokeless tobacco.  Alcohol: none    Medication Adherence/Access:   Medication: Marleni manages her medications at home with help from Adalid  Pharmacy: [facility], local [facility]  Sometimes hard to get refills at [facility], wondering if there's an option for more than 30 day supplies.      CF:    Inhaled medications:   Bronchodilator: albuterol nebs twice daily   Mucolytic: Pulmozyme daily, and hypertonic saline 7% only when sick   Antibiotic: Tobramycin nebs twice daily (cycling; on)   Other: Dulera 100-5mcg 2 puffs twice daily, Flonase 1 spray each nostril twice daily   Oral medications:   CFTR modulator: on Trikafta    Genotype: [id number]  Cultures (last growth): throat cultures grow MRSA (5/25/23). Hx of PSA (prior to 2018)  Lab Results   Component Value Date    ALT 37 04/16/2024    AST 27  2024    BILITOTAL 1.2 2024    DBIL <0.20 2024    CKT 56 2024       Pancreatic Insufficiency/Nutrition:   Creon 71690 taking 4 capsules with meals and 3 capsules with snacks.   Acid reducer: omeprazole 40 mg twice daily  Vitamins/Supplements : MVW chewable 2 daily, and Vitamin D 50,000 units MWF, Vitron C twice weekly, Ensure occasionally    Patient is not experiencing sign/symptoms of malabsorption. Received notice that Mundi , wondering how to update this. Does have a copay still for Vitron C, wondering about options for this.      CFRD:    Lantus 14 units daily  Humalog 8 units with breakfast plus sliding scale  Baqsimi available for urgent lows  SMBG: Uses Dexcom G7 with backup Accu-chek  meter and supplies.      Patient is not experiencing hypoglycemia  Recent symptoms of high blood sugar? none  Most recent HgA1C:   Lab Results   Component Value Date    A1C 6.5 12/15/2023    A1C 7.0 11/10/2022    A1C 6.8 10/26/2021     Patient follows with Dr. Galindo for endocrinology. Last visit 22.      Rash:  Urea cream as needed     Hasn't needed recently, but does use for calluses around heals from leg braces. Not needing currently but likes to have available.      PFTs:      Today's Vitals:   BP Readings from Last 1 Encounters:   24 102/66     Pulse Readings from Last 1 Encounters:   24 67     Wt Readings from Last 1 Encounters:   24 139 lb 5.3 oz (63.2 kg)     Ht Readings from Last 1 Encounters:   24 5' (1.524 m)     Estimated body mass index is 27.21 kg/m  as calculated from the following:    Height as of an earlier encounter on 24: 5' (1.524 m).    Weight as of an earlier encounter on 24: 139 lb 5.3 oz (63.2 kg).    ----------------      I spent 15 minutes with this patient today. I offer these suggestions for consideration by Dr. Leger during covisit today.     A summary of these recommendations was given to the patient (see AVS from  today's appointment with Dr. Leger).    Nora Singh, PharmD  Cystic Fibrosis MTM Pharmacist  [facility]  Voicemail: [phone]           Medication Therapy Recommendations  CF (cystic fibrosis) (H)    Current Medication: mvw complete formulation (CHEWABLES) tablet   Rationale: Cannot afford medication product - Cost - Adherence   Recommendation: Referral to Service    Status: Patient Agreed - Adherence/Education

## 2024-05-02 NOTE — NURSING NOTE
Chief Complaint   Patient presents with    RECHECK     Return Cystic Fibrosis     Medications reviewed and vital signs taken.   Mecca Thornton, CMA

## 2024-05-02 NOTE — PATIENT INSTRUCTIONS
Cystic Fibrosis Self-Care Plan    RECOMMENDATIONS:   Help us provide the best possible care. If you receive a questionnaire from the CF Foundation about your clinic experience today please fill it out.  It should take less than 5 minutes. Let us know what we are doing well and how we can improve.    Continue current medication, exercise, nebs and vest therapy.   Tdap and Hep B vaccine.        Cystic Fibrosis :    Ruth Bass  [phone]  [facility] Nurse line:  Yasmeen    [phone]     [facility] Fax Number:      [phone]         Cystic Fibrosis Respiratory Therapists:   Nabila Sheikh                          [phone]          Debbie Payne   [phone]  Cystic Fibrosis Dietitians:              Renetta Gilmore              [phone]                            Megan Goddard                        [phone]   Cystic Fibrosis Diabetes Nurse:    Paulette Danielle               [phone]    Cystic Fibrosis Social Workers:     Magi Sarkar              [phone]                     Zehra Ware               [phone]  Cystic Fibrosis Pharmacists:           Mónica Chapman                              [phone] (pager)         Nora Singh      [phone]   Cystic Fibrosis Genetic Counselor:   Cami Rutledge    [phone]    [facility] website:  [email or web address]    We have recently learned about an albuterol neb solution shortage due to a manufacturing delay. There is still a small supply coming in but not enough to meet the current demand. We do not yet have an estimate for when this will become widely available again.    We our asking our CF community to do the following:    Please take time to check your supply of albuterol neb solution at home. We recommend keeping at least a 2-week supply of albuterol nebs at home in case of illness.    2.  If you have an albuterol inhaler at home, you can use 4 puffs of  "the inhaler with a spacer in place of the nebulized albuterol at the start of your treatments. It is important to use a spacer for the best technique. If you do not have a spacer at home or have questions on technique, we will be happy to review and send one to your home address. Please also take a moment to check that your albuterol HFA inhaler is available and not .  inhalers may be less effective as the medication loses its potency or power. In some instances your team may suggest another alternative instead of an albuterol inhaler.    3.  Please take care in requesting refills. Albuterol neb solution is a life-saving medication for patients having severe asthma attacks and other emergency respiratory conditions. Let s work together to make sure that albuterol neb solution is available to those who need it urgently.    Reach out to your care team with questions and to confirm your planned alternative for albuterol nebs. [employer] will be the fastest way to connect. If possible, please reserve the nursing line for more urgent concerns as we are short on nursing staff.    Here are some reputable sites with more information:    https://www.cidrap.[facility]/resilient-drug-supply/us-liquid-albuterol-qfgplegw-qhdmeydr-nzhvke-after-[email or web address]-shuts-down    https://www.[employer]//health/albuterol-shortage    https://www.ashp.org/drug-shortages/current-shortages/drug-shortage-detail.aspx?wo=268&loginreturnUrl=SSOCheckOnly       MRN: [id number]   Clinic Date: May 2, 2024   Patient: Marleni Alamo     Annual Studies:   IGG   Date Value Ref Range Status   2018 1,320 695 - 1,620 mg/dL Final     Immunoglobulin G   Date Value Ref Range Status   12/15/2023 1,028 610 - 1,616 mg/dL Final     No results found for: \"INS\"  There are no preventive care reminders to display for this patient.    Pulmonary Function Tests  FEV1: amount of air you can blow out in 1 second  FVC: total amount of air you can take " in and blow out    Your Goals:             Latest Ref Rng & Units 5/2/2024     8:03 AM   PFT   FVC L 2.95  P   FEV1 L 2.67  P   FVC% % 97  P   FEV1% % 101  P      P Preliminary result          Airway Clearance: The Most Important Way to Keep Your Lungs Healthy  Vest Settings:   Hill-Rom Frequencies: 8, 9, 10 Pressure 10 Then, Frequencies 18, 19, 20 Pressure 6     [employer]: Quick Start with Pressure of     Do each frequency for 5 minutes; Deflate vest after each frequency & cough 3 times before beginning the next setting.    Vest and Neb Therapy should be done 2 times/day.    Good Nutrition Can Improve Lung Function and Overall Health    Take ALL of your vitamins with food    Take 1/2 of your enzymes before EVERY meal/snack and the other 1/2 mid-meal/snack    Wt Readings from Last 3 Encounters:   05/02/24 63.2 kg (139 lb 5.3 oz)   12/15/23 62.8 kg (138 lb 6.4 oz)   05/25/23 59 kg (130 lb 1.1 oz)       Body mass index is 27.21 kg/m .         National CF Foundation Recommendations for BMI in CF Adults: Women: at least 22 Men: at least 23        Controlling Blood Sugars Helps Prevent Lung Infections & Improves Nutrition  Test blood sugar:    In the morning before eating (goal is )    2 hours after a meal (goal is less than 150)    When pre-meal glucose is greater than 150 add correction    At bedtime (if less than 100 eat a snack with 15 grams of carbohydrates  Last A1C Results:   Hemoglobin A1C   Date Value Ref Range Status   12/15/2023 6.5 (H) <5.7 % Final     Comment:     Normal <5.7%   Prediabetes 5.7-6.4%    Diabetes 6.5% or higher     Note: Adopted from ADA consensus guidelines.   08/31/2020 6.9 (H) 0 - 5.6 % Final     Comment:     Normal <5.7% Prediabetes 5.7-6.4%  Diabetes 6.5% or higher - adopted from ADA   consensus guidelines.           If diabetic, measure A1C every 6 months. Goal: Under 7%    Staying Healthy  Research:  If you are interested in learning about research opportunities or have  questions, please contact the CF Research Team at [phone] or [email or web address].    CF Foundation:  Compass is a personalized resource service to help you with the insurance, financial, legal and other issues you are facing.  It's free, confidential and available to anyone with CF.  Ask your  for more information or contact Compass directly at 069-YEBDRRN ([phone]) or compass@cff.org, or learn more at cff.org/compass.

## 2024-05-02 NOTE — PROGRESS NOTES
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  Trikafta was initiated in February 2023.  Maintenance   Modulator:  Trikafta it was initiated in February 2023.    Mutation: [id number]  AW Clearance: Vest  Bronchodilators:  Albuterol  Mucolytics: Pulmozyme, Hypertonic (7%),   Antibiotics Inh:  CHER  Antibiotics Oral: none   Other: Advair  Colonization Hx:  MRSA, MSSA, Streptococcus anginosus, Achromobacter xylosoxidans/denitrificans  Annual Studies:  Due December 2024  Contraindications to standard of care:     Pulmonary/cystic fibrosis: Patient reports very good exercise tolerance.  No cough or sputum.  Oxygenating well at 97%.  PFTs are in the normal range and similar to her baseline.  She does not appear to be having a pulmonary exacerbation at this time.  She is doing well from a pulmonary standpoint.  Continue current airway clearance therapy       CFTR Modulation: The patient is an [id number] homozygote.  She has responded well to Trikafta with decreased respiratory symptoms.  LFTs and CK are within normal limits.  The patient has difficulty with blood draws due to limited venous access.  She finds blood draws very challenging.  Trikafta labs will be checked again with annual studies in December.       CF-related diabetes: The patient was having difficulty with hypoglycemia at her last visit.  This is improved with reduction in her long-acting insulin.  Continue current insulin regimen.       Pancreatic insufficiency: The patient denies symptoms of malabsorption.  Her weight is in an excellent range.  In the past she has required supplements to maintain her weight but with Trikafta weight has been maintained more easily.  We discussed that she no longer needs use of  supplements on a daily basis but can use them when she chooses.  Vitamin levels will be checked with annual studies in December.       CF-related sinus disease: No symptoms of acute sinusitis at this time.  Continue monitoring.       Reflux: Adequately controlled with omeprazole.       Healthcare maintenance: The patient is up-to-date on COVID and influenza vaccines.  Annual studies will be due in December 2024.       The longitudinal plan of care for the diagnosis(es)/condition(s) as documented were addressed during this visit. Due to the added complexity in care, I will continue to support Marleni in the subsequent management and with ongoing continuity of care.        Follow-up in 3 months with PFTs and sputum culture.    Edi Leger MD       CF HPI  The patient was seen and examined by Edi Leger MD   No illnesses since last visit.  Breathing is comfortable at rest and with all activity.  No cough.  No sputum.  No chest pain.  Vest therapy twice daily.  No fever, chills or night sweats.  Main exercise is walking her dogs.    Review of systems:  Appetite is good  No ear pain, sore throat, sinus pain or rhinorrhea  No visual changes  No palpitations  No nausea, vomiting, diarrhea or abdominal pain  Less frequent hypoglycemia since long-acting insulin dose was reduced.  Patient is frustrated by difficulty with blood draws, attributed to small veins.  Right change 79  A complete ROS was otherwise negative except as noted in the HPI.    Current Outpatient Medications   Medication Sig Dispense Refill    ACCU-CHEK GUIDE test strip USE TO TEST BLOOD SUGAR 3 TIMES DAILY OR AS DIRECTED. CALL CLINIC TO SCHEDULE FOLLOW UP APPOINTMENT. 300 strip 3    albuterol (PROVENTIL) (2.5 MG/3ML) 0.083% neb solution TAKE 1 VIAL (2.5 MG) BY NEBULIZATION 2 TIMES DAILY 180 mL 9    BD PEN NEEDLE STEVIE 2ND GEN 32G X 4 MM miscellaneous USE 4 PEN NEEDLES DAILY OR AS DIRECTED. 400 each 2    Continuous Blood Gluc Sensor  (DEXCOM G7 SENSOR) MISC Change every 10 days. 3 each 5    elexacaftor-tezacaftor-ivacaftor & ivacaftor (TRIKAFTA) 100-50-75 & 150 MG tablet pack Take 2 orange tablets in the morning and 1 light blue tablet in the evening. Swallow whole with fat-containing food. 84 tablet 4    ferrous fumarate 65 mg, [place]. FE,-Vitamin C 125 mg (VITRON-C)  MG TABS tablet Take 1 tablet by mouth twice a week 60 tablet 1    fluticasone (FLONASE) 50 MCG/ACT nasal spray Spray 1 spray into both nostrils 2 times daily      Glucagon (BAQSIMI TWO PACK) 3 MG/DOSE POWD Spray 1 spray (3 mg) in nostril as needed in the event of unconscious hypoglycemia or hypoglycemic seizure. May repeat dose if no response after 15 minutes. 1 each 1    insulin glargine (LANTUS SOLOSTAR) 100 UNIT/ML pen INJECT 14 UNITS SUBCUTANEOUSLY DAILY.      insulin lispro (HUMALOG KWIKPEN) 100 UNIT/ML (1 unit dial) KWIKPEN INJECT 10 UNITS SUBQ BEFORE BREAKFAST &1 U PER 12G OF CARBS FOR SNACKS &REMAINING MEALS OF THE DAY, ~5 UNITS @DINNER. MAX 30UNITS DAILY. Please call  soon to schedule follow up visit with Dr Galindo in Sept 2023. 30 mL 1    lipase-protease-amylase (CREON) 98145-79616-604850 units CPEP per EC capsule TAKE 4 CAPSULES WITH MEALS AND 3 CAPSULES WITH SNACKS, FOR 3 MEALS AND 2 SNACKS PER DAY. 540 capsule 3    mometasone-formoterol (DULERA) 100-5 MCG/ACT inhaler INHALE 2 PUFFS INTO THE LUNGS TWICE A DAY 13 g 11    mvw complete formulation (CHEWABLES) tablet CHEW AND SWALLOW TWO TABLETS BY MOUTH ONCE DAILY 180 tablet 4    Nutritional Supplements (ENSURE ORIGINAL) LIQD Take 1 Can by mouth daily as needed 1 Bottle 11    omeprazole (PRILOSEC) 40 MG DR capsule TAKE 1 CAPSULE BY MOUTH TWICE A  capsule 1    PULMOZYME 2.5 MG/2.5ML neb solution NEBULIZE CONTENTS OF ONE VIAL(2.5ML) INTO THE LUNGS ONCE DAILY 75 mL 11    sodium chloride inhalant 7 % NEBU neb solution Take 4 mLs by nebulization daily When not on CHER 360 mL 11    tobramycin, PF, (CHER)  300 MG/5ML neb solution NEBULIZE CONTENTS OF ONE VIAL(5ML) INTO THE LUNGS TWO TIMES A DAY FOR 28 DAYS AND 28 DAYS  mL 3    UREA 20 INTENSIVE HYDRATING 20 % external cream EXTERNALLY APPLY 1 DOSE TOPICALLY DAILY TO FOOT CALLUSES. 170 g 5     No current facility-administered medications for this visit.     Allergies   Allergen Reactions    Amoxicillin-Pot Clavulanate Hives     Per mother    Ceftin Hives     Per mother    Vancomycin Hives     Per mother     Past Medical History:   Diagnosis Date    Atopy     Cerebral palsy (H)     Botox injextions, managed by Dr. 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    Seizure disorder (H)     Multiple daily in infancy now infrequent (every few years)    Thrush, oral        Past Surgical History:   Procedure Laterality Date    bilat antrostomies  04/2011    Bilater knee surgery with plates and pins Bilateral 2007    CHOLECYSTECTOMY, LAPOROSCOPIC  02/2015    Cleft palate repair and mandibular advancement  1996    gastrostomy in infancy      Multiple PET (ear tubes)      Multiple sinus surgeries  2015    OPTICAL TRACKING SYSTEM ENDOSCOPIC SINUS SURGERY Bilateral 03/01/2019    Procedure: Stealth Assisted Bilateral Maxillary Antrostomy, Ethmoidectomy, Sphenoidotomy, Frontal Sinusotomy;  Surgeon: Anny Carbajal MD;  Location: [facility] OR    RESECT TRACHEA WITH RECONSTRUCTION CHILD  1998    Rib for  reconstruction    STRABISMUS SURGERY      surgery for meconium ileus, partial bowel resection  08/1995    Details not available    tracheostomy in infancy         Social History     Socioeconomic History    Marital status: Single     Spouse name: Not on file    Number of children: Not on file    Years of education: Not on file    Highest education level: Not on file   Occupational History    Not on file   Tobacco Use    Smoking status: Never    Smokeless tobacco: Never   Substance and Sexual Activity    Alcohol use: No     Alcohol/week: 0.0 standard drinks of alcohol    Drug use: No    Sexual activity: Never   Other Topics Concern    Not on file   Social History Narrative    Marleni lives at home with mother in [place] with 2 dogs and a cat. 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 Marleni goes to transition school.      Social Determinants of Health     Financial Resource Strain: Not on file   Food Insecurity: Not on file   Transportation Needs: Not on file   Physical Activity: Not on file   Stress: Not on file   Social Connections: Not on file   Interpersonal Safety: Not on file   Housing Stability: Not on file         /66   Pulse 67   Ht 1.524 m (5')   Wt 63.2 kg (139 lb 5.3 oz)   SpO2 97%   BMI 27.21 kg/m    Body mass index is 27.21 kg/m .  Exam:   GENERAL APPEARANCE: Well developed, well nourished, alert, and in no apparent distress.  EYES: PERRL, EOMI  HENT: Nasal mucosa with no edema and no hyperemia. No nasal polyps.  EARS: Canals clear, TMs normal  MOUTH: Bifid Uvula. Oral mucosa is moist, without any lesions, no tonsillar enlargement, no oropharyngeal exudate.  NECK: supple, no masses, no thyromegaly.  LYMPHATICS: No significant axillary, cervical, or supraclavicular nodes.  RESP: normal percussion, good air flow throughout.  No crackles. No rhonchi. No wheezes.  CV: Normal S1, S2, regular rhythm, normal rate. No murmur.  No rub. No gallop. No LE edema.   ABDOMEN:  Bowel sounds normal, soft, nontender,  no HSM or masses.   MS: extremities normal. No clubbing. No cyanosis.  SKIN: no rash on limited exam  NEURO: Mentation intact, speech normal, bilat ankle AFO.  PSYCH: mentation appears normal. and affect normal/bright  Results:  Recent Results (from the past 168 hour(s))   General PFT Lab (Please always keep checked)    Collection Time: 05/02/24  8:03 AM   Result Value Ref Range    FVC-Pred 3.02 L    FVC-Pre 2.95 L    FVC-%Pred-Pre 97 %    FEV1-Pre 2.67 L    FEV1-%Pred-Pre 101 %    FEV1FVC-Pred 87 %    FEV1FVC-Pre 91 %    FEFMax-Pred 6.39 L/sec    FEFMax-Pre 7.28 L/sec    FEFMax-%Pred-Pre 113 %    FEF2575-Pred 3.15 L/sec    FEF2575-Pre 3.60 L/sec    SHF7774-%Pred-Pre 114 %    ExpTime-Pre 4.25 sec    FIFMax-Pre 4.16 L/sec    FEV1FEV6-Pred 86 %    FEV1FEV6-Pre 91 %                   Results as noted above.    PFT Interpretation:  Normal spirometry.  Unchanged from previous.   Similar to recent best.  Valid Maneuver             CF Exacerbation  Absent

## 2024-05-02 NOTE — LETTER
5/2/2024         RE: Marleni Alamo  [address] [place] [address]        Dear Colleague,    Thank you for referring your patient, Marleni Alamo, to the [facility] AND [facility] [place]. Please see a copy of my visit note below.    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  Trikafta was initiated in February 2023.  Maintenance   Modulator:  Trikafta it was initiated in February 2023.    Mutation: [id number]  AW Clearance: Vest  Bronchodilators:  Albuterol  Mucolytics: Pulmozyme, Hypertonic (7%),   Antibiotics Inh:  CHER  Antibiotics Oral: none   Other: Advair  Colonization Hx:  MRSA, MSSA, Streptococcus anginosus, Achromobacter xylosoxidans/denitrificans  Annual Studies:  Due December 2024  Contraindications to standard of care:     Pulmonary/cystic fibrosis: Patient reports very good exercise tolerance.  No cough or sputum.  Oxygenating well at 97%.  PFTs are in the normal range and similar to her baseline.  She does not appear to be having a pulmonary exacerbation at this time.  She is doing well from a pulmonary standpoint.  Continue current airway clearance therapy       CFTR Modulation: The patient is an [id number] homozygote.  She has responded well to Trikafta with decreased respiratory symptoms.  LFTs and CK are within normal limits.  The patient has difficulty with blood draws due to limited venous access.  She finds blood draws very challenging.  Trikafta labs will be checked again with annual studies in December.       CF-related diabetes: The patient was having difficulty with hypoglycemia at her last visit.  This is improved with reduction in her long-acting insulin.  Continue current insulin regimen.        Pancreatic insufficiency: The patient denies symptoms of malabsorption.  Her weight is in an excellent range.  In the past she has required supplements to maintain her weight but with Trikafta weight has been maintained more easily.  We discussed that she no longer needs use of supplements on a daily basis but can use them when she chooses.  Vitamin levels will be checked with annual studies in December.       CF-related sinus disease: No symptoms of acute sinusitis at this time.  Continue monitoring.       Reflux: Adequately controlled with omeprazole.       Healthcare maintenance: The patient is up-to-date on COVID and influenza vaccines.  Annual studies will be due in December 2024.       The longitudinal plan of care for the diagnosis(es)/condition(s) as documented were addressed during this visit. Due to the added complexity in care, I will continue to support Marleni in the subsequent management and with ongoing continuity of care.        Follow-up in 3 months with PFTs and sputum culture.    Edi Leger MD       CF HPI  The patient was seen and examined by Edi Leger MD   No illnesses since last visit.  Breathing is comfortable at rest and with all activity.  No cough.  No sputum.  No chest pain.  Vest therapy twice daily.  No fever, chills or night sweats.  Main exercise is walking her dogs.    Review of systems:  Appetite is good  No ear pain, sore throat, sinus pain or rhinorrhea  No visual changes  No palpitations  No nausea, vomiting, diarrhea or abdominal pain  Less frequent hypoglycemia since long-acting insulin dose was reduced.  Patient is frustrated by difficulty with blood draws, attributed to small veins.  Right change 79  A complete ROS was otherwise negative except as noted in the HPI.    Current Outpatient Medications   Medication Sig Dispense Refill     ACCU-CHEK GUIDE test strip USE TO TEST BLOOD SUGAR 3 TIMES DAILY OR AS DIRECTED. CALL CLINIC TO SCHEDULE FOLLOW UP  APPOINTMENT. 300 strip 3     albuterol (PROVENTIL) (2.5 MG/3ML) 0.083% neb solution TAKE 1 VIAL (2.5 MG) BY NEBULIZATION 2 TIMES DAILY 180 mL 9     BD PEN NEEDLE STEVIE 2ND GEN 32G X 4 MM miscellaneous USE 4 PEN NEEDLES DAILY OR AS DIRECTED. 400 each 2     Continuous Blood Gluc Sensor (DEXCOM G7 SENSOR) MISC Change every 10 days. 3 each 5     elexacaftor-tezacaftor-ivacaftor & ivacaftor (TRIKAFTA) 100-50-75 & 150 MG tablet pack Take 2 orange tablets in the morning and 1 light blue tablet in the evening. Swallow whole with fat-containing food. 84 tablet 4     ferrous fumarate 65 mg, [place]. FE,-Vitamin C 125 mg (VITRON-C)  MG TABS tablet Take 1 tablet by mouth twice a week 60 tablet 1     fluticasone (FLONASE) 50 MCG/ACT nasal spray Spray 1 spray into both nostrils 2 times daily       Glucagon (BAQSIMI TWO PACK) 3 MG/DOSE POWD Spray 1 spray (3 mg) in nostril as needed in the event of unconscious hypoglycemia or hypoglycemic seizure. May repeat dose if no response after 15 minutes. 1 each 1     insulin glargine (LANTUS SOLOSTAR) 100 UNIT/ML pen INJECT 14 UNITS SUBCUTANEOUSLY DAILY.       insulin lispro (HUMALOG KWIKPEN) 100 UNIT/ML (1 unit dial) KWIKPEN INJECT 10 UNITS SUBQ BEFORE BREAKFAST &1 U PER 12G OF CARBS FOR SNACKS &REMAINING MEALS OF THE DAY, ~5 UNITS @DINNER. MAX 30UNITS DAILY. Please call  soon to schedule follow up visit with Dr Galindo in Sept 2023. 30 mL 1     lipase-protease-amylase (CREON) 41440-35711-848789 units CPEP per EC capsule TAKE 4 CAPSULES WITH MEALS AND 3 CAPSULES WITH SNACKS, FOR 3 MEALS AND 2 SNACKS PER DAY. 540 capsule 3     mometasone-formoterol (DULERA) 100-5 MCG/ACT inhaler INHALE 2 PUFFS INTO THE LUNGS TWICE A DAY 13 g 11     mvw complete formulation (CHEWABLES) tablet CHEW AND SWALLOW TWO TABLETS BY MOUTH ONCE DAILY 180 tablet 4     Nutritional Supplements (ENSURE ORIGINAL) LIQD Take 1 Can by mouth daily as needed 1 Bottle 11     omeprazole (PRILOSEC) 40 MG DR capsule  TAKE 1 CAPSULE BY MOUTH TWICE A  capsule 1     PULMOZYME 2.5 MG/2.5ML neb solution NEBULIZE CONTENTS OF ONE VIAL(2.5ML) INTO THE LUNGS ONCE DAILY 75 mL 11     sodium chloride inhalant 7 % NEBU neb solution Take 4 mLs by nebulization daily When not on CHER 360 mL 11     tobramycin, PF, (CHER) 300 MG/5ML neb solution NEBULIZE CONTENTS OF ONE VIAL(5ML) INTO THE LUNGS TWO TIMES A DAY FOR 28 DAYS AND 28 DAYS  mL 3     UREA 20 INTENSIVE HYDRATING 20 % external cream EXTERNALLY APPLY 1 DOSE TOPICALLY DAILY TO FOOT CALLUSES. 170 g 5     No current facility-administered medications for this visit.     Allergies   Allergen Reactions     Amoxicillin-Pot Clavulanate Hives     Per mother     Ceftin Hives     Per mother     Vancomycin Hives     Per mother     Past Medical History:   Diagnosis Date     Atopy      Cerebral palsy (H)     Botox injextions, managed by Dr. John Marley     CF (cystic fibrosis) (H) 07/25/2016    Sweat Test: 8/15/95 Value: 85.0 Genotype: [id number], H/O Pseudomonas and MRSA, Baseline FEV1  2.48, FVC 2.76 (7/2015)     Cholelithiasis      Chronic pansinusitis 07/25/2016     Diabetes mellitus due to cystic fibrosis (H) 07/25/2016     Diabetes mellitus related to cystic fibrosis (H) 07/25/2016     Gastroesophageal reflux disease without esophagitis 07/25/2016     Infection due to 2019 novel coronavirus 05/2022    Minimal symptoms     MRSA (methicillin resistant staph aureus) culture positive      Pancreatic insufficiency 07/25/2016     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     Seizure disorder (H)     Multiple daily in infancy now infrequent (every few years)     Thrush, oral        Past Surgical History:   Procedure Laterality Date     bilat antrostomies  04/2011     Bilater knee surgery with plates and pins Bilateral 2007     CHOLECYSTECTOMY, LAPOROSCOPIC  02/2015     Cleft palate repair and mandibular advancement  1996     gastrostomy in infancy       Multiple PET (ear tubes)        Multiple sinus surgeries  2015     OPTICAL TRACKING SYSTEM ENDOSCOPIC SINUS SURGERY Bilateral 03/01/2019    Procedure: Stealth Assisted Bilateral Maxillary Antrostomy, Ethmoidectomy, Sphenoidotomy, Frontal Sinusotomy;  Surgeon: Anny Carbajal MD;  Location: [facility] OR     RESECT TRACHEA WITH RECONSTRUCTION CHILD  1998    Rib for reconstruction     STRABISMUS SURGERY       surgery for meconium ileus, partial bowel resection  08/1995    Details not available     tracheostomy in infancy         Social History     Socioeconomic History     Marital status: Single     Spouse name: Not on file     Number of children: Not on file     Years of education: Not on file     Highest education level: Not on file   Occupational History     Not on file   Tobacco Use     Smoking status: Never     Smokeless tobacco: Never   Substance and Sexual Activity     Alcohol use: No     Alcohol/week: 0.0 standard drinks of alcohol     Drug use: No     Sexual activity: Never   Other Topics Concern     Not on file   Social History Narrative    Marleni lives at home with mother in [place] with 2 dogs and a cat. 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 Marleni goes to transition school.      Social Determinants of Health     Financial Resource Strain: Not on file   Food Insecurity: Not on file   Transportation Needs: Not on file   Physical Activity: Not on file   Stress: Not on file   Social Connections: Not on file   Interpersonal Safety: Not on file   Housing Stability: Not on file         /66   Pulse 67   Ht 1.524 m (5')   Wt 63.2 kg (139 lb 5.3 oz)   SpO2 97%   BMI 27.21 kg/m    Body mass index is 27.21 kg/m .  Exam:   GENERAL APPEARANCE: Well developed, well nourished, alert, and in no apparent distress.  EYES: PERRL, EOMI  HENT: Nasal mucosa with no edema and no hyperemia. No nasal polyps.  EARS: Canals clear, TMs normal  MOUTH: Bifid Uvula. Oral mucosa is moist, without any lesions, no tonsillar enlargement, no oropharyngeal exudate.  NECK:  supple, no masses, no thyromegaly.  LYMPHATICS: No significant axillary, cervical, or supraclavicular nodes.  RESP: normal percussion, good air flow throughout.  No crackles. No rhonchi. No wheezes.  CV: Normal S1, S2, regular rhythm, normal rate. No murmur.  No rub. No gallop. No LE edema.   ABDOMEN:  Bowel sounds normal, soft, nontender, no HSM or masses.   MS: extremities normal. No clubbing. No cyanosis.  SKIN: no rash on limited exam  NEURO: Mentation intact, speech normal, bilat ankle AFO.  PSYCH: mentation appears normal. and affect normal/bright  Results:  Recent Results (from the past 168 hour(s))   General PFT Lab (Please always keep checked)    Collection Time: 05/02/24  8:03 AM   Result Value Ref Range    FVC-Pred 3.02 L    FVC-Pre 2.95 L    FVC-%Pred-Pre 97 %    FEV1-Pre 2.67 L    FEV1-%Pred-Pre 101 %    FEV1FVC-Pred 87 %    FEV1FVC-Pre 91 %    FEFMax-Pred 6.39 L/sec    FEFMax-Pre 7.28 L/sec    FEFMax-%Pred-Pre 113 %    FEF2575-Pred 3.15 L/sec    FEF2575-Pre 3.60 L/sec    [id number]-%Pred-Pre 114 %    ExpTime-Pre 4.25 sec    FIFMax-Pre 4.16 L/sec    FEV1FEV6-Pred 86 %    FEV1FEV6-Pre 91 %                   Results as noted above.    PFT Interpretation:  Normal spirometry.  Unchanged from previous.   Similar to recent best.  Valid Maneuver             CF Exacerbation  Absent          Nutrition Note    Reason for Visit: Per pt/Dad request    Dad wondering about high BGs with breakfast, particularly large bowl of cereal that causes BG >250. Most recently has been trying to do PB toast or yogurt. Also wondering about ideas for lunch as Marleni usually has microwave mac+cheese on days he is working.     Interventions/Recommendations:  1) Reviewed options for adding protein to breakfast along with smaller bowl of cereal - examples such as yogurt, cottage cheese, eggs, meats, etc. Also meal planned some ideas for lunches - encouraged to batch-cook and have meals prepped; can also freeze individual servings of  meals to reheat in the future. Can also compile rice bowls, salad bowls, etc.      RD will remain available per pt/family request.     Renetta Gilmore RD, LD, CACFD  Cystic Fibrosis/Lung Transplant Dietitian  Available via [employer]             Again, thank you for allowing me to participate in the care of your patient.        Sincerely,        Edi Leger MD

## 2024-05-02 NOTE — PATIENT INSTRUCTIONS
See provider AVS for a summary of recommendations from today's visit.    Nora Singh, PharmD  Cystic Fibrosis MTM Pharmacist  [facility]  Voicemail: [phone]

## 2024-05-03 RX ORDER — INSULIN LISPRO 100 [IU]/ML
INJECTION, SOLUTION INTRAVENOUS; SUBCUTANEOUS
Qty: 30 ML | Refills: 3 | Status: SHIPPED | OUTPATIENT
Start: 2024-05-03

## 2024-05-03 RX ORDER — MOMETASONE FUROATE AND FORMOTEROL FUMARATE DIHYDRATE 100; 5 UG/1; UG/1
2 AEROSOL RESPIRATORY (INHALATION) 2 TIMES DAILY
Qty: 39 G | Refills: 3 | Status: SHIPPED | OUTPATIENT
Start: 2024-05-03

## 2024-05-03 RX ORDER — ALBUTEROL SULFATE 0.83 MG/ML
SOLUTION RESPIRATORY (INHALATION)
Qty: 600 ML | Refills: 11 | Status: SHIPPED | OUTPATIENT
Start: 2024-05-03

## 2024-05-03 RX ORDER — OMEPRAZOLE 40 MG/1
CAPSULE, DELAYED RELEASE ORAL
Qty: 200 CAPSULE | Refills: 3 | Status: SHIPPED | OUTPATIENT
Start: 2024-05-03

## 2024-05-03 NOTE — PROGRESS NOTES
Nutrition Note    Reason for Visit: Per pt/Dad request    Dad wondering about high BGs with breakfast, particularly large bowl of cereal that causes BG >250. Most recently has been trying to do PB toast or yogurt. Also wondering about ideas for lunch as Marleni usually has microwave mac+cheese on days he is working.     Interventions/Recommendations:  1) Reviewed options for adding protein to breakfast along with smaller bowl of cereal - examples such as yogurt, cottage cheese, eggs, meats, etc. Also meal planned some ideas for lunches - encouraged to batch-cook and have meals prepped; can also freeze individual servings of meals to reheat in the future. Can also compile rice bowls, salad bowls, etc.      RD will remain available per pt/family request.     Renetta Gilmore RD, LD, CACFD  Cystic Fibrosis/Lung Transplant Dietitian  Available via [employer]

## 2024-05-03 NOTE — TELEPHONE ENCOUNTER
HUMALOG 100 UNIT/ML KWIKPEN     Last Written Prescription Date:  4/10/23  Last Fill Quantity: 30 ml ,   # refills: 1  Last Office Visit : 4/16/24 Josie  Future Office visit:  none  Routing refill request to provider for review/approval because:  Insulin and insulin pump supplies - refilled per Endocrine clinic.

## 2024-05-07 LAB
BACTERIA SPEC CULT: ABNORMAL
BACTERIA SPEC CULT: ABNORMAL

## 2024-05-08 NOTE — PROGRESS NOTES
Respiratory Therapist Note:         Vest                Brand: Hill-Rom - traditional Hill Rom: Frequencies 8, 9, 10 at pressure 10 then frequencies 18, 19, 20 at pressure 6.                Cough Pause: Cough Pause; Yes                Vest Garment Size: Adult Medium                Last Fitting Date: Due as needed                Frequency of therapy: 14 times per week                Concerns: None         Exercise (purposeful and aerobic for >20 minutes each session): No - walks dogs, but no aerobic exercise training                Does this qualify as additional airway clearance: No         Alternative Airway Clearance:          Nebulized Medications                Bronchodilators: Albuterol                Mucolytic: Pulmozyme                Antibiotics: CHER                Additional Inhaled Medications:                 Spacer Use:          Review Cleaning: Yes. Top rack of .         Education and Transition Information                Correct order of inhaled medications: Yes                Mechanism of Action of inhaled medications: Yes                Frequency of inhaled medications: Yes                Dosage of inhaled medications: Yes                Other:          Home Care:                Nebulizer Cups (Brand/Type): Zhanna and Iqbal sidestream (both given in clinic today)                Nebulizer Compressor                            Year Purchased: Works         Oxygen: N/A                                  Pulmonary Rehab: N/A         Plan of Care and Goals for next visit:  It was great seeing you in clinic today, please reach out with any airway clearance needs.

## 2024-05-26 ENCOUNTER — HEALTH MAINTENANCE LETTER (OUTPATIENT)
Age: 29
End: 2024-05-26

## 2024-06-04 ENCOUNTER — MYC MEDICAL ADVICE (OUTPATIENT)
Dept: PULMONOLOGY | Facility: CLINIC | Age: 29
End: 2024-06-04
Payer: COMMERCIAL

## 2024-07-23 ENCOUNTER — MYC MEDICAL ADVICE (OUTPATIENT)
Dept: PULMONOLOGY | Facility: CLINIC | Age: 29
End: 2024-07-23
Payer: COMMERCIAL

## 2024-07-24 NOTE — PROGRESS NOTES
[facility]  July 30, 2024         Assessment and Plan:   Marleni Alamo is a 28 year old female with cystic fibrosis, pancreatic insufficiency, CFRD, cerebral palsy and Leoncio Ogden Syndrome who is seen today in clinic for routine follow up.    1. Cystic fibrosis: doing well, denies new pulmonary complaints, other than some tightness when she is outside in humid weather. Asking about ways to exercise indoors. Sating 99% on room air; doing her nebs and vesting BID. PFTs today are at her baseline (2.5-2.6L). Previous cultures + for MRSA, MSSA, Streptococcus anginosus, Achromobacter xylosoxidans/denitrificans.  - Albuterol prescription today for use prior to going outside to walk the dogs in hot/humid weather  - Continue nebs, Dulera and vesting BID  - On jamarcus nebs every other month    2. CFTR modulation: [id number] homozygote and has been on Trikafta since 2023. She is currently on the two orange tablets only. Labs in April WNL.   - Labs with annual studies in December  - Continue reduced dose Trikafta     3. CF-related diabetes: no recent issues with BS. Follows with Endocrine.  - Continue current insulin regimen     4. Pancreatic insufficiency: denies symptoms of malabsorption. BMI > CFF goal. Using Ensure occasionally.   - Continue enzymes and vitamins     5. CF related sinus disease: no symptoms of acute sinusitis at this time.     6. Reflux: denies complaints.   - Continue omeprazole    RTC: in 4 months with annual studies  Annual studies due: December 2024  Preventative care:    Yahaira Trinidad PA-C  Pulmonary, Allergy, Critical Care and Sleep Medicine        Interval History:     Been feeling well, no cough, congestion or tightness. Humidity makes patient feel a little bit tight especially when walking the dogs. Doing nebs and vesting BID. NO acid reflux, no new belly pain. BMs are looser after sugary cereal in the am. More solid other times of the day.           Review of Systems:   Please see HPI. Otherwise, complete 10 point ROS negative.           Past Medical and Surgical History:     Past Medical History:   Diagnosis Date    Atopy     Cerebral palsy (H)     Botox injextions, managed by Dr. John Marley    CF (cystic fibrosis) (H) 07/25/2016    Sweat Test: 8/15/95 Value: 85.0 Genotype: [id number], H/O Pseudomonas and MRSA, Baseline FEV1  2.48, FVC 2.76 (7/2015)    Cholelithiasis     Chronic pansinusitis 07/25/2016    Diabetes mellitus due to cystic fibrosis (H) 07/25/2016    Diabetes mellitus related to cystic fibrosis (H) 07/25/2016    Gastroesophageal reflux disease without esophagitis 07/25/2016    Infection due to 2019 novel coronavirus 05/2022    Minimal symptoms    MRSA (methicillin resistant staph aureus) culture positive     Pancreatic insufficiency 07/25/2016    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    Seizure disorder (H)     Multiple daily in infancy now infrequent (every few years)    Thrush, oral      Past Surgical History:   Procedure Laterality Date    bilat antrostomies  04/2011    Bilater knee surgery with plates and pins Bilateral 2007    CHOLECYSTECTOMY, LAPOROSCOPIC  02/2015    Cleft palate repair and mandibular advancement  1996    gastrostomy in infancy      Multiple PET (ear tubes)      Multiple sinus surgeries  2015    OPTICAL TRACKING SYSTEM ENDOSCOPIC SINUS SURGERY Bilateral 03/01/2019    Procedure: Stealth Assisted Bilateral Maxillary Antrostomy, Ethmoidectomy, Sphenoidotomy, Frontal Sinusotomy;  Surgeon: Anny Carbajal MD;  Location: [facility] OR    RESECT TRACHEA WITH RECONSTRUCTION CHILD  1998    Rib for reconstruction    STRABISMUS SURGERY      surgery for meconium ileus, partial bowel resection  08/1995    Details not available    tracheostomy in infancy             Family History:     Family History   Problem Relation Age of Onset    Diabetes Type 1 Mother 12    Thyroid Disease Mother     Diabetes Type 1 Maternal Uncle      Diabetes Type 1 Maternal Grandmother     Thyroid Disease Maternal Grandmother     Breast Cancer Other         Great Grandmother    Thyroid Disease Paternal Uncle     Thyroid Disease Maternal Aunt             Social History:     Social History     Socioeconomic History    Marital status: Single     Spouse name: Not on file    Number of children: Not on file    Years of education: Not on file    Highest education level: Not on file   Occupational History    Not on file   Tobacco Use    Smoking status: Never     Passive exposure: Never    Smokeless tobacco: Never   Vaping Use    Vaping status: Never Used   Substance and Sexual Activity    Alcohol use: No     Alcohol/week: 0.0 standard drinks of alcohol    Drug use: No    Sexual activity: Never   Other Topics Concern    Not on file   Social History Narrative    Marleni lives at home with mother in [place] with 2 dogs and a cat. 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 Marleni goes to transition school.      Social Determinants of Health     Financial Resource Strain: Not on file   Food Insecurity: Not on file   Transportation Needs: Not on file   Physical Activity: Not on file   Stress: Not on file   Social Connections: Not on file   Interpersonal Safety: Not on file   Housing Stability: Not on file            Medications:     Current Outpatient Medications   Medication Sig Dispense Refill    ACCU-CHEK GUIDE test strip USE TO TEST BLOOD SUGAR 3 TIMES DAILY OR AS DIRECTED. CALL CLINIC TO SCHEDULE FOLLOW UP APPOINTMENT. 300 strip 3    albuterol (PROAIR HFA/PROVENTIL HFA/VENTOLIN HFA) 108 (90 Base) MCG/ACT inhaler Inhale 2 puffs into the lungs every 6 hours as needed for shortness of breath, wheezing or cough 18 g 3    albuterol (PROVENTIL) (2.5 MG/3ML) 0.083% neb solution TAKE 1 VIAL (2.5 MG) BY NEBULIZATION 2 TIMES DAILY 600 mL 11    BD PEN NEEDLE STEVIE 2ND GEN 32G X 4 MM miscellaneous USE 4 PEN NEEDLES DAILY OR AS DIRECTED. 400 each 2    Continuous Blood Gluc Sensor (DEXCOM G7  SENSOR) MISC Change every 10 days. 3 each 5    elexacaftor-tezacaftor-ivacaftor & ivacaftor (TRIKAFTA) 100-50-75 & 150 MG tablet pack Take 2 orange tablets in the morning; does not take the the blue tablet.      ferrous fumarate 65 mg, Q[place]in. FE,-Vitamin C 125 mg (VITRON-C)  MG TABS tablet Take 1 tablet by mouth twice a week 60 tablet 1    fluticasone (FLONASE) 50 MCG/ACT nasal spray Spray 1 spray into both nostrils 2 times daily      Glucagon (BAQSIMI TWO PACK) 3 MG/DOSE POWD Spray 1 spray (3 mg) in nostril as needed in the event of unconscious hypoglycemia or hypoglycemic seizure. May repeat dose if no response after 15 minutes. 1 each 1    insulin glargine (LANTUS SOLOSTAR) 100 UNIT/ML pen INJECT 14 UNITS SUBCUTANEOUSLY DAILY.      insulin lispro (HUMALOG KWIKPEN) 100 UNIT/ML (1 unit dial) KWIKPEN INJECT 10 UNITS SUBQ BEFORE BREAKFAST &1 U PER 12G OF CARBS FOR SNACKS &REMAINING MEALS OF THE DAY, ~5 UNITS @DINNER. MAX 30UNITS DAILY. 30 mL 3    lipase-protease-amylase (CREON) 02834-99637-027895 units CPEP per EC capsule TAKE 4 CAPSULES WITH MEALS AND 3 CAPSULES WITH SNACKS, FOR 3 MEALS AND 2 SNACKS PER DAY. 3600 capsule 3    mometasone-formoterol (DULERA) 100-5 MCG/ACT inhaler Inhale 2 puffs into the lungs 2 times daily 39 g 3    mvw complete formulation (CHEWABLES) tablet CHEW AND SWALLOW TWO TABLETS BY MOUTH ONCE DAILY 180 tablet 4    Nutritional Supplements (ENSURE ORIGINAL) LIQD Take 1 Can by mouth daily as needed 1 Bottle 11    omeprazole (PRILOSEC) 40 MG DR capsule TAKE 1 CAPSULE BY MOUTH TWICE A  capsule 3    PULMOZYME 2.5 MG/2.5ML neb solution NEBULIZE CONTENTS OF ONE VIAL(2.5ML) INTO THE LUNGS ONCE DAILY 75 mL 11    sodium chloride inhalant 7 % NEBU neb solution Take 4 mLs by nebulization daily When not on CHER 360 mL 11    tobramycin, PF, (CHER) 300 MG/5ML neb solution NEBULIZE CONTENTS OF ONE VIAL(5ML) INTO THE LUNGS TWO TIMES A DAY FOR 28 DAYS AND 28 DAYS  mL 3    UREA 20 INTENSIVE  HYDRATING 20 % external cream EXTERNALLY APPLY 1 DOSE TOPICALLY DAILY TO FOOT CALLUSES. 170 g 5     No current facility-administered medications for this visit.            Physical Exam:   /70   Pulse 76   Ht 1.524 m (5')   Wt 62.3 kg (137 lb 5.6 oz)   SpO2 99%   BMI 26.82 kg/m      GENERAL: alert, NAD  HEENT: NCAT, EOMI, anicteric sclera, no oral mucosal edema or erythema  Neck: no cervical or supraclavicular adenopathy  Respiratory: moderate airflow, mainly clear  CV: RRR, S1S2, no murmurs noted  Abdomen: normoactive BS, soft  Lymph: no edema, + digital clubbing  Neuro: AAO X 3, CN 2-12 grossly intact  Psychiatric: normal affect, good eye contact  Skin: no rash, jaundice or lesions on limited exam         Data:   All laboratory and imaging data reviewed.      Cystic Fibrosis Culture  Specimen Description   Date Value Ref Range Status   06/21/2021 Swab Sputum  Final   08/31/2020 Throat  Final   11/14/2019 Throat  Final    Culture Micro   Date Value Ref Range Status   06/21/2021 Heavy growth  Normal junaid    Final   06/21/2021 (A)  Final    Moderate growth  Methicillin resistant Staphylococcus aureus (MRSA)     08/31/2020 Moderate growth  Normal junaid    Final   08/31/2020 (A)  Final    Light growth  Methicillin resistant Staphylococcus aureus (MRSA)     08/31/2020 Light growth  Strain 2  Staphylococcus aureus   (A)  Final        PFT interpretation:  Maneuver: valid and meets ATS guidelines  Normal spirometry

## 2024-07-30 ENCOUNTER — OFFICE VISIT (OUTPATIENT)
Dept: PULMONOLOGY | Facility: CLINIC | Age: 29
End: 2024-07-30
Attending: PHYSICIAN ASSISTANT
Payer: COMMERCIAL

## 2024-07-30 ENCOUNTER — ALLIED HEALTH/NURSE VISIT (OUTPATIENT)
Dept: CARE COORDINATION | Facility: CLINIC | Age: 29
End: 2024-07-30

## 2024-07-30 VITALS
SYSTOLIC BLOOD PRESSURE: 108 MMHG | HEIGHT: 60 IN | BODY MASS INDEX: 26.97 KG/M2 | OXYGEN SATURATION: 99 % | WEIGHT: 137.35 LBS | HEART RATE: 76 BPM | DIASTOLIC BLOOD PRESSURE: 70 MMHG

## 2024-07-30 DIAGNOSIS — R79.9 ABNORMAL FINDING OF BLOOD CHEMISTRY, UNSPECIFIED: ICD-10-CM

## 2024-07-30 DIAGNOSIS — Z13.9 RISK AND FUNCTIONAL ASSESSMENT: Primary | ICD-10-CM

## 2024-07-30 DIAGNOSIS — R79.1 ABNORMAL COAGULATION PROFILE: ICD-10-CM

## 2024-07-30 DIAGNOSIS — K86.89 PANCREATIC INSUFFICIENCY: Primary | ICD-10-CM

## 2024-07-30 DIAGNOSIS — E84.9 CF (CYSTIC FIBROSIS) (H): ICD-10-CM

## 2024-07-30 DIAGNOSIS — E08.9 DIABETES MELLITUS DUE TO CYSTIC FIBROSIS (H): ICD-10-CM

## 2024-07-30 DIAGNOSIS — J32.4 CHRONIC PANSINUSITIS: ICD-10-CM

## 2024-07-30 DIAGNOSIS — K86.89 PANCREATIC INSUFFICIENCY: ICD-10-CM

## 2024-07-30 DIAGNOSIS — E84.9 DIABETES MELLITUS DUE TO CYSTIC FIBROSIS (H): ICD-10-CM

## 2024-07-30 DIAGNOSIS — Z79.899 ENCOUNTER FOR LONG-TERM (CURRENT) USE OF MEDICATIONS: ICD-10-CM

## 2024-07-30 DIAGNOSIS — E84.8 DIABETES MELLITUS RELATED TO CYSTIC FIBROSIS (H): ICD-10-CM

## 2024-07-30 DIAGNOSIS — E08.9 DIABETES MELLITUS RELATED TO CYSTIC FIBROSIS (H): ICD-10-CM

## 2024-07-30 DIAGNOSIS — Z79.51 LONG TERM CURRENT USE OF INHALED STEROID: ICD-10-CM

## 2024-07-30 LAB
EXPTIME-PRE: 6.33 SEC
FEF2575-%PRED-PRE: 109 %
FEF2575-PRE: 3.42 L/SEC
FEF2575-PRED: 3.14 L/SEC
FEFMAX-%PRED-PRE: 113 %
FEFMAX-PRE: 7.26 L/SEC
FEFMAX-PRED: 6.39 L/SEC
FEV1-%PRED-PRE: 98 %
FEV1-PRE: 2.57 L
FEV1FEV6-PRE: 90 %
FEV1FEV6-PRED: 86 %
FEV1FVC-PRE: 90 %
FEV1FVC-PRED: 87 %
FIFMAX-PRE: 5.15 L/SEC
FVC-%PRED-PRE: 94 %
FVC-PRE: 2.84 L
FVC-PRED: 3.02 L

## 2024-07-30 PROCEDURE — 87070 CULTURE OTHR SPECIMN AEROBIC: CPT | Performed by: PHYSICIAN ASSISTANT

## 2024-07-30 PROCEDURE — 99214 OFFICE O/P EST MOD 30 MIN: CPT | Mod: 25 | Performed by: PHYSICIAN ASSISTANT

## 2024-07-30 PROCEDURE — G0463 HOSPITAL OUTPT CLINIC VISIT: HCPCS | Performed by: PHYSICIAN ASSISTANT

## 2024-07-30 PROCEDURE — 94375 RESPIRATORY FLOW VOLUME LOOP: CPT | Performed by: PHYSICIAN ASSISTANT

## 2024-07-30 RX ORDER — ALBUTEROL SULFATE 90 UG/1
2 AEROSOL, METERED RESPIRATORY (INHALATION) EVERY 6 HOURS PRN
Qty: 18 G | Refills: 3 | Status: SHIPPED | OUTPATIENT
Start: 2024-07-30

## 2024-07-30 RX ORDER — ELEXACAFTOR, TEZACAFTOR, AND IVACAFTOR 100-50-75
KIT ORAL
Status: SHIPPED
Start: 2024-07-30 | End: 2024-08-05

## 2024-07-30 ASSESSMENT — PAIN SCALES - GENERAL: PAINLEVEL: NO PAIN (0)

## 2024-07-30 NOTE — PATIENT INSTRUCTIONS
"Cystic Fibrosis Self-Care Plan       Patient: Marleni Alamo   MRN: [id number]   Clinic Date: July 30, 2024     RECOMMENDATIONS:  1. Continue nebulizers and vest therapy.   2. Consider using your albuterol inhaler prior to going outside in hot/humid weather.     Annual Studies:   IGG   Date Value Ref Range Status   02/26/2018 1,320 695 - 1,620 mg/dL Final     Immunoglobulin G   Date Value Ref Range Status   12/15/2023 1,028 610 - 1,616 mg/dL Final     No results found for: \"INS\"  There are no preventive care reminders to display for this patient.    Pulmonary Function Tests  FEV1: amount of air you can blow out in 1 second  FVC: total amount of air you can take in and blow out    Your Goals:             Latest Ref Rng & Units 7/30/2024    11:08 AM   PFT   FVC L 2.84  P   FEV1 L 2.57  P   FVC% % 94  P   FEV1% % 98  P      P Preliminary result          Airway Clearance: The Most Important Way to Keep Your Lungs Healthy  Vest Settings:   Hill-Rom Frequencies: 8, 9, 10 Pressure 10 Then, Frequencies 18, 19, 20 Pressure 6     RespirTech: Quick Start with Pressure of     Do each frequency for 5 minutes; Deflate vest after each frequency & cough 3 times before beginning the next setting.    Vest and Neb Therapy should be done 2 times/day.    Good Nutrition Can Improve Lung Function and Overall Health    Take ALL of your vitamins with food    Take 1/2 of your enzymes before EVERY meal/snack and the other 1/2 mid-meal/snack    Wt Readings from Last 3 Encounters:   07/30/24 62.3 kg (137 lb 5.6 oz)   05/02/24 63.2 kg (139 lb 5.3 oz)   12/15/23 62.8 kg (138 lb 6.4 oz)       Body mass index is 26.82 kg/m .         National CF Foundation Recommendations for BMI in CF Adults: Women: at least 22 Men: at least 23        Controlling Blood Sugars Helps Prevent Lung Infections & Improves Nutrition  Test blood sugar:    In the morning before eating (goal is )    2 hours after a meal (goal is less than 150)    When pre-meal " glucose is greater than 150 add correction    At bedtime (if less than 100 eat a snack with 15 grams of carbohydrates  Last A1C Results:   Hemoglobin A1C   Date Value Ref Range Status   12/15/2023 6.5 (H) <5.7 % Final     Comment:     Normal <5.7%   Prediabetes 5.7-6.4%    Diabetes 6.5% or higher     Note: Adopted from ADA consensus guidelines.   08/31/2020 6.9 (H) 0 - 5.6 % Final     Comment:     Normal <5.7% Prediabetes 5.7-6.4%  Diabetes 6.5% or higher - adopted from ADA   consensus guidelines.           If diabetic, measure A1C every 6 months. Goal is under 7%.    Staying Healthy  Research: If you are interested in learning about research opportunities or have questions, please contact Stella Gatica at [phone] or [email or web address].     Foundation: Comp[employer] is a personalized resource service to help you with the insurance, financial, legal and other issues you are facing.  It's free, confidential and available to anyone with CF.  Ask your  for more information or contact [employer] directly at 594-COMPASS ([phone]) or compass@cff.org, or learn more at cff.org/compass.       CF Nurse Line: Allyson Ramos and KJ: [phone]  Nabila Sheikh or Debbie Payne RT: [phone]    Megan Gilmore , Dieticians: [phone]    Paulette Danielle, Diabetes Nurse: [phone]   Magi Sarkar: [phone] or Zehra Ware at [phone], Social Workers  [email or web address]

## 2024-07-30 NOTE — LETTER
7/30/2024      Marleni Alamo  [address] [place] [address]      Dear Colleague,    Thank you for referring your patient, Marleni Alamo, to the [facility] [place]. Please see a copy of my visit note below.    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    1. Cystic fibrosis: doing well, denies new pulmonary complaints, other than some tightness when she is outside in humid weather. Asking about ways to exercise indoors. Sating 99% on room air; doing her nebs and vesting BID. PFTs today are at her baseline (2.5-2.6L). Previous cultures + for MRSA, MSSA, Streptococcus anginosus, Achromobacter xylosoxidans/denitrificans.  - Albuterol prescription today for use prior to going outside to walk the dogs in hot/humid weather  - Continue nebs, Dulera and vesting BID  - On jamarcus nebs every other month    2. 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 She is currently on the two orange tablets only. Labs in April WNL.   - Labs with annual studies in December  - Continue reduced dose Trikafta     3. CF-related diabetes: no recent issues with BS. Follows with Endocrine.  - Continue current insulin regimen     4. Pancreatic insufficiency: denies symptoms of malabsorption. BMI > CFF goal. Using Ensure occasionally.   - Continue enzymes and vitamins     5. CF related sinus disease: no symptoms of acute sinusitis at this time.     6. Reflux: denies complaints.   - Continue omeprazole    RTC: in 4 months with annual studies  Annual studies due: December 2024  Preventative care:    Yahaira Trinidad PA-C  Pulmonary, Allergy, Critical Care and Sleep Medicine        Interval History:     Been  feeling well, no cough, congestion or tightness. Humidity makes patient feel a little bit tight especially when walking the dogs. Doing nebs and vesting BID. NO acid reflux, no new belly pain. BMs are looser after sugary cereal in the am. More solid other times of the day.          Review of Systems:   Please see HPI. Otherwise, complete 10 point ROS negative.           Past Medical and Surgical History:     Past Medical History:   Diagnosis Date     Atopy      Cerebral palsy (H)     Botox injextions, managed by Dr. John Marley     CF (cystic fibrosis) (H) 07/25/2016    Sweat Test: 8/15/95 Value: 85.0 Genotype: [id number], H/O Pseudomonas and MRSA, Baseline FEV1  2.48, FVC 2.76 (7/2015)     Cholelithiasis      Chronic pansinusitis 07/25/2016     Diabetes mellitus due to cystic fibrosis (H) 07/25/2016     Diabetes mellitus related to cystic fibrosis (H) 07/25/2016     Gastroesophageal reflux disease without esophagitis 07/25/2016     Infection due to 2019 novel coronavirus 05/2022    Minimal symptoms     MRSA (methicillin resistant staph aureus) culture positive      Pancreatic insufficiency 07/25/2016     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     Seizure disorder (H)     Multiple daily in infancy now infrequent (every few years)     Thrush, oral      Past Surgical History:   Procedure Laterality Date     bilat antrostomies  04/2011     Bilater knee surgery with plates and pins Bilateral 2007     CHOLECYSTECTOMY, LAPOROSCOPIC  02/2015     Cleft palate repair and mandibular advancement  1996     gastrostomy in infancy       Multiple PET (ear tubes)       Multiple sinus surgeries  2015     OPTICAL TRACKING SYSTEM ENDOSCOPIC SINUS SURGERY Bilateral 03/01/2019    Procedure: Stealth Assisted Bilateral Maxillary Antrostomy, Ethmoidectomy, Sphenoidotomy, Frontal Sinusotomy;  Surgeon: Anny Carbajal MD;  Location: [facility] OR     RESECT TRACHEA WITH RECONSTRUCTION CHILD  1998    Rib for reconstruction     STRABISMUS SURGERY        surgery for meconium ileus, partial bowel resection  08/1995    Details not available     tracheostomy in infancy             Family History:     Family History   Problem Relation Age of Onset     Diabetes Type 1 Mother 12     Thyroid Disease Mother      Diabetes Type 1 Maternal Uncle      Diabetes Type 1 Maternal Grandmother      Thyroid Disease Maternal Grandmother      Breast Cancer Other         Great Grandmother     Thyroid Disease Paternal Uncle      Thyroid Disease Maternal Aunt             Social History:     Social History     Socioeconomic History     Marital status: Single     Spouse name: Not on file     Number of children: Not on file     Years of education: Not on file     Highest education level: Not on file   Occupational History     Not on file   Tobacco Use     Smoking status: Never     Passive exposure: Never     Smokeless tobacco: Never   Vaping Use     Vaping status: Never Used   Substance and Sexual Activity     Alcohol use: No     Alcohol/week: 0.0 standard drinks of alcohol     Drug use: No     Sexual activity: Never   Other Topics Concern     Not on file   Social History Narrative    Marleni lives at home with mother in [place] with 2 dogs and a cat. 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 Marleni goes to transition school.      Social Determinants of Health     Financial Resource Strain: Not on file   Food Insecurity: Not on file   Transportation Needs: Not on file   Physical Activity: Not on file   Stress: Not on file   Social Connections: Not on file   Interpersonal Safety: Not on file   Housing Stability: Not on file            Medications:     Current Outpatient Medications   Medication Sig Dispense Refill     ACCU-CHEK GUIDE test strip USE TO TEST BLOOD SUGAR 3 TIMES DAILY OR AS DIRECTED. CALL CLINIC TO SCHEDULE FOLLOW UP APPOINTMENT. 300 strip 3     albuterol (PROAIR HFA/PROVENTIL HFA/VENTOLIN HFA) 108 (90 Base) MCG/ACT inhaler Inhale 2 puffs into the lungs every 6 hours as needed for  shortness of breath, wheezing or cough 18 g 3     albuterol (PROVENTIL) (2.5 MG/3ML) 0.083% neb solution TAKE 1 VIAL (2.5 MG) BY NEBULIZATION 2 TIMES DAILY 600 mL 11     BD PEN NEEDLE STEVIE 2ND GEN 32G X 4 MM miscellaneous USE 4 PEN NEEDLES DAILY OR AS DIRECTED. 400 each 2     Continuous Blood Gluc Sensor (DEXCOM G7 SENSOR) MISC Change every 10 days. 3 each 5     elexacaftor-tezacaftor-ivacaftor & ivacaftor (TRIKAFTA) 100-50-75 & 150 MG tablet pack Take 2 orange tablets in the morning; does not take the the blue tablet.       ferrous fumarate 65 mg, [place]. FE,-Vitamin C 125 mg (VITRON-C)  MG TABS tablet Take 1 tablet by mouth twice a week 60 tablet 1     fluticasone (FLONASE) 50 MCG/ACT nasal spray Spray 1 spray into both nostrils 2 times daily       Glucagon (BAQSIMI TWO PACK) 3 MG/DOSE POWD Spray 1 spray (3 mg) in nostril as needed in the event of unconscious hypoglycemia or hypoglycemic seizure. May repeat dose if no response after 15 minutes. 1 each 1     insulin glargine (LANTUS SOLOSTAR) 100 UNIT/ML pen INJECT 14 UNITS SUBCUTANEOUSLY DAILY.       insulin lispro (HUMALOG KWIKPEN) 100 UNIT/ML (1 unit dial) KWIKPEN INJECT 10 UNITS SUBQ BEFORE BREAKFAST &1 U PER 12G OF CARBS FOR SNACKS &REMAINING MEALS OF THE DAY, ~5 UNITS @DINNER. MAX 30UNITS DAILY. 30 mL 3     lipase-protease-amylase (CREON) 04300-69420-303576 units CPEP per EC capsule TAKE 4 CAPSULES WITH MEALS AND 3 CAPSULES WITH SNACKS, FOR 3 MEALS AND 2 SNACKS PER DAY. 3600 capsule 3     mometasone-formoterol (DULERA) 100-5 MCG/ACT inhaler Inhale 2 puffs into the lungs 2 times daily 39 g 3     mvw complete formulation (CHEWABLES) tablet CHEW AND SWALLOW TWO TABLETS BY MOUTH ONCE DAILY 180 tablet 4     Nutritional Supplements (ENSURE ORIGINAL) LIQD Take 1 Can by mouth daily as needed 1 Bottle 11     omeprazole (PRILOSEC) 40 MG DR capsule TAKE 1 CAPSULE BY MOUTH TWICE A  capsule 3     PULMOZYME 2.5 MG/2.5ML neb solution NEBULIZE CONTENTS OF ONE  VIAL(2.5ML) INTO THE LUNGS ONCE DAILY 75 mL 11     sodium chloride inhalant 7 % NEBU neb solution Take 4 mLs by nebulization daily When not on CHER 360 mL 11     tobramycin, PF, (CHER) 300 MG/5ML neb solution NEBULIZE CONTENTS OF ONE VIAL(5ML) INTO THE LUNGS TWO TIMES A DAY FOR 28 DAYS AND 28 DAYS  mL 3     UREA 20 INTENSIVE HYDRATING 20 % external cream EXTERNALLY APPLY 1 DOSE TOPICALLY DAILY TO FOOT CALLUSES. 170 g 5     No current facility-administered medications for this visit.            Physical Exam:   /70   Pulse 76   Ht 1.524 m (5')   Wt 62.3 kg (137 lb 5.6 oz)   SpO2 99%   BMI 26.82 kg/m      GENERAL: alert, NAD  HEENT: NCAT, EOMI, anicteric sclera, no oral mucosal edema or erythema  Neck: no cervical or supraclavicular adenopathy  Respiratory: moderate airflow, mainly clear  CV: RRR, S1S2, no murmurs noted  Abdomen: normoactive BS, soft  Lymph: no edema, + digital clubbing  Neuro: AAO X 3, CN 2-12 grossly intact  Psychiatric: normal affect, good eye contact  Skin: no rash, jaundice or lesions on limited exam         Data:   All laboratory and imaging data reviewed.      Cystic Fibrosis Culture  Specimen Description   Date Value Ref Range Status   06/21/2021 Swab Sputum  Final   08/31/2020 Throat  Final   11/14/2019 Throat  Final    Culture Micro   Date Value Ref Range Status   06/21/2021 Heavy growth  Normal junaid    Final   06/21/2021 (A)  Final    Moderate growth  Methicillin resistant Staphylococcus aureus (MRSA)     08/31/2020 Moderate growth  Normal junaid    Final   08/31/2020 (A)  Final    Light growth  Methicillin resistant Staphylococcus aureus (MRSA)     08/31/2020 Light growth  Strain 2  Staphylococcus aureus   (A)  Final        PFT interpretation:  Maneuver: valid and meets ATS guidelines  Normal spirometry            Again, thank you for allowing me to participate in the care of your patient.        Sincerely,        Yahaira Trinidad PA-C

## 2024-07-30 NOTE — PROGRESS NOTES
Adult Cystic Fibrosis Program  Annual Psychosocial Assessment    Presenting Information:  Marleni is a 28-year-old female with cystic fibrosis, presenting in CF clinic for a regular follow up with primary CF provider, Dr. Edi Leger.  Met with Marleni for annual psychosocial assessment. Her dad and legal guardian, Jah, was also present. Marleni has developmental delays due to cerebral palsy and Leoncio Brick syndrome.     Living situation:  Marleni lives with her dad, Jah, in [place], [place]. They have 3 dogs. Marleni also spends some time at her maternal grandparents on the weekends. Marleni and Jah deny any concerns about their living situation.      Family Constellation:  Marleni was raised by her mother, Anny. She had intermittent contact with her father until age 12 when he became more involved. She has no siblings. Sadly, Anny passed away a couple of year ago after some diabetic related complications and a non healing foot wound. Marleni's father, Jah, now has legal guardianship and Marleni lives with him full time. She see's her maternal and paternal grandparents frequently [place] She also travels to her paternal grandparents cabin often in the summers.     Social Support:  Anny reports good social support for Marleni. She gets along well with family members and draws additional support from friends, and blu community. Marleni is also well supported by her father and grandparents. Marleni's mom, Anny, had two good friends who have children with CF, but otherwise Marleni has had no connections in the CF Community (not discussed today).    Adjustment to Illness:  Marleni was diagnosed with CF in infancy. 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 She was hospitalized for a few months after birth with various complications. She also had a cleft palat, a g-tube, and a trach for 2 years. Anny described this time as very stressful and upsetting for her. She  "described Marleni as \"purple\" when she was born because she couldn't breathe. After Marleni's first two years of life her health became more stable and she had minimal hospitalizations. She was hospitalized a couple times as a teenager and has also undergone a few sinus surgeries. She has not been hospitalized for the past several years.     Marleni and Jah describe her current health status as \"good\". She notes her PFT's were down a little today but she is feeling well and it is not a concern today. She notes the humidity has been hard on her lungs which could be why. 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 Marleni completes 2 vest treatments per day. Marleni does not formally exercise but walks her dogs often. Marleni's mom, Anny, was previously her PCA for 40 hours a week. After she  Jah and Marleni did not feel like this support was necessary. SW neglected to assess today current status of support services in the home. Marleni is interested in doing workouts and activities so SW will send resources via [place].    Marleni is open about her CF diagnosis.         Advanced Care Planning:     Health Care Directive:  Marleni has previously received Health Care Directive education. This SW provided/reviewed education, including concept/purpose of health care directive, default health care agents and how to complete a directive. Jah is Marleni's legal guardian and NOK for decision making in he absence of a directive. They are not interested in filling one out today.     Education:  Marleni graduated from high school in . She has not had additional schooling since graduation and has no plans for education at this time.      Employment:  Since highschool Marleni has been enrolled in a couple of vocational rehab programs. She has not been enrolled in one or worked in the past couple of years. She " "has been on a wait list for the past 2 years for a vocational rehab program and hopes to find a job soon. She has been participating in a zoom group 3x a week through [employer] for socialization.    Finances:  Marleni receives income from her SSI and denies any financial concerns. She is financially supported by her father, Jah, as well.    Insurance:  Marleni is insured through a [employer] Medicare Advantage plan and medical assistance. Jah denies any insurance concerns today.    Mental Health/Coping:  Marleni reports her mood is \"good\" and that she is happy most days. She and her dad deny any current or past symptoms indicative of mood, anxiety, eating, learning or other mental health disorder. She also denies a family history of MH concerns. She does not take medication or see a therapist for mental health. COLBY did not complete the PHQ-9 or ALEXANDR-7 due to Marleni's cognitive delays. She denies any current stressors.    Marleni attends [employer] regularly and blu is an important part of her life.    Chemical Health:  Marleni denies use of alcohol, tobacco, vaping, or other drugs.    Leisure Activities/Interests:   Marleni enjoys playing games, spending time with her dad and grandparents, watching movies, going out to eat, and walking her dogs. She has been going to her grandparents cabin a lot this summer and has been enjoying going on their [employer] boat.    Intervention:  -Psychosocial Assessment  -Resource education/referral (exercise grants)  -Supportive counseling    Assessment:  Marleni was pleasant and receptive to  visit. She appeared to be open in her responses. She answered most SW questions with some assistance from her father. She reports her mood has been good and she is feeling physically well. No insurance/financial concerns. She is well supported by her father and grandparents. She hopes to start working soon and is enrolled in an online program through [employer]     Marleni" seems to be psychosocially stable overall, with access to relevant resources and supports. No concerns expressed/noted.    Plan:  Re-consult for any psychosocial needs that may arise.    Complete psychosocial assessment annually.  Continue to follow for regular clinic consult.    Zehra Ware [facility]  Adult Cystic Fibrosis   Ph: [phone]    Message me securely with Fer

## 2024-08-04 LAB — BACTERIA SPEC CULT: NORMAL

## 2024-08-05 DIAGNOSIS — E84.9 CF (CYSTIC FIBROSIS) (H): ICD-10-CM

## 2024-08-05 RX ORDER — ELEXACAFTOR, TEZACAFTOR, AND IVACAFTOR 100-50-75
KIT ORAL
Qty: 84 TABLET | Refills: 4 | Status: SHIPPED | OUTPATIENT
Start: 2024-08-05

## 2024-08-06 ENCOUNTER — PHARMACY VISIT (OUTPATIENT)
Dept: ADMINISTRATIVE | Facility: CLINIC | Age: 29
End: 2024-08-06
Payer: COMMERCIAL

## 2024-08-06 NOTE — PROGRESS NOTES
Cystic Fibrosis Clinical Follow Up Assessment   Completed on 2024/08/06 17:57:42 [facility], by Cherrie Damon        Activity Date 2024/08/06     Activity Medications    CREON    PULMOZYME    TRIKAFTA    Tobramycin        Care Details    What are the patient's goals for this therapy?   ? 5/8/2023: Per Dad, make Marleni's prescriptions easier to manage and maintain.      Did you identify any patient barriers to access and successful treatment?   ? No barriers to access identified      Is it appropriate to collect a PDC at this time? [QA Metric] (An MPR or PDC would not be appropriate for cycled medications or if the patient is on therapy   ? Yes      Document PDC   ? 1      Has the patient missed doses inappropriately?   ? No      Please select CURRENT side effect(s) for monitoring:   ? None          Summary Notes  I had the pleasure of speaking to dad via text for TM.   States all is well with Marleni. She is managing her medications very well. Just had a great check up with her CF team last week. (In response to side effects and adherence).   He did not respond further questions re: any changes in meds, health or allergies, or with any questions.   Plan: Will continue biannual TM.       Cherrie DAMON, PharmD, CSP  Therapy Management Pharmacist  [address] [address]   [email or web address]  [email or web address]   Specialty: [phone]  Mail Order: [phone]

## 2024-10-02 DIAGNOSIS — E84.9 CF (CYSTIC FIBROSIS) (H): ICD-10-CM

## 2024-10-02 RX ORDER — SODIUM CHLORIDE FOR INHALATION 7 %
4 VIAL, NEBULIZER (ML) INHALATION DAILY
Qty: 360 ML | Refills: 11 | Status: SHIPPED | OUTPATIENT
Start: 2024-10-02

## 2024-10-23 DIAGNOSIS — E84.9 CF (CYSTIC FIBROSIS) (H): ICD-10-CM

## 2024-10-23 RX ORDER — TOBRAMYCIN INHALATION SOLUTION 300 MG/5ML
INHALANT RESPIRATORY (INHALATION)
Qty: 280 ML | Refills: 5 | Status: SHIPPED | OUTPATIENT
Start: 2024-10-23

## 2024-10-23 RX ORDER — DORNASE ALFA 1 MG/ML
SOLUTION RESPIRATORY (INHALATION)
Qty: 75 ML | Refills: 11 | Status: SHIPPED | OUTPATIENT
Start: 2024-10-23

## 2024-11-12 ENCOUNTER — LAB (OUTPATIENT)
Dept: LAB | Facility: CLINIC | Age: 29
End: 2024-11-12
Payer: COMMERCIAL

## 2024-11-12 DIAGNOSIS — R79.9 ABNORMAL FINDING OF BLOOD CHEMISTRY, UNSPECIFIED: ICD-10-CM

## 2024-11-12 DIAGNOSIS — Z79.51 LONG TERM CURRENT USE OF INHALED STEROID: ICD-10-CM

## 2024-11-12 DIAGNOSIS — R79.1 ABNORMAL COAGULATION PROFILE: ICD-10-CM

## 2024-11-12 DIAGNOSIS — E84.9 CF (CYSTIC FIBROSIS) (H): ICD-10-CM

## 2024-11-12 DIAGNOSIS — Z79.899 ENCOUNTER FOR LONG-TERM (CURRENT) USE OF MEDICATIONS: ICD-10-CM

## 2024-11-12 DIAGNOSIS — K86.89 PANCREATIC INSUFFICIENCY: ICD-10-CM

## 2024-11-12 LAB
ALBUMIN SERPL BCG-MCNC: 4.4 G/DL (ref 3.5–5.2)
ALP SERPL-CCNC: 137 U/L (ref 40–150)
ALT SERPL W P-5'-P-CCNC: 37 U/L (ref 0–50)
ANION GAP SERPL CALCULATED.3IONS-SCNC: 10 MMOL/L (ref 7–15)
AST SERPL W P-5'-P-CCNC: 24 U/L (ref 0–45)
BASOPHILS # BLD AUTO: 0 10E3/UL (ref 0–0.2)
BASOPHILS NFR BLD AUTO: 1 %
BILIRUB DIRECT SERPL-MCNC: 0.33 MG/DL (ref 0–0.3)
BILIRUB SERPL-MCNC: 1.4 MG/DL
BUN SERPL-MCNC: 11.5 MG/DL (ref 6–20)
CALCIUM SERPL-MCNC: 9.9 MG/DL (ref 8.8–10.4)
CHLORIDE SERPL-SCNC: 102 MMOL/L (ref 98–107)
CHOLEST SERPL-MCNC: 138 MG/DL
CK SERPL-CCNC: 54 U/L (ref 26–192)
CREAT SERPL-MCNC: 0.57 MG/DL (ref 0.51–0.95)
EGFRCR SERPLBLD CKD-EPI 2021: >90 ML/MIN/1.73M2
EOSINOPHIL # BLD AUTO: 0.1 10E3/UL (ref 0–0.7)
EOSINOPHIL NFR BLD AUTO: 2 %
ERYTHROCYTE [DISTWIDTH] IN BLOOD BY AUTOMATED COUNT: 13.6 % (ref 10–15)
ERYTHROCYTE [SEDIMENTATION RATE] IN BLOOD BY WESTERGREN METHOD: 5 MM/HR (ref 0–20)
EST. AVERAGE GLUCOSE BLD GHB EST-MCNC: 160 MG/DL
FASTING STATUS PATIENT QL REPORTED: NO
FASTING STATUS PATIENT QL REPORTED: NO
GGT SERPL-CCNC: 57 U/L (ref 5–36)
GLUCOSE SERPL-MCNC: 194 MG/DL (ref 70–99)
HBA1C MFR BLD: 7.2 % (ref 0–5.6)
HCO3 SERPL-SCNC: 26 MMOL/L (ref 22–29)
HCT VFR BLD AUTO: 44 % (ref 35–47)
HDLC SERPL-MCNC: 56 MG/DL
HGB BLD-MCNC: 13.7 G/DL (ref 11.7–15.7)
IMM GRANULOCYTES # BLD: 0 10E3/UL
IMM GRANULOCYTES NFR BLD: 0 %
INR PPP: 1.02 (ref 0.85–1.15)
IRON SERPL-MCNC: 121 UG/DL (ref 37–145)
LDLC SERPL CALC-MCNC: 61 MG/DL
LYMPHOCYTES # BLD AUTO: 1.8 10E3/UL (ref 0.8–5.3)
LYMPHOCYTES NFR BLD AUTO: 22 %
MAGNESIUM SERPL-MCNC: 1.9 MG/DL (ref 1.7–2.3)
MCH RBC QN AUTO: 28.7 PG (ref 26.5–33)
MCHC RBC AUTO-ENTMCNC: 31.1 G/DL (ref 31.5–36.5)
MCV RBC AUTO: 92 FL (ref 78–100)
MONOCYTES # BLD AUTO: 0.5 10E3/UL (ref 0–1.3)
MONOCYTES NFR BLD AUTO: 7 %
NEUTROPHILS # BLD AUTO: 5.8 10E3/UL (ref 1.6–8.3)
NEUTROPHILS NFR BLD AUTO: 70 %
NONHDLC SERPL-MCNC: 82 MG/DL
PHOSPHATE SERPL-MCNC: 4.2 MG/DL (ref 2.5–4.5)
PLATELET # BLD AUTO: 122 10E3/UL (ref 150–450)
POTASSIUM SERPL-SCNC: 5.1 MMOL/L (ref 3.4–5.3)
PROT SERPL-MCNC: 7.2 G/DL (ref 6.4–8.3)
RBC # BLD AUTO: 4.77 10E6/UL (ref 3.8–5.2)
SODIUM SERPL-SCNC: 138 MMOL/L (ref 135–145)
TRIGL SERPL-MCNC: 104 MG/DL
TSH SERPL DL<=0.005 MIU/L-ACNC: 2.3 UIU/ML (ref 0.3–4.2)
VIT D+METAB SERPL-MCNC: 41 NG/ML (ref 20–50)
WBC # BLD AUTO: 8.3 10E3/UL (ref 4–11)

## 2024-11-12 PROCEDURE — 36415 COLL VENOUS BLD VENIPUNCTURE: CPT

## 2024-11-12 PROCEDURE — 85652 RBC SED RATE AUTOMATED: CPT

## 2024-11-12 PROCEDURE — 83540 ASSAY OF IRON: CPT

## 2024-11-12 PROCEDURE — 82977 ASSAY OF GGT: CPT

## 2024-11-12 PROCEDURE — 83036 HEMOGLOBIN GLYCOSYLATED A1C: CPT

## 2024-11-12 PROCEDURE — 85610 PROTHROMBIN TIME: CPT

## 2024-11-12 PROCEDURE — 84403 ASSAY OF TOTAL TESTOSTERONE: CPT

## 2024-11-12 PROCEDURE — 85025 COMPLETE CBC W/AUTO DIFF WBC: CPT

## 2024-11-12 PROCEDURE — 82785 ASSAY OF IGE: CPT

## 2024-11-12 PROCEDURE — 84443 ASSAY THYROID STIM HORMONE: CPT

## 2024-11-12 PROCEDURE — 80053 COMPREHEN METABOLIC PANEL: CPT

## 2024-11-12 PROCEDURE — 82784 ASSAY IGA/IGD/IGG/IGM EACH: CPT

## 2024-11-12 PROCEDURE — 82248 BILIRUBIN DIRECT: CPT

## 2024-11-12 PROCEDURE — 99000 SPECIMEN HANDLING OFFICE-LAB: CPT

## 2024-11-12 PROCEDURE — 80061 LIPID PANEL: CPT

## 2024-11-12 PROCEDURE — 84446 ASSAY OF VITAMIN E: CPT | Mod: 90

## 2024-11-12 PROCEDURE — 84590 ASSAY OF VITAMIN A: CPT | Mod: 90

## 2024-11-12 PROCEDURE — 82306 VITAMIN D 25 HYDROXY: CPT

## 2024-11-12 PROCEDURE — 84100 ASSAY OF PHOSPHORUS: CPT

## 2024-11-12 PROCEDURE — 83735 ASSAY OF MAGNESIUM: CPT

## 2024-11-12 PROCEDURE — 82550 ASSAY OF CK (CPK): CPT

## 2024-11-13 LAB
IGE SERPL-ACNC: 25 KU/L (ref 0–114)
IGG SERPL-MCNC: 1016 MG/DL (ref 610–1616)

## 2024-11-14 LAB — TESTOST SERPL-MCNC: 49 NG/DL (ref 8–60)

## 2024-11-16 LAB
A-TOCOPHEROL VIT E SERPL-MCNC: 9.2 MG/L
ANNOTATION COMMENT IMP: NORMAL
BETA+GAMMA TOCOPHEROL SERPL-MCNC: 0.2 MG/L
RETINYL PALMITATE SERPL-MCNC: 0.08 MG/L
VIT A SERPL-MCNC: 0.37 MG/L

## 2024-11-21 ENCOUNTER — ANCILLARY PROCEDURE (OUTPATIENT)
Dept: BONE DENSITY | Facility: CLINIC | Age: 29
End: 2024-11-21
Attending: PHYSICIAN ASSISTANT
Payer: COMMERCIAL

## 2024-11-21 ENCOUNTER — OFFICE VISIT (OUTPATIENT)
Dept: PULMONOLOGY | Facility: CLINIC | Age: 29
End: 2024-11-21
Attending: PHYSICIAN ASSISTANT
Payer: COMMERCIAL

## 2024-11-21 VITALS
BODY MASS INDEX: 28.09 KG/M2 | HEIGHT: 60 IN | HEART RATE: 70 BPM | OXYGEN SATURATION: 97 % | WEIGHT: 143.08 LBS | DIASTOLIC BLOOD PRESSURE: 72 MMHG | SYSTOLIC BLOOD PRESSURE: 104 MMHG

## 2024-11-21 DIAGNOSIS — Z79.51 LONG TERM CURRENT USE OF INHALED STEROID: ICD-10-CM

## 2024-11-21 DIAGNOSIS — E84.9 CF (CYSTIC FIBROSIS) (H): ICD-10-CM

## 2024-11-21 DIAGNOSIS — Z79.899 ENCOUNTER FOR LONG-TERM (CURRENT) USE OF MEDICATIONS: ICD-10-CM

## 2024-11-21 DIAGNOSIS — R79.9 ABNORMAL FINDING OF BLOOD CHEMISTRY, UNSPECIFIED: ICD-10-CM

## 2024-11-21 DIAGNOSIS — K86.89 PANCREATIC INSUFFICIENCY: ICD-10-CM

## 2024-11-21 DIAGNOSIS — Z23 NEED FOR VACCINATION: ICD-10-CM

## 2024-11-21 DIAGNOSIS — E08.9 DIABETES MELLITUS DUE TO CYSTIC FIBROSIS (H): ICD-10-CM

## 2024-11-21 DIAGNOSIS — K86.89 PANCREATIC INSUFFICIENCY: Primary | ICD-10-CM

## 2024-11-21 DIAGNOSIS — E84.9 DIABETES MELLITUS DUE TO CYSTIC FIBROSIS (H): ICD-10-CM

## 2024-11-21 DIAGNOSIS — E84.9 CF (CYSTIC FIBROSIS) (H): Primary | ICD-10-CM

## 2024-11-21 LAB
ALBUMIN UR-MCNC: 10 MG/DL
APPEARANCE UR: CLEAR
BACTERIA #/AREA URNS HPF: ABNORMAL /HPF
BILIRUB UR QL STRIP: NEGATIVE
COLOR UR AUTO: YELLOW
CREAT UR-MCNC: 168 MG/DL
EXPTIME-PRE: 3.94 SEC
FEF2575-%PRED-PRE: 114 %
FEF2575-PRE: 3.57 L/SEC
FEF2575-PRED: 3.13 L/SEC
FEFMAX-%PRED-PRE: 112 %
FEFMAX-PRE: 7.21 L/SEC
FEFMAX-PRED: 6.4 L/SEC
FEV1-%PRED-PRE: 98 %
FEV1-PRE: 2.58 L
FEV1FEV6-PRE: 90 %
FEV1FEV6-PRED: 86 %
FEV1FVC-PRE: 90 %
FEV1FVC-PRED: 87 %
FIFMAX-PRE: 4.86 L/SEC
FVC-%PRED-PRE: 94 %
FVC-PRE: 2.87 L
FVC-PRED: 3.02 L
GLUCOSE UR STRIP-MCNC: NEGATIVE MG/DL
HGB UR QL STRIP: NEGATIVE
KETONES UR STRIP-MCNC: NEGATIVE MG/DL
LEUKOCYTE ESTERASE UR QL STRIP: ABNORMAL
MICROALBUMIN UR-MCNC: <12 MG/L
MICROALBUMIN/CREAT UR: NORMAL MG/G{CREAT}
MUCOUS THREADS #/AREA URNS LPF: PRESENT /LPF
NITRATE UR QL: NEGATIVE
PH UR STRIP: 6.5 [PH] (ref 5–7)
RBC URINE: 3 /HPF
SP GR UR STRIP: 1.03 (ref 1–1.03)
SQUAMOUS EPITHELIAL: 2 /HPF
UROBILINOGEN UR STRIP-MCNC: NORMAL MG/DL
WBC URINE: 16 /HPF
YEAST #/AREA URNS HPF: ABNORMAL /HPF

## 2024-11-21 PROCEDURE — 87070 CULTURE OTHR SPECIMN AEROBIC: CPT | Performed by: INTERNAL MEDICINE

## 2024-11-21 PROCEDURE — 90656 IIV3 VACC NO PRSV 0.5 ML IM: CPT | Performed by: INTERNAL MEDICINE

## 2024-11-21 PROCEDURE — G0008 ADMIN INFLUENZA VIRUS VAC: HCPCS | Performed by: INTERNAL MEDICINE

## 2024-11-21 PROCEDURE — 81001 URINALYSIS AUTO W/SCOPE: CPT | Performed by: INTERNAL MEDICINE

## 2024-11-21 PROCEDURE — 77080 DXA BONE DENSITY AXIAL: CPT | Performed by: INTERNAL MEDICINE

## 2024-11-21 PROCEDURE — 250N000011 HC RX IP 250 OP 636: Performed by: INTERNAL MEDICINE

## 2024-11-21 RX ORDER — PEDIATRIC MULTIVIT 61/D3/VIT K 1500-800
2 CAPSULE ORAL DAILY
Qty: 180 TABLET | Refills: 4 | Status: SHIPPED | OUTPATIENT
Start: 2024-11-21

## 2024-11-21 RX ORDER — INSULIN LISPRO 100 [IU]/ML
INJECTION, SOLUTION INTRAVENOUS; SUBCUTANEOUS
Status: SHIPPED
Start: 2024-11-21

## 2024-11-21 RX ORDER — BACILLUS COAGULANS 1B CELL
1 CAPSULE ORAL
Qty: 60 TABLET | Refills: 1 | Status: SHIPPED | OUTPATIENT
Start: 2024-11-21

## 2024-11-21 RX ADMIN — INFLUENZA A VIRUS A/VICTORIA/4897/2022 IVR-238 (H1N1) ANTIGEN (FORMALDEHYDE INACTIVATED), INFLUENZA A VIRUS A/CALIFORNIA/122/2022 SAN-022 (H3N2) ANTIGEN (FORMALDEHYDE INACTIVATED), AND INFLUENZA B VIRUS B/MICHIGAN/01/2021 ANTIGEN (FORMALDEHYDE INACTIVATED) 0.5 ML: 15; 15; 15 INJECTION, SUSPENSION INTRAMUSCULAR at 14:03

## 2024-11-21 ASSESSMENT — PAIN SCALES - GENERAL: PAINLEVEL_OUTOF10: NO PAIN (0)

## 2024-11-21 NOTE — Clinical Note
11/21/2024      Marleni Alamo  [address] [place] [address]      Dear Colleague,    Thank you for referring your patient, Marleni Alamo, to the [facility] [place]. Please see a copy of my visit note below.    No notes on file    Again, thank you for allowing me to participate in the care of your patient.        Sincerely,        Edi Leger MD

## 2024-11-21 NOTE — NURSING NOTE
Chief Complaint   Patient presents with    Follow Up     Return CF        Vitals were taken, medications reconciled.    Josefina Rodriguez, Clinic Assistant   1:54 PM

## 2024-11-21 NOTE — PATIENT INSTRUCTIONS
Cystic Fibrosis Self-Care Plan    RECOMMENDATIONS:       Continue current medication, nebs and vest therapy.         Cystic Fibrosis :    Ruth Bass   [phone]  [facility] Nurse line:  AllysonTea benson  [phone]     [facility] [place] Fax Number:      [phone]         Cystic Fibrosis Respiratory Therapists:   Nabila Sheikh                          [phone]          Debbie Payne   [phone]  Cystic Fibrosis Dietitians:              Renetta Gilmore              [phone]                            Megan Goddard                        [phone]   Cystic Fibrosis Diabetes Nurse:    Paulette Danielle               [phone]    Cystic Fibrosis Social Workers:     Magi Sarkar              [phone]                     Zehra Ware               [phone]  Cystic Fibrosis Pharmacists:           Mónica Chapman                              [phone]         Nora Singh      [phone]   Cystic Fibrosis Genetic Counselor:   Cami Rutledge    [phone]    [facility] website:  [email or web address]    We have recently learned about an albuterol neb solution shortage due to a manufacturing delay. There is still a small supply coming in but not enough to meet the current demand. We do not yet have an estimate for when this will become widely available again.    We our asking our CF community to do the following:    Please take time to check your supply of albuterol neb solution at home. We recommend keeping at least a 2-week supply of albuterol nebs at home in case of illness.    2.  If you have an albuterol inhaler at home, you can use 4 puffs of the inhaler with a spacer in place of the nebulized albuterol at the start of your treatments. It is important to use a spacer for the best technique. If you do not have a spacer at home or have questions on technique, we will be happy to review and send one to your home  "address. Please also take a moment to check that your albuterol HFA inhaler is available and not .  inhalers may be less effective as the medication loses its potency or power. In some instances your team may suggest another alternative instead of an albuterol inhaler.    3.  Please take care in requesting refills. Albuterol neb solution is a life-saving medication for patients having severe asthma attacks and other emergency respiratory conditions. Let s work together to make sure that albuterol neb solution is available to those who need it urgently.    Reach out to your care team with questions and to confirm your planned alternative for albuterol nebs. MyChart will be the fastest way to connect. If possible, please reserve the nursing line for more urgent concerns as we are short on nursing staff.    Here are some reputable sites with more information:    https://www.cidrap.[facility]/resilient-drug-supply/us-liquid-albuterol-xhbgpanr-wfqpdvvc-kxytcd-after-major-supplier-shuts-down    https://www.[employer]//health/albuterol-shortage    https://www.ashp.org/drug-shortages/current-shortages/drug-shortage-detail.aspx?aq=831&loginreturnUrl=SSOCheckOnly       MRN: [id number]   Clinic Date: 2024   Patient: Marleni Alamo     Annual Studies:   IGG   Date Value Ref Range Status   2018 1,320 695 - 1,620 mg/dL Final     Immunoglobulin G   Date Value Ref Range Status   2024 1,016 610 - 1,616 mg/dL Final     No results found for: \"INS\"  There are no preventive care reminders to display for this patient.    Pulmonary Function Tests  FEV1: amount of air you can blow out in 1 second  FVC: total amount of air you can take in and blow out    Your Goals:             Latest Ref Rng & Units 2024    12:46 PM   PFT   FVC L 2.87  P   FEV1 L 2.58  P   FVC% % 94  P   FEV1% % 98  P      P Preliminary result          Airway Clearance: The Most Important Way to Keep Your Lungs " Healthy  Vest Settings:   Hill-Rom Frequencies: 8, 9, 10 Pressure 10 Then, Frequencies 18, 19, 20 Pressure 6     RespirTech: Quick Start with Pressure of     Do each frequency for 5 minutes; Deflate vest after each frequency & cough 3 times before beginning the next setting.    Vest and Neb Therapy should be done 2 times/day.    Good Nutrition Can Improve Lung Function and Overall Health    Take ALL of your vitamins with food    Take 1/2 of your enzymes before EVERY meal/snack and the other 1/2 mid-meal/snack    Wt Readings from Last 3 Encounters:   11/21/24 64.9 kg (143 lb 1.3 oz)   07/30/24 62.3 kg (137 lb 5.6 oz)   05/02/24 63.2 kg (139 lb 5.3 oz)       Body mass index is 27.94 kg/m .         National CF Foundation Recommendations for BMI in CF Adults: Women: at least 22 Men: at least 23        Controlling Blood Sugars Helps Prevent Lung Infections & Improves Nutrition  Test blood sugar:    In the morning before eating (goal is )    2 hours after a meal (goal is less than 150)    When pre-meal glucose is greater than 150 add correction    At bedtime (if less than 100 eat a snack with 15 grams of carbohydrates  Last A1C Results:   Hemoglobin A1C   Date Value Ref Range Status   11/12/2024 7.2 (H) 0.0 - 5.6 % Final     Comment:     Normal <5.7%   Prediabetes 5.7-6.4%    Diabetes 6.5% or higher     Note: Adopted from ADA consensus guidelines.   08/31/2020 6.9 (H) 0 - 5.6 % Final     Comment:     Normal <5.7% Prediabetes 5.7-6.4%  Diabetes 6.5% or higher - adopted from ADA   consensus guidelines.           If diabetic, measure A1C every 6 months. Goal: Under 7%    Staying Healthy  Research:  If you are interested in learning about research opportunities or have questions, please contact the CF Research Team at [phone] or [email or web address].    CF Foundation:  Compass is a personalized resource service to help you with the insurance, financial, legal and other issues you are facing.  It's free,  confidential and available to anyone with CF.  Ask your  for more information or contact Compass directly at [phone]-[phone] ([phone]) or compass@cff.org, or learn more at cff.org/compass.

## 2024-11-22 DIAGNOSIS — E84.8 DIABETES MELLITUS RELATED TO CYSTIC FIBROSIS (H): ICD-10-CM

## 2024-11-22 DIAGNOSIS — E08.9 DIABETES MELLITUS RELATED TO CYSTIC FIBROSIS (H): ICD-10-CM

## 2024-11-25 ENCOUNTER — CLINICAL UPDATE (OUTPATIENT)
Dept: PHARMACY | Facility: CLINIC | Age: 29
End: 2024-11-25
Payer: COMMERCIAL

## 2024-11-25 DIAGNOSIS — E84.9 CF (CYSTIC FIBROSIS) (H): Primary | ICD-10-CM

## 2024-11-25 PROCEDURE — 99207 PR NO CHARGE LOS: CPT | Performed by: PHARMACIST

## 2024-11-25 NOTE — PROGRESS NOTES
Clinical Update:                                                    A chart review was conducted for Marleni Alamo.    Reason for Chart Review: Trikafta Lab Monitoring     Discussion: Marleni has been on Trikafta since 2/16/23. Patient had recent history of LFT and CK elevations which requires closer monitoring and a dose reduction to 2 orange tablets daily. Per chart review, patient continues modified dose Trikafta 2 orange daily.      Labs were reviewed from  11/12/24  at [facility] . Bilirubin direct is 1.1x ULN and all other modulator labs are within normal limits, which is increased since previous lab draw. Majority of bilirubin elevation is indirect bilirubin which can be caused by [id number]/3 inhibition by elexacaftor and does not require intervention.       Lab Results   Component Value Date    ALT 37 11/12/2024    AST 24 11/12/2024    BILITOTAL 1.4 (H) 11/12/2024    DBIL 0.33 (H) 11/12/2024    CKT 54 11/12/2024     Plan:  1. Continue Trikafta (2 orange daily)   2. Recheck hepatic panel and CK in 3 months per Dr. Arsen Singh, PharmD  Cystic Fibrosis MTM Pharmacist  [facility]  Voicemail: [phone]

## 2024-11-26 LAB — BACTERIA SPEC CULT: NORMAL

## 2024-11-26 RX ORDER — ACYCLOVIR 400 MG/1
TABLET ORAL
Qty: 9 EACH | Refills: 1 | Status: SHIPPED | OUTPATIENT
Start: 2024-11-26

## 2024-11-26 NOTE — TELEPHONE ENCOUNTER
Last Clinic Visit: 4/16/2024 [facility] [place]    Continuous Glucose Sensor: passed protocol

## 2024-11-27 DIAGNOSIS — E84.8 DIABETES MELLITUS RELATED TO CYSTIC FIBROSIS (H): ICD-10-CM

## 2024-11-27 DIAGNOSIS — E08.9 DIABETES MELLITUS RELATED TO CYSTIC FIBROSIS (H): ICD-10-CM

## 2024-11-27 PROBLEM — M24.573 ANKLE CONTRACTURE, UNSPECIFIED LATERALITY: Status: RESOLVED | Noted: 2023-11-05 | Resolved: 2024-11-27

## 2024-11-27 PROBLEM — R29.898 ANKLE WEAKNESS: Status: RESOLVED | Noted: 2023-11-05 | Resolved: 2024-11-27

## 2024-11-27 RX ORDER — ACYCLOVIR 400 MG/1
TABLET ORAL
Qty: 3 EACH | Refills: 5 | OUTPATIENT
Start: 2024-11-27

## 2024-12-03 DIAGNOSIS — E84.9 CF (CYSTIC FIBROSIS) (H): ICD-10-CM

## 2024-12-03 LAB
EXPTIME-PRE: 3.94 SEC
FEF2575-%PRED-PRE: 114 %
FEF2575-PRE: 3.57 L/SEC
FEF2575-PRED: 3.13 L/SEC
FEFMAX-%PRED-PRE: 112 %
FEFMAX-PRE: 7.21 L/SEC
FEFMAX-PRED: 6.4 L/SEC
FEV1-%PRED-PRE: 98 %
FEV1-PRE: 2.58 L
FEV1FEV6-PRE: 90 %
FEV1FEV6-PRED: 86 %
FEV1FVC-PRE: 90 %
FEV1FVC-PRED: 87 %
FIFMAX-PRE: 4.86 L/SEC
FVC-%PRED-PRE: 94 %
FVC-PRE: 2.87 L
FVC-PRED: 3.02 L

## 2024-12-03 RX ORDER — ELEXACAFTOR, TEZACAFTOR, AND IVACAFTOR 100-50-75
KIT ORAL
Qty: 84 TABLET | Refills: 4 | Status: SHIPPED | OUTPATIENT
Start: 2024-12-03

## 2024-12-04 ENCOUNTER — TELEPHONE (OUTPATIENT)
Dept: PULMONOLOGY | Facility: CLINIC | Age: 29
End: 2024-12-04
Payer: COMMERCIAL

## 2024-12-04 NOTE — TELEPHONE ENCOUNTER
Prior Authorization Approval    Medication: TRIKAFTA 100-50-75 & 150 MG PO TBPK  Authorization Effective Date: 12/4/2024  Authorization Expiration Date: 12/31/2025  Approved Dose/Quantity: 84 tabs per 28 days  Reference #: [id number]   Insurance Company: [employer] Part D - Phone [phone] Fax [phone]  Expected CoPay: $    CoPay Card Available: No    Financial Assistance Needed: N/A  Which Pharmacy is filling the prescription: [facility] - [place], [place] - [facility] [address]   Pharmacy Notified: Not needed  Patient Notified: Not needed

## 2024-12-04 NOTE — TELEPHONE ENCOUNTER
PA Initiation    Medication: TRIKAFTA 100-50-75 & 150 MG PO TBPK  Insurance Company: [employer] Part D - Phone [phone] Fax [phone]  Pharmacy Filling the Rx: [facility] - [place], [place] - [address]  Filling Pharmacy Phone:    Filling Pharmacy Fax:    Start Date: 12/4/2024    BGSUSAN

## 2025-02-18 NOTE — PROGRESS NOTES
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     1. Cystic fibrosis: doing well, denies new pulmonary complaints. No recent illnesses. Sating 97% on room air; doing her nebs and vesting BID. PFTs today are at her baseline (2.5-2.6L). Previous cultures + for MRSA, MSSA, Streptococcus anginosus, Achromobacter xylosoxidans/denitrificans.  - Continue nebs, Dulera and vesting BID; albuterol PRN tightness  - On jamarcus nebs every other month, currently off    2. 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 She is currently on the two orange tablets only. Total bilirubin elevated, but stable. Does have a slight increase in direct bilrubin.  - Recheck labs in 6-8 weeks  - Continue reduced dose Trikafta     3. CF-related diabetes: no recent issues with BS. Follows with Endocrine.  - Continue current insulin regimen     4. Pancreatic insufficiency: notes some increased gas since her last visit, no specific meal (does eat a lot of chips), denies missing enzymes or  enzymes. Does not think she is having malabsorption. BMI > CFF goal.  - Renetta to see today recommending trial of increased enzymes  - Continue enzymes and vitamins     5. CF related sinus disease: no symptoms of acute sinusitis at this time.     6. Reflux: denies complaints.   - Continue omeprazole    RTC: labs in 6-8 weeks; clinic follow up in 4 months  Annual studies due: 2025 (DEXA > 2026)  Preventative care: UTD with vaccines    Yahaira Trinidad PA-C  Pulmonary, Allergy, Critical Care and Sleep Medicine        Interval History:     Patient is doing well, does a lot of Play Station with Minecraft and Echeverria. No recent illnesses, up to date with vaccines. No  tightness, congestion or cough. No shortness of breath. Nebs and vesting BID. No new GI issues, does get gassy after meals, happening anytime and more frequent. BMs are okay, has about 2 per day and not fatty or floating. Doesn't miss enzymes and unlikely they have .          Review of Systems:   Please see HPI. Otherwise, complete 10 point ROS negative.           Past Medical and Surgical History:     Past Medical History:   Diagnosis Date    Atopy     Cerebral palsy (H)     Botox injextions, managed by Dr. John Marley    CF (cystic fibrosis) (H) 2016    Sweat Test: 8/15/95 Value: 85.0 Genotype: [id number], H/O Pseudomonas and MRSA, Baseline FEV1  2.48, FVC 2.76 (2015)    Cholelithiasis     Chronic pansinusitis 2016    Diabetes mellitus due to cystic fibrosis (H) 2016    Diabetes mellitus related to cystic fibrosis (H) 2016    Gastroesophageal reflux disease without esophagitis 2016    Infection due to  novel coronavirus 2022    Minimal symptoms    MRSA (methicillin resistant staph aureus) culture positive     Pancreatic insufficiency 2016    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 2016    Seizure disorder (H)     Multiple daily in infancy now infrequent (every few years)    Thrush, oral      Past Surgical History:   Procedure Laterality Date    bilat antrostomies  2011    Bilater knee surgery with plates and pins Bilateral     CHOLECYSTECTOMY, LAPOROSCOPIC  2015    Cleft palate repair and mandibular advancement      gastrostomy in infancy      Multiple PET (ear tubes)      Multiple sinus surgeries      OPTICAL TRACKING SYSTEM ENDOSCOPIC SINUS SURGERY Bilateral 2019    Procedure: Stealth Assisted Bilateral Maxillary Antrostomy, Ethmoidectomy, Sphenoidotomy, Frontal Sinusotomy;  Surgeon: Anny Carbajal MD;  Location: [facility] OR    RESECT TRACHEA WITH RECONSTRUCTION CHILD      Rib for reconstruction    STRABISMUS SURGERY      surgery for  meconium ileus, partial bowel resection  08/1995    Details not available    tracheostomy in infancy             Family History:     Family History   Problem Relation Age of Onset    Diabetes Type 1 Mother 12    Thyroid Disease Mother     Diabetes Type 1 Maternal Uncle     Diabetes Type 1 Maternal Grandmother     Thyroid Disease Maternal Grandmother     Breast Cancer Other         Great Grandmother    Thyroid Disease Paternal Uncle     Thyroid Disease Maternal Aunt             Social History:     Social History     Socioeconomic History    Marital status: Single     Spouse name: Not on file    Number of children: Not on file    Years of education: Not on file    Highest education level: Not on file   Occupational History    Not on file   Tobacco Use    Smoking status: Never     Passive exposure: Never    Smokeless tobacco: Never   Vaping Use    Vaping status: Never Used   Substance and Sexual Activity    Alcohol use: No     Alcohol/week: 0.0 standard drinks of alcohol    Drug use: No    Sexual activity: Never   Other Topics Concern    Not on file   Social History Narrative    Marleni lives at home with mother in [place] with 2 dogs and a cat. 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 Marleni goes to transition school.      Social Drivers of Health     Financial Resource Strain: Not on file   Food Insecurity: Not on file   Transportation Needs: Not on file   Physical Activity: Not on file   Stress: Not on file   Social Connections: Not on file   Interpersonal Safety: Not on file   Housing Stability: Not on file            Medications:     Current Outpatient Medications   Medication Sig Dispense Refill    ACCU-CHEK GUIDE test strip USE TO TEST BLOOD SUGAR 3 TIMES DAILY OR AS DIRECTED. CALL CLINIC TO SCHEDULE FOLLOW UP APPOINTMENT. 300 strip 3    albuterol (PROAIR HFA/PROVENTIL HFA/VENTOLIN HFA) 108 (90 Base) MCG/ACT inhaler Inhale 2 puffs into the lungs every 6 hours as needed for shortness of breath, wheezing or cough 18 g 3     albuterol (PROVENTIL) (2.5 MG/3ML) 0.083% neb solution TAKE 1 VIAL (2.5 MG) BY NEBULIZATION 2 TIMES DAILY 600 mL 11    BD PEN NEEDLE STEVIE 2ND GEN 32G X 4 MM miscellaneous USE 4 PEN NEEDLES DAILY OR AS DIRECTED. 400 each 2    Continuous Glucose Sensor (DEXCOM G7 SENSOR) MISC Change sensor every 10 days. 9 each 1    Elemental iron 65 mg Vitamin C 125 mg (VITRON-C)  MG TABS tablet Take 1 tablet by mouth twice a week. 60 tablet 1    elexacaftor-tezacaftor-ivacaftor & ivacaftor (TRIKAFTA) 100-50-75 & 150 MG tablet pack TAKE TWO ORANGE TABLETS BY MOUTH IN THE MORNING.  TAKE WITH FAT CONTAINING FOOD. (SKIP BLUE TABLET) 84 tablet 4    fluticasone (FLONASE) 50 MCG/ACT nasal spray Spray 1 spray into both nostrils 2 times daily      Glucagon (BAQSIMI TWO PACK) 3 MG/DOSE POWD Spray 1 spray (3 mg) in nostril as needed in the event of unconscious hypoglycemia or hypoglycemic seizure. May repeat dose if no response after 15 minutes. 1 each 1    insulin glargine (LANTUS SOLOSTAR) 100 UNIT/ML pen INJECT 14 UNITS SUBCUTANEOUSLY DAILY.      insulin lispro (HUMALOG KWIKPEN) 100 UNIT/ML (1 unit dial) KWIKPEN INJECT 8 UNITS SUBQ BEFORE BREAKFAST &1 U PER 12G OF CARBS FOR SNACKS &REMAINING MEALS OF THE DAY, ~5 UNITS @DINNER. MAX 30UNITS DAILY.      lipase-protease-amylase (CREON) 77440-59870-049320 units CPEP per EC capsule TAKE 4 CAPSULES WITH MEALS AND 3 CAPSULES WITH SNACKS, FOR 3 MEALS AND 2 SNACKS PER DAY. 3600 capsule 3    mometasone-formoterol (DULERA) 100-5 MCG/ACT inhaler Inhale 2 puffs into the lungs 2 times daily 39 g 3    mvw complete formulation (CHEWABLES) tablet Take 2 tablets by mouth daily. 180 tablet 4    omeprazole (PRILOSEC) 40 MG DR capsule TAKE 1 CAPSULE BY MOUTH TWICE A  capsule 3    PULMOZYME 2.5 MG/2.5ML neb solution NEBULIZE AND INHALE THE CONTENTS OF ONE VIAL(2.5ML) INTO THE LUNGS ONCE DAILY 75 mL 11    sodium chloride inhalant 7 % NEBU neb solution TAKE 4 MLS BY NEBULIZATION DAILY WHEN NOT ON  CHER 360 mL 11    tobramycin, PF, (CHER) 300 MG/5ML neb solution NEBULIZE CONTENTS OF ONE VIALINTO THE LUNGS TWO TIMES A DAY FOR 28 DAYS AND 28 DAYS  mL 5    UREA 20 INTENSIVE HYDRATING 20 % external cream EXTERNALLY APPLY 1 DOSE TOPICALLY DAILY TO FOOT CALLUSES. 170 g 5     No current facility-administered medications for this visit.            Physical Exam:   /73   Pulse 71   Ht 1.524 m (5')   SpO2 97%   BMI 27.94 kg/m      GENERAL: alert, NAD  HEENT: NCAT, EOMI, anicteric sclera, no oral mucosal edema or erythema  Neck: no cervical or supraclavicular adenopathy  Respiratory: moderate airflow, mainly clear  CV: RRR, S1S2, no murmurs noted  Abdomen: normoactive BS, soft  Lymph: no edema, + digital clubbing  Neuro: AAO X 3, CN 2-12 grossly intact  Psychiatric: normal affect, good eye contact  Skin: no rash, jaundice or lesions on limited exam         Data:   All laboratory and imaging data reviewed.      Cystic Fibrosis Culture  Specimen Description   Date Value Ref Range Status   06/21/2021 Swab Sputum  Final   08/31/2020 Throat  Final   11/14/2019 Throat  Final    Culture Micro   Date Value Ref Range Status   06/21/2021 Heavy growth  Normal junaid    Final   06/21/2021 (A)  Final    Moderate growth  Methicillin resistant Staphylococcus aureus (MRSA)     08/31/2020 Moderate growth  Normal ujnaid    Final   08/31/2020 (A)  Final    Light growth  Methicillin resistant Staphylococcus aureus (MRSA)     08/31/2020 Light growth  Strain 2  Staphylococcus aureus   (A)  Final        PFT interpretation:  Maneuver: valid and meets ATS guidelines  Normal spirometry

## 2025-02-20 ENCOUNTER — CLINICAL UPDATE (OUTPATIENT)
Dept: PHARMACY | Facility: CLINIC | Age: 30
End: 2025-02-20

## 2025-02-20 ENCOUNTER — OFFICE VISIT (OUTPATIENT)
Dept: PULMONOLOGY | Facility: CLINIC | Age: 30
End: 2025-02-20
Attending: INTERNAL MEDICINE
Payer: COMMERCIAL

## 2025-02-20 ENCOUNTER — LAB (OUTPATIENT)
Dept: LAB | Facility: CLINIC | Age: 30
End: 2025-02-20
Payer: COMMERCIAL

## 2025-02-20 VITALS
OXYGEN SATURATION: 97 % | HEART RATE: 71 BPM | SYSTOLIC BLOOD PRESSURE: 112 MMHG | HEIGHT: 60 IN | DIASTOLIC BLOOD PRESSURE: 73 MMHG | BODY MASS INDEX: 27.94 KG/M2

## 2025-02-20 DIAGNOSIS — E08.9 DIABETES MELLITUS DUE TO CYSTIC FIBROSIS (H): ICD-10-CM

## 2025-02-20 DIAGNOSIS — E84.9 CF (CYSTIC FIBROSIS) (H): ICD-10-CM

## 2025-02-20 DIAGNOSIS — Z79.51 LONG TERM CURRENT USE OF INHALED STEROID: ICD-10-CM

## 2025-02-20 DIAGNOSIS — E84.9 CF (CYSTIC FIBROSIS) (H): Primary | ICD-10-CM

## 2025-02-20 DIAGNOSIS — Z79.899 ENCOUNTER FOR LONG-TERM (CURRENT) USE OF MEDICATIONS: ICD-10-CM

## 2025-02-20 DIAGNOSIS — K86.89 PANCREATIC INSUFFICIENCY: ICD-10-CM

## 2025-02-20 DIAGNOSIS — E84.9 DIABETES MELLITUS DUE TO CYSTIC FIBROSIS (H): ICD-10-CM

## 2025-02-20 LAB
ALBUMIN SERPL BCG-MCNC: 4.6 G/DL (ref 3.5–5.2)
ALP SERPL-CCNC: 145 U/L (ref 40–150)
ALT SERPL W P-5'-P-CCNC: 37 U/L (ref 0–50)
AST SERPL W P-5'-P-CCNC: 22 U/L (ref 0–45)
BILIRUB DIRECT SERPL-MCNC: 0.52 MG/DL (ref 0–0.3)
BILIRUB SERPL-MCNC: 1.4 MG/DL
CK SERPL-CCNC: 49 U/L (ref 26–192)
EXPTIME-PRE: 3.2 SEC
FEF2575-%PRED-PRE: 117 %
FEF2575-PRE: 3.67 L/SEC
FEF2575-PRED: 3.12 L/SEC
FEFMAX-%PRED-PRE: 105 %
FEFMAX-PRE: 6.77 L/SEC
FEFMAX-PRED: 6.4 L/SEC
FEV1-%PRED-PRE: 99 %
FEV1-PRE: 2.6 L
FEV1FEV6-PRE: 92 %
FEV1FEV6-PRED: 85 %
FEV1FVC-PRE: 92 %
FEV1FVC-PRED: 87 %
FIFMAX-PRE: 5.11 L/SEC
FVC-%PRED-PRE: 93 %
FVC-PRE: 2.83 L
FVC-PRED: 3.02 L
PROT SERPL-MCNC: 7.6 G/DL (ref 6.4–8.3)

## 2025-02-20 PROCEDURE — G0463 HOSPITAL OUTPT CLINIC VISIT: HCPCS | Performed by: PHYSICIAN ASSISTANT

## 2025-02-20 PROCEDURE — 36415 COLL VENOUS BLD VENIPUNCTURE: CPT | Performed by: PATHOLOGY

## 2025-02-20 PROCEDURE — 80076 HEPATIC FUNCTION PANEL: CPT | Performed by: PATHOLOGY

## 2025-02-20 PROCEDURE — 82550 ASSAY OF CK (CPK): CPT | Performed by: PATHOLOGY

## 2025-02-20 PROCEDURE — 87070 CULTURE OTHR SPECIMN AEROBIC: CPT | Performed by: PHYSICIAN ASSISTANT

## 2025-02-20 ASSESSMENT — PAIN SCALES - GENERAL: PAINLEVEL_OUTOF10: NO PAIN (0)

## 2025-02-20 NOTE — PATIENT INSTRUCTIONS
"Cystic Fibrosis Self-Care Plan       Patient: Marleni Alamo   MRN: [id number]   Clinic Date: February 20, 2025     RECOMMENDATIONS:  1. Continue nebulizers and vest therapy.   2. Labs in 6-8 weeks.     Annual Studies:   IGG   Date Value Ref Range Status   02/26/2018 1,320 695 - 1,620 mg/dL Final     Immunoglobulin G   Date Value Ref Range Status   11/12/2024 1,016 610 - 1,616 mg/dL Final     No results found for: \"INS\"  There are no preventive care reminders to display for this patient.    Pulmonary Function Tests  FEV1: amount of air you can blow out in 1 second  FVC: total amount of air you can take in and blow out    Your Goals:             Latest Ref Rng & Units 11/21/2024    12:46 PM   PFT   FVC L 2.87    FEV1 L 2.58    FVC% % 94    FEV1% % 98           Airway Clearance: The Most Important Way to Keep Your Lungs Healthy  Vest Settings:   Hill-Rom Frequencies: 8, 9, 10 Pressure 10 Then, Frequencies 18, 19, 20 Pressure 6     RespirTech: Quick Start with Pressure of     Do each frequency for 5 minutes; Deflate vest after each frequency & cough 3 times before beginning the next setting.    Vest and Neb Therapy should be done 2 times/day.    Good Nutrition Can Improve Lung Function and Overall Health    Take ALL of your vitamins with food    Take 1/2 of your enzymes before EVERY meal/snack and the other 1/2 mid-meal/snack    Wt Readings from Last 3 Encounters:   11/21/24 64.9 kg (143 lb 1.3 oz)   07/30/24 62.3 kg (137 lb 5.6 oz)   05/02/24 63.2 kg (139 lb 5.3 oz)       There is no height or weight on file to calculate BMI.         National CF Foundation Recommendations for BMI in CF Adults: Women: at least 22 Men: at least 23        Controlling Blood Sugars Helps Prevent Lung Infections & Improves Nutrition  Test blood sugar:    In the morning before eating (goal is )    2 hours after a meal (goal is less than 150)    When pre-meal glucose is greater than 150 add correction    At bedtime (if less than " 100 eat a snack with 15 grams of carbohydrates  Last A1C Results:   Hemoglobin A1C   Date Value Ref Range Status   11/12/2024 7.2 (H) 0.0 - 5.6 % Final     Comment:     Normal <5.7%   Prediabetes 5.7-6.4%    Diabetes 6.5% or higher     Note: Adopted from ADA consensus guidelines.   08/31/2020 6.9 (H) 0 - 5.6 % Final     Comment:     Normal <5.7% Prediabetes 5.7-6.4%  Diabetes 6.5% or higher - adopted from ADA   consensus guidelines.           If diabetic, measure A1C every 6 months. Goal is under 7%.    Staying Healthy  Research: If you are interested in learning about research opportunities or have questions, please contact Stella Gatica at [phone] or [email or web address].     Foundation: Compass is a personalized resource service to help you with the insurance, financial, legal and other issues you are facing.  It's free, confidential and available to anyone with CF.  Ask your  for more information or contact Compass directly at 873-[facility] ([phone]) or compass@[email or web address], or learn more at cff.org/compass.       CF Nurse Line: Allyson Ramos and KJ: [phone]  Nabila Sheikh or Debbie Payne RT: [phone]    Megan Goddard and Renetta Gilmore , Dieticians: [phone]    Paulette Danielle, Diabetes Nurse: [phone]   Magi Sarkar: [phone] or Zehra Wrae at [phone], Social Workers  [email or web address]

## 2025-02-20 NOTE — PROGRESS NOTES
Clinical Update:                                                    A chart review was conducted for Marleni Alamo.    Reason for Chart Review: Trikafta Lab Monitoring     Discussion: Marleni has been on Trikafta since 2/16/23. Patient had a history of LFT and CK elevations which requires closer monitoring and a dose reduction to 2 orange tablets daily. Per chart review, patient continues modified dose Trikafta 2 orange daily.      Labs were reviewed from  2/20/25  at [facility] . Bilirubin direct is 1.7x ULN and all other modulator labs are within normal limits, which is increased since previous lab draw.     Reviewed with Yahaira Trinidad PA-C, given direct bilirubin increase, will plan to repeat labs in 6 to 8 weeks.    Lab Results   Component Value Date    ALT 37 02/20/2025    AST 22 02/20/2025    BILITOTAL 1.4 (H) 02/20/2025    DBIL 0.52 (H) 02/20/2025    CKT 49 02/20/2025     Plan:  1. Continue Trikafta (2 orange daily) (previously ordered)  2. Recheck hepatic panel and CK in  6 to 8 weeks  per Yahaira Trinidad PA-C (ordered today)          Nora Singh, Zayda  Cystic Fibrosis MTM Pharmacist  [facility]  Voicemail: [phone]

## 2025-02-20 NOTE — LETTER
2025      Marleni Alamo  [address] [place] [address]      Dear Colleague,    Thank you for referring your patient, Marleni Alamo, to the [facility] [place]. Please see a copy of my visit note below.    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     1. Cystic fibrosis: doing well, denies new pulmonary complaints. No recent illnesses. Sating 97% on room air; doing her nebs and vesting BID. PFTs today are at her baseline (2.5-2.6L). Previous cultures + for MRSA, MSSA, Streptococcus anginosus, Achromobacter xylosoxidans/denitrificans.  - Continue nebs, Dulera and vesting BID; albuterol PRN tightness  - On jamarcus nebs every other month, currently off    2. CFTR modulation: [id number] homozygote and has been on Trikafta since . She is currently on the two orange tablets only. Total bilirubin elevated, but stable. Does have a slight increase in direct bilrubin.  - Recheck labs in 6-8 weeks  - Continue reduced dose Trikafta     3. CF-related diabetes: no recent issues with BS. Follows with Endocrine.  - Continue current insulin regimen     4. Pancreatic insufficiency: notes some increased gas since her last visit, no specific meal (does eat a lot of chips), denies missing enzymes or  enzymes. Does not think she is having malabsorption. BMI > CFF goal.  - Renetta to see today recommending trial of increased enzymes  - Continue enzymes and vitamins     5. CF related sinus disease: no symptoms of acute sinusitis at this time.     6. Reflux: denies complaints.   - Continue omeprazole    RTC: labs in 6-8 weeks; clinic follow up in 4 months  Annual studies due: 2025 (DEXA > Nov  )  Preventative care: UTD with vaccines    Yahaira Trinidad PA-C  Pulmonary, Allergy, Critical Care and Sleep Medicine        Interval History:     Patient is doing well, does a lot of Play [employer] with Minecraft and Echeverria. No recent illnesses, up to date with vaccines. No tightness, congestion or cough. No shortness of breath. Nebs and vesting BID. No new GI issues, does get gassy after meals, happening anytime and more frequent. BMs are okay, has about 2 per day and not fatty or floating. Doesn't miss enzymes and unlikely they have .          Review of Systems:   Please see HPI. Otherwise, complete 10 point ROS negative.           Past Medical and Surgical History:     Past Medical History:   Diagnosis Date     Atopy      Cerebral palsy (H)     Botox injextions, managed by Dr. John Marley     CF (cystic fibrosis) (H) 2016    Sweat Test: 8/15/95 Value: 85.0 Genotype: [id number], H/O Pseudomonas and MRSA, Baseline FEV1  2.48, FVC 2.76 (2015)     Cholelithiasis      Chronic pansinusitis 2016     Diabetes mellitus due to cystic fibrosis (H) 2016     Diabetes mellitus related to cystic fibrosis (H) 2016     Gastroesophageal reflux disease without esophagitis 2016     Infection due to 2019 novel coronavirus 2022    Minimal symptoms     MRSA (methicillin resistant staph aureus) culture positive      Pancreatic insufficiency 2016     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 2016     Seizure disorder (H)     Multiple daily in infancy now infrequent (every few years)     Thrush, oral      Past Surgical History:   Procedure Laterality Date     bilat antrostomies  2011     Bilater knee surgery with plates and pins Bilateral      CHOLECYSTECTOMY, LAPOROSCOPIC  2015     Cleft palate repair and mandibular advancement  1996     gastrostomy in infancy       Multiple PET (ear tubes)       Multiple sinus surgeries       OPTICAL TRACKING SYSTEM ENDOSCOPIC SINUS SURGERY  Bilateral 03/01/2019    Procedure: Stealth Assisted Bilateral Maxillary Antrostomy, Ethmoidectomy, Sphenoidotomy, Frontal Sinusotomy;  Surgeon: Anny Carbajal MD;  Location: [facility] OR     RESECT TRACHEA WITH RECONSTRUCTION CHILD  1998    Rib for reconstruction     STRABISMUS SURGERY       surgery for meconium ileus, partial bowel resection  08/1995    Details not available     tracheostomy in infancy             Family History:     Family History   Problem Relation Age of Onset     Diabetes Type 1 Mother 12     Thyroid Disease Mother      Diabetes Type 1 Maternal Uncle      Diabetes Type 1 Maternal Grandmother      Thyroid Disease Maternal Grandmother      Breast Cancer Other         Great Grandmother     Thyroid Disease Paternal Uncle      Thyroid Disease Maternal Aunt             Social History:     Social History     Socioeconomic History     Marital status: Single     Spouse name: Not on file     Number of children: Not on file     Years of education: Not on file     Highest education level: Not on file   Occupational History     Not on file   Tobacco Use     Smoking status: Never     Passive exposure: Never     Smokeless tobacco: Never   Vaping Use     Vaping status: Never Used   Substance and Sexual Activity     Alcohol use: No     Alcohol/week: 0.0 standard drinks of alcohol     Drug use: No     Sexual activity: Never   Other Topics Concern     Not on file   Social History Narrative    Marleni lives at home with mother in [place] with 2 dogs and a cat. 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 Marleni goes to transition school.      Social Drivers of Health     Financial Resource Strain: Not on file   Food Insecurity: Not on file   Transportation Needs: Not on file   Physical Activity: Not on file   Stress: Not on file   Social Connections: Not on file   Interpersonal Safety: Not on file   Housing Stability: Not on file            Medications:     Current Outpatient Medications   Medication Sig Dispense Refill      ACCU-CHEK GUIDE test strip USE TO TEST BLOOD SUGAR 3 TIMES DAILY OR AS DIRECTED. CALL CLINIC TO SCHEDULE FOLLOW UP APPOINTMENT. 300 strip 3     albuterol (PROAIR HFA/PROVENTIL HFA/VENTOLIN HFA) 108 (90 Base) MCG/ACT inhaler Inhale 2 puffs into the lungs every 6 hours as needed for shortness of breath, wheezing or cough 18 g 3     albuterol (PROVENTIL) (2.5 MG/3ML) 0.083% neb solution TAKE 1 VIAL (2.5 MG) BY NEBULIZATION 2 TIMES DAILY 600 mL 11     BD PEN NEEDLE STEVIE 2ND GEN 32G X 4 MM miscellaneous USE 4 PEN NEEDLES DAILY OR AS DIRECTED. 400 each 2     Continuous Glucose Sensor (DEXCOM G7 SENSOR) MISC Change sensor every 10 days. 9 each 1     Elemental iron 65 mg Vitamin C 125 mg (VITRON-C)  MG TABS tablet Take 1 tablet by mouth twice a week. 60 tablet 1     elexacaftor-tezacaftor-ivacaftor & ivacaftor (TRIKAFTA) 100-50-75 & 150 MG tablet pack TAKE TWO ORANGE TABLETS BY MOUTH IN THE MORNING.  TAKE WITH FAT CONTAINING FOOD. (SKIP BLUE TABLET) 84 tablet 4     fluticasone (FLONASE) 50 MCG/ACT nasal spray Spray 1 spray into both nostrils 2 times daily       Glucagon (BAQSIMI TWO PACK) 3 MG/DOSE POWD Spray 1 spray (3 mg) in nostril as needed in the event of unconscious hypoglycemia or hypoglycemic seizure. May repeat dose if no response after 15 minutes. 1 each 1     insulin glargine (LANTUS SOLOSTAR) 100 UNIT/ML pen INJECT 14 UNITS SUBCUTANEOUSLY DAILY.       insulin lispro (HUMALOG KWIKPEN) 100 UNIT/ML (1 unit dial) KWIKPEN INJECT 8 UNITS SUBQ BEFORE BREAKFAST &1 U PER 12G OF CARBS FOR SNACKS &REMAINING MEALS OF THE DAY, ~5 UNITS @DINNER. MAX 30UNITS DAILY.       lipase-protease-amylase (CREON) 85435-46743-167173 units CPEP per EC capsule TAKE 4 CAPSULES WITH MEALS AND 3 CAPSULES WITH SNACKS, FOR 3 MEALS AND 2 SNACKS PER DAY. 3600 capsule 3     mometasone-formoterol (DULERA) 100-5 MCG/ACT inhaler Inhale 2 puffs into the lungs 2 times daily 39 g 3     mvw complete formulation (CHEWABLES) tablet Take 2 tablets by  mouth daily. 180 tablet 4     omeprazole (PRILOSEC) 40 MG DR capsule TAKE 1 CAPSULE BY MOUTH TWICE A  capsule 3     PULMOZYME 2.5 MG/2.5ML neb solution NEBULIZE AND INHALE THE CONTENTS OF ONE VIAL(2.5ML) INTO THE LUNGS ONCE DAILY 75 mL 11     sodium chloride inhalant 7 % NEBU neb solution TAKE 4 MLS BY NEBULIZATION DAILY WHEN NOT ON CHER 360 mL 11     tobramycin, PF, (CHER) 300 MG/5ML neb solution NEBULIZE CONTENTS OF ONE VIALINTO THE LUNGS TWO TIMES A DAY FOR 28 DAYS AND 28 DAYS  mL 5     UREA 20 INTENSIVE HYDRATING 20 % external cream EXTERNALLY APPLY 1 DOSE TOPICALLY DAILY TO FOOT CALLUSES. 170 g 5     No current facility-administered medications for this visit.            Physical Exam:   /73   Pulse 71   Ht 1.524 m (5')   SpO2 97%   BMI 27.94 kg/m      GENERAL: alert, NAD  HEENT: NCAT, EOMI, anicteric sclera, no oral mucosal edema or erythema  Neck: no cervical or supraclavicular adenopathy  Respiratory: moderate airflow, mainly clear  CV: RRR, S1S2, no murmurs noted  Abdomen: normoactive BS, soft  Lymph: no edema, + digital clubbing  Neuro: AAO X 3, CN 2-12 grossly intact  Psychiatric: normal affect, good eye contact  Skin: no rash, jaundice or lesions on limited exam         Data:   All laboratory and imaging data reviewed.      Cystic Fibrosis Culture  Specimen Description   Date Value Ref Range Status   06/21/2021 Swab Sputum  Final   08/31/2020 Throat  Final   11/14/2019 Throat  Final    Culture Micro   Date Value Ref Range Status   06/21/2021 Heavy growth  Normal junaid    Final   06/21/2021 (A)  Final    Moderate growth  Methicillin resistant Staphylococcus aureus (MRSA)     08/31/2020 Moderate growth  Normal junaid    Final   08/31/2020 (A)  Final    Light growth  Methicillin resistant Staphylococcus aureus (MRSA)     08/31/2020 Light growth  Strain 2  Staphylococcus aureus   (A)  Final        PFT interpretation:  Maneuver: valid and meets ATS guidelines  Normal  spirometry            Nutrition Note    Reason for Visit: GI concerns, bloating per pt/provider request    Pt reports an increase in discomfort abdominal bloating in the last few months. Occurs after most meals throughout the day. Denies greasy, loose stools or change in BMs. No changes in diet or medications. Taking Creon 24s 4 caps with meals (1480 units lipase/kg/meal).      Interventions/Recommendations:  1) Given increase in symptoms with meals, will plan to try increasing Creon to 5 caps with meals. Would also increase snack dose by 1 capsule with greasy foods like chips. Trial increased dose x 2-3 weeks. If no improvement, would decrease back to previous.     2) If no improvement, could also trial probiotic such as MVW probiotic 2 capsules daily.     3) Could also consider AXR to rule out stool burden vs GI referral if ongoing symptoms.       Renetta Gilmore RD, LD, CACFD  Cystic Fibrosis/Lung Transplant Dietitian  Available via [employer]             Again, thank you for allowing me to participate in the care of your patient.        Sincerely,        Yahaira Trinidad PA-C    Electronically signed

## 2025-02-20 NOTE — PROGRESS NOTES
Nutrition Note    Reason for Visit: GI concerns, bloating per pt/provider request    Pt reports an increase in discomfort abdominal bloating in the last few months. Occurs after most meals throughout the day. Denies greasy, loose stools or change in BMs. No changes in diet or medications. Taking Creon 24s 4 caps with meals (1480 units lipase/kg/meal).      Interventions/Recommendations:  1) Given increase in symptoms with meals, will plan to try increasing Creon to 5 caps with meals. Would also increase snack dose by 1 capsule with greasy foods like chips. Trial increased dose x 2-3 weeks. If no improvement, would decrease back to previous.     2) If no improvement, could also trial probiotic such as MVW probiotic 2 capsules daily.     3) Could also consider AXR to rule out stool burden vs GI referral if ongoing symptoms.       Renetta Gilmore RD, LD, CACFD  Cystic Fibrosis/Lung Transplant Dietitian  Available via [employer]

## 2025-02-25 LAB
BACTERIA SPEC CULT: ABNORMAL
BACTERIA SPEC CULT: ABNORMAL

## 2025-04-03 ENCOUNTER — MYC MEDICAL ADVICE (OUTPATIENT)
Dept: PHARMACY | Facility: CLINIC | Age: 30
End: 2025-04-03
Payer: COMMERCIAL

## 2025-04-04 DIAGNOSIS — E84.8 DIABETES MELLITUS RELATED TO CYSTIC FIBROSIS (H): ICD-10-CM

## 2025-04-04 DIAGNOSIS — E08.9 DIABETES MELLITUS RELATED TO CYSTIC FIBROSIS (H): ICD-10-CM

## 2025-04-10 RX ORDER — PEN NEEDLE, DIABETIC 32GX 5/32"
NEEDLE, DISPOSABLE MISCELLANEOUS
Qty: 100 EACH | Refills: 0 | Status: SHIPPED | OUTPATIENT
Start: 2025-04-10

## 2025-04-29 NOTE — TELEPHONE ENCOUNTER
Third attempt contacting patient to repeat Trikafta labs, no answer/unable to leave voicemail      Mónica Chapman, PharmD   Cystic Fibrosis MTM Pharmacist  [facility]

## 2025-05-01 DIAGNOSIS — E84.8 DIABETES MELLITUS RELATED TO CYSTIC FIBROSIS (H): ICD-10-CM

## 2025-05-01 DIAGNOSIS — E08.9 DIABETES MELLITUS RELATED TO CYSTIC FIBROSIS (H): ICD-10-CM

## 2025-05-02 RX ORDER — PEN NEEDLE, DIABETIC 32GX 5/32"
NEEDLE, DISPOSABLE MISCELLANEOUS
Qty: 400 EACH | Refills: 0 | Status: SHIPPED | OUTPATIENT
Start: 2025-05-02

## 2025-05-02 NOTE — TELEPHONE ENCOUNTER
BD PEN NEEDLE STEVIE 2ND GEN 32G X 4 MM miscellaneous   Start: 04/10/2025   Disp 100  R 0  Use up to 4 pen needles daily as directed. Follow up visit needed. For future refills Call  to schedule.     Anjelica Galindo MD     4/16/24  Nv none    Refill decision: Medication unable to be refilled by RN due to: Pt not seen within past 12 months, No FOV or FOV exceeds timeframe per protocol    diabetic supplies.      Request from pharmacy:  Requested Prescriptions   Pending Prescriptions Disp Refills    BD PEN NEEDLE STEVIE 2ND GEN 32G X 4 MM miscellaneous [Pharmacy Med Name: BD STEVIE 2 GEN PEN NDL 32G 4MM] 100 each 0     Sig: USE UP TO 4 PEN NEEDLES DAILY AS DIRECTED. FOLLOW UP VISIT NEEDED. FOR FUTURE REFILLS CALL  TO SCHEDULE.       Diabetic Supplies Protocol Passed - 5/2/2025 12:20 PM        Passed - Medication is active on med list and the sig matches. RN to manually verify dose and sig if red X/fail.     If the protocol passes (green check), you do not need to verify med dose and sig.    A prescription matches if they are the same clinical intention.    For Example: once daily and every morning are the same.    The protocol can not identify upper and lower case letters as matching and will fail.     For Example: Take 1 tablet (50 mg) by mouth daily     TAKE 1 TABLET (50 MG) BY MOUTH DAILY    For all fails (red x), verify dose and sig.    If the refill does match what is on file, the RN can still proceed to approve the refill request.       If they do not match, route to the appropriate provider.             Passed - Recent (12 month) or future (90 days) visit with authorizing provider s specialty     The patient must have completed an in-person or virtual visit within the past 12 months or has a future visit scheduled within the next 90 days with the authorizing provider s specialty.  Urgent care and e-visits do not qualify as an office visit for this protocol.          Passed - Medication  indicated for associated diagnosis        Passed - Patient is 18 years of age or older

## 2025-05-06 DIAGNOSIS — E84.9 CF (CYSTIC FIBROSIS) (H): ICD-10-CM

## 2025-05-06 DIAGNOSIS — E84.0 CYSTIC FIBROSIS WITH PULMONARY MANIFESTATIONS (H): ICD-10-CM

## 2025-05-06 RX ORDER — MOMETASONE FUROATE AND FORMOTEROL FUMARATE DIHYDRATE 100; 5 UG/1; UG/1
2 AEROSOL RESPIRATORY (INHALATION) 2 TIMES DAILY
Qty: 39 G | Refills: 2 | Status: SHIPPED | OUTPATIENT
Start: 2025-05-06

## 2025-05-06 RX ORDER — ELEXACAFTOR, TEZACAFTOR, AND IVACAFTOR 100-50-75
KIT ORAL
Qty: 84 TABLET | Refills: 4 | OUTPATIENT
Start: 2025-05-06

## 2025-05-06 RX ORDER — OMEPRAZOLE 40 MG/1
CAPSULE, DELAYED RELEASE ORAL
Qty: 200 CAPSULE | Refills: 3 | Status: SHIPPED | OUTPATIENT
Start: 2025-05-06

## 2025-05-07 ENCOUNTER — TELEPHONE (OUTPATIENT)
Dept: ENDOCRINOLOGY | Facility: CLINIC | Age: 30
End: 2025-05-07
Payer: COMMERCIAL

## 2025-05-07 NOTE — TELEPHONE ENCOUNTER
Left Voicemail (1st Attempt) for the patient to call back and schedule the following:    Appointment type: RETURN CYSTIC FIBROSIS   Provider: Anjelica Galindo MD  Return date: First available slot  Specialty phone number: [phone]  Additional appointment(s) needed: N/A  Additonal Notes: Nino NAYLOR, Schedule patient CF follow up for future refills per message below:     Wendy Mckeon, RN to Clinic [id number] (Selected Message)      5/2/25 12:31 PM  :   simone Galindo   4/24  thanks.  Follow up visit needed. For future refills     Tomy Greenwood on 5/7/2025 at 10:43 AM

## 2025-05-13 ENCOUNTER — LAB (OUTPATIENT)
Dept: LAB | Facility: CLINIC | Age: 30
End: 2025-05-13
Payer: COMMERCIAL

## 2025-05-13 DIAGNOSIS — E84.9 CF (CYSTIC FIBROSIS) (H): ICD-10-CM

## 2025-05-13 PROCEDURE — 87186 SC STD MICRODIL/AGAR DIL: CPT | Performed by: INTERNAL MEDICINE

## 2025-05-13 PROCEDURE — 99000 SPECIMEN HANDLING OFFICE-LAB: CPT | Performed by: PATHOLOGY

## 2025-05-13 PROCEDURE — 80076 HEPATIC FUNCTION PANEL: CPT

## 2025-05-13 PROCEDURE — 36415 COLL VENOUS BLD VENIPUNCTURE: CPT

## 2025-05-14 DIAGNOSIS — E84.9 CF (CYSTIC FIBROSIS) (H): ICD-10-CM

## 2025-05-14 LAB
ALBUMIN SERPL BCG-MCNC: 4 G/DL (ref 3.5–5.2)
ALP SERPL-CCNC: 148 U/L (ref 40–150)
ALT SERPL W P-5'-P-CCNC: 41 U/L (ref 0–50)
AST SERPL W P-5'-P-CCNC: 27 U/L (ref 0–45)
BILIRUB DIRECT SERPL-MCNC: 0.34 MG/DL (ref 0–0.3)
BILIRUB SERPL-MCNC: 0.8 MG/DL
PROT SERPL-MCNC: 6.8 G/DL (ref 6.4–8.3)

## 2025-05-14 RX ORDER — ELEXACAFTOR, TEZACAFTOR, AND IVACAFTOR 100-50-75
KIT ORAL
Qty: 84 TABLET | Refills: 4 | Status: SHIPPED | OUTPATIENT
Start: 2025-05-14

## 2025-05-15 LAB
BACTERIA SPEC CULT: ABNORMAL

## 2025-05-18 LAB
BACTERIA SPEC CULT: ABNORMAL
BACTERIA SPEC CULT: ABNORMAL

## 2025-06-09 ENCOUNTER — CLINICAL UPDATE (OUTPATIENT)
Dept: PHARMACY | Facility: CLINIC | Age: 30
End: 2025-06-09
Payer: COMMERCIAL

## 2025-06-09 DIAGNOSIS — E84.9 CF (CYSTIC FIBROSIS) (H): Primary | ICD-10-CM

## 2025-06-09 PROCEDURE — 99207 PR NO CHARGE LOS: CPT | Performed by: PHARMACIST

## 2025-06-09 NOTE — PROGRESS NOTES
Clinical Update:                                                    A chart review was conducted for Marleni Alamo.    Reason for Chart Review: Trikafta Lab Monitoring     Discussion: Marleni has been on Trikafta since 2/16/23. Patient had a history of LFT and CK elevations which requires closer monitoring and a dose reduction to 2 orange tablets daily. Per chart review, patient continues modified dose Trikafta 2 orange daily.      Labs were reviewed from 5/13/25 at [facility]. direct bilirubin is elevated and all other labs are within normal limits, Majority of bilirubin elevation is indirect bilirubin which can be caused by [id number]/3 inhibition by elexacaftor and does not require intervention..    Lab Results   Component Value Date    ALT 41 05/13/2025    AST 27 05/13/2025    BILITOTAL 0.8 05/13/2025    DBIL 0.34 (H) 05/13/2025    ALKPHOS 148 05/13/2025     Plan:  1. Continue Trikafta (2 orange daily) (previously ordered)  2. Recheck hepatic panel in 6 months  with annuals          Nora Singh PharmD  Cystic Fibrosis MTM Pharmacist  [facility]  Voicemail: [phone]

## 2025-06-14 ENCOUNTER — HEALTH MAINTENANCE LETTER (OUTPATIENT)
Age: 30
End: 2025-06-14

## 2025-06-18 NOTE — PROGRESS NOTES
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     1. Cystic fibrosis: doing well, denies new pulmonary complaints. No recent illnesses. Sating 96% on room air; doing her nebs and vesting BID. PFTs today are at her baseline (2.5-2.6L). Previous cultures + for MRSA, MSSA, Streptococcus anginosus, Achromobacter xylosoxidans/denitrificans.  - Continue nebs, Dulera and vesting BID; albuterol PRN tightness  - On jamarcus nebs every other month    It is medically necessary for this patient to have a nebulizer compressor, nebulizer cups and supplies (tubing, masks, etc) to deliver their prescribed inhaled medications due to the patient s diagnosis of cystic fibrosis.     2. 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 She is currently on the two orange tablets only. Labs WNL in May  - Labs with annuals  - Continue reduced dose Trikafta     3. CF-related diabetes: no recent issues with BS. Follows with Endocrine.  - Continue current insulin regimen     4. Pancreatic insufficiency: ongoing gas, not responsive to the increase in enzymes or increase in omeprazole. BMI > CFF goal.  - AXR today to assess stool burden  - MARIE Jackson, to see again re: enzymes, ? SIBO?  - Continue enzymes and vitamins     5. CF related sinus disease: no symptoms.    6. Reflux: denies complaints.   - Continue omeprazole, decrease to daily    RTC: 4 months with annuals (labs)  Annual studies due: November 2025 (DEXA > Nov 2026)  Preventative care: UTD with vaccines    Yahaira Trinidad PA-C  Pulmonary, Allergy, Critical Care and Sleep Medicine        Interval History:     Notes a lot of gas, not worse than normal. No belly pain, no bloating. No constipation or diarrhea. Doesn't  miss enzymes and they haven't . No burping or acid reflux. Did try and extra enzyme with meals and that didn't make a difference. Breathing is good, no new shortness of breath, no cough. Doing nebs and vesting BID.          Review of Systems:   Please see HPI. Otherwise, complete 10 point ROS negative.           Past Medical and Surgical History:     Past Medical History:   Diagnosis Date    Atopy     Cerebral palsy (H)     Botox injextions, managed by Dr. 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 2016    Seizure disorder (H)     Multiple daily in infancy now infrequent (every few years)    Thrush, oral      Past Surgical History:   Procedure Laterality Date    bilat antrostomies  2011    Bilater knee surgery with plates and pins Bilateral     CHOLECYSTECTOMY, LAPOROSCOPIC  2015    Cleft palate repair and mandibular advancement      gastrostomy in infancy      Multiple PET (ear tubes)      Multiple sinus surgeries      OPTICAL TRACKING SYSTEM ENDOSCOPIC SINUS SURGERY Bilateral 2019    Procedure: Stealth Assisted Bilateral Maxillary Antrostomy, Ethmoidectomy, Sphenoidotomy, Frontal Sinusotomy;  Surgeon: Anny Carbajal MD;  Location: [facility] OR    RESECT TRACHEA WITH RECONSTRUCTION CHILD      Rib for reconstruction    STRABISMUS SURGERY      surgery for meconium ileus, partial bowel resection  1995    Details  not available    tracheostomy in infancy             Family History:     Family History   Problem Relation Age of Onset    Diabetes Type 1 Mother 12    Thyroid Disease Mother     Diabetes Type 1 Maternal Uncle     Diabetes Type 1 Maternal Grandmother     Thyroid Disease Maternal Grandmother     Breast Cancer Other         Great Grandmother    Thyroid Disease Paternal Uncle     Thyroid Disease Maternal Aunt             Social History:     Social History     Socioeconomic History    Marital status: Single     Spouse name: Not on file    Number of children: Not on file    Years of education: Not on file    Highest education level: Not on file   Occupational History    Not on file   Tobacco Use    Smoking status: Never     Passive exposure: Never    Smokeless tobacco: Never   Vaping Use    Vaping status: Never Used   Substance and Sexual Activity    Alcohol use: No     Alcohol/week: 0.0 standard drinks of alcohol    Drug use: No    Sexual activity: Never   Other Topics Concern    Not on file   Social History Narrative    Marleni lives at home with mother in [place] with 2 dogs and a cat. 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 Marleni goes to transition school.      Social Drivers of Health     Financial Resource Strain: Not on file   Food Insecurity: Not on file   Transportation Needs: Not on file   Physical Activity: Not on file   Stress: Not on file   Social Connections: Not on file   Interpersonal Safety: Not on file   Housing Stability: Not on file            Medications:     Current Outpatient Medications   Medication Sig Dispense Refill    omeprazole (PRILOSEC) 40 MG DR capsule Take 1 capsule (40 mg) by mouth daily.      ACCU-CHEK GUIDE test strip USE TO TEST BLOOD SUGAR 3 TIMES DAILY OR AS DIRECTED. CALL CLINIC TO SCHEDULE FOLLOW UP APPOINTMENT. 300 strip 3    albuterol (PROAIR HFA/PROVENTIL HFA/VENTOLIN HFA) 108 (90 Base) MCG/ACT inhaler Inhale 2 puffs into the lungs every 6 hours as needed for shortness of breath,  wheezing or cough 18 g 3    albuterol (PROVENTIL) (2.5 MG/3ML) 0.083% neb solution TAKE 1 VIAL (2.5 MG) BY NEBULIZATION 2 TIMES DAILY 600 mL 11    BD PEN NEEDLE STEVIE 2ND GEN 32G X 4 MM miscellaneous USE UP TO 4 PEN NEEDLES DAILY AS DIRECTED. FOLLOW UP VISIT NEEDED. FOR FUTURE REFILLS CALL  TO SCHEDULE. 400 each 0    Continuous Glucose Sensor (DEXCOM G7 SENSOR) MISC Change sensor every 10 days. 9 each 1    DULERA 100-5 MCG/ACT inhaler INHALE 2 PUFFS INTO THE LUNGS 2 TIMES DAILY 39 g 2    Elemental iron 65 mg Vitamin C 125 mg (VITRON-C)  MG TABS tablet Take 1 tablet by mouth twice a week. 60 tablet 1    elexacaftor-tezacaftor-ivacaftor & ivacaftor (TRIKAFTA) 100-50-75 & 150 MG tablet pack TAKE TWO ORANGE TABLETS BY MOUTH IN THE MORNING.  TAKE WITH FAT CONTAINING FOOD. (SKIP BLUE TABLET) 84 tablet 4    fluticasone (FLONASE) 50 MCG/ACT nasal spray Spray 1 spray into both nostrils 2 times daily      Glucagon (BAQSIMI TWO PACK) 3 MG/DOSE POWD Spray 1 spray (3 mg) in nostril as needed in the event of unconscious hypoglycemia or hypoglycemic seizure. May repeat dose if no response after 15 minutes. 1 each 1    insulin glargine (LANTUS SOLOSTAR) 100 UNIT/ML pen INJECT 18 UNITS SUBCUTANEOUSLY DAILY. 15 mL 3    insulin lispro (HUMALOG KWIKPEN) 100 UNIT/ML (1 unit dial) KWIKPEN INJECT 8 UNITS SUBQ BEFORE BREAKFAST &1 U PER 12G OF CARBS FOR SNACKS &REMAINING MEALS OF THE DAY, ~5 UNITS @DINNER. MAX 30UNITS DAILY.      lipase-protease-amylase (CREON) 76122-31734-458601 units CPEP per EC capsule TAKE 4 CAPSULES WITH MEALS AND 3 CAPSULES WITH SNACKS, FOR 3 MEALS AND 2 SNACKS PER DAY. 3600 capsule 3    mvw complete formulation (CHEWABLES) tablet TAKE 2 TABLETS BY MOUTH DAILY. 180 tablet 3    PULMOZYME 2.5 MG/2.5ML neb solution NEBULIZE AND INHALE THE CONTENTS OF ONE VIAL(2.5ML) INTO THE LUNGS ONCE DAILY 75 mL 11    sodium chloride inhalant 7 % NEBU neb solution TAKE 4 MLS BY NEBULIZATION DAILY WHEN NOT ON CHER 360  mL 11    tobramycin, PF, (CHER) 300 MG/5ML neb solution NEBULIZE CONTENTS OF ONE VIALINTO THE LUNGS TWO TIMES A DAY FOR 28 DAYS AND 28 DAYS  mL 5    UREA 20 INTENSIVE HYDRATING 20 % external cream EXTERNALLY APPLY 1 DOSE TOPICALLY DAILY TO FOOT CALLUSES. 170 g 5     No current facility-administered medications for this visit.            Physical Exam:   /68 (BP Location: Right arm, Patient Position: Chair, Cuff Size: Adult Regular)   Pulse 82   Ht 1.524 m (5')   Wt 66.1 kg (145 lb 11.6 oz)   SpO2 96%   BMI 28.46 kg/m      GENERAL: alert, NAD  HEENT: NCAT, EOMI, anicteric sclera, no oral mucosal edema or erythema  Neck: no cervical or supraclavicular adenopathy  Respiratory: moderate airflow, mainly clear  CV: RRR, S1S2, no murmurs noted  Abdomen: normoactive BS, soft  Lymph: no edema, + digital clubbing  Neuro: AAO X 3, CN 2-12 grossly intact  Psychiatric: normal affect, good eye contact  Skin: no rash, jaundice or lesions on limited exam         Data:   All laboratory and imaging data reviewed.      Cystic Fibrosis Culture  Specimen Description   Date Value Ref Range Status   06/21/2021 Swab Sputum  Final   08/31/2020 Throat  Final   11/14/2019 Throat  Final    Culture Micro   Date Value Ref Range Status   06/21/2021 Heavy growth  Normal junaid    Final   06/21/2021 (A)  Final    Moderate growth  Methicillin resistant Staphylococcus aureus (MRSA)     08/31/2020 Moderate growth  Normal junaid    Final   08/31/2020 (A)  Final    Light growth  Methicillin resistant Staphylococcus aureus (MRSA)     08/31/2020 Light growth  Strain 2  Staphylococcus aureus   (A)  Final        PFT interpretation:  Maneuver: valid and meets ATS guidelines  Normal spirometry

## 2025-06-19 ENCOUNTER — OFFICE VISIT (OUTPATIENT)
Dept: PULMONOLOGY | Facility: CLINIC | Age: 30
End: 2025-06-19
Attending: PHYSICIAN ASSISTANT
Payer: COMMERCIAL

## 2025-06-19 ENCOUNTER — OFFICE VISIT (OUTPATIENT)
Dept: PHARMACY | Facility: CLINIC | Age: 30
End: 2025-06-19
Payer: COMMERCIAL

## 2025-06-19 ENCOUNTER — ALLIED HEALTH/NURSE VISIT (OUTPATIENT)
Dept: CARE COORDINATION | Facility: CLINIC | Age: 30
End: 2025-06-19

## 2025-06-19 VITALS
WEIGHT: 145.72 LBS | OXYGEN SATURATION: 96 % | HEART RATE: 82 BPM | BODY MASS INDEX: 28.61 KG/M2 | HEIGHT: 60 IN | DIASTOLIC BLOOD PRESSURE: 68 MMHG | SYSTOLIC BLOOD PRESSURE: 105 MMHG

## 2025-06-19 DIAGNOSIS — E84.9 DIABETES MELLITUS DUE TO CYSTIC FIBROSIS (H): ICD-10-CM

## 2025-06-19 DIAGNOSIS — E84.9 CF (CYSTIC FIBROSIS) (H): Primary | ICD-10-CM

## 2025-06-19 DIAGNOSIS — Z13.9 RISK AND FUNCTIONAL ASSESSMENT: Primary | ICD-10-CM

## 2025-06-19 DIAGNOSIS — E08.9 DIABETES MELLITUS DUE TO CYSTIC FIBROSIS (H): ICD-10-CM

## 2025-06-19 DIAGNOSIS — Z79.51 LONG TERM CURRENT USE OF INHALED STEROID: ICD-10-CM

## 2025-06-19 DIAGNOSIS — R21 RASH: ICD-10-CM

## 2025-06-19 DIAGNOSIS — R79.9 ABNORMAL FINDING OF BLOOD CHEMISTRY, UNSPECIFIED: ICD-10-CM

## 2025-06-19 DIAGNOSIS — Z79.899 ENCOUNTER FOR LONG-TERM (CURRENT) USE OF MEDICATIONS: ICD-10-CM

## 2025-06-19 DIAGNOSIS — K86.89 PANCREATIC INSUFFICIENCY: ICD-10-CM

## 2025-06-19 DIAGNOSIS — E84.9 CF (CYSTIC FIBROSIS) (H): ICD-10-CM

## 2025-06-19 LAB
EXPTIME-PRE: 3.56 SEC
FEF2575-%PRED-PRE: 113 %
FEF2575-PRE: 3.52 L/SEC
FEF2575-PRED: 3.11 L/SEC
FEFMAX-%PRED-PRE: 111 %
FEFMAX-PRE: 7.16 L/SEC
FEFMAX-PRED: 6.4 L/SEC
FEV1-%PRED-PRE: 100 %
FEV1-PRE: 2.62 L
FEV1FEV6-PRE: 91 %
FEV1FEV6-PRED: 85 %
FEV1FVC-PRE: 91 %
FEV1FVC-PRED: 87 %
FIFMAX-PRE: 5.55 L/SEC
FVC-%PRED-PRE: 95 %
FVC-PRE: 2.89 L
FVC-PRED: 3.02 L

## 2025-06-19 PROCEDURE — G0463 HOSPITAL OUTPT CLINIC VISIT: HCPCS | Performed by: PHYSICIAN ASSISTANT

## 2025-06-19 PROCEDURE — 99207 PR NO CHARGE LOS: CPT | Performed by: PHARMACIST

## 2025-06-19 RX ORDER — OMEPRAZOLE 40 MG/1
40 CAPSULE, DELAYED RELEASE ORAL DAILY
Status: SHIPPED
Start: 2025-06-19

## 2025-06-19 RX ORDER — ALBUTEROL SULFATE 90 UG/1
2 INHALANT RESPIRATORY (INHALATION) EVERY 6 HOURS PRN
Qty: 18 G | Refills: 3 | Status: SHIPPED | OUTPATIENT
Start: 2025-06-19

## 2025-06-19 RX ORDER — ALBUTEROL SULFATE 0.83 MG/ML
SOLUTION RESPIRATORY (INHALATION)
Qty: 600 ML | Refills: 11 | Status: SHIPPED | OUTPATIENT
Start: 2025-06-19

## 2025-06-19 ASSESSMENT — PAIN SCALES - GENERAL: PAINLEVEL_OUTOF10: NO PAIN (0)

## 2025-06-19 NOTE — PROGRESS NOTES
RT Note:    Order placed today through [facility] for Zhanna LC Plus reusable nebulizer cup, tubing, and mask. [facility] has acknowledged receipt of order. Writer will continue to follow for any airway clearance needs.

## 2025-06-19 NOTE — PROGRESS NOTES
Adult Cystic Fibrosis Program  Annual Psychosocial Assessment    Presenting Information:  Marleni is a 29-year-old female with cystic fibrosis, presenting in CF clinic for a regular follow up with primary CF provider, Dr. Edi Leger.  Met with Marleni for annual psychosocial assessment. Her dad and legal guardian, Jah, was also present. 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     Living situation:  Marleni lives with her dad, Jah, in [place], [place]. They have 3 dogs, a couple of her dogs are getting very old but are doing okay overall. Marleni also spends some time at her maternal grandparents on the weekends. Marleni and Jah deny any concerns about their living situation.      Family Constellation:  Marleni was raised by her mother, Anny. She had intermittent contact with her father until age 12 when he became more involved. She has no siblings. Sadly, Anny passed away a couple of year ago after some diabetic related complications and a non healing foot wound. Marleni's father, Jah, now has legal guardianship and Marleni lives with him full time. She see's her maternal and paternal grandparents frequently [place] She also travels to her paternal grandparents cabin often in the summers.     Social Support:  Anny reports good social support for Marleni. She gets along well with family members and draws additional support from friends, and blu community. Marleni is also well supported by her father and grandparents. Marleni's mom, Anny, had two good friends who have children with CF, but otherwise Marleni has had no connections in the CF Community (not discussed today).    Adjustment to Illness:  Marleni was diagnosed with CF in infancy. 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 She was hospitalized for a few months after birth with various complications. She also had a cleft palat, a g-tube, and a trach for 2 years. Anny  "described this time as very stressful and upsetting for her. She described Marleni as \"purple\" when she was born because she couldn't breathe. After Marleni's first two years of life her health became more stable and she had minimal hospitalizations. She was hospitalized a couple times as a teenager and has also undergone a few sinus surgeries. She has not been hospitalized for the past several years.     Marleni and Jah describe her current health status as \"good\". She notes her PFT's were down a little today but she is feeling well and it is not a concern today. She notes the humidity has been hard on her lungs which could be why. 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 Marleni completes 2 vest treatments per day. Marleni does not formally exercise but walks her dogs often. Marleni's mom, Anny, was previously her PCA for 40 hours a week. After she  Jah and Marleni did not feel like this support was necessary. SW neglected to assess today current status of support services in the home. Marleni is interested in doing workouts and activities so SW will send resources via [employer].    Marleni is open about her CF diagnosis.         Advanced Care Planning:     Health Care Directive:  Marleni has previously received Health Care Directive education. This SW provided/reviewed education, including concept/purpose of health care directive, default health care agents and how to complete a directive. Jah is Marleni's legal guardian and NOK for decision making in he absence of a directive. They are not interested in filling one out today.     Education:  Marleni graduated from high school in . She has not had additional schooling since graduation and has no plans for education at this time.      Employment:  Since highschool Marleni has been enrolled in a couple of vocational rehab programs. She has " "not been enrolled in one or worked in the past couple of years. She has been on a wait list for the past 2 years for a vocational rehab program and hopes to find a job soon. She has been participating in a zoom group 3x a week through [employer] for socialization.    Finances:  Marleni receives income from her SSI and denies any financial concerns. She is financially supported by her father, Jah, as well.    Insurance:  Marleni is insured through a [employer] Medicare Advantage plan and medical assistance. Jah denies any insurance concerns today.    Mental Health/Coping:  Marleni reports her mood is \"good\" and that she is happy most days. She and her dad deny any current or past symptoms indicative of mood, anxiety, eating, learning or other mental health disorder. She also denies a family history of MH concerns. Marleni see's her therapist Dr. Elaine every other week which is going well. COLBY did not complete the PHQ-9 or ALEXANDR-7 due to Marleni's cognitive delays. She denies any current stressors.    Marleni attends [employer] regularly and blu is an important part of her life.    Chemical Health:  Marleni denies use of alcohol, tobacco, vaping, or other drugs.    Leisure Activities/Interests:   Marleni enjoys playing games, spending time with her dad and grandparents, watching movies, going out to eat, and walking her dogs. She has been going to her grandparents cabin a lot this summer and has been enjoying going on their [employer]g [employer] boat.    Intervention:  -Psychosocial Assessment  -Resource education/referral (exercise grants)  -Supportive counseling    Assessment:  Marleni was pleasant and receptive to SW visit. She appeared to be open in her responses. She answered most SW questions with some assistance from her father. She reports her mood has been good and she is feeling physically well. No insurance/financial concerns. She is well supported by her father and grandparents. She hopes to start working soon " and is enrolled in an online program through [employer].     Marleni seems to be psychosocially stable overall, with access to relevant resources and supports. No concerns expressed/noted.    Plan:  Re-consult for any psychosocial needs that may arise.    Complete psychosocial assessment annually.  Continue to follow for regular clinic consult.    AILYN Potts, [facility]  Adult Cystic Fibrosis   [facility] Acute Care Management  Phone: [phone]  Available on [employer]

## 2025-06-19 NOTE — NURSING NOTE
Chief Complaint   Patient presents with    Follow Up     Return CF       Vitals were taken, medications reconciled.    Josefina Rodriguez, Clinic Assistant   9:51 AM

## 2025-06-19 NOTE — LETTER
6/19/2025      Marleni Alamo  [address] [place] [address]      Dear Colleague,    Thank you for referring your patient, Marleni Alamo, to the [facility] [place]. Please see a copy of my visit note below.    [facility]  June 19, 2025         Assessment and Plan:   Marleni Alamo is a 29 year old female with cystic fibrosis, pancreatic insufficiency, CFRD, cerebral palsy and Leoncio Nightmute Syndrome who is seen today in clinic for routine follow up.     1. Cystic fibrosis: doing well, denies new pulmonary complaints. No recent illnesses. Sating 96% on room air; doing her nebs and vesting BID. PFTs today are at her baseline (2.5-2.6L). Previous cultures + for MRSA, MSSA, Streptococcus anginosus, Achromobacter xylosoxidans/denitrificans.  - Continue nebs, Dulera and vesting BID; albuterol PRN tightness  - On jamarcus nebs every other month    2. CFTR modulation: C056ulp homozygote and has been on Trikafta since 2023. She is currently on the two orange tablets only. Labs WNL in May  - Labs with annuals  - Continue reduced dose Trikafta     3. CF-related diabetes: no recent issues with BS. Follows with Endocrine.  - Continue current insulin regimen     4. Pancreatic insufficiency: ongoing gas, not responsive to the increase in enzymes or increase in omeprazole. BMI > CFF goal.  - AXR today to assess stool burden  - MARIE Jackson, to see again re: enzymes, ? SIBO?  - Continue enzymes and vitamins     5. CF related sinus disease: no symptoms.    6. Reflux: denies complaints.   - Continue omeprazole, decrease to daily    RTC: 4 months with annuals (labs)  Annual studies due: November 2025 (DEXA > Nov 2026)  Preventative care: UTD with vaccines    Yahaira Trinidad PA-C  Pulmonary, Allergy, Critical Care and Sleep Medicine        Interval History:     Notes a lot of gas, not worse than normal. No belly pain, no  bloating. No constipation or diarrhea. Doesn't miss enzymes and they haven't . No burping or acid reflux. Did try and extra enzyme with meals and that didn't make a difference. Breathing is good, no new shortness of breath, no cough. Doing nebs and vesting BID.          Review of Systems:   Please see HPI. Otherwise, complete 10 point ROS negative.           Past Medical and Surgical History:     Past Medical History:   Diagnosis Date     Atopy      Cerebral palsy (H)     Botox injextions, managed by Dr. John Malrey     CF (cystic fibrosis) (H) 2016    Sweat Test: 8/15/95 Value: 85.0 Genotype: [id number], H/O Pseudomonas and MRSA, Baseline FEV1  2.48, FVC 2.76 (2015)     Cholelithiasis      Chronic pansinusitis 2016     Diabetes mellitus due to cystic fibrosis (H) 2016     Diabetes mellitus related to cystic fibrosis (H) 2016     Gastroesophageal reflux disease without esophagitis 2016     Infection due to  novel coronavirus 2022    Minimal symptoms     MRSA (methicillin resistant staph aureus) culture positive      Pancreatic insufficiency 2016     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 2016     Seizure disorder (H)     Multiple daily in infancy now infrequent (every few years)     Thrush, oral      Past Surgical History:   Procedure Laterality Date     bilat antrostomies  2011     Bilater knee surgery with plates and pins Bilateral      CHOLECYSTECTOMY, LAPOROSCOPIC  2015     Cleft palate repair and mandibular advancement       gastrostomy in infancy       Multiple PET (ear tubes)       Multiple sinus surgeries       OPTICAL TRACKING SYSTEM ENDOSCOPIC SINUS SURGERY Bilateral 2019    Procedure: Stealth Assisted Bilateral Maxillary Antrostomy, Ethmoidectomy, Sphenoidotomy, Frontal Sinusotomy;  Surgeon: Anny Carbajal MD;  Location: [facility] OR     RESECT TRACHEA WITH RECONSTRUCTION CHILD      Rib for reconstruction     STRABISMUS SURGERY        surgery for meconium ileus, partial bowel resection  08/1995    Details not available     tracheostomy in infancy             Family History:     Family History   Problem Relation Age of Onset     Diabetes Type 1 Mother 12     Thyroid Disease Mother      Diabetes Type 1 Maternal Uncle      Diabetes Type 1 Maternal Grandmother      Thyroid Disease Maternal Grandmother      Breast Cancer Other         Great Grandmother     Thyroid Disease Paternal Uncle      Thyroid Disease Maternal Aunt             Social History:     Social History     Socioeconomic History     Marital status: Single     Spouse name: Not on file     Number of children: Not on file     Years of education: Not on file     Highest education level: Not on file   Occupational History     Not on file   Tobacco Use     Smoking status: Never     Passive exposure: Never     Smokeless tobacco: Never   Vaping Use     Vaping status: Never Used   Substance and Sexual Activity     Alcohol use: No     Alcohol/week: 0.0 standard drinks of alcohol     Drug use: No     Sexual activity: Never   Other Topics Concern     Not on file   Social History Narrative    Marleni lives at home with mother in [place] with 2 dogs and a cat. 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 Marleni goes to transition school.      Social Drivers of Health     Financial Resource Strain: Not on file   Food Insecurity: Not on file   Transportation Needs: Not on file   Physical Activity: Not on file   Stress: Not on file   Social Connections: Not on file   Interpersonal Safety: Not on file   Housing Stability: Not on file            Medications:     Current Outpatient Medications   Medication Sig Dispense Refill     omeprazole (PRILOSEC) 40 MG DR capsule Take 1 capsule (40 mg) by mouth daily.       ACCU-CHEK GUIDE test strip USE TO TEST BLOOD SUGAR 3 TIMES DAILY OR AS DIRECTED. CALL CLINIC TO SCHEDULE FOLLOW UP APPOINTMENT. 300 strip 3     albuterol (PROAIR HFA/PROVENTIL HFA/VENTOLIN HFA) 108 (90 Base)  MCG/ACT inhaler Inhale 2 puffs into the lungs every 6 hours as needed for shortness of breath, wheezing or cough 18 g 3     albuterol (PROVENTIL) (2.5 MG/3ML) 0.083% neb solution TAKE 1 VIAL (2.5 MG) BY NEBULIZATION 2 TIMES DAILY 600 mL 11     BD PEN NEEDLE STEVIE 2ND GEN 32G X 4 MM miscellaneous USE UP TO 4 PEN NEEDLES DAILY AS DIRECTED. FOLLOW UP VISIT NEEDED. FOR FUTURE REFILLS CALL  TO SCHEDULE. 400 each 0     Continuous Glucose Sensor (DEXCOM G7 SENSOR) MISC Change sensor every 10 days. 9 each 1     DULERA 100-5 MCG/ACT inhaler INHALE 2 PUFFS INTO THE LUNGS 2 TIMES DAILY 39 g 2     Elemental iron 65 mg Vitamin C 125 mg (VITRON-C)  MG TABS tablet Take 1 tablet by mouth twice a week. 60 tablet 1     elexacaftor-tezacaftor-ivacaftor & ivacaftor (TRIKAFTA) 100-50-75 & 150 MG tablet pack TAKE TWO ORANGE TABLETS BY MOUTH IN THE MORNING.  TAKE WITH FAT CONTAINING FOOD. (SKIP BLUE TABLET) 84 tablet 4     fluticasone (FLONASE) 50 MCG/ACT nasal spray Spray 1 spray into both nostrils 2 times daily       Glucagon (BAQSIMI TWO PACK) 3 MG/DOSE POWD Spray 1 spray (3 mg) in nostril as needed in the event of unconscious hypoglycemia or hypoglycemic seizure. May repeat dose if no response after 15 minutes. 1 each 1     insulin glargine (LANTUS SOLOSTAR) 100 UNIT/ML pen INJECT 18 UNITS SUBCUTANEOUSLY DAILY. 15 mL 3     insulin lispro (HUMALOG KWIKPEN) 100 UNIT/ML (1 unit dial) KWIKPEN INJECT 8 UNITS SUBQ BEFORE BREAKFAST &1 U PER 12G OF CARBS FOR SNACKS &REMAINING MEALS OF THE DAY, ~5 UNITS @DINNER. MAX 30UNITS DAILY.       lipase-protease-amylase (CREON) 00059-11427-448604 units CPEP per EC capsule TAKE 4 CAPSULES WITH MEALS AND 3 CAPSULES WITH SNACKS, FOR 3 MEALS AND 2 SNACKS PER DAY. 3600 capsule 3     mvw complete formulation (CHEWABLES) tablet TAKE 2 TABLETS BY MOUTH DAILY. 180 tablet 3     PULMOZYME 2.5 MG/2.5ML neb solution NEBULIZE AND INHALE THE CONTENTS OF ONE VIAL(2.5ML) INTO THE LUNGS ONCE DAILY 75 mL  11     sodium chloride inhalant 7 % NEBU neb solution TAKE 4 MLS BY NEBULIZATION DAILY WHEN NOT ON CHER 360 mL 11     tobramycin, PF, (CHER) 300 MG/5ML neb solution NEBULIZE CONTENTS OF ONE VIALINTO THE LUNGS TWO TIMES A DAY FOR 28 DAYS AND 28 DAYS  mL 5     UREA 20 INTENSIVE HYDRATING 20 % external cream EXTERNALLY APPLY 1 DOSE TOPICALLY DAILY TO FOOT CALLUSES. 170 g 5     No current facility-administered medications for this visit.            Physical Exam:   /68 (BP Location: Right arm, Patient Position: Chair, Cuff Size: Adult Regular)   Pulse 82   Ht 1.524 m (5')   Wt 66.1 kg (145 lb 11.6 oz)   SpO2 96%   BMI 28.46 kg/m      GENERAL: alert, NAD  HEENT: NCAT, EOMI, anicteric sclera, no oral mucosal edema or erythema  Neck: no cervical or supraclavicular adenopathy  Respiratory: moderate airflow, mainly clear  CV: RRR, S1S2, no murmurs noted  Abdomen: normoactive BS, soft  Lymph: no edema, + digital clubbing  Neuro: AAO X 3, CN 2-12 grossly intact  Psychiatric: normal affect, good eye contact  Skin: no rash, jaundice or lesions on limited exam         Data:   All laboratory and imaging data reviewed.      Cystic Fibrosis Culture  Specimen Description   Date Value Ref Range Status   06/21/2021 Swab Sputum  Final   08/31/2020 Throat  Final   11/14/2019 Throat  Final    Culture Micro   Date Value Ref Range Status   06/21/2021 Heavy growth  Normal junaid    Final   06/21/2021 (A)  Final    Moderate growth  Methicillin resistant Staphylococcus aureus (MRSA)     08/31/2020 Moderate growth  Normal junaid    Final   08/31/2020 (A)  Final    Light growth  Methicillin resistant Staphylococcus aureus (MRSA)     08/31/2020 Light growth  Strain 2  Staphylococcus aureus   (A)  Final        PFT interpretation:  Maneuver: valid and meets ATS guidelines  Normal spirometry            Again, thank you for allowing me to participate in the care of your patient.        Sincerely,        Yahaira Trinidad,  ANDERSON    Electronically signed

## 2025-06-19 NOTE — PROGRESS NOTES
Medication Therapy Management (MTM) Encounter    ASSESSMENT:                            Medication Adherence/Access: No issues identified.    CF:   All modulator labs are within normal limits.      Pancreatic Insufficiency/Nutrition:   See above regarding [facility] latoya      CFRD:   Patient is not meeting A1c goal of <7% per CFF guidelines.      Rash:  Stable     PLAN:                            *** albuterol spacer  Check expiration date of Baqsimi - place on hold [facility] [place]  ***. Myc - ear ringing conversation with latrell    Follow-up: {followuptest2:280514}    SUBJECTIVE/OBJECTIVE:                          Marleni Alamo is a 29 year old female seen for a follow-up visit. Today's visit is a co-visit with Latrell Trinidad PA-C.      Reason for visit: Annual CF Medication Review.    Allergies/ADRs: Reviewed in chart  Past Medical History: Reviewed in chart  Tobacco: She reports that she has never smoked. She has never been exposed to tobacco smoke. She has never used smokeless tobacco.  Alcohol: none    Medication Adherence/Access:   Medication: Marleni manages her medications at home with help from Dad  Pharmacy: [place] [facility], local [facility]  Sometimes hard to get refills at [facility], wondering if there's an option for more than 30 day supplies.        CF:    Inhaled medications:              Bronchodilator: albuterol 0.083% nebs twice daily, albuterol inhaler HFA as needed (typically uses on humid days or with wildfires)              Mucolytic: Pulmozyme 2.5 mg daily, hypertonic saline 7% only when sick              Antibiotic: Corbin 300 mg/5 mL nebs twice daily (cycling 28 days on/off; on)              Other: Dulera 100-5 mcg 2 puffs twice daily, Flonase 1 spray each nostril twice daily as needed  Oral medications:              CFTR modulator: on Trikafta  *** notes ringing in the left ear (occurs infrequently but since childhood, can be bothersome, first time she mentions today)  Genotype:  [id number]    Pancreatic Insufficiency/Nutrition:   Creon 89439 taking 4 capsules with meals and 3 capsules with snacks.   Acid reducer: omeprazole 40 mg twice daily  Vitamins/Supplements : MVW chewable 2 daily, Vitron C twice weekly  Patient is not experiencing sign/symptoms of malabsorption.   ***      CFRD:    Lantus 14 units daily  Humalog 8 units with breakfast plus sliding scale (1:12 g of carbs)  Baqsimi available for urgent lows  SMBG: Uses Dexcom G7 with backup Accu-chek  meter and supplies.  Patient reported: 157 mg/dL today  Patient is experiencing hypoglycemia (rarely): last night reading of 48 mg/dL, may have correlated with Humalog (felt dizzy), ate cereal for low blood sugars  Recent symptoms of high blood sugar? none  Patient follows with Dr. Galindo for endocrinology. Last visit ***  ***     Rash:  Urea cream as needed   Hasn't needed recently, but does use for calluses around heals from leg braces. Not needing currently but likes to have available.    ----------------  I spent *** minutes with this patient today. All changes were made via verbal approval with Yahaira Trinidad PA-C.     A summary of these recommendations was given to the patient (see AVS from today's appointment with Yahaira Trinidad PA-C).    Mónica Chapman, PharmD   Cystic Fibrosis MTM Pharmacist  [facility]       Medication Therapy Recommendations  No medication therapy recommendations to display    ANDERSON).    Mónica Chapman, PharmD   Cystic Fibrosis MTM Pharmacist  [facility]       Medication Therapy Recommendations  CF (cystic fibrosis) (H)   1 Current Medication: albuterol (PROAIR HFA/PROVENTIL HFA/VENTOLIN HFA) 108 (90 Base) MCG/ACT inhaler   Current Medication Sig: Inhale 2 puffs into the lungs every 6 hours as needed for shortness of breath, wheezing or cough.   Rationale: Synergistic therapy - Needs additional medication therapy - Indication   Recommendation: Start Medication - SPACER/AERO CHAMBER MOUTHPIECE   Status: Accepted per Provider   Identified Date: 6/19/2025         Diabetes mellitus related to cystic fibrosis (H)   1 Current Medication: Glucagon (BAQSIMI TWO PACK) 3 MG/DOSE nasal powder   Current Medication Sig: Spray 1 spray (3 mg) in nostril as needed. in the event of unconscious hypoglycemia or hypoglycemic seizure. May repeat dose if no response after 15 minutes.   Rationale: More effective medication available - Ineffective medication - Effectiveness   Recommendation: Provide Education   Status: Accepted per Provider   Identified Date: 6/19/2025 Completed Date: 6/19/2025

## 2025-06-19 NOTE — PATIENT INSTRUCTIONS
"Cystic Fibrosis Self-Care Plan       Patient: Marleni Alamo   MRN: [id number]   Clinic Date: June 19, 2025     RECOMMENDATIONS:  1. Continue nebulizers and vest therapy.   2. Decrease omeprazole (Prilosec) to daily.   3. We will call you about abdominal xray results.    Annual Studies:   IGG   Date Value Ref Range Status   02/26/2018 1,320 695 - 1,620 mg/dL Final     Immunoglobulin G   Date Value Ref Range Status   11/12/2024 1,016 610 - 1,616 mg/dL Final     No results found for: \"INS\"  There are no preventive care reminders to display for this patient.    Pulmonary Function Tests  FEV1: amount of air you can blow out in 1 second  FVC: total amount of air you can take in and blow out    Your Goals:             Latest Ref Rng & Units 6/19/2025     9:14 AM   PFT   FVC L 2.89  P   FEV1 L 2.62  P   FVC% % 95  P   FEV1% % 100  P      P Preliminary result          Airway Clearance: The Most Important Way to Keep Your Lungs Healthy  Vest Settings:   Hill-Rom Frequencies: 8, 9, 10 Pressure 10 Then, Frequencies 18, 19, 20 Pressure 6     RespirTech: Quick Start with Pressure of     Do each frequency for 5 minutes; Deflate vest after each frequency & cough 3 times before beginning the next setting.    Vest and Neb Therapy should be done 2 times/day.    Good Nutrition Can Improve Lung Function and Overall Health    Take ALL of your vitamins with food    Take 1/2 of your enzymes before EVERY meal/snack and the other 1/2 mid-meal/snack    Wt Readings from Last 3 Encounters:   06/19/25 66.1 kg (145 lb 11.6 oz)   11/21/24 64.9 kg (143 lb 1.3 oz)   07/30/24 62.3 kg (137 lb 5.6 oz)       Body mass index is 28.46 kg/m .         National CF Foundation Recommendations for BMI in CF Adults: Women: at least 22 Men: at least 23        Controlling Blood Sugars Helps Prevent Lung Infections & Improves Nutrition  Test blood sugar:    In the morning before eating (goal is )    2 hours after a meal (goal is less than 150)    When " pre-meal glucose is greater than 150 add correction    At bedtime (if less than 100 eat a snack with 15 grams of carbohydrates  Last A1C Results:   Hemoglobin A1C   Date Value Ref Range Status   11/12/2024 7.2 (H) 0.0 - 5.6 % Final     Comment:     Normal <5.7%   Prediabetes 5.7-6.4%    Diabetes 6.5% or higher     Note: Adopted from ADA consensus guidelines.   08/31/2020 6.9 (H) 0 - 5.6 % Final     Comment:     Normal <5.7% Prediabetes 5.7-6.4%  Diabetes 6.5% or higher - adopted from ADA   consensus guidelines.           If diabetic, measure A1C every 6 months. Goal is under 7%.    Staying Healthy  Research: If you are interested in learning about research opportunities or have questions, please contact Stella Gatica at [phone] or [email or web address].     Foundation: Compass is a personalized resource service to help you with the insurance, financial, legal and other issues you are facing.  It's free, confidential and available to anyone with CF.  Ask your  for more information or contact Compass directly at [facility] ([phone]) or compass@cff.org, or learn more at cff.org/compass.       CF Nurse Line: Allyson Ramos and KJ: [phone]  Nabila Sheikh or Debbie Payne RT: [phone]    Megan Goddard and Renetta Gilmore , Dieticians: [phone]    Paulette Danielle, Diabetes Nurse: [phone]   Magi Sarkar: [phone] or Zehra Ware at [phone], Social Workers  [email or web address]

## 2025-06-19 NOTE — PROGRESS NOTES
Nutrition Note    Reason for Visit: PVP request for RD visit    Marleni says gas is an ongoing GI concern. Reports stools are normal.   Tried increasing Creon by 1 capsule without improvement.     Interventions/Recommendations:  1) Plan per provider for AXR to evaluate for stool burden. Reviewed rationale for testing, may use Miralax if warranted and benefit from maintenance therapy.     2) If AXR is normal, will plan to trial new enzyme brand. Consider Zenpep 25,000 -  4 caps with meals pending insurance coverage. Pt/family prefers to be contacted via phone call.      Renetta Gilmore RD, LD, CACFD  Cystic Fibrosis/Lung Transplant Dietitian  Available via [employer]

## 2025-06-23 ENCOUNTER — PATIENT OUTREACH (OUTPATIENT)
Dept: CARE COORDINATION | Facility: CLINIC | Age: 30
End: 2025-06-23
Payer: COMMERCIAL

## 2025-06-24 ENCOUNTER — RESULTS FOLLOW-UP (OUTPATIENT)
Dept: PULMONOLOGY | Facility: CLINIC | Age: 30
End: 2025-06-24
Payer: COMMERCIAL

## 2025-06-25 NOTE — PATIENT INSTRUCTIONS
See provider AVS for a summary of recommendations from today's visit.  Mónica Chapman, PharmD  Cystic Fibrosis MTM Pharmacist  [facility] [place]

## 2025-07-14 DIAGNOSIS — E84.9 DIABETES MELLITUS DUE TO CYSTIC FIBROSIS (H): ICD-10-CM

## 2025-07-14 DIAGNOSIS — E08.9 DIABETES MELLITUS DUE TO CYSTIC FIBROSIS (H): ICD-10-CM

## 2025-07-15 ENCOUNTER — OFFICE VISIT (OUTPATIENT)
Dept: ENDOCRINOLOGY | Facility: CLINIC | Age: 30
End: 2025-07-15
Attending: INTERNAL MEDICINE
Payer: COMMERCIAL

## 2025-07-15 VITALS
WEIGHT: 145.72 LBS | DIASTOLIC BLOOD PRESSURE: 68 MMHG | OXYGEN SATURATION: 99 % | HEART RATE: 70 BPM | HEIGHT: 60 IN | SYSTOLIC BLOOD PRESSURE: 108 MMHG | BODY MASS INDEX: 28.61 KG/M2

## 2025-07-15 DIAGNOSIS — E84.9 DIABETES MELLITUS DUE TO CYSTIC FIBROSIS (H): ICD-10-CM

## 2025-07-15 DIAGNOSIS — E08.9 DIABETES MELLITUS DUE TO CYSTIC FIBROSIS (H): ICD-10-CM

## 2025-07-15 PROCEDURE — 3078F DIAST BP <80 MM HG: CPT | Performed by: INTERNAL MEDICINE

## 2025-07-15 PROCEDURE — G2211 COMPLEX E/M VISIT ADD ON: HCPCS | Performed by: INTERNAL MEDICINE

## 2025-07-15 PROCEDURE — G0463 HOSPITAL OUTPT CLINIC VISIT: HCPCS | Performed by: INTERNAL MEDICINE

## 2025-07-15 PROCEDURE — 3074F SYST BP LT 130 MM HG: CPT | Performed by: INTERNAL MEDICINE

## 2025-07-15 PROCEDURE — 99213 OFFICE O/P EST LOW 20 MIN: CPT | Performed by: INTERNAL MEDICINE

## 2025-07-15 PROCEDURE — 1126F AMNT PAIN NOTED NONE PRSNT: CPT | Performed by: INTERNAL MEDICINE

## 2025-07-15 RX ORDER — BLOOD SUGAR DIAGNOSTIC
STRIP MISCELLANEOUS
Qty: 300 STRIP | Refills: 3 | OUTPATIENT
Start: 2025-07-15

## 2025-07-15 ASSESSMENT — PAIN SCALES - GENERAL: PAINLEVEL_OUTOF10: NO PAIN (0)

## 2025-07-15 NOTE — PROGRESS NOTES
Marleni is a 29 year old  female presents today for RECHECK (Return Cystic Fibrosis Diabetes )    HPI  29 year old  female being seen in follow-up for CF related diabetes.    She reports that she was diagnosed with CF related diabetes around age 14.  She also has history of pancreatic insufficiency and cerebral palsy.  Patient was accompanied by Jah (father)    She is currently using Lantus 14 units daily in the morning.  Takes around 8 units of Humalog for breakfast,    usually 3-4 units for lunch and dinner  Has not been able to count carbs and usually takes fixed dose for meals  Humalog correction 1 unit for every 50 above 200    On Trifakta   Uses Dexcom G7 CGM on and off  Currently using fingerstick glucose for monitoring  Data reviewed most readings range between 100-180  1 episode of hypoglycemia over the last 2 weeks with glucose reading of 48.  Patient attributes low to taking too much insulin for meal  Can feel symptoms of hypoglycemia  Denies any symptoms of severe hypoglycemia  Likes to swim during the summer season    Reports that A1c was checked within the last month by [facility] nurse.  They think that A1c was below 7%    Patient is able to self administer insulin.  Rotates injection sites between abdomen and arms and thigh  Father helps with putting the CGM    Hemoglobin A1C   Date Value Ref Range Status   11/12/2024 7.2 (H) 0.0 - 5.6 % Final     Comment:     Normal <5.7%   Prediabetes 5.7-6.4%    Diabetes 6.5% or higher     Note: Adopted from ADA consensus guidelines.   08/31/2020 6.9 (H) 0 - 5.6 % Final     Comment:     Normal <5.7% Prediabetes 5.7-6.4%  Diabetes 6.5% or higher - adopted from ADA   consensus guidelines.         Past Medical History:   Diagnosis Date    Atopy     Cerebral palsy (H)     Botox injextions, managed by Dr. 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 CALL CLINIC TO SCHEDULE FOLLOW UP APPOINTMENT. 300 strip 3    albuterol (PROAIR HFA/PROVENTIL HFA/VENTOLIN HFA) 108 (90 Base) MCG/ACT inhaler Inhale 2 puffs into the lungs every 6 hours as needed for shortness of breath, wheezing or cough. 18 g 3    albuterol (PROVENTIL) (2.5 MG/3ML) 0.083% neb solution TAKE 1 VIAL (2.5 MG) BY NEBULIZATION 2 TIMES DAILY 600 mL 11    BD PEN NEEDLE STEVIE 2ND GEN 32G X 4 MM miscellaneous USE UP TO 4 PEN NEEDLES DAILY AS DIRECTED. FOLLOW UP VISIT NEEDED. FOR FUTURE REFILLS CALL  TO SCHEDULE. 400 each 0    Continuous Glucose Sensor (DEXCOM G7 SENSOR) MISC Change sensor every 10 days. 9 each 1    DULERA 100-5 MCG/ACT inhaler INHALE 2 PUFFS INTO THE LUNGS 2 TIMES DAILY 39 g 2    Elemental iron 65 mg Vitamin C 125 mg (VITRON-C)  MG TABS tablet Take 1 tablet by mouth twice a week. 60 tablet 1    elexacaftor-tezacaftor-ivacaftor & ivacaftor (TRIKAFTA) 100-50-75 & 150 MG tablet pack TAKE TWO ORANGE TABLETS BY MOUTH IN THE MORNING.  TAKE WITH FAT CONTAINING FOOD. (SKIP BLUE TABLET)  84 tablet 4    fluticasone (FLONASE) 50 MCG/ACT nasal spray Spray 1 spray into both nostrils 2 times daily      Glucagon (BAQSIMI TWO PACK) 3 MG/DOSE nasal powder Spray 1 spray (3 mg) in nostril as needed. in the event of unconscious hypoglycemia or hypoglycemic seizure. May repeat dose if no response after 15 minutes. 1 each 1    insulin glargine (LANTUS SOLOSTAR) 100 UNIT/ML pen INJECT 18 UNITS SUBCUTANEOUSLY DAILY. 15 mL 3    insulin lispro (HUMALOG KWIKPEN) 100 UNIT/ML (1 unit dial) KWIKPEN INJECT 8 UNITS SUBQ BEFORE BREAKFAST &1 U PER 12G OF CARBS FOR SNACKS &REMAINING MEALS OF THE DAY, ~5 UNITS @DINNER. MAX 30UNITS DAILY.      lipase-protease-amylase (CREON) 55819-19412-442024 units CPEP per EC capsule TAKE 4 CAPSULES WITH MEALS AND 3 CAPSULES WITH SNACKS, FOR 3 MEALS AND 2 SNACKS PER DAY. 3600 capsule 3    mvw complete formulation (CHEWABLES) tablet TAKE 2 TABLETS BY MOUTH DAILY. 180 tablet 3    omeprazole (PRILOSEC) 40 MG DR capsule Take 1 capsule (40 mg) by mouth daily.      PULMOZYME 2.5 MG/2.5ML neb solution NEBULIZE AND INHALE THE CONTENTS OF ONE VIAL(2.5ML) INTO THE LUNGS ONCE DAILY 75 mL 11    sodium chloride inhalant 7 % NEBU neb solution TAKE 4 MLS BY NEBULIZATION DAILY WHEN NOT ON CHER 360 mL 11    tobramycin, PF, (CHER) 300 MG/5ML neb solution NEBULIZE CONTENTS OF ONE VIALINTO THE LUNGS TWO TIMES A DAY FOR 28 DAYS AND 28 DAYS  mL 5    UREA 20 INTENSIVE HYDRATING 20 % external cream EXTERNALLY APPLY 1 DOSE TOPICALLY DAILY TO FOOT CALLUSES. 170 g 5     Family History  family history includes Breast Cancer in an other family member; Diabetes Type 1 in her maternal grandmother and maternal uncle; Diabetes Type 1 (age of onset: 12) in her mother; Thyroid Disease in her maternal aunt, maternal grandmother, mother, and paternal uncle.  Social History     Socioeconomic History    Marital status: Single     Spouse name: Not on file    Number of children: Not on file    Years of education: Not on file     Highest education level: Not on file   Occupational History    Not on file   Tobacco Use    Smoking status: Never     Passive exposure: Never    Smokeless tobacco: Never   Vaping Use    Vaping status: Never Used   Substance and Sexual Activity    Alcohol use: No     Alcohol/week: 0.0 standard drinks of alcohol    Drug use: No    Sexual activity: Never   Other Topics Concern    Not on file   Social History Narrative    Marleni lives at home with mother in [place] with 2 dogs and a cat. 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 Marleni goes to [employer] school.      Social Drivers of Health     Financial Resource Strain: Not on file   Food Insecurity: Not on file   Transportation Needs: Not on file   Physical Activity: Not on file   Stress: Not on file   Social Connections: Not on file   Interpersonal Safety: Not on file   Housing Stability: Not on file   mother had hx of T1D  Live with her father     Physical Exam  /68   Pulse 70   Ht 1.524 m (5')   Wt 66.1 kg (145 lb 11.5 oz)   SpO2 99%   BMI 28.46 kg/m    Body mass index is 28.46 kg/m .    GENERAL: Healthy, alert and no distress  EYES: Eyes grossly normal to inspection.  No discharge or erythema, or obvious scleral/conjunctival abnormalities.  RESP: No audible wheeze, cough, or visible cyanosis.  No visible retractions or increased work of breathing.    NEURO: Cranial nerves grossly intact.  Mentation and speech appropriate for age.  PSYCH:  affect normal/bright,  normal speech and appearance well-groomed.    RESULTS    Hemoglobin A1C   Date Value Ref Range Status   11/12/2024 7.2 (H) 0.0 - 5.6 % Final     Comment:     Normal <5.7%   Prediabetes 5.7-6.4%    Diabetes 6.5% or higher     Note: Adopted from ADA consensus guidelines.   08/31/2020 6.9 (H) 0 - 5.6 % Final     Comment:     Normal <5.7% Prediabetes 5.7-6.4%  Diabetes 6.5% or higher - adopted from ADA   consensus guidelines.         Assessment/Plan:    Cystic fibrosis related diabetes:   Overall appears  to be in good control based on limited fingerstick glucose data  Recent A1c was done by home health care nurse  Patient and father were advised to share A1c results through MyChart  Counseled to continue using CGM periodically particularly before clinic visits  No change in insulin regimen today    Return to clinic in about 6 months    DAVID Herrera    Note: Chart documentation done in part with Dragon Voice Recognition software. Although reviewed after completion, some word and grammatical errors may remain.  Please consider this when interpreting information in this chart

## 2025-07-15 NOTE — NURSING NOTE
Chief Complaint   Patient presents with    RECHECK     Return Cystic Fibrosis Diabetes     Medications reviewed and vital signs taken.   Mecca Thornton, CMA

## 2025-07-15 NOTE — LETTER
7/15/2025       RE: Marleni Alamo  [address] [place] [address]     Dear Colleague,    Thank you for referring your patient, Marleni Alamo, to the [facility] [place] at [facility]. Please see a copy of my visit note below.    Outcome for 07/14/25 12:24 PM: [employer] message sent                  Marleni is a 29 year old  female presents today for RECHECK (Return Cystic Fibrosis Diabetes )    HPI  29 year old  female being seen in follow-up for CF related diabetes.    She reports that she was diagnosed with CF related diabetes around age 14.  She also has history of pancreatic insufficiency and cerebral palsy.  Patient was accompanied by Jah (father)    She is currently using Lantus 14 units daily in the morning.  Takes around 8 units of Humalog for breakfast,    usually 3-4 units for lunch and dinner  Has not been able to count carbs and usually takes fixed dose for meals  Humalog correction 1 unit for every 50 above 200    On Trifakta   Uses Dexcom G7 CGM on and off  Currently using fingerstick glucose for monitoring  Data reviewed most readings range between 100-180  1 episode of hypoglycemia over the last 2 weeks with glucose reading of 48.  Patient attributes low to taking too much insulin for meal  Can feel symptoms of hypoglycemia  Denies any symptoms of severe hypoglycemia  Likes to swim during the summer season    Reports that A1c was checked within the last month by [facility] home care nurse.  They think that A1c was below 7%    Patient is able to self administer insulin.  Rotates injection sites between abdomen and arms and thigh  Father helps with putting the CGM    Hemoglobin A1C   Date Value Ref Range Status   11/12/2024 7.2 (H) 0.0 - 5.6 % Final     Comment:     Normal <5.7%   Prediabetes 5.7-6.4%    Diabetes 6.5% or higher     Note: Adopted from ADA consensus guidelines.   08/31/2020 6.9 (H) 0 - 5.6 % Final      Comment:     Normal <5.7% Prediabetes 5.7-6.4%  Diabetes 6.5% or higher - adopted from ADA   consensus guidelines.         Past Medical History:   Diagnosis Date     Atopy      Cerebral palsy (H)     Botox injextions, managed by Dr. John Marley     CF (cystic fibrosis) (H) 07/25/2016    Sweat Test: 8/15/95 Value: 85.0 Genotype: [id number], H/O Pseudomonas and MRSA, Baseline FEV1  2.48, FVC 2.76 (7/2015)     Cholelithiasis      Chronic pansinusitis 07/25/2016     Diabetes mellitus due to cystic fibrosis (H) 07/25/2016     Diabetes mellitus related to cystic fibrosis (H) 07/25/2016     Gastroesophageal reflux disease without esophagitis 07/25/2016     Infection due to 2019 novel coronavirus 05/2022    Minimal symptoms     MRSA (methicillin resistant staph aureus) culture positive      Pancreatic insufficiency 07/25/2016     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 CALL CLINIC TO SCHEDULE FOLLOW UP APPOINTMENT. 300 strip 3     albuterol (PROAIR HFA/PROVENTIL HFA/VENTOLIN HFA) 108 (90 Base) MCG/ACT inhaler Inhale 2 puffs into the lungs every 6 hours as needed for shortness of breath, wheezing or cough. 18 g 3     albuterol (PROVENTIL) (2.5 MG/3ML) 0.083% neb solution TAKE 1 VIAL (2.5 MG) BY NEBULIZATION 2 TIMES DAILY 600 mL 11     BD PEN NEEDLE STEVIE 2ND GEN 32G X 4 MM miscellaneous USE UP TO 4 PEN NEEDLES DAILY AS DIRECTED. FOLLOW UP VISIT NEEDED. FOR FUTURE REFILLS CALL  TO SCHEDULE. 400 each 0     Continuous Glucose Sensor (DEXCOM G7 SENSOR) MISC Change sensor every 10  days. 9 each 1     DULERA 100-5 MCG/ACT inhaler INHALE 2 PUFFS INTO THE LUNGS 2 TIMES DAILY 39 g 2     Elemental iron 65 mg Vitamin C 125 mg (VITRON-C)  MG TABS tablet Take 1 tablet by mouth twice a week. 60 tablet 1     elexacaftor-tezacaftor-ivacaftor & ivacaftor (TRIKAFTA) 100-50-75 & 150 MG tablet pack TAKE TWO ORANGE TABLETS BY MOUTH IN THE MORNING.  TAKE WITH FAT CONTAINING FOOD. (SKIP BLUE TABLET) 84 tablet 4     fluticasone (FLONASE) 50 MCG/ACT nasal spray Spray 1 spray into both nostrils 2 times daily       Glucagon (BAQSIMI TWO PACK) 3 MG/DOSE nasal powder Spray 1 spray (3 mg) in nostril as needed. in the event of unconscious hypoglycemia or hypoglycemic seizure. May repeat dose if no response after 15 minutes. 1 each 1     insulin glargine (LANTUS SOLOSTAR) 100 UNIT/ML pen INJECT 18 UNITS SUBCUTANEOUSLY DAILY. 15 mL 3     insulin lispro (HUMALOG KWIKPEN) 100 UNIT/ML (1 unit dial) KWIKPEN INJECT 8 UNITS SUBQ BEFORE BREAKFAST &1 U PER 12G OF CARBS FOR SNACKS &REMAINING MEALS OF THE DAY, ~5 UNITS @DINNER. MAX 30UNITS DAILY.       lipase-protease-amylase (CREON) 96598-29193-013325 units CPEP per EC capsule TAKE 4 CAPSULES WITH MEALS AND 3 CAPSULES WITH SNACKS, FOR 3 MEALS AND 2 SNACKS PER DAY. 3600 capsule 3     mvw complete formulation (CHEWABLES) tablet TAKE 2 TABLETS BY MOUTH DAILY. 180 tablet 3     omeprazole (PRILOSEC) 40 MG DR capsule Take 1 capsule (40 mg) by mouth daily.       PULMOZYME 2.5 MG/2.5ML neb solution NEBULIZE AND INHALE THE CONTENTS OF ONE VIAL(2.5ML) INTO THE LUNGS ONCE DAILY 75 mL 11     sodium chloride inhalant 7 % NEBU neb solution TAKE 4 MLS BY NEBULIZATION DAILY WHEN NOT ON CHER 360 mL 11     tobramycin, PF, (CHER) 300 MG/5ML neb solution NEBULIZE CONTENTS OF ONE VIALINTO THE LUNGS TWO TIMES A DAY FOR 28 DAYS AND 28 DAYS  mL 5     UREA 20 INTENSIVE HYDRATING 20 % external cream EXTERNALLY APPLY 1 DOSE TOPICALLY DAILY TO FOOT CALLUSES. 170 g 5     Family History  family  history includes Breast Cancer in an other family member; Diabetes Type 1 in her maternal grandmother and maternal uncle; Diabetes Type 1 (age of onset: 12) in her mother; Thyroid Disease in her maternal aunt, maternal grandmother, mother, and paternal uncle.  Social History     Socioeconomic History     Marital status: Single     Spouse name: Not on file     Number of children: Not on file     Years of education: Not on file     Highest education level: Not on file   Occupational History     Not on file   Tobacco Use     Smoking status: Never     Passive exposure: Never     Smokeless tobacco: Never   Vaping Use     Vaping status: Never Used   Substance and Sexual Activity     Alcohol use: No     Alcohol/week: 0.0 standard drinks of alcohol     Drug use: No     Sexual activity: Never   Other Topics Concern     Not on file   Social History Narrative    Marleni lives at home with mother in [place] with 2 dogs and a cat. 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 Marleni goes to [employer] school.      Social Drivers of Health     Financial Resource Strain: Not on file   Food Insecurity: Not on file   Transportation Needs: Not on file   Physical Activity: Not on file   Stress: Not on file   Social Connections: Not on file   Interpersonal Safety: Not on file   Housing Stability: Not on file   mother had hx of T1D  Live with her father     Physical Exam  /68   Pulse 70   Ht 1.524 m (5')   Wt 66.1 kg (145 lb 11.5 oz)   SpO2 99%   BMI 28.46 kg/m    Body mass index is 28.46 kg/m .    GENERAL: Healthy, alert and no distress  EYES: Eyes grossly normal to inspection.  No discharge or erythema, or obvious scleral/conjunctival abnormalities.  RESP: No audible wheeze, cough, or visible cyanosis.  No visible retractions or increased work of breathing.    NEURO: Cranial nerves grossly intact.  Mentation and speech appropriate for age.  PSYCH:  affect normal/bright,  normal speech and appearance  well-groomed.    RESULTS    Hemoglobin A1C   Date Value Ref Range Status   11/12/2024 7.2 (H) 0.0 - 5.6 % Final     Comment:     Normal <5.7%   Prediabetes 5.7-6.4%    Diabetes 6.5% or higher     Note: Adopted from ADA consensus guidelines.   08/31/2020 6.9 (H) 0 - 5.6 % Final     Comment:     Normal <5.7% Prediabetes 5.7-6.4%  Diabetes 6.5% or higher - adopted from ADA   consensus guidelines.         Assessment/Plan:    Cystic fibrosis related diabetes:   Overall appears to be in good control based on limited fingerstick glucose data  Recent A1c was done by home health care nurse  Patient and father [facility]  Counseled to continue using CGM periodically particularly before clinic visits  No change in insulin regimen today    Return to clinic in about 6 months    DAVID Herrera    Note: Chart documentation done in part with Dragon Voice Recognition software. Although reviewed after completion, some word and grammatical errors may remain.  Please consider this when interpreting information in this chart               Again, thank you for allowing me to participate in the care of your patient.      Sincerely,    Anjelica Galindo MD

## 2025-07-21 ENCOUNTER — OFFICE VISIT (OUTPATIENT)
Dept: PODIATRY | Facility: CLINIC | Age: 30
End: 2025-07-21
Payer: COMMERCIAL

## 2025-07-21 DIAGNOSIS — R60.0 PERIPHERAL EDEMA: ICD-10-CM

## 2025-07-21 DIAGNOSIS — E11.69 TYPE 2 DIABETES MELLITUS WITH DIABETIC FOOT DEFORMITY (H): Primary | ICD-10-CM

## 2025-07-21 DIAGNOSIS — M21.969 TYPE 2 DIABETES MELLITUS WITH DIABETIC FOOT DEFORMITY (H): Primary | ICD-10-CM

## 2025-07-21 DIAGNOSIS — M72.2 PLANTAR FASCIITIS, BILATERAL: ICD-10-CM

## 2025-07-21 PROCEDURE — 99213 OFFICE O/P EST LOW 20 MIN: CPT | Performed by: PODIATRIST

## 2025-07-21 NOTE — PROGRESS NOTES
Past Medical History:   Diagnosis Date    Atopy     Cerebral palsy (H)     Botox injextions, managed by Dr. 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    Seizure disorder (H)     Multiple daily in infancy now infrequent (every few years)    Thrush, oral      Patient Active Problem List   Diagnosis    CF (cystic fibrosis) (H)    Diabetes mellitus due to cystic fibrosis (H)    Diabetes mellitus related to cystic fibrosis (H)    Pancreatic insufficiency    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    Gastroesophageal reflux disease without esophagitis    Chronic pansinusitis    Diabetes mellitus, type 2 (H)     Past Surgical History:   Procedure Laterality Date    bilat antrostomies  04/2011    Bilater knee surgery with plates and pins Bilateral 2007    CHOLECYSTECTOMY, LAPOROSCOPIC  02/2015    Cleft palate repair and mandibular advancement  1996    gastrostomy in infancy      Multiple PET (ear tubes)      Multiple sinus surgeries  2015    OPTICAL TRACKING SYSTEM ENDOSCOPIC SINUS SURGERY Bilateral 03/01/2019    Procedure: Stealth Assisted Bilateral Maxillary Antrostomy, Ethmoidectomy, Sphenoidotomy, Frontal Sinusotomy;  Surgeon: Anny Carbajal MD;  Location: [facility] OR    RESECT TRACHEA WITH RECONSTRUCTION CHILD  1998    Rib for reconstruction    STRABISMUS SURGERY      surgery for meconium ileus, partial bowel resection  08/1995    Details not available     tracheostomy in infancy       Social History     Socioeconomic History    Marital status: Single     Spouse name: Not on file    Number of children: Not on file    Years of education: Not on file    Highest education level: Not on file   Occupational History    Not on file   Tobacco Use    Smoking status: Never     Passive exposure: Never    Smokeless tobacco: Never   Vaping Use    Vaping status: Never Used   Substance and Sexual Activity    Alcohol use: No     Alcohol/week: 0.0 standard drinks of alcohol    Drug use: No    Sexual activity: Never   Other Topics Concern    Not on file   Social History Narrative    Marleni lives at home with mother in [place] with 2 dogs and a cat. 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 Marleni goes to transition school.      Social Drivers of Health     Financial Resource Strain: Not on file   Food Insecurity: Not on file   Transportation Needs: Not on file   Physical Activity: Not on file   Stress: Not on file   Social Connections: Not on file   Interpersonal Safety: Not on file   Housing Stability: Not on file     Family History   Problem Relation Age of Onset    Diabetes Type 1 Mother 12    Thyroid Disease Mother     Diabetes Type 1 Maternal Uncle     Diabetes Type 1 Maternal Grandmother     Thyroid Disease Maternal Grandmother     Breast Cancer Other         Great Grandmother    Thyroid Disease Paternal Uncle     Thyroid Disease Maternal Aunt          Lab Results   Component Value Date    A1C 7.2 11/12/2024    A1C 6.5 12/15/2023    A1C 7.0 11/10/2022    A1C 6.8 10/26/2021    A1C 6.9 08/31/2020    A1C 8.1 02/26/2018       Last Comprehensive Metabolic Panel:  Lab Results   Component Value Date     11/12/2024    POTASSIUM 5.1 11/12/2024    CHLORIDE 102 11/12/2024    CO2 26 11/12/2024    ANIONGAP 10 11/12/2024     (H) 11/12/2024    BUN 11.5 11/12/2024    CR 0.57 11/12/2024    GFRESTIMATED >90 11/12/2024    RUPERT 9.9 11/12/2024       Last Comprehensive Metabolic Panel:  Lab  Results   Component Value Date     11/12/2024    POTASSIUM 5.1 11/12/2024    CHLORIDE 102 11/12/2024    CO2 26 11/12/2024    ANIONGAP 10 11/12/2024     (H) 11/12/2024    BUN 11.5 11/12/2024    CR 0.57 11/12/2024    GFRESTIMATED >90 11/12/2024    RUPERT 9.9 11/12/2024             Subjective findings- 29-year-old returns clinic with caregiver for diabetes with equinus and hammertoes.  Relates she  has been getting plantar foot pain bilaterally when she is active.  Relates she got her new AFOs and those are good but feels she could use more padding in them.  Relates no ulcers, no sores, no injuries since we seen her last.  Relates to no numbness, no tingling, no neuropathy in the feet.  Relates her feet do swell up especially in the summer and if she is active.    Objective findings- DP and PT are 2 out of 4 bilaterally.  Has equinus bilaterally.  Has dorsal contracted digits bilaterally.  Has decreased ankle joint dorsiflexion bilaterally.  Has prominent plantar 2 through 4 MPJs bilaterally.  There is no erythema, no edema, no drainage, no odor, no calor, no tendon voids, no pain on palpation bilaterally.  She relates the pain is on the plantar arch and heel when it occurs.  Sharp/dull is intact with 5.07 [place] monofilament bilaterally, proprioception intact bilaterally, deep tendon reflex are intact bilaterally.    Assessment and plan- Diabetes with Equinus contracture and Hammertoes.  Callus appears resolved.  Plantar Fasciitis bilaterally.  Diagnosis and treatment options discussed with them.  Referral back to orthotics and prosthetics to see if they can put new lining or adjust her AFOs.  Prescription for Voltaren gel given and use discussed with them.  Advised her on stretching.  Prescription for compression socks 15 to 20 mmHg given use discussed with them.  They opted for no physical therapy today.  Labs reviewed as well.  No imaging today previous notes reviewed.  Return to clinic and see  me in 6 months.                                              Low to moderte level of medical decision making.

## 2025-07-21 NOTE — NURSING NOTE
Marleni Alamo's chief complaint for this visit includes:  Chief Complaint   Patient presents with    Consult     Diabetic foot check and foot cares     PCP: [facility]    Referring Provider:  Referred Self, MD  No address on file    There were no vitals taken for this visit.  Data Unavailable     Do you need any medication refills at today's visit? NO    Allergies   Allergen Reactions    Amoxicillin-Pot Clavulanate Hives     Per mother    Ceftin Hives     Per mother    Vancomycin Hives     Per mother       Evi Spangler LPN

## 2025-07-21 NOTE — LETTER
7/21/2025      Marleni Alamo  [address] [place] [address]      Dear Colleague,    Thank you for referring your patient, Marleni Alamo, to the [facility]. Please see a copy of my visit note below.    Past Medical History:   Diagnosis Date     Atopy      Cerebral palsy (H)     Botox injextions, managed by Dr. 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) 07/25/2016     Diabetes mellitus related to cystic fibrosis (H) 07/25/2016     Gastroesophageal reflux disease without esophagitis 07/25/2016     Infection due to 2019 novel coronavirus 05/2022    Minimal symptoms     MRSA (methicillin resistant staph aureus) culture positive      Pancreatic insufficiency 07/25/2016     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     Seizure disorder (H)     Multiple daily in infancy now infrequent (every few years)     Thrush, oral      Patient Active Problem List   Diagnosis     CF (cystic fibrosis) (H)     Diabetes mellitus due to cystic fibrosis (H)     Diabetes mellitus related to cystic fibrosis (H)     Pancreatic insufficiency     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     Gastroesophageal reflux disease without esophagitis     Chronic pansinusitis     Diabetes mellitus, type 2 (H)     Past Surgical History:   Procedure Laterality Date     bilat antrostomies  04/2011     Bilater knee surgery with plates and pins Bilateral 2007     CHOLECYSTECTOMY, LAPOROSCOPIC  02/2015     Cleft palate repair and mandibular advancement  1996     gastrostomy in infancy       Multiple PET (ear tubes)       Multiple sinus surgeries  2015     OPTICAL TRACKING SYSTEM ENDOSCOPIC SINUS SURGERY Bilateral 03/01/2019    Procedure: Stealth Assisted Bilateral Maxillary Antrostomy, Ethmoidectomy,  Sphenoidotomy, Frontal Sinusotomy;  Surgeon: Anny Carbajal MD;  Location: [facility] OR     RESECT TRACHEA WITH RECONSTRUCTION CHILD  1998    Rib for reconstruction     STRABISMUS SURGERY       surgery for meconium ileus, partial bowel resection  08/1995    Details not available     tracheostomy in infancy       Social History     Socioeconomic History     Marital status: Single     Spouse name: Not on file     Number of children: Not on file     Years of education: Not on file     Highest education level: Not on file   Occupational History     Not on file   Tobacco Use     Smoking status: Never     Passive exposure: Never     Smokeless tobacco: Never   Vaping Use     Vaping status: Never Used   Substance and Sexual Activity     Alcohol use: No     Alcohol/week: 0.0 standard drinks of alcohol     Drug use: No     Sexual activity: Never   Other Topics Concern     Not on file   Social History Narrative    Marleni lives at home with mother in [place] with 2 dogs and a cat. 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 Marleni goes to [employer] school.      Social Drivers of Health     Financial Resource Strain: Not on file   Food Insecurity: Not on file   Transportation Needs: Not on file   Physical Activity: Not on file   Stress: Not on file   Social Connections: Not on file   Interpersonal Safety: Not on file   Housing Stability: Not on file     Family History   Problem Relation Age of Onset     Diabetes Type 1 Mother 12     Thyroid Disease Mother      Diabetes Type 1 Maternal Uncle      Diabetes Type 1 Maternal Grandmother      Thyroid Disease Maternal Grandmother      Breast Cancer Other         Great Grandmother     Thyroid Disease Paternal Uncle      Thyroid Disease Maternal Aunt          Lab Results   Component Value Date    A1C 7.2 11/12/2024    A1C 6.5 12/15/2023    A1C 7.0 11/10/2022    A1C 6.8 10/26/2021    A1C 6.9 08/31/2020    A1C 8.1 02/26/2018       Last Comprehensive Metabolic Panel:  Lab Results   Component Value Date      11/12/2024    POTASSIUM 5.1 11/12/2024    CHLORIDE 102 11/12/2024    CO2 26 11/12/2024    ANIONGAP 10 11/12/2024     (H) 11/12/2024    BUN 11.5 11/12/2024    CR 0.57 11/12/2024    GFRESTIMATED >90 11/12/2024    RUPERT 9.9 11/12/2024       Last Comprehensive Metabolic Panel:  Lab Results   Component Value Date     11/12/2024    POTASSIUM 5.1 11/12/2024    CHLORIDE 102 11/12/2024    CO2 26 11/12/2024    ANIONGAP 10 11/12/2024     (H) 11/12/2024    BUN 11.5 11/12/2024    CR 0.57 11/12/2024    GFRESTIMATED >90 11/12/2024    RUPERT 9.9 11/12/2024             Subjective findings- 29-year-old returns clinic with caregiver for diabetes with equinus and hammertoes.  Relates she  has been getting plantar foot pain bilaterally when she is active.  Relates she got her new AFOs and those are good but feels she could use more padding in them.  Relates no ulcers, no sores, no injuries since we seen her last.  Relates to no numbness, no tingling, no neuropathy in the feet.  Relates her feet do swell up especially in the summer and if she is active.    Objective findings- DP and PT are 2 out of 4 bilaterally.  Has equinus bilaterally.  Has dorsal contracted digits bilaterally.  Has decreased ankle joint dorsiflexion bilaterally.  Has prominent plantar 2 through 4 MPJs bilaterally.  There is no erythema, no edema, no drainage, no odor, no calor, no tendon voids, no pain on palpation bilaterally.  She relates the pain is on the plantar arch and heel when it occurs.  Sharp/dull is intact with 5.07 Everett Delmer monofilament bilaterally, proprioception intact bilaterally, deep tendon reflex are intact bilaterally.    Assessment and plan- Diabetes with Equinus contracture and Hammertoes.  Callus appears resolved.  Plantar Fasciitis bilaterally.  Diagnosis and treatment options discussed with them.  Referral back to orthotics and prosthetics to see if they can put new lining or adjust her AFOs.  Prescription for  Voltaren gel given and use discussed with them.  Advised her on stretching.  Prescription for compression socks 15 to 20 mmHg given use discussed with them.  They opted for no physical therapy today.  Labs reviewed as well.  No imaging today previous notes reviewed.  Return to clinic and see me in 6 months.                                              Low to moderte level of medical decision making.    Again, thank you for allowing me to participate in the care of your patient.        Sincerely,        Yong Mosley DPM    Electronically signed

## 2025-08-05 ENCOUNTER — PHARMACY VISIT (OUTPATIENT)
Dept: ADMINISTRATIVE | Facility: CLINIC | Age: 30
End: 2025-08-05
Payer: COMMERCIAL

## 2025-08-05 DIAGNOSIS — E84.9 CF (CYSTIC FIBROSIS) (H): ICD-10-CM

## 2025-08-05 RX ORDER — OMEPRAZOLE 40 MG/1
40 CAPSULE, DELAYED RELEASE ORAL DAILY
Qty: 90 CAPSULE | Refills: 2 | Status: SHIPPED | OUTPATIENT
Start: 2025-08-05

## 2025-08-05 RX ORDER — OMEPRAZOLE 40 MG/1
40 CAPSULE, DELAYED RELEASE ORAL 2 TIMES DAILY
Qty: 200 CAPSULE | Refills: 3 | OUTPATIENT
Start: 2025-08-05

## 2025-08-05 RX ORDER — MOMETASONE FUROATE AND FORMOTEROL FUMARATE DIHYDRATE 100; 5 UG/1; UG/1
2 AEROSOL RESPIRATORY (INHALATION) 2 TIMES DAILY
Qty: 39 G | Refills: 2 | Status: SHIPPED | OUTPATIENT
Start: 2025-08-05

## 2025-08-09 DIAGNOSIS — E08.9 DIABETES MELLITUS RELATED TO CYSTIC FIBROSIS (H): ICD-10-CM

## 2025-08-09 DIAGNOSIS — E84.8 DIABETES MELLITUS RELATED TO CYSTIC FIBROSIS (H): ICD-10-CM

## 2025-08-12 RX ORDER — PEN NEEDLE, DIABETIC 32GX 5/32"
NEEDLE, DISPOSABLE MISCELLANEOUS
Qty: 400 EACH | Refills: 2 | Status: SHIPPED | OUTPATIENT
Start: 2025-08-12

## (undated) DEVICE — SYR 10ML FINGER CONTROL W/O NDL 309695

## (undated) DEVICE — DRAPE WARMER 66X44" ORS-300

## (undated) DEVICE — TUBING SUCTION MEDI-VAC SOFT 3/16"X20' N520A

## (undated) DEVICE — SYR 30ML SLIP TIP W/O NDL 302833

## (undated) DEVICE — ESU GROUND PAD ADULT W/CORD E7507

## (undated) DEVICE — TUBING IRRIGATOR STRAIGHTSHOT XPS 1895522

## (undated) DEVICE — LINEN TOWEL PACK X6 WHITE 5487

## (undated) DEVICE — BLADE SINUS TRICUT STR 4MMX13CM FUSION ROTATE W/TRACKING

## (undated) DEVICE — SOL NACL 0.9% IRRIG 1000ML BOTTLE 2F7124

## (undated) DEVICE — SOL NACL 0.9% INJ 1000ML BAG 2B1324X

## (undated) DEVICE — SYR 30ML LL W/O NDL 302832

## (undated) DEVICE — SPONGE COTTONOID 1/2X1/2" 20-04S

## (undated) DEVICE — LINEN TOWEL PACK X5 5464

## (undated) DEVICE — DRSG TEGADERM 2 3/8X2 3/4" 1624W

## (undated) DEVICE — SPONGE COTTONOID 1/2X3" 20-07S

## (undated) DEVICE — SUCTION MANIFOLD DORNOCH ULTRA CART UL-CL500

## (undated) DEVICE — APPLICATOR COTTON TIP 6"X2 STERILE LF 6012

## (undated) DEVICE — SYR 03ML LL W/O NDL 309657

## (undated) DEVICE — WIPE INSTRUMENT MEROCEL 400200

## (undated) DEVICE — SOL WATER IRRIG 1000ML BOTTLE 2F7114

## (undated) DEVICE — TRACKER ENT OTS INSTRUMENT FUSION 9733533

## (undated) DEVICE — TRACKER PATIENT NON-INVASIVE AXIEM 9734887XOM

## (undated) DEVICE — PACK ENT ENDOSCOPY UMMC

## (undated) RX ORDER — PHENYLEPHRINE HCL IN 0.9% NACL 1 MG/10 ML
SYRINGE (ML) INTRAVENOUS
Status: DISPENSED
Start: 2019-03-01

## (undated) RX ORDER — LIDOCAINE HYDROCHLORIDE AND EPINEPHRINE 10; 10 MG/ML; UG/ML
INJECTION, SOLUTION INFILTRATION; PERINEURAL
Status: DISPENSED
Start: 2019-03-01

## (undated) RX ORDER — PROPOFOL 10 MG/ML
INJECTION, EMULSION INTRAVENOUS
Status: DISPENSED
Start: 2019-03-01

## (undated) RX ORDER — GLYCOPYRROLATE 0.2 MG/ML
INJECTION, SOLUTION INTRAMUSCULAR; INTRAVENOUS
Status: DISPENSED
Start: 2019-03-01

## (undated) RX ORDER — DEXAMETHASONE SODIUM PHOSPHATE 4 MG/ML
INJECTION, SOLUTION INTRA-ARTICULAR; INTRALESIONAL; INTRAMUSCULAR; INTRAVENOUS; SOFT TISSUE
Status: DISPENSED
Start: 2019-03-01

## (undated) RX ORDER — ESMOLOL HYDROCHLORIDE 10 MG/ML
INJECTION INTRAVENOUS
Status: DISPENSED
Start: 2019-03-01

## (undated) RX ORDER — OXYMETAZOLINE HYDROCHLORIDE 0.05 G/100ML
SPRAY NASAL
Status: DISPENSED
Start: 2019-03-01

## (undated) RX ORDER — FENTANYL CITRATE 50 UG/ML
INJECTION, SOLUTION INTRAMUSCULAR; INTRAVENOUS
Status: DISPENSED
Start: 2019-03-01

## (undated) RX ORDER — EPINEPHRINE NASAL SOLUTION 1 MG/ML
SOLUTION NASAL
Status: DISPENSED
Start: 2019-03-01

## (undated) RX ORDER — LIDOCAINE HYDROCHLORIDE 20 MG/ML
INJECTION, SOLUTION EPIDURAL; INFILTRATION; INTRACAUDAL; PERINEURAL
Status: DISPENSED
Start: 2019-03-01

## (undated) RX ORDER — EPHEDRINE SULFATE 50 MG/ML
INJECTION, SOLUTION INTRAMUSCULAR; INTRAVENOUS; SUBCUTANEOUS
Status: DISPENSED
Start: 2019-03-01

## (undated) RX ORDER — ONDANSETRON 2 MG/ML
INJECTION INTRAMUSCULAR; INTRAVENOUS
Status: DISPENSED
Start: 2019-03-01